# Patient Record
Sex: MALE | Race: WHITE | NOT HISPANIC OR LATINO | Employment: OTHER | URBAN - METROPOLITAN AREA
[De-identification: names, ages, dates, MRNs, and addresses within clinical notes are randomized per-mention and may not be internally consistent; named-entity substitution may affect disease eponyms.]

---

## 2023-02-17 PROBLEM — E11.49 DIABETES MELLITUS TYPE 2 WITH NEUROLOGICAL MANIFESTATIONS (MULTI): Status: ACTIVE | Noted: 2023-02-17

## 2023-02-17 PROBLEM — M51.36 DISC DEGENERATION, LUMBAR: Status: ACTIVE | Noted: 2023-02-17

## 2023-02-17 PROBLEM — M54.9 BACK PAIN: Status: ACTIVE | Noted: 2023-02-17

## 2023-02-17 PROBLEM — E78.5 HYPERLIPIDEMIA: Status: ACTIVE | Noted: 2023-02-17

## 2023-02-17 PROBLEM — N40.0 ENLARGED PROSTATE WITHOUT LOWER URINARY TRACT SYMPTOMS (LUTS): Status: ACTIVE | Noted: 2023-02-17

## 2023-02-17 PROBLEM — I10 HYPERTENSION: Status: ACTIVE | Noted: 2023-02-17

## 2023-02-17 PROBLEM — R35.1 NOCTURIA: Status: ACTIVE | Noted: 2023-02-17

## 2023-02-17 PROBLEM — K21.9 GERD (GASTROESOPHAGEAL REFLUX DISEASE): Status: ACTIVE | Noted: 2023-02-17

## 2023-02-17 PROBLEM — M19.019 DJD OF SHOULDER: Status: ACTIVE | Noted: 2023-02-17

## 2023-02-17 PROBLEM — M17.9 DEGENERATIVE JOINT DISEASE OF KNEE: Status: ACTIVE | Noted: 2023-02-17

## 2023-02-17 PROBLEM — E55.9 MILD VITAMIN D DEFICIENCY: Status: ACTIVE | Noted: 2023-02-17

## 2023-02-17 PROBLEM — E11.29: Status: ACTIVE | Noted: 2023-02-17

## 2023-02-17 PROBLEM — Z85.820 PERSONAL HISTORY OF MALIGNANT MELANOMA OF SKIN: Status: ACTIVE | Noted: 2023-02-17

## 2023-02-17 PROBLEM — M25.512 LEFT SHOULDER PAIN: Status: ACTIVE | Noted: 2023-02-17

## 2023-02-17 PROBLEM — M51.369 DISC DEGENERATION, LUMBAR: Status: ACTIVE | Noted: 2023-02-17

## 2023-02-17 PROBLEM — R31.9 HEMATURIA: Status: ACTIVE | Noted: 2022-12-31

## 2023-02-17 PROBLEM — I47.19 PAROXYSMAL ATRIAL TACHYCARDIA (CMS-HCC): Status: ACTIVE | Noted: 2023-02-17

## 2023-02-17 PROBLEM — N20.0 RENAL CALCULI: Status: ACTIVE | Noted: 2023-02-17

## 2023-02-17 RX ORDER — TAMSULOSIN HYDROCHLORIDE 0.4 MG/1
0.4 CAPSULE ORAL DAILY
COMMUNITY
Start: 2019-03-04 | End: 2023-04-07 | Stop reason: SDUPTHER

## 2023-02-17 RX ORDER — FLECAINIDE ACETATE 100 MG/1
50 TABLET ORAL 2 TIMES DAILY
COMMUNITY
Start: 2012-12-31 | End: 2023-10-05 | Stop reason: ALTCHOICE

## 2023-02-17 RX ORDER — GLYBURIDE 5 MG/1
5 TABLET ORAL 4 TIMES DAILY
COMMUNITY
Start: 2013-04-19 | End: 2023-04-07 | Stop reason: SDUPTHER

## 2023-02-17 RX ORDER — TIZANIDINE 2 MG/1
2 TABLET ORAL EVERY 6 HOURS PRN
COMMUNITY
Start: 2019-09-09 | End: 2023-09-18 | Stop reason: ALTCHOICE

## 2023-02-17 RX ORDER — METFORMIN HYDROCHLORIDE 500 MG/1
TABLET, EXTENDED RELEASE ORAL
COMMUNITY
Start: 2021-06-21 | End: 2023-04-07 | Stop reason: SDUPTHER

## 2023-02-17 RX ORDER — SIMVASTATIN 40 MG/1
40 TABLET, FILM COATED ORAL DAILY
COMMUNITY
End: 2023-04-07 | Stop reason: SDUPTHER

## 2023-02-17 RX ORDER — PIOGLITAZONEHYDROCHLORIDE 45 MG/1
45 TABLET ORAL DAILY
COMMUNITY
End: 2023-04-07 | Stop reason: SDUPTHER

## 2023-02-17 RX ORDER — LOSARTAN POTASSIUM 100 MG/1
1 TABLET ORAL DAILY
COMMUNITY
Start: 2017-09-13 | End: 2023-04-07 | Stop reason: SDUPTHER

## 2023-03-13 ENCOUNTER — TELEPHONE (OUTPATIENT)
Dept: PRIMARY CARE | Facility: CLINIC | Age: 78
End: 2023-03-13
Payer: COMMERCIAL

## 2023-03-13 DIAGNOSIS — E11.22 CONTROLLED TYPE 2 DIABETES MELLITUS WITH STAGE 3 CHRONIC KIDNEY DISEASE, WITHOUT LONG-TERM CURRENT USE OF INSULIN (MULTI): Primary | ICD-10-CM

## 2023-03-13 DIAGNOSIS — N18.30 CONTROLLED TYPE 2 DIABETES MELLITUS WITH STAGE 3 CHRONIC KIDNEY DISEASE, WITHOUT LONG-TERM CURRENT USE OF INSULIN (MULTI): Primary | ICD-10-CM

## 2023-04-04 ENCOUNTER — LAB (OUTPATIENT)
Dept: LAB | Facility: LAB | Age: 78
End: 2023-04-04
Payer: MEDICARE

## 2023-04-04 DIAGNOSIS — E11.22 CONTROLLED TYPE 2 DIABETES MELLITUS WITH STAGE 3 CHRONIC KIDNEY DISEASE, WITHOUT LONG-TERM CURRENT USE OF INSULIN (MULTI): ICD-10-CM

## 2023-04-04 DIAGNOSIS — N18.30 CONTROLLED TYPE 2 DIABETES MELLITUS WITH STAGE 3 CHRONIC KIDNEY DISEASE, WITHOUT LONG-TERM CURRENT USE OF INSULIN (MULTI): ICD-10-CM

## 2023-04-04 DIAGNOSIS — I10 HYPERTENSION, UNSPECIFIED TYPE: ICD-10-CM

## 2023-04-04 LAB
ALANINE AMINOTRANSFERASE (SGPT) (U/L) IN SER/PLAS: 8 U/L (ref 10–52)
ALBUMIN (G/DL) IN SER/PLAS: 4 G/DL (ref 3.4–5)
ALKALINE PHOSPHATASE (U/L) IN SER/PLAS: 65 U/L (ref 33–136)
ANION GAP IN SER/PLAS: 14 MMOL/L (ref 10–20)
ASPARTATE AMINOTRANSFERASE (SGOT) (U/L) IN SER/PLAS: 13 U/L (ref 9–39)
BILIRUBIN TOTAL (MG/DL) IN SER/PLAS: 0.6 MG/DL (ref 0–1.2)
CALCIUM (MG/DL) IN SER/PLAS: 9.6 MG/DL (ref 8.6–10.3)
CARBON DIOXIDE, TOTAL (MMOL/L) IN SER/PLAS: 28 MMOL/L (ref 21–32)
CHLORIDE (MMOL/L) IN SER/PLAS: 104 MMOL/L (ref 98–107)
CREATININE (MG/DL) IN SER/PLAS: 1.17 MG/DL (ref 0.5–1.3)
ESTIMATED AVERAGE GLUCOSE FOR HBA1C: 123 MG/DL
GFR MALE: 64 ML/MIN/1.73M2
GLUCOSE (MG/DL) IN SER/PLAS: 86 MG/DL (ref 74–99)
HEMOGLOBIN A1C/HEMOGLOBIN TOTAL IN BLOOD: 5.9 %
POTASSIUM (MMOL/L) IN SER/PLAS: 4.7 MMOL/L (ref 3.5–5.3)
PROTEIN TOTAL: 7.2 G/DL (ref 6.4–8.2)
SODIUM (MMOL/L) IN SER/PLAS: 141 MMOL/L (ref 136–145)
UREA NITROGEN (MG/DL) IN SER/PLAS: 36 MG/DL (ref 6–23)

## 2023-04-04 PROCEDURE — 80053 COMPREHEN METABOLIC PANEL: CPT

## 2023-04-04 PROCEDURE — 36415 COLL VENOUS BLD VENIPUNCTURE: CPT

## 2023-04-04 PROCEDURE — 83036 HEMOGLOBIN GLYCOSYLATED A1C: CPT

## 2023-04-07 ENCOUNTER — OFFICE VISIT (OUTPATIENT)
Dept: PRIMARY CARE | Facility: CLINIC | Age: 78
End: 2023-04-07
Payer: MEDICARE

## 2023-04-07 VITALS
BODY MASS INDEX: 29.12 KG/M2 | OXYGEN SATURATION: 97 % | HEIGHT: 71 IN | DIASTOLIC BLOOD PRESSURE: 67 MMHG | HEART RATE: 72 BPM | WEIGHT: 208 LBS | SYSTOLIC BLOOD PRESSURE: 129 MMHG

## 2023-04-07 DIAGNOSIS — I10 HYPERTENSION, UNSPECIFIED TYPE: ICD-10-CM

## 2023-04-07 DIAGNOSIS — N20.0 NEPHROLITHIASIS: ICD-10-CM

## 2023-04-07 DIAGNOSIS — I47.19 PAROXYSMAL ATRIAL TACHYCARDIA (CMS-HCC): ICD-10-CM

## 2023-04-07 DIAGNOSIS — N40.0 BENIGN PROSTATIC HYPERPLASIA WITHOUT LOWER URINARY TRACT SYMPTOMS: ICD-10-CM

## 2023-04-07 DIAGNOSIS — E78.5 HYPERLIPIDEMIA, UNSPECIFIED HYPERLIPIDEMIA TYPE: ICD-10-CM

## 2023-04-07 DIAGNOSIS — E11.22 CONTROLLED TYPE 2 DIABETES MELLITUS WITH STAGE 3 CHRONIC KIDNEY DISEASE, WITHOUT LONG-TERM CURRENT USE OF INSULIN (MULTI): ICD-10-CM

## 2023-04-07 DIAGNOSIS — N18.30 CONTROLLED TYPE 2 DIABETES MELLITUS WITH STAGE 3 CHRONIC KIDNEY DISEASE, WITHOUT LONG-TERM CURRENT USE OF INSULIN (MULTI): ICD-10-CM

## 2023-04-07 DIAGNOSIS — Z00.00 MEDICARE ANNUAL WELLNESS VISIT, SUBSEQUENT: Primary | ICD-10-CM

## 2023-04-07 PROCEDURE — 3074F SYST BP LT 130 MM HG: CPT | Performed by: FAMILY MEDICINE

## 2023-04-07 PROCEDURE — 3078F DIAST BP <80 MM HG: CPT | Performed by: FAMILY MEDICINE

## 2023-04-07 PROCEDURE — 1170F FXNL STATUS ASSESSED: CPT | Performed by: FAMILY MEDICINE

## 2023-04-07 PROCEDURE — G0439 PPPS, SUBSEQ VISIT: HCPCS | Performed by: FAMILY MEDICINE

## 2023-04-07 PROCEDURE — 99497 ADVNCD CARE PLAN 30 MIN: CPT | Performed by: FAMILY MEDICINE

## 2023-04-07 PROCEDURE — 99214 OFFICE O/P EST MOD 30 MIN: CPT | Performed by: FAMILY MEDICINE

## 2023-04-07 PROCEDURE — 1036F TOBACCO NON-USER: CPT | Performed by: FAMILY MEDICINE

## 2023-04-07 PROCEDURE — 1160F RVW MEDS BY RX/DR IN RCRD: CPT | Performed by: FAMILY MEDICINE

## 2023-04-07 PROCEDURE — 1157F ADVNC CARE PLAN IN RCRD: CPT | Performed by: FAMILY MEDICINE

## 2023-04-07 PROCEDURE — 1159F MED LIST DOCD IN RCRD: CPT | Performed by: FAMILY MEDICINE

## 2023-04-07 RX ORDER — GLYBURIDE 5 MG/1
10 TABLET ORAL
Qty: 360 TABLET | Refills: 2 | Status: SHIPPED | OUTPATIENT
Start: 2023-04-07 | End: 2023-09-18 | Stop reason: SDUPTHER

## 2023-04-07 RX ORDER — LOSARTAN POTASSIUM 100 MG/1
100 TABLET ORAL DAILY
Qty: 90 TABLET | Refills: 2 | Status: SHIPPED | OUTPATIENT
Start: 2023-04-07 | End: 2023-09-18 | Stop reason: SDUPTHER

## 2023-04-07 RX ORDER — TAMSULOSIN HYDROCHLORIDE 0.4 MG/1
0.4 CAPSULE ORAL DAILY
Qty: 90 CAPSULE | Refills: 2 | Status: SHIPPED | OUTPATIENT
Start: 2023-04-07 | End: 2023-09-18 | Stop reason: SDUPTHER

## 2023-04-07 RX ORDER — PIOGLITAZONEHYDROCHLORIDE 45 MG/1
45 TABLET ORAL DAILY
Qty: 90 TABLET | Refills: 2 | Status: SHIPPED | OUTPATIENT
Start: 2023-04-07 | End: 2023-09-18 | Stop reason: SDUPTHER

## 2023-04-07 RX ORDER — METFORMIN HYDROCHLORIDE 500 MG/1
1000 TABLET, EXTENDED RELEASE ORAL
Qty: 180 TABLET | Refills: 2 | Status: SHIPPED | OUTPATIENT
Start: 2023-04-07 | End: 2023-09-18 | Stop reason: SDUPTHER

## 2023-04-07 RX ORDER — SIMVASTATIN 40 MG/1
40 TABLET, FILM COATED ORAL NIGHTLY
Qty: 90 TABLET | Refills: 2 | Status: SHIPPED | OUTPATIENT
Start: 2023-04-07 | End: 2023-09-18 | Stop reason: SDUPTHER

## 2023-04-07 ASSESSMENT — PATIENT HEALTH QUESTIONNAIRE - PHQ9
2. FEELING DOWN, DEPRESSED OR HOPELESS: NOT AT ALL
SUM OF ALL RESPONSES TO PHQ9 QUESTIONS 1 & 2: 0
1. LITTLE INTEREST OR PLEASURE IN DOING THINGS: NOT AT ALL

## 2023-04-07 ASSESSMENT — ACTIVITIES OF DAILY LIVING (ADL)
TAKING_MEDICATION: INDEPENDENT
DRESSING: INDEPENDENT
DOING_HOUSEWORK: INDEPENDENT
BATHING: INDEPENDENT
GROCERY_SHOPPING: INDEPENDENT
MANAGING_FINANCES: INDEPENDENT

## 2023-04-07 NOTE — PROGRESS NOTES
General Medical Management Note    77 y.o. male presents for Medical Management.  Wife present also  HPI    ER in 1/2023 for kidney stone.  Passed spontaneously.  No residual effects.  Right knee s/p TKA 10 yrs ago.  Golfs all Summer.  Walks daily.  Doing squats now.  Compliant with current medications.  Notes no side effects.  He and his wife are given up sweets for length.  Hemoglobin A1c has significantly improved.    Chronic kidney disease stage IIIa not drinking enough water during the winter but states he drinks more during summer.    Living independently with his wife.  Driving automobile safely.  Rarely uses alcohol.  Non-smoker    Past Medical History:   Diagnosis Date    Low back pain, unspecified 09/25/2017    Acute low back pain    Personal history of other diseases of the circulatory system     Personal history of supraventricular tachycardia    Personal history of other diseases of urinary system 12/31/2022    History of hematuria    Personal history of other endocrine, nutritional and metabolic disease     History of diabetes mellitus      Past Surgical History:   Procedure Laterality Date    CATARACT EXTRACTION  04/07/2017    Cataract Surgery    HAND SURGERY  04/07/2017    Hand Surgery                                                                                                                                                          OTHER SURGICAL HISTORY  04/10/2014    Catheter Ablation Atrial Supraventricular Tachycardia    SHOULDER SURGERY  04/07/2017    Shoulder Surgery    TOTAL HIP ARTHROPLASTY  04/10/2014    Hip Replacement    TOTAL KNEE ARTHROPLASTY  04/10/2014    Knee Replacement     Family History   Problem Relation Name Age of Onset    Other (heart failure following cardiac surgery) Mother        Social History     Socioeconomic History    Marital status:      Spouse name: Not on file    Number of children: Not on file    Years of education: Not on file    Highest education level:  "Not on file   Occupational History    Not on file   Tobacco Use    Smoking status: Not on file    Smokeless tobacco: Not on file   Vaping Use    Vaping status: Not on file   Substance and Sexual Activity    Alcohol use: Not on file    Drug use: Not on file    Sexual activity: Not on file   Other Topics Concern    Not on file   Social History Narrative    Not on file     Social Determinants of Health     Financial Resource Strain: Not on file   Food Insecurity: Not on file   Transportation Needs: Not on file   Physical Activity: Not on file   Stress: Not on file   Social Connections: Not on file   Intimate Partner Violence: Not on file   Housing Stability: Not on file       Current Outpatient Medications on File Prior to Visit   Medication Sig Dispense Refill    flecainide (Tambocor) 100 mg tablet Take 0.5 tablets (50 mg) by mouth in the morning and 0.5 tablets (50 mg) in the evening.      glyBURIDE (Diabeta) 5 mg tablet Take 1 tablet (5 mg) by mouth in the morning and 1 tablet (5 mg) at noon and 1 tablet (5 mg) in the evening and 1 tablet (5 mg) before bedtime.      losartan (Cozaar) 100 mg tablet Take 1 tablet (100 mg) by mouth once daily.      metFORMIN XR (Glucophage-XR) 500 mg 24 hr tablet Take 1 tablet with evening meal for 2 weeks then increase to 2 tab with evening meal      pioglitazone (Actos) 45 mg tablet Take 1 tablet (45 mg) by mouth once daily.      simvastatin (Zocor) 40 mg tablet Take 1 tablet (40 mg) by mouth once daily.      tamsulosin (Flomax) 0.4 mg 24 hr capsule Take 1 capsule (0.4 mg) by mouth once daily.      tiZANidine (Zanaflex) 2 mg tablet Take 1 tablet (2 mg) by mouth every 6 hours if needed.       No current facility-administered medications on file prior to visit.       Allergies   Allergen Reactions    Cat Dander Unknown    Canagliflozin Rash         ROS: Denies chest pain, SOB, Headache, GI problems     Visit Vitals  /67   Pulse 72   Ht 1.803 m (5' 11\")   Wt 94.3 kg (208 lb) "   SpO2 97%   BMI 29.01 kg/m²   Smoking Status Never Assessed   BSA 2.17 m²        PHYSICAL EXAM:  Alert and oriented x3.  Eyes: EOM grossly intact  Neck supple without lymph adenopathy or carotid bruit.  No masses or thyromegaly  Heart regular rate and rhythm without murmur.  Lungs clear to auscultation.  Legs without edema.  Gait is non-antalgic  Speech clear.  Hearing adequate.          DIAGNOSIS/PLAN:  1. Medicare annual wellness visit, subsequent    2. Hypertension, unspecified type  - Comprehensive Metabolic Panel; Future  - losartan (Cozaar) 100 mg tablet; Take 1 tablet (100 mg) by mouth once daily.  Dispense: 90 tablet; Refill: 2    3. Nephrolithiasis  Resolved    4. Controlled type 2 diabetes mellitus with stage 3 chronic kidney disease, without long-term current use of insulin (CMS/[facility])  - glyBURIDE (Diabeta) 5 mg tablet; Take 2 tablets (10 mg) by mouth in the morning and 2 tablets (10 mg) in the evening. Take with meals.  Dispense: 360 tablet; Refill: 2  - metFORMIN XR (Glucophage-XR) 500 mg 24 hr tablet; Take 2 tablets (1,000 mg) by mouth once daily in the evening. Take with meals. Do not crush, chew, or split.  Dispense: 180 tablet; Refill: 2  - pioglitazone (Actos) 45 mg tablet; Take 1 tablet (45 mg) by mouth once daily.  Dispense: 90 tablet; Refill: 2    5. Benign prostatic hyperplasia without lower urinary tract symptoms  - tamsulosin (Flomax) 0.4 mg 24 hr capsule; Take 1 capsule (0.4 mg) by mouth once daily.  Dispense: 90 capsule; Refill: 2    6. Hyperlipidemia, unspecified hyperlipidemia type  - simvastatin (Zocor) 40 mg tablet; Take 1 tablet (40 mg) by mouth once daily at bedtime.  Dispense: 90 tablet; Refill: 2    7. Paroxysmal atrial tachycardia (CMS/HCC)  Follow-up with cardiology        Follow up in 6 months for medical management    I will continue to monitor, evaluate, assess and treat all problems/diagnoses as appropriate and continue to collaborate with specialists.    Contact office or  send a Follow My Health message with any questions or concerns    Encouraged to sign up with my  care  Patient will only be notified of labs that require medical intervention.    Prescriptions will not be filled unless you are compliant with your follow up appointments or have a follow up appointment scheduled as per instruction of your physician. Refills should be requested at the time of your visit.    **Charting was completed using voice recognition technology and may include unintended errors**    Kd Lange DO, DWAYNE  [address] [facility] [address]  [place] [address] [phone]          Kd Lange DO, DWAYNE

## 2023-08-23 DIAGNOSIS — R30.0 DYSURIA: ICD-10-CM

## 2023-08-23 DIAGNOSIS — R53.83 FATIGUE, UNSPECIFIED TYPE: Primary | ICD-10-CM

## 2023-08-23 DIAGNOSIS — E11.9 TYPE 2 DIABETES MELLITUS WITHOUT COMPLICATION, UNSPECIFIED WHETHER LONG TERM INSULIN USE (MULTI): ICD-10-CM

## 2023-08-23 DIAGNOSIS — Z13.220 SCREENING, LIPID: ICD-10-CM

## 2023-08-23 DIAGNOSIS — E55.9 VITAMIN D DEFICIENCY: ICD-10-CM

## 2023-08-23 DIAGNOSIS — E29.1 HYPOGONADISM MALE: ICD-10-CM

## 2023-08-23 DIAGNOSIS — R39.198 SLOWING OF URINARY STREAM: ICD-10-CM

## 2023-08-23 DIAGNOSIS — N40.0 BENIGN PROSTATIC HYPERPLASIA, UNSPECIFIED WHETHER LOWER URINARY TRACT SYMPTOMS PRESENT: ICD-10-CM

## 2023-09-14 ENCOUNTER — LAB (OUTPATIENT)
Dept: LAB | Facility: LAB | Age: 78
End: 2023-09-14
Payer: MEDICARE

## 2023-09-14 DIAGNOSIS — N40.0 BENIGN PROSTATIC HYPERPLASIA, UNSPECIFIED WHETHER LOWER URINARY TRACT SYMPTOMS PRESENT: ICD-10-CM

## 2023-09-14 DIAGNOSIS — Z13.220 SCREENING, LIPID: ICD-10-CM

## 2023-09-14 DIAGNOSIS — E55.9 VITAMIN D DEFICIENCY: ICD-10-CM

## 2023-09-14 DIAGNOSIS — E29.1 HYPOGONADISM MALE: ICD-10-CM

## 2023-09-14 DIAGNOSIS — R53.83 FATIGUE, UNSPECIFIED TYPE: ICD-10-CM

## 2023-09-14 DIAGNOSIS — R39.198 SLOWING OF URINARY STREAM: ICD-10-CM

## 2023-09-14 DIAGNOSIS — R30.0 DYSURIA: ICD-10-CM

## 2023-09-14 DIAGNOSIS — E11.9 TYPE 2 DIABETES MELLITUS WITHOUT COMPLICATION, UNSPECIFIED WHETHER LONG TERM INSULIN USE (MULTI): ICD-10-CM

## 2023-09-14 LAB
ALANINE AMINOTRANSFERASE (SGPT) (U/L) IN SER/PLAS: 8 U/L (ref 10–52)
ALBUMIN (G/DL) IN SER/PLAS: 4.1 G/DL (ref 3.4–5)
ALKALINE PHOSPHATASE (U/L) IN SER/PLAS: 76 U/L (ref 33–136)
ANION GAP IN SER/PLAS: 13 MMOL/L (ref 10–20)
APPEARANCE, URINE: CLEAR
ASPARTATE AMINOTRANSFERASE (SGOT) (U/L) IN SER/PLAS: 15 U/L (ref 9–39)
BILIRUBIN TOTAL (MG/DL) IN SER/PLAS: 0.4 MG/DL (ref 0–1.2)
BILIRUBIN, URINE: NEGATIVE
BLOOD, URINE: ABNORMAL
CALCIDIOL (25 OH VITAMIN D3) (NG/ML) IN SER/PLAS: 48 NG/ML
CALCIUM (MG/DL) IN SER/PLAS: 9.4 MG/DL (ref 8.6–10.3)
CARBON DIOXIDE, TOTAL (MMOL/L) IN SER/PLAS: 26 MMOL/L (ref 21–32)
CHLORIDE (MMOL/L) IN SER/PLAS: 107 MMOL/L (ref 98–107)
CHOLESTEROL (MG/DL) IN SER/PLAS: 113 MG/DL (ref 0–199)
CHOLESTEROL IN HDL (MG/DL) IN SER/PLAS: 45.5 MG/DL
CHOLESTEROL/HDL RATIO: 2.5
COLOR, URINE: YELLOW
CREATININE (MG/DL) IN SER/PLAS: 1.42 MG/DL (ref 0.5–1.3)
ERYTHROCYTE DISTRIBUTION WIDTH (RATIO) BY AUTOMATED COUNT: 14.5 % (ref 11.5–14.5)
ERYTHROCYTE MEAN CORPUSCULAR HEMOGLOBIN CONCENTRATION (G/DL) BY AUTOMATED: 29.5 G/DL (ref 32–36)
ERYTHROCYTE MEAN CORPUSCULAR VOLUME (FL) BY AUTOMATED COUNT: 97 FL (ref 80–100)
ERYTHROCYTES (10*6/UL) IN BLOOD BY AUTOMATED COUNT: 3.88 X10E12/L (ref 4.5–5.9)
ESTIMATED AVERAGE GLUCOSE FOR HBA1C: 128 MG/DL
GFR MALE: 50 ML/MIN/1.73M2
GLUCOSE (MG/DL) IN SER/PLAS: 62 MG/DL (ref 74–99)
GLUCOSE, URINE: NEGATIVE MG/DL
HEMATOCRIT (%) IN BLOOD BY AUTOMATED COUNT: 37.6 % (ref 41–52)
HEMOGLOBIN (G/DL) IN BLOOD: 11.1 G/DL (ref 13.5–17.5)
HEMOGLOBIN A1C/HEMOGLOBIN TOTAL IN BLOOD: 6.1 %
KETONES, URINE: NEGATIVE MG/DL
LDL: 51 MG/DL (ref 0–99)
LEUKOCYTE ESTERASE, URINE: NEGATIVE
LEUKOCYTES (10*3/UL) IN BLOOD BY AUTOMATED COUNT: 8 X10E9/L (ref 4.4–11.3)
MUCUS, URINE: NORMAL /LPF
NITRITE, URINE: NEGATIVE
PH, URINE: 5 (ref 5–8)
PLATELETS (10*3/UL) IN BLOOD AUTOMATED COUNT: 224 X10E9/L (ref 150–450)
POTASSIUM (MMOL/L) IN SER/PLAS: 4.9 MMOL/L (ref 3.5–5.3)
PROSTATE SPECIFIC AG (NG/ML) IN SER/PLAS: 1.18 NG/ML (ref 0–4)
PROTEIN TOTAL: 6.7 G/DL (ref 6.4–8.2)
PROTEIN, URINE: NEGATIVE MG/DL
RBC, URINE: 2 /HPF (ref 0–5)
SODIUM (MMOL/L) IN SER/PLAS: 141 MMOL/L (ref 136–145)
SPECIFIC GRAVITY, URINE: 1.02 (ref 1–1.03)
THYROTROPIN (MIU/L) IN SER/PLAS BY DETECTION LIMIT <= 0.05 MIU/L: 3.2 MIU/L (ref 0.44–3.98)
TRIGLYCERIDE (MG/DL) IN SER/PLAS: 83 MG/DL (ref 0–149)
UREA NITROGEN (MG/DL) IN SER/PLAS: 39 MG/DL (ref 6–23)
UROBILINOGEN, URINE: <2 MG/DL (ref 0–1.9)
VLDL: 17 MG/DL (ref 0–40)
WBC, URINE: 1 /HPF (ref 0–5)

## 2023-09-14 PROCEDURE — 81001 URINALYSIS AUTO W/SCOPE: CPT

## 2023-09-14 PROCEDURE — 83036 HEMOGLOBIN GLYCOSYLATED A1C: CPT

## 2023-09-14 PROCEDURE — 85027 COMPLETE CBC AUTOMATED: CPT

## 2023-09-14 PROCEDURE — 36415 COLL VENOUS BLD VENIPUNCTURE: CPT

## 2023-09-14 PROCEDURE — 80061 LIPID PANEL: CPT

## 2023-09-14 PROCEDURE — 80053 COMPREHEN METABOLIC PANEL: CPT

## 2023-09-14 PROCEDURE — 84443 ASSAY THYROID STIM HORMONE: CPT

## 2023-09-14 PROCEDURE — 82306 VITAMIN D 25 HYDROXY: CPT

## 2023-09-14 PROCEDURE — 84403 ASSAY OF TOTAL TESTOSTERONE: CPT

## 2023-09-14 PROCEDURE — 84153 ASSAY OF PSA TOTAL: CPT

## 2023-09-15 DIAGNOSIS — Z12.11 COLON CANCER SCREENING: Primary | ICD-10-CM

## 2023-09-15 PROBLEM — M19.91 LOCALIZED, PRIMARY OSTEOARTHRITIS: Status: RESOLVED | Noted: 2018-09-06 | Resolved: 2023-09-15

## 2023-09-15 PROBLEM — Z96.651 PRESENCE OF RIGHT ARTIFICIAL KNEE JOINT: Status: RESOLVED | Noted: 2018-01-12 | Resolved: 2023-09-15

## 2023-09-15 PROBLEM — H90.3 SENSORINEURAL HEARING LOSS, BILATERAL: Status: RESOLVED | Noted: 2018-07-23 | Resolved: 2023-09-15

## 2023-09-15 PROBLEM — M75.32 CALCIFIC TENDONITIS OF LEFT SHOULDER: Status: RESOLVED | Noted: 2018-02-08 | Resolved: 2023-09-15

## 2023-09-15 LAB — TESTOSTERONE (NG/DL) IN SER/PLAS: 553 NG/DL (ref 240–1000)

## 2023-09-18 ENCOUNTER — OFFICE VISIT (OUTPATIENT)
Dept: PRIMARY CARE | Facility: CLINIC | Age: 78
End: 2023-09-18
Payer: MEDICARE

## 2023-09-18 VITALS
BODY MASS INDEX: 29.12 KG/M2 | WEIGHT: 208 LBS | OXYGEN SATURATION: 95 % | HEART RATE: 79 BPM | SYSTOLIC BLOOD PRESSURE: 121 MMHG | HEIGHT: 71 IN | DIASTOLIC BLOOD PRESSURE: 73 MMHG

## 2023-09-18 DIAGNOSIS — E78.5 HYPERLIPIDEMIA, UNSPECIFIED HYPERLIPIDEMIA TYPE: ICD-10-CM

## 2023-09-18 DIAGNOSIS — N40.0 BENIGN PROSTATIC HYPERPLASIA WITHOUT LOWER URINARY TRACT SYMPTOMS: ICD-10-CM

## 2023-09-18 DIAGNOSIS — N18.30 CONTROLLED TYPE 2 DIABETES MELLITUS WITH STAGE 3 CHRONIC KIDNEY DISEASE, WITHOUT LONG-TERM CURRENT USE OF INSULIN (MULTI): ICD-10-CM

## 2023-09-18 DIAGNOSIS — E11.22 CONTROLLED TYPE 2 DIABETES MELLITUS WITH STAGE 3 CHRONIC KIDNEY DISEASE, WITHOUT LONG-TERM CURRENT USE OF INSULIN (MULTI): ICD-10-CM

## 2023-09-18 DIAGNOSIS — D64.9 ANEMIA, UNSPECIFIED TYPE: Primary | ICD-10-CM

## 2023-09-18 DIAGNOSIS — I10 HYPERTENSION, UNSPECIFIED TYPE: ICD-10-CM

## 2023-09-18 PROCEDURE — 3074F SYST BP LT 130 MM HG: CPT | Performed by: FAMILY MEDICINE

## 2023-09-18 PROCEDURE — 1159F MED LIST DOCD IN RCRD: CPT | Performed by: FAMILY MEDICINE

## 2023-09-18 PROCEDURE — 99214 OFFICE O/P EST MOD 30 MIN: CPT | Performed by: FAMILY MEDICINE

## 2023-09-18 PROCEDURE — 1036F TOBACCO NON-USER: CPT | Performed by: FAMILY MEDICINE

## 2023-09-18 PROCEDURE — 1160F RVW MEDS BY RX/DR IN RCRD: CPT | Performed by: FAMILY MEDICINE

## 2023-09-18 PROCEDURE — 1157F ADVNC CARE PLAN IN RCRD: CPT | Performed by: FAMILY MEDICINE

## 2023-09-18 PROCEDURE — 3078F DIAST BP <80 MM HG: CPT | Performed by: FAMILY MEDICINE

## 2023-09-18 PROCEDURE — 1126F AMNT PAIN NOTED NONE PRSNT: CPT | Performed by: FAMILY MEDICINE

## 2023-09-18 RX ORDER — GLYBURIDE 5 MG/1
10 TABLET ORAL
Qty: 360 TABLET | Refills: 2 | Status: SHIPPED | OUTPATIENT
Start: 2023-09-18 | End: 2023-09-18 | Stop reason: SDUPTHER

## 2023-09-18 RX ORDER — SIMVASTATIN 40 MG/1
40 TABLET, FILM COATED ORAL NIGHTLY
Qty: 90 TABLET | Refills: 2 | Status: SHIPPED | OUTPATIENT
Start: 2023-09-18 | End: 2024-04-10 | Stop reason: SDUPTHER

## 2023-09-18 RX ORDER — METFORMIN HYDROCHLORIDE 500 MG/1
1000 TABLET, EXTENDED RELEASE ORAL
Qty: 180 TABLET | Refills: 2 | Status: SHIPPED | OUTPATIENT
Start: 2023-09-18 | End: 2024-04-18 | Stop reason: ALTCHOICE

## 2023-09-18 RX ORDER — PIOGLITAZONEHYDROCHLORIDE 45 MG/1
45 TABLET ORAL DAILY
Qty: 90 TABLET | Refills: 2 | Status: SHIPPED | OUTPATIENT
Start: 2023-09-18 | End: 2024-04-10 | Stop reason: SDUPTHER

## 2023-09-18 RX ORDER — TAMSULOSIN HYDROCHLORIDE 0.4 MG/1
0.4 CAPSULE ORAL DAILY
Qty: 90 CAPSULE | Refills: 2 | Status: SHIPPED | OUTPATIENT
Start: 2023-09-18 | End: 2024-04-10 | Stop reason: ALTCHOICE

## 2023-09-18 RX ORDER — LOSARTAN POTASSIUM 100 MG/1
100 TABLET ORAL DAILY
Qty: 90 TABLET | Refills: 2 | Status: SHIPPED | OUTPATIENT
Start: 2023-09-18

## 2023-09-18 RX ORDER — GLYBURIDE 5 MG/1
10 TABLET ORAL
Qty: 360 TABLET | Refills: 2 | Status: SHIPPED | OUTPATIENT
Start: 2023-09-18 | End: 2024-04-10 | Stop reason: SDUPTHER

## 2023-09-18 ASSESSMENT — PATIENT HEALTH QUESTIONNAIRE - PHQ9
2. FEELING DOWN, DEPRESSED OR HOPELESS: NOT AT ALL
1. LITTLE INTEREST OR PLEASURE IN DOING THINGS: NOT AT ALL
SUM OF ALL RESPONSES TO PHQ9 QUESTIONS 1 & 2: 0

## 2023-09-18 NOTE — PATIENT INSTRUCTIONS
Take a multiple vitamin/mineral WITH IRON once daily.    Anemia - do blood test in one month;  consider colonoscopy

## 2023-09-18 NOTE — PROGRESS NOTES
General Medical Management Note    77 y.o. male presents for Medical Management.  Wife present  HPI    BPH w LUTS - Flomax 0.4 daily.  Since starting an eye atb, noticed more frequent urination.     Hematuria and slowly progressive anemia.  Had passed a stone several months ago.  WBC improved but anemia remains progressive.    8 # weight loss over summer.  BMI 29.  Patient states that since his wife became ill earlier this year, she is not cooking fresh foods.  They have been eating more processed foods.  The patient himself does not shop for food or cook food.    Patient remains active by golfing during the summer.    Recent lab testing reveals a slowly progressive anemia.  Patient is resistant to undergoing colonoscopy which has been ordered after labs were reviewed.    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    Localized, primary osteoarthritis 09/06/2018    Low back pain, unspecified 09/25/2017    Acute low back pain    Malignant melanoma of skin of trunk, except scrotum (CMS/HCC) 07/23/2009    Personal history of other diseases of the circulatory system     Personal history of supraventricular tachycardia    Personal history of other diseases of urinary system 12/31/2022    History of hematuria    Personal history of other endocrine, nutritional and metabolic disease     History of diabetes mellitus    Polyneuropathy 04/05/2010    Presence of right artificial knee joint 01/12/2018    Sensorineural hearing loss, bilateral 07/23/2018      Past Surgical History:   Procedure Laterality Date    CATARACT EXTRACTION  04/07/2017    Cataract Surgery    HAND SURGERY  04/07/2017    Hand Surgery                                                                                                                                                           OTHER SURGICAL HISTORY  04/10/2014    Catheter Ablation Atrial Supraventricular Tachycardia    SHOULDER SURGERY  04/07/2017    Shoulder Surgery    TOTAL HIP ARTHROPLASTY  04/10/2014    Hip Replacement    TOTAL KNEE ARTHROPLASTY  04/10/2014    Knee Replacement     Family History   Problem Relation Name Age of Onset    Other (heart failure following cardiac surgery) Mother        Social History     Socioeconomic History    Marital status:      Spouse name: Not on file    Number of children: Not on file    Years of education: Not on file    Highest education level: Not on file   Occupational History    Not on file   Tobacco Use    Smoking status: Never    Smokeless tobacco: Never   Substance and Sexual Activity    Alcohol use: Yes     Alcohol/week: 2.0 standard drinks of alcohol     Types: 2 Cans of beer per week    Drug use: Not on file    Sexual activity: Not on file   Other Topics Concern    Not on file   Social History Narrative    Not on file     Social Determinants of Health     Financial Resource Strain: Not on file   Food Insecurity: Not on file   Transportation Needs: Not on file   Physical Activity: Not on file   Stress: Not on file   Social Connections: Not on file   Intimate Partner Violence: Not on file   Housing Stability: Not on file       Current Outpatient Medications on File Prior to Visit   Medication Sig Dispense Refill    flecainide (Tambocor) 100 mg tablet Take 0.5 tablets (50 mg) by mouth twice a day.      glyBURIDE (Diabeta) 5 mg tablet Take 2 tablets (10 mg) by mouth in the morning and 2 tablets (10 mg) in the evening. Take with meals. 360 tablet 2    losartan (Cozaar) 100 mg tablet Take 1 tablet (100 mg) by mouth once daily. 90 tablet 2    metFORMIN XR (Glucophage-XR) 500 mg 24 hr tablet Take 2 tablets (1,000 mg) by mouth once daily in the evening. Take with meals. Do not crush, chew, or split. 180 tablet 2    pioglitazone (Actos) 45 mg tablet Take 1 tablet (45 mg) by mouth once daily.  "90 tablet 2    simvastatin (Zocor) 40 mg tablet Take 1 tablet (40 mg) by mouth once daily at bedtime. 90 tablet 2    tamsulosin (Flomax) 0.4 mg 24 hr capsule Take 1 capsule (0.4 mg) by mouth once daily. 90 capsule 2    [DISCONTINUED] tiZANidine (Zanaflex) 2 mg tablet Take 1 tablet (2 mg) by mouth every 6 hours if needed.       No current facility-administered medications on file prior to visit.       Allergies   Allergen Reactions    Cat Dander Unknown    Canagliflozin Rash         ROS: Denies chest pain, SOB, Headache, GI problems     Visit Vitals  /73   Pulse 79   Ht 1.791 m (5' 10.5\")   Wt 94.3 kg (208 lb)   SpO2 95%   BMI 29.42 kg/m²   Smoking Status Never   BSA 2.17 m²        PHYSICAL EXAM:  Alert and oriented x3.  Eyes: EOM grossly intact  Neck supple without lymph adenopathy or carotid bruit.  No masses or thyromegaly  Heart regular rate and rhythm without murmur.  Lungs clear to auscultation.  Legs without edema.  Gait is non-antalgic  Speech clear.  Hearing adequate.          DIAGNOSIS/PLAN:    1. Anemia, unspecified type  -Will repeat CBC in 1 month.  If blood count continues to decrease,  will have colonoscopy.  Will also start taking a multiple vitamin with iron daily.  Encourage patient to eat more fresh foods  - CBC; Future    2. Controlled type 2 diabetes mellitus with stage 3 chronic kidney disease, without long-term current use of insulin (CMS/[facility])  - metFORMIN  mg 24 hr tablet; Take 2 tablets (1,000 mg) by mouth once daily in the evening. Take with meals. Do not crush, chew, or split.  Dispense: 180 tablet; Refill: 2  - pioglitazone (Actos) 45 mg tablet; Take 1 tablet (45 mg) by mouth once daily.  Dispense: 90 tablet; Refill: 2  - glyBURIDE (Diabeta) 5 mg tablet; Take 2 tablets (10 mg) by mouth 2 times a day with meals.  Dispense: 360 tablet; Refill: 2    3. Hypertension, unspecified type  - losartan (Cozaar) 100 mg tablet; Take 1 tablet (100 mg) by mouth once daily.  Dispense: 90 " tablet; Refill: 2    4. Hyperlipidemia, unspecified hyperlipidemia type  - simvastatin (Zocor) 40 mg tablet; Take 1 tablet (40 mg) by mouth once daily at bedtime.  Dispense: 90 tablet; Refill: 2    5. Benign prostatic hyperplasia without lower urinary tract symptoms  -Anticipate decreased nocturia once complete antibiotic.  If symptom persists, will refer to urology as patient has history of kidney stones and BPH  - tamsulosin (Flomax) 0.4 mg 24 hr capsule; Take 1 capsule (0.4 mg) by mouth once daily.  Dispense: 90 capsule; Refill: 2        Return to office in 6 months for comprehensive medical evaluation, long-term medication use monitoring, and preventative services screening    We will continue to monitor, evaluate, assess and treat all problems/diagnoses as appropriate and continue to collaborate with specialists.    [facility]our[facility] to [facility] with [facility]    Contact office or send a  [employer] message with any questions or concerns    Patient will only be notified of labs that require medical intervention.    Prescriptions will not be filled unless you are compliant with your follow up appointments or have a follow up appointment scheduled as per instruction of your physician. Refills should be requested at the time of your visit.    **Charting was completed using voice recognition technology and may include unintended errors**    Kd Lange DO, DWAYNE  [address] [facility] [address]  [place] [address] [phone]        Kd Lange DO, DWAYNE

## 2023-10-05 ENCOUNTER — OFFICE VISIT (OUTPATIENT)
Dept: PRIMARY CARE | Facility: CLINIC | Age: 78
End: 2023-10-05
Payer: MEDICARE

## 2023-10-05 VITALS
HEIGHT: 71 IN | OXYGEN SATURATION: 96 % | HEART RATE: 74 BPM | WEIGHT: 210 LBS | SYSTOLIC BLOOD PRESSURE: 128 MMHG | DIASTOLIC BLOOD PRESSURE: 72 MMHG | BODY MASS INDEX: 29.4 KG/M2

## 2023-10-05 DIAGNOSIS — K14.9 TONGUE IRRITATION: Primary | ICD-10-CM

## 2023-10-05 PROCEDURE — 1159F MED LIST DOCD IN RCRD: CPT | Performed by: NURSE PRACTITIONER

## 2023-10-05 PROCEDURE — 1160F RVW MEDS BY RX/DR IN RCRD: CPT | Performed by: NURSE PRACTITIONER

## 2023-10-05 PROCEDURE — 1126F AMNT PAIN NOTED NONE PRSNT: CPT | Performed by: NURSE PRACTITIONER

## 2023-10-05 PROCEDURE — 3074F SYST BP LT 130 MM HG: CPT | Performed by: NURSE PRACTITIONER

## 2023-10-05 PROCEDURE — 99213 OFFICE O/P EST LOW 20 MIN: CPT | Performed by: NURSE PRACTITIONER

## 2023-10-05 PROCEDURE — 3078F DIAST BP <80 MM HG: CPT | Performed by: NURSE PRACTITIONER

## 2023-10-05 PROCEDURE — 1036F TOBACCO NON-USER: CPT | Performed by: NURSE PRACTITIONER

## 2023-10-05 RX ORDER — NEOMYCIN SULFATE, POLYMYXIN B SULFATE AND DEXAMETHASONE 3.5; 10000; 1 MG/ML; [USP'U]/ML; MG/ML
SUSPENSION/ DROPS OPHTHALMIC 2 TIMES DAILY
COMMUNITY
Start: 2023-09-21

## 2023-10-05 RX ORDER — NEOMYCIN SULFATE, POLYMYXIN B SULFATE, AND DEXAMETHASONE 3.5; 10000; 1 MG/G; [USP'U]/G; MG/G
OINTMENT OPHTHALMIC 2 TIMES DAILY
COMMUNITY
Start: 2023-09-21

## 2023-10-05 NOTE — PROGRESS NOTES
"Subjective   Patient ID: Kevin Mendes is a 77 y.o. male who presents for Throat Pain.    HPI     Pt states 3 weeks ago he was ordered very large antibiotics to tx his eye infection.   He states that 1 week ago one of the pills became stuck between the right side of his tongue and his cheek. Said it was \"way back in his mouth\"  States that the pill started to dissolve because the taste was terrible.   He denies pain.   States he just knows its there-when he swallows it feels like a speed bump.   Denies difficulty swelling or SOB.   He did complete the entire antibiotic.     Only reason he came in was because his brother had throat/tongue cancer in the past.     He is a former smoker    Review of Systems    Objective   Visit Vitals  /72   Pulse 74   Ht 1.803 m (5' 11\")   Wt 95.3 kg (210 lb)   SpO2 96%   BMI 29.29 kg/m²   Smoking Status Never   BSA 2.18 m²         Physical Exam    Alert and oriented x 3  Appears healthy  Does not appear/sound dyspneic with conversation  Speech clear.  .  Right lateral tongue closer to the tongue base noted to have a dime size mildly erythematous and mildly edematous area (area looks mildly irritated)       Assessment/Plan     1. Tongue irritation  Discussed with patient that he should continue to monitor area closely. If the area does not continue to improve, he should contact his dentist.   Try to avoid irritating foods/drinks  Apply Anbesol if needed.       Contact office with any questions or concerns.   Preferred communication is via  [employer]  Please contact [email or web address] if having issues with  [facility]    Radha GRAVES-[facility]  [address], [address]  [place] [address]  Phone: [phone]    **Charting was completed using voice recognition technology and may include unintended errors**           "

## 2023-10-11 ENCOUNTER — OFFICE VISIT (OUTPATIENT)
Dept: OTOLARYNGOLOGY | Facility: CLINIC | Age: 78
End: 2023-10-11
Payer: MEDICARE

## 2023-10-11 VITALS
DIASTOLIC BLOOD PRESSURE: 58 MMHG | TEMPERATURE: 97.8 F | BODY MASS INDEX: 30.24 KG/M2 | WEIGHT: 211.2 LBS | HEIGHT: 70 IN | SYSTOLIC BLOOD PRESSURE: 127 MMHG

## 2023-10-11 DIAGNOSIS — R22.1 MASS OF LEFT SIDE OF NECK: ICD-10-CM

## 2023-10-11 DIAGNOSIS — K14.8 TONGUE MASS: Primary | ICD-10-CM

## 2023-10-11 PROCEDURE — 99204 OFFICE O/P NEW MOD 45 MIN: CPT | Performed by: OTOLARYNGOLOGY

## 2023-10-11 PROCEDURE — 3078F DIAST BP <80 MM HG: CPT | Performed by: OTOLARYNGOLOGY

## 2023-10-11 PROCEDURE — 31575 DIAGNOSTIC LARYNGOSCOPY: CPT | Performed by: OTOLARYNGOLOGY

## 2023-10-11 PROCEDURE — 1036F TOBACCO NON-USER: CPT | Performed by: OTOLARYNGOLOGY

## 2023-10-11 PROCEDURE — 1160F RVW MEDS BY RX/DR IN RCRD: CPT | Performed by: OTOLARYNGOLOGY

## 2023-10-11 PROCEDURE — 1159F MED LIST DOCD IN RCRD: CPT | Performed by: OTOLARYNGOLOGY

## 2023-10-11 PROCEDURE — 3074F SYST BP LT 130 MM HG: CPT | Performed by: OTOLARYNGOLOGY

## 2023-10-11 PROCEDURE — 1126F AMNT PAIN NOTED NONE PRSNT: CPT | Performed by: OTOLARYNGOLOGY

## 2023-10-11 RX ORDER — ASPIRIN 81 MG/1
81 TABLET ORAL DAILY
COMMUNITY

## 2023-10-11 NOTE — PROGRESS NOTES
Impression:  1. Tongue mass  CT soft tissue neck w IV contrast      2. Mass of left side of neck  CT soft tissue neck w IV contrast         RECOMMENDATIONS/PLAN :  I explained to the patient that he has a mass along his right tongue base that is approaching the midline.  This could be coming from his base of tongue or from his right tonsillar region.  He also has a left neck mass that could either be lymphadenopathy or a lipoma.  We will set him up with a CT scan with contrast to look at both of these areas.  I would also like to refer him to one of my head and neck colleagues, Dr. Kalin Garcia in consultation for his right base of tongue mass.      **This electronic medical record note was created with the use of voice recognition software.  Despite proofreading, typographical or grammatical errors may be present that could affect meaning of content **    Subjective   Patient ID:     Kevin Mendes is a 77 y.o. male who presents to the office today stating that he recently was swallowing some medication and he felt like it got stuck along the right base of his tongue.  He has noticed a slight fullness to the area.  He denies any hemoptysis or hematemesis.  He has not had any secondary symptoms such as night sweats weight loss or fatigue.  He is still able to eat and drink without any pain or discomfort.  He denies spitting up any blood.  In the past he used to smoke cigars and drink alcohol frequently however he quit both of these approximately 25 years ago.  He also has some swelling along his left neck that has been there for several years.  He denies any fixation to the overlying skin or any pain or discomfort.    ROS:  A detailed 12 system review of systems is noted on the intake form has been reviewed with the patient with details noted in the HPI and scanned into the patient's medical record.    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    Localized, primary osteoarthritis 09/06/2018    Low back pain, unspecified 09/25/2017    Acute low back pain    Malignant melanoma of skin of trunk, except scrotum (CMS/[facility]) 07/23/2009    Personal history of other diseases of the circulatory system     Personal history of supraventricular tachycardia    Personal history of other diseases of urinary system 12/31/2022    History of hematuria    Personal history of other endocrine, nutritional and metabolic disease     History of diabetes mellitus    Polyneuropathy 04/05/2010    Presence of right artificial knee joint 01/12/2018    Sensorineural hearing loss, bilateral 07/23/2018        Past Surgical History:   Procedure Laterality Date    CATARACT EXTRACTION  04/07/2017    Cataract Surgery    HAND SURGERY  04/07/2017    Hand Surgery                                                                                                                                                          OTHER SURGICAL HISTORY  04/10/2014    Catheter Ablation Atrial Supraventricular Tachycardia    SHOULDER SURGERY  04/07/2017    Shoulder Surgery    TOTAL HIP ARTHROPLASTY  04/10/2014    Hip Replacement    TOTAL KNEE ARTHROPLASTY  04/10/2014    Knee Replacement        Allergies   Allergen Reactions    Cat Dander Unknown    Canagliflozin Rash          Current Outpatient Medications:     aspirin 81 mg EC tablet, Take 1 tablet (81 mg) by mouth once daily., Disp: , Rfl:     glyBURIDE (Diabeta) 5 mg tablet, Take 2 tablets (10 mg) by mouth 2 times a day with meals., Disp: 360 tablet, Rfl: 2    losartan (Cozaar) 100 mg tablet, Take 1 tablet (100 mg) by mouth once daily., Disp: 90 tablet, Rfl: 2    metFORMIN  mg 24 hr tablet, Take 2 tablets (1,000 mg) by mouth once daily in the evening. Take with meals. Do not crush, chew, or split., Disp: 180 tablet, Rfl: 2     "neomycin-polymyxin B-dexameth (Polydex) 3.5 mg/g-10,000 unit/g-0.1 % ointment ophthalmic ointment, apply to all 4 eyelids four times a day, Disp: , Rfl:     neomycin-polymyxin-dexAMETHasone (Maxitrol) 3.5mg/mL-10,000 unit/mL-0.1 % ophthalmic suspension, Instill 2 (TWO) drops in each eye four times a day, Disp: , Rfl:     pioglitazone (Actos) 45 mg tablet, Take 1 tablet (45 mg) by mouth once daily., Disp: 90 tablet, Rfl: 2    simvastatin (Zocor) 40 mg tablet, Take 1 tablet (40 mg) by mouth once daily at bedtime., Disp: 90 tablet, Rfl: 2    tamsulosin (Flomax) 0.4 mg 24 hr capsule, Take 1 capsule (0.4 mg) by mouth once daily., Disp: 90 capsule, Rfl: 2     Tobacco Use: Low Risk  (10/11/2023)    Patient History     Smoking Tobacco Use: Never     Smokeless Tobacco Use: Never     Passive Exposure: Not on file        Alcohol Use: Not on file        Social History     Substance and Sexual Activity   Drug Use Not on file        Physical Exam:  Visit Vitals  /58   Temp 36.6 °C (97.8 °F) (Temporal)   Ht 1.778 m (5' 10\")   Wt 95.8 kg (211 lb 3.2 oz)   BMI 30.30 kg/m²   Smoking Status Never   BSA 2.18 m²      General: Patient is alert, oriented, cooperative in no apparent distress.  Head: Normocephalic, atraumatic.  Eyes: PERRL, EOMI, Conjunctiva is clear. No nystagmus.  Ears: Right Ear-- Pinna is normal.  External auditory canal is patent, no lesions. Tympanic membrane is [intact, translucent and has good mobility with my pneumatic otoscope. No effusion].  Mastoid is nontender.  Left ear-- Pinna is normal.  External auditory canal is patent, no lesions.  Tympanic membrane is [intact, translucent and has good mobility with my pneumatic otoscope.  No effusion].  Mastoid is nontender.  Nose: Septum is relatively straight.  No septal perforation or lesions. No septal hematoma/ seroma.  No signs of bleeding.  Inferior turbinates are fairly normal.   No evidence of intranasal polyps.    Throat:  Floor of mouth is clear, no " masses.  Along his right base of tongue and right tonsillar region he does have an exophytic mass that appears like tonsil tissue.  Left side is clear.  Gums, gingiva, buccal mucosa appear pink and moist, no lesions. Teeth are in fair repair.  No obvious dental infections.  Peritonsillar regions appear symmetric without swelling.  Hard and soft palate appear normal, no obvious cleft. Uvula is midline.  Tonsils were previously removed surgically however he does have a mass along the right tongue base and right tonsillar region  Oropharynx: No lesions. Retropharyngeal wall is flat.  No active postnasal drip.  Neck: He does have a mobile mass at level 3/4 along his left neck.  This could represent lymphadenopathy versus a lipoma.  It is firm but nontender.  No fixation to the overlying skin.  Salivary Glands: Symmetric bilaterally.  No palpable masses.  No evidence of acute infection or salivary stones  Neurologic: Cranial Nerves 2-12 are grossly intact without focal deficits. Cerebellar function testing is normal.     Results:   []  Procedure:   Pre Op Diagnosis: Right base of tongue mass-rule out other upper airway lesions  Post Op Diagnosis: Same  Procedure: Flexible Nasopharyngolaryngoscopy  Surgeon: Federico Holly DO  Assist: None  Anesthesia: Topical Lidocaine 4%/ 0.05% Afrin 1:1 mixture  EBL: None  Complications: None  Disposition: The patient tolerated the procedure well. There were no complications.    Procedure:  After informed consent was obtained with the risks, benefits, complications and alternatives explained to the patient/ guardian, the patient was sat up in the ENT chair and the nose was anesthetized and decongested with topical  4% lidocaine and 0.05% Afrin. After allowing this to take effect, the flexible nasopharyngoscope was advanced thru the nostrils and down to the larynx. The following areas were visualized:  The nasal passages, septum, nasopharynx, sinus ostia, base of tongue, epiglottis, true  and false vocal folds, arytenoids, post cricoid region and subglottis were all examined and found to be normal except as follows:    Septum is relatively straight.  His ostiomeatal complexes are clear bilaterally.  No pus polyps or lesions.  Nasopharynx is clear.  Along his right tongue base and right tonsillar region he does have a mass that appears similar to tonsillar tissue.  This does extend towards the midline and it extends down towards his right vallecula.  Epiglottis otherwise appears fairly normal.  No evidence of any lingual tonsil hypertrophy on the left side.  No active bleeding.  No signs of inflammation or infection.  True and false vocal cords are approximating equally bilaterally.  No nodules polyps or lesions.  Arytenoids show mild edema consistent with laryngopharyngeal reflux.  Subglottis is clear.  Piriform sinuses are clear.      The patient tolerated the procedure well and there were no complications.      Federico Holly DO

## 2023-10-17 ENCOUNTER — LAB (OUTPATIENT)
Dept: LAB | Facility: LAB | Age: 78
End: 2023-10-17
Payer: MEDICARE

## 2023-10-17 DIAGNOSIS — D64.9 ANEMIA, UNSPECIFIED TYPE: ICD-10-CM

## 2023-10-17 LAB
ERYTHROCYTE [DISTWIDTH] IN BLOOD BY AUTOMATED COUNT: 14 % (ref 11.5–14.5)
HCT VFR BLD AUTO: 37.5 % (ref 41–52)
HGB BLD-MCNC: 11.3 G/DL (ref 13.5–17.5)
MCH RBC QN AUTO: 29.5 PG (ref 26–34)
MCHC RBC AUTO-ENTMCNC: 30.1 G/DL (ref 32–36)
MCV RBC AUTO: 98 FL (ref 80–100)
NRBC BLD-RTO: 0 /100 WBCS (ref 0–0)
PLATELET # BLD AUTO: 236 X10*3/UL (ref 150–450)
PMV BLD AUTO: 10 FL (ref 7.5–11.5)
RBC # BLD AUTO: 3.83 X10*6/UL (ref 4.5–5.9)
WBC # BLD AUTO: 8.8 X10*3/UL (ref 4.4–11.3)

## 2023-10-17 PROCEDURE — 85027 COMPLETE CBC AUTOMATED: CPT

## 2023-10-17 PROCEDURE — 36415 COLL VENOUS BLD VENIPUNCTURE: CPT

## 2023-10-18 ENCOUNTER — TELEPHONE (OUTPATIENT)
Dept: PRIMARY CARE | Facility: CLINIC | Age: 78
End: 2023-10-18
Payer: MEDICARE

## 2023-10-18 NOTE — TELEPHONE ENCOUNTER
Pt's wife is calling and is very concerned with his test results. She is asking if you can give her a call she is very concerned that he is bleeding internally and doesn't want to wait if he is. She is asking if you can give her a call at [phone]?

## 2023-10-20 ENCOUNTER — HOSPITAL ENCOUNTER (OUTPATIENT)
Dept: RADIOLOGY | Facility: HOSPITAL | Age: 78
Discharge: HOME | End: 2023-10-20
Payer: MEDICARE

## 2023-10-20 DIAGNOSIS — R22.1 MASS OF LEFT SIDE OF NECK: ICD-10-CM

## 2023-10-20 DIAGNOSIS — K14.8 TONGUE MASS: ICD-10-CM

## 2023-10-20 PROCEDURE — 70491 CT SOFT TISSUE NECK W/DYE: CPT | Performed by: RADIOLOGY

## 2023-10-20 PROCEDURE — 70491 CT SOFT TISSUE NECK W/DYE: CPT | Mod: MG

## 2023-10-20 PROCEDURE — 2550000001 HC RX 255 CONTRASTS: Performed by: OTOLARYNGOLOGY

## 2023-10-20 RX ADMIN — IOHEXOL 70 ML: 350 INJECTION, SOLUTION INTRAVENOUS at 16:20

## 2023-10-30 ENCOUNTER — OFFICE VISIT (OUTPATIENT)
Dept: OTOLARYNGOLOGY | Facility: CLINIC | Age: 78
End: 2023-10-30
Payer: MEDICARE

## 2023-10-30 VITALS
WEIGHT: 210 LBS | TEMPERATURE: 98.6 F | DIASTOLIC BLOOD PRESSURE: 70 MMHG | HEIGHT: 70 IN | BODY MASS INDEX: 30.06 KG/M2 | SYSTOLIC BLOOD PRESSURE: 115 MMHG | HEART RATE: 76 BPM

## 2023-10-30 DIAGNOSIS — C01 MALIGNANT NEOPLASM OF BASE OF TONGUE (MULTI): Primary | ICD-10-CM

## 2023-10-30 PROCEDURE — 3078F DIAST BP <80 MM HG: CPT | Performed by: OTOLARYNGOLOGY

## 2023-10-30 PROCEDURE — 1159F MED LIST DOCD IN RCRD: CPT | Performed by: OTOLARYNGOLOGY

## 2023-10-30 PROCEDURE — 1126F AMNT PAIN NOTED NONE PRSNT: CPT | Performed by: OTOLARYNGOLOGY

## 2023-10-30 PROCEDURE — 3074F SYST BP LT 130 MM HG: CPT | Performed by: OTOLARYNGOLOGY

## 2023-10-30 PROCEDURE — 31575 DIAGNOSTIC LARYNGOSCOPY: CPT | Performed by: OTOLARYNGOLOGY

## 2023-10-30 PROCEDURE — 1036F TOBACCO NON-USER: CPT | Performed by: OTOLARYNGOLOGY

## 2023-10-30 PROCEDURE — 99204 OFFICE O/P NEW MOD 45 MIN: CPT | Performed by: OTOLARYNGOLOGY

## 2023-10-30 PROCEDURE — 1160F RVW MEDS BY RX/DR IN RCRD: CPT | Performed by: OTOLARYNGOLOGY

## 2023-10-30 ASSESSMENT — PATIENT HEALTH QUESTIONNAIRE - PHQ9: 2. FEELING DOWN, DEPRESSED OR HOPELESS: NOT AT ALL

## 2023-10-30 NOTE — PROGRESS NOTES
ENT Outpatient Consultation    Chief Complaint: Base of tongue mass  History Of Present Illness  Katherine Mendes is a 78 y.o. male referred by Dr. Holly for evaluation of a base of tongue mass.  Patient states that he developed a globus type sensation a little over 1 month ago.  This prompted his evaluation with Dr. Holly who noted a oropharyngeal mass.  A CT scan was obtained showing an exophytic mass in the oropharynx that appears to be coming from the base of tongue.  There is some invasion into the deeper aspect of the base of tongue and the mass extends out laterally towards the carotid.  He has a history of smoking and a lung nodule was also noted on his CT scan.  He is still tolerating p.o. intake.     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    Surgical History  He has a past surgical history that includes Total hip arthroplasty (04/10/2014); Total knee arthroplasty (04/10/2014); Other surgical history (04/10/2014); Hand surgery (04/07/2017); Cataract extraction (04/07/2017); and Shoulder surgery (04/07/2017).     Social History  He reports that he has never smoked. He has never used smokeless tobacco. He reports current alcohol use of about 2.0 standard drinks of alcohol per week. No history on file for drug use.    Family  History  Family History   Problem Relation Name Age of Onset    Other (heart failure following cardiac surgery) Mother          Allergies  Cat dander and Canagliflozin     Physical Exam:  CONSTITUTIONAL:  No acute distress  VOICE:  No hoarseness or other abnormality  RESPIRATION:  Breathing comfortably, no stridor  CV:  No clubbing/cyanosis/edema in hands  EYES:  EOM intact, sclera normal  NEURO:  Alert and oriented times 3, Cranial nerves II-XII grossly intact and symmetric bilaterally  HEAD AND FACE:  Symmetric facial features, no masses or lesions, sinuses non-tender to palpation  SALIVARY GLANDS:  Parotid and submandibular glands normal bilaterally  EARS:  Normal external ears, external auditory canals, and TMs to otoscopy, normal hearing to whispered voice.  NOSE:  External nose midline, anterior rhinoscopy is normal with limited visualization to the anterior aspect of the interior turbinates, no bleeding or drainage, no lesions  ORAL CAVITY/OROPHARYNX/LIPS: An exophytic friable mass can be visualized in the right oropharynx.  The remainder of the oral cavity has normal mucosa  PHARYNGEAL WALLS: Right oropharyngeal mass  NECK/LYMPH: Some mildly enlarged LAD, no thyroid masses, trachea midline.  There is a lipomatous lesion in the left inferior neck near the sternocleidomastoid  SKIN:  Neck skin is without scar or injury  PSYCH:  Alert and oriented with appropriate mood and affect    Procedure Note: Flexible Nasolaryngoscopy  Verbal informed consent was obtained from the patient. 4% lidocaine mixed with phenylephrine was prepared and dripped into the nose. It was placed in the right naris. Following an appropriate amount of time to allow for adequate anesthesia, a flexible fiberoptic nasolaryngoscope was placed into the patient's right naris. The nasal cavity, nasopharynx, oropharynx, hypopharynx, and all endolaryngeal structures were visualized and were normal except as listed below. Significant findings  included:  -exophytic mass in right oropharynx, airway patent, vocal cords mobile         CT soft tissue neck w IV contrast    Result Date: 10/23/2023  Interpreted By:  Antwon Chin,  and Rossi Alcocer STUDY: CT SOFT TISSUE NECK W IV CONTRAST;  10/20/2023 4:38 pm   INDICATION: Signs/Symptoms:Right base of tongue mass and left neck mass.   COMPARISON: Cervical spine radiograph 03/18/2014   ACCESSION NUMBER(S): [id number]   ORDERING CLINICIAN: GUILHERME GIBBS   TECHNIQUE: Axial CT images of the neck were obtained.  The patient received 70 mL Omnipaque 350 intravenous contrast agent. The images were reformatted in angled axial, coronal and sagittal planes.   FINDINGS: Oral Cavity, Pharynx and Larynx:  Evaluation oral cavity is limited by streak artifact from dental hardware.  The nasopharyngeal are unremarkable. There is a 3.3 x 2.5 x 2.5 cm enhancing mass centered at the base of the right aspect of the tongue extending superiorly to the right aspect of the soft palate and inferiorly along the right lateral pharyngeal wall towards the right piriformis sinus. There is loss of the adjacent parapharyngeal fat plane.   The laryngeal structures are unremarkable.   Lymph nodes: No lymphadenopathy.   The left neck has a 1.7 x 5.1 x 4.8 cm circumscribed benign-appearing lipoma extending between the sternocleidomastoid muscle and platysma/superficial cervical fascia.   Neck vessels: Bilateral neck vessels are normal in course and caliber and appear patent. There are moderate right-greater-than-left carotid bulb calcifications.   Thyroid gland: There are multiple hypodense nodules within the thyroid gland measuring up to 1.3 cm at the inferior left thyroid lobe.   Parotid and submandibular glands: Bilateral parotid and submandibular glands are unremarkable in appearance.   Paranasal Sinuses and Mastoids: Moderately sized mucous retention cyst within the right maxillary sinus. The remainder of the visualized paranasal sinuses  and bilateral mastoids are clear.   Visualized orbital structures are unremarkable.   There is a small amount of debris within the right external auditory canal, likely representing cerumen.   There is a 9 mm spiculated nodule in the superior left lower lobe. Additional smaller tree-in-bud nodular opacities are noted in the superior left lower lobe.   Multilevel degenerative changes of the cervical spine, most prominently at C4-C5 where posterior disc osteophyte complex, uncovertebral and facet joint arthropathy results in severe right and moderate left neural foraminal narrowing and mild central canal stenosis.       1. The right tongue base has a 3.3 x 2.5 x 2.5 cm invasive/exophytic tumor extending to the right tonsil/lateral pharyngeal wall. 2. The superior segment of the left lower lobe has a 9 mm spiculated nodule with surrounding tiny nodular tree-in-bud opacities suspicious for metastatic disease. 3. Benign-appearing lipoma of left neck between sternocleidomastoid muscle and platysma/superficial cervical fascia. 4. Multiple hypodense nodules of the thyroid gland measuring up to 1.3 cm in the left thyroid lobe. Further evaluation with thyroid ultrasound is recommended. 5. Multilevel degenerative changes of the cervical spine, most prominently at C4-C5 with severe right and moderate left neural foraminal narrowing and mild central canal stenosis.   I personally reviewed the images/study and I agree with the findings as stated. This study was interpreted at [facility], [place], [place].   MACRO: None   Signed by: Antwon Chin 10/23/2023 1:06 PM Dictation workstation:   [id number]      Assessment and Plan  78 y.o. 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  Patient does have a history of smoking and there is also a suspicious appearing lung nodule on his CT scan.  Today we discussed the likely diagnosis including  different indications for surgery, radiation, chemotherapy.  Based on the appearance of his CT scan I suspect he will likely be treated with chemoradiation as the mass does not seem amenable to transoral resection.  We discussed direct laryngoscopy with biopsy and also placement of a PEG tube.  Advised him that I would still evaluate for potential surgical treatment at the time of the biopsy.  Also advised him to get evaluation by his dentist to see if he requires any dental extractions.  Consent was obtained for direct laryngoscopy, biopsy, placement of feeding tube.  We will also get a PET scan to complete staging and to evaluate his lung nodule for hypermetabolic activity    Klain Garcia MD

## 2023-10-30 NOTE — H&P (VIEW-ONLY)
ENT Outpatient Consultation    Chief Complaint: Base of tongue mass  History Of Present Illness  Katherine Mendes is a 78 y.o. male referred by Dr. Holly for evaluation of a base of tongue mass.  Patient states that he developed a globus type sensation a little over 1 month ago.  This prompted his evaluation with Dr. Holly who noted a oropharyngeal mass.  A CT scan was obtained showing an exophytic mass in the oropharynx that appears to be coming from the base of tongue.  There is some invasion into the deeper aspect of the base of tongue and the mass extends out laterally towards the carotid.  He has a history of smoking and a lung nodule was also noted on his CT scan.  He is still tolerating p.o. intake.     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    Surgical History  He has a past surgical history that includes Total hip arthroplasty (04/10/2014); Total knee arthroplasty (04/10/2014); Other surgical history (04/10/2014); Hand surgery (04/07/2017); Cataract extraction (04/07/2017); and Shoulder surgery (04/07/2017).     Social History  He reports that he has never smoked. He has never used smokeless tobacco. He reports current alcohol use of about 2.0 standard drinks of alcohol per week. No history on file for drug use.    Family  History  Family History   Problem Relation Name Age of Onset    Other (heart failure following cardiac surgery) Mother          Allergies  Cat dander and Canagliflozin     Physical Exam:  CONSTITUTIONAL:  No acute distress  VOICE:  No hoarseness or other abnormality  RESPIRATION:  Breathing comfortably, no stridor  CV:  No clubbing/cyanosis/edema in hands  EYES:  EOM intact, sclera normal  NEURO:  Alert and oriented times 3, Cranial nerves II-XII grossly intact and symmetric bilaterally  HEAD AND FACE:  Symmetric facial features, no masses or lesions, sinuses non-tender to palpation  SALIVARY GLANDS:  Parotid and submandibular glands normal bilaterally  EARS:  Normal external ears, external auditory canals, and TMs to otoscopy, normal hearing to whispered voice.  NOSE:  External nose midline, anterior rhinoscopy is normal with limited visualization to the anterior aspect of the interior turbinates, no bleeding or drainage, no lesions  ORAL CAVITY/OROPHARYNX/LIPS: An exophytic friable mass can be visualized in the right oropharynx.  The remainder of the oral cavity has normal mucosa  PHARYNGEAL WALLS: Right oropharyngeal mass  NECK/LYMPH: Some mildly enlarged LAD, no thyroid masses, trachea midline.  There is a lipomatous lesion in the left inferior neck near the sternocleidomastoid  SKIN:  Neck skin is without scar or injury  PSYCH:  Alert and oriented with appropriate mood and affect    Procedure Note: Flexible Nasolaryngoscopy  Verbal informed consent was obtained from the patient. 4% lidocaine mixed with phenylephrine was prepared and dripped into the nose. It was placed in the right naris. Following an appropriate amount of time to allow for adequate anesthesia, a flexible fiberoptic nasolaryngoscope was placed into the patient's right naris. The nasal cavity, nasopharynx, oropharynx, hypopharynx, and all endolaryngeal structures were visualized and were normal except as listed below. Significant findings  included:  -exophytic mass in right oropharynx, airway patent, vocal cords mobile         CT soft tissue neck w IV contrast    Result Date: 10/23/2023  Interpreted By:  Antwon Chin,  and Rossi Alcocer STUDY: CT SOFT TISSUE NECK W IV CONTRAST;  10/20/2023 4:38 pm   INDICATION: Signs/Symptoms:Right base of tongue mass and left neck mass.   COMPARISON: Cervical spine radiograph 03/18/2014   ACCESSION NUMBER(S): [id number]   ORDERING CLINICIAN: GUILHERME GIBBS   TECHNIQUE: Axial CT images of the neck were obtained.  The patient received 70 mL Omnipaque 350 intravenous contrast agent. The images were reformatted in angled axial, coronal and sagittal planes.   FINDINGS: Oral Cavity, Pharynx and Larynx:  Evaluation oral cavity is limited by streak artifact from dental hardware.  The nasopharyngeal are unremarkable. There is a 3.3 x 2.5 x 2.5 cm enhancing mass centered at the base of the right aspect of the tongue extending superiorly to the right aspect of the soft palate and inferiorly along the right lateral pharyngeal wall towards the right piriformis sinus. There is loss of the adjacent parapharyngeal fat plane.   The laryngeal structures are unremarkable.   Lymph nodes: No lymphadenopathy.   The left neck has a 1.7 x 5.1 x 4.8 cm circumscribed benign-appearing lipoma extending between the sternocleidomastoid muscle and platysma/superficial cervical fascia.   Neck vessels: Bilateral neck vessels are normal in course and caliber and appear patent. There are moderate right-greater-than-left carotid bulb calcifications.   Thyroid gland: There are multiple hypodense nodules within the thyroid gland measuring up to 1.3 cm at the inferior left thyroid lobe.   Parotid and submandibular glands: Bilateral parotid and submandibular glands are unremarkable in appearance.   Paranasal Sinuses and Mastoids: Moderately sized mucous retention cyst within the right maxillary sinus. The remainder of the visualized paranasal sinuses  and bilateral mastoids are clear.   Visualized orbital structures are unremarkable.   There is a small amount of debris within the right external auditory canal, likely representing cerumen.   There is a 9 mm spiculated nodule in the superior left lower lobe. Additional smaller tree-in-bud nodular opacities are noted in the superior left lower lobe.   Multilevel degenerative changes of the cervical spine, most prominently at C4-C5 where posterior disc osteophyte complex, uncovertebral and facet joint arthropathy results in severe right and moderate left neural foraminal narrowing and mild central canal stenosis.       1. The right tongue base has a 3.3 x 2.5 x 2.5 cm invasive/exophytic tumor extending to the right tonsil/lateral pharyngeal wall. 2. The superior segment of the left lower lobe has a 9 mm spiculated nodule with surrounding tiny nodular tree-in-bud opacities suspicious for metastatic disease. 3. Benign-appearing lipoma of left neck between sternocleidomastoid muscle and platysma/superficial cervical fascia. 4. Multiple hypodense nodules of the thyroid gland measuring up to 1.3 cm in the left thyroid lobe. Further evaluation with thyroid ultrasound is recommended. 5. Multilevel degenerative changes of the cervical spine, most prominently at C4-C5 with severe right and moderate left neural foraminal narrowing and mild central canal stenosis.   I personally reviewed the images/study and I agree with the findings as stated. This study was interpreted at [facility], [place], [place].   MACRO: None   Signed by: Antwon Chin 10/23/2023 1:06 PM Dictation workstation:   [id number]      Assessment and Plan  78 y.o. 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  Patient does have a history of smoking and there is also a suspicious appearing lung nodule on his CT scan.  Today we discussed the likely diagnosis including  different indications for surgery, radiation, chemotherapy.  Based on the appearance of his CT scan I suspect he will likely be treated with chemoradiation as the mass does not seem amenable to transoral resection.  We discussed direct laryngoscopy with biopsy and also placement of a PEG tube.  Advised him that I would still evaluate for potential surgical treatment at the time of the biopsy.  Also advised him to get evaluation by his dentist to see if he requires any dental extractions.  Consent was obtained for direct laryngoscopy, biopsy, placement of feeding tube.  We will also get a PET scan to complete staging and to evaluate his lung nodule for hypermetabolic activity    Kalin Garcia MD

## 2023-11-03 ENCOUNTER — ANCILLARY PROCEDURE (OUTPATIENT)
Dept: RADIOLOGY | Facility: CLINIC | Age: 78
End: 2023-11-03
Payer: MEDICARE

## 2023-11-03 DIAGNOSIS — C01 MALIGNANT NEOPLASM OF BASE OF TONGUE (MULTI): ICD-10-CM

## 2023-11-03 PROCEDURE — 3430000001 HC RX 343 DIAGNOSTIC RADIOPHARMACEUTICALS: Mod: MUE | Performed by: OTOLARYNGOLOGY

## 2023-11-03 PROCEDURE — 78815 PET IMAGE W/CT SKULL-THIGH: CPT | Mod: PET TUMOR SUBSQ TX STRATEGY | Performed by: RADIOLOGY

## 2023-11-03 PROCEDURE — A9552 F18 FDG: HCPCS | Mod: MUE | Performed by: OTOLARYNGOLOGY

## 2023-11-03 PROCEDURE — 78815 PET IMAGE W/CT SKULL-THIGH: CPT | Mod: PS

## 2023-11-03 RX ORDER — FLUDEOXYGLUCOSE F 18 200 MCI/ML
13 INJECTION, SOLUTION INTRAVENOUS
Status: COMPLETED | OUTPATIENT
Start: 2023-11-03 | End: 2023-11-03

## 2023-11-03 RX ADMIN — FLUDEOXYGLUCOSE F 18 13 MILLICURIE: 200 INJECTION, SOLUTION INTRAVENOUS at 10:42

## 2023-11-06 ENCOUNTER — APPOINTMENT (OUTPATIENT)
Dept: OTOLARYNGOLOGY | Facility: CLINIC | Age: 78
End: 2023-11-06
Payer: MEDICARE

## 2023-11-07 ENCOUNTER — PRE-ADMISSION TESTING (OUTPATIENT)
Dept: PREADMISSION TESTING | Facility: HOSPITAL | Age: 78
End: 2023-11-07
Payer: MEDICARE

## 2023-11-07 ENCOUNTER — ANESTHESIA EVENT (OUTPATIENT)
Dept: OPERATING ROOM | Facility: HOSPITAL | Age: 78
End: 2023-11-07
Payer: MEDICARE

## 2023-11-07 VITALS
BODY MASS INDEX: 30.35 KG/M2 | HEART RATE: 68 BPM | SYSTOLIC BLOOD PRESSURE: 135 MMHG | DIASTOLIC BLOOD PRESSURE: 64 MMHG | WEIGHT: 212 LBS | TEMPERATURE: 97.2 F | HEIGHT: 70 IN | OXYGEN SATURATION: 96 %

## 2023-11-07 DIAGNOSIS — C01 MALIGNANT NEOPLASM OF BASE OF TONGUE (MULTI): Primary | ICD-10-CM

## 2023-11-07 LAB
ABO GROUP (TYPE) IN BLOOD: NORMAL
ANION GAP SERPL CALC-SCNC: 15 MMOL/L (ref 10–20)
ANTIBODY SCREEN: NORMAL
APTT PPP: 32 SECONDS (ref 27–38)
BUN SERPL-MCNC: 29 MG/DL (ref 6–23)
CALCIUM SERPL-MCNC: 9.3 MG/DL (ref 8.6–10.6)
CHLORIDE SERPL-SCNC: 106 MMOL/L (ref 98–107)
CO2 SERPL-SCNC: 27 MMOL/L (ref 21–32)
CREAT SERPL-MCNC: 1.25 MG/DL (ref 0.5–1.3)
ERYTHROCYTE [DISTWIDTH] IN BLOOD BY AUTOMATED COUNT: 13.7 % (ref 11.5–14.5)
GFR SERPL CREATININE-BSD FRML MDRD: 59 ML/MIN/1.73M*2
GLUCOSE SERPL-MCNC: 142 MG/DL (ref 74–99)
HCT VFR BLD AUTO: 38.4 % (ref 41–52)
HGB BLD-MCNC: 11.7 G/DL (ref 13.5–17.5)
INR PPP: 1.1 (ref 0.9–1.1)
MCH RBC QN AUTO: 29 PG (ref 26–34)
MCHC RBC AUTO-ENTMCNC: 30.5 G/DL (ref 32–36)
MCV RBC AUTO: 95 FL (ref 80–100)
NRBC BLD-RTO: 0 /100 WBCS (ref 0–0)
PLATELET # BLD AUTO: 253 X10*3/UL (ref 150–450)
POTASSIUM SERPL-SCNC: 4.8 MMOL/L (ref 3.5–5.3)
PROTHROMBIN TIME: 12 SECONDS (ref 9.8–12.8)
RBC # BLD AUTO: 4.04 X10*6/UL (ref 4.5–5.9)
RH FACTOR (ANTIGEN D): NORMAL
SODIUM SERPL-SCNC: 143 MMOL/L (ref 136–145)
WBC # BLD AUTO: 8 X10*3/UL (ref 4.4–11.3)

## 2023-11-07 PROCEDURE — 85610 PROTHROMBIN TIME: CPT | Performed by: NURSE PRACTITIONER

## 2023-11-07 PROCEDURE — 36415 COLL VENOUS BLD VENIPUNCTURE: CPT

## 2023-11-07 PROCEDURE — 86850 RBC ANTIBODY SCREEN: CPT | Performed by: NURSE PRACTITIONER

## 2023-11-07 PROCEDURE — 99204 OFFICE O/P NEW MOD 45 MIN: CPT | Performed by: NURSE PRACTITIONER

## 2023-11-07 PROCEDURE — 80048 BASIC METABOLIC PNL TOTAL CA: CPT | Performed by: NURSE PRACTITIONER

## 2023-11-07 PROCEDURE — 87081 CULTURE SCREEN ONLY: CPT | Performed by: NURSE PRACTITIONER

## 2023-11-07 PROCEDURE — 85027 COMPLETE CBC AUTOMATED: CPT | Performed by: NURSE PRACTITIONER

## 2023-11-07 RX ORDER — CHLORHEXIDINE GLUCONATE 40 MG/ML
SOLUTION TOPICAL DAILY PRN
Qty: 473 ML | Refills: 0 | Status: SHIPPED | OUTPATIENT
Start: 2023-11-07 | End: 2023-11-12

## 2023-11-07 ASSESSMENT — ENCOUNTER SYMPTOMS
MUSCULOSKELETAL NEGATIVE: 1
NECK NEGATIVE: 1
EYES NEGATIVE: 1
GASTROINTESTINAL NEGATIVE: 1
ENDOCRINE NEGATIVE: 1
RESPIRATORY NEGATIVE: 1
NEUROLOGICAL NEGATIVE: 1
CONSTITUTIONAL NEGATIVE: 1

## 2023-11-07 ASSESSMENT — DUKE ACTIVITY SCORE INDEX (DASI)
DASI METS SCORE: 8.3
CAN YOU DO YARD WORK LIKE RAKING LEAVES, WEEDING OR PUSHING A MOWER: YES
CAN YOU WALK A BLOCK OR TWO ON LEVEL GROUND: YES
CAN YOU CLIMB A FLIGHT OF STAIRS OR WALK UP A HILL: YES
CAN YOU DO HEAVY WORK AROUND THE HOUSE LIKE SCRUBBING FLOORS OR LIFTING AND MOVING HEAVY FURNITURE: YES
CAN YOU WALK INDOORS, SUCH AS AROUND YOUR HOUSE: YES
CAN YOU PARTICIPATE IN MODERATE RECREATIONAL ACTIVITIES LIKE GOLF, BOWLING, DANCING, DOUBLES TENNIS OR THROWING A BASEBALL OR FOOTBALL: YES
CAN YOU PARTICIPATE IN STRENOUS SPORTS LIKE SWIMMING, SINGLES TENNIS, FOOTBALL, BASKETBALL, OR SKIING: NO
CAN YOU TAKE CARE OF YOURSELF (EAT, DRESS, BATHE, OR USE TOILET): YES
CAN YOU HAVE SEXUAL RELATIONS: NO
CAN YOU RUN A SHORT DISTANCE: YES
CAN YOU DO MODERATE WORK AROUND THE HOUSE LIKE VACUUMING, SWEEPING FLOORS OR CARRYING GROCERIES: YES
CAN YOU DO LIGHT WORK AROUND THE HOUSE LIKE DUSTING OR WASHING DISHES: YES
TOTAL_SCORE: 45.45

## 2023-11-07 ASSESSMENT — CHADS2 SCORE
HYPERTENSION: YES
AGE GREATER THAN OR EQUAL TO 75: YES
CHF: NO
PRIOR STROKE OR TIA OR THROMBOEMBOLISM: NO
DIABETES: YES
CHADS2 SCORE: 3

## 2023-11-07 ASSESSMENT — LIFESTYLE VARIABLES: SMOKING_STATUS: NONSMOKER

## 2023-11-07 NOTE — CPM/PAT H&P
CPM/PAT Evaluation       Name: Kevin Mendes (Katherine Mendes)  /Age: 1945/78 y.o.     Visit Type:   In-Person       Chief Complaint: Malignant neoplasm of base of tongue    HPI    Patient is a 78-year-old male with a past medical history significant for HLD, HTN, SVT status post ablation 25 years ago, hearing loss, cataract status post repair, diabetes type 2, BPH, osteoarthritis, and base of tongue cancer.  Patient is being evaluated in [facility] in anticipation of an Direct Laryngoscopy, Esophagogastroduodenoscopy with Insertion PEG on 23 with Dr. Garcia.    Past Medical History:   Diagnosis Date    Abnormal CT of the chest 2009    Arthritis     BPH (benign prostatic hyperplasia)     Calcific tendonitis of left shoulder 2018    Cardiac dysrhythmia 2009    Cataract     Diabetes mellitus (CMS/HCC)     Hyperlipidemia     Hypertension     Inflammatory and toxic neuropathy (CMS/HCC) 2010    Localized, primary osteoarthritis 2018    Low back pain, unspecified 2017    Acute low back pain    Malignant melanoma of skin of trunk, except scrotum (CMS/HCC) 2009    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)     Personal history of other diseases of the circulatory system     Personal history of supraventricular tachycardia    Personal history of other diseases of urinary system 2022    History of hematuria    Personal history of other endocrine, nutritional and metabolic disease     History of diabetes mellitus    Polyneuropathy 2010    Presence of right artificial knee joint 2018    Sensorineural hearing loss, bilateral 2018       Past Surgical History:   Procedure Laterality Date    CATARACT EXTRACTION  2017    Cataract Surgery    HAND SURGERY  2017    Hand Surgery                                                                                                                                                          OTHER SURGICAL HISTORY   04/10/2014    Catheter Ablation Atrial Supraventricular Tachycardia    SHOULDER SURGERY  04/07/2017    Shoulder Surgery    TOTAL HIP ARTHROPLASTY  04/10/2014    Hip Replacement    TOTAL KNEE ARTHROPLASTY  04/10/2014    Knee Replacement       Patient Sexual activity questions deferred to the physician.    Family History   Problem Relation Name Age of Onset    Other (heart failure following cardiac surgery) Mother         Allergies   Allergen Reactions    Cat Dander Unknown    Canagliflozin Rash       Prior to Admission medications    Medication Sig Start Date End Date Taking? Authorizing Provider   aspirin 81 mg EC tablet Take 1 tablet (81 mg) by mouth once daily.   Yes Historical Provider, MD   glyBURIDE (Diabeta) 5 mg tablet Take 2 tablets (10 mg) by mouth 2 times a day with meals. 9/18/23  Yes Kd Lange DO   losartan (Cozaar) 100 mg tablet Take 1 tablet (100 mg) by mouth once daily. 9/18/23  Yes Kd Lange DO   metFORMIN  mg 24 hr tablet Take 2 tablets (1,000 mg) by mouth once daily in the evening. Take with meals. Do not crush, chew, or split. 9/18/23  Yes Kd Lange DO   neomycin-polymyxin B-dexameth (Polydex) 3.5 mg/g-10,000 unit/g-0.1 % ointment ophthalmic ointment apply to all 4 eyelids four times a day 9/21/23  Yes Historical Provider, MD   neomycin-polymyxin-dexAMETHasone (Maxitrol) 3.5mg/mL-10,000 unit/mL-0.1 % ophthalmic suspension Instill 2 (TWO) drops in each eye four times a day 9/21/23  Yes Historical Provider, MD   pioglitazone (Actos) 45 mg tablet Take 1 tablet (45 mg) by mouth once daily. 9/18/23  Yes Kd Lange DO   simvastatin (Zocor) 40 mg tablet Take 1 tablet (40 mg) by mouth once daily at bedtime. 9/18/23  Yes Kd Lange DO   tamsulosin (Flomax) 0.4 mg 24 hr capsule Take 1 capsule (0.4 mg) by mouth once daily. 9/18/23  Yes Kd Lange DO        PAT ROS:   Constitutional:   neg    Neuro/Psych:   neg    Eyes:   neg    Ears:    hearing loss  Nose:   neg    Mouth:    Upper  Partial  Throat:   neg    Neck:   neg    Cardio:    chest pain  Respiratory:   neg    Endocrine:   neg    GI:   neg    :   neg    Musculoskeletal:   neg    Hematologic:   neg    Skin:  neg        Physical Exam  Vitals reviewed. Physical exam within normal limits.          PAT AIRWAY:   Airway:     Mallampati::  II    TM distance::  >3 FB    Neck ROM::  Full      Visit Vitals  /64   Pulse 68   Temp 36.2 °C (97.2 °F) (Tympanic)       DASI Risk Score      Flowsheet Row Most Recent Value   DASI SCORE 45.45   METS Score (Will be calculated only when all the questions are answered) 8.3          Caprini DVT Assessment      Flowsheet Row Most Recent Value   DVT Score 8   Current Status Major surgery planned, lasting 2-3 hours   History Prior major surgery   Age Over 75 years   BMI 30 or less          Modified Frailty Index      Flowsheet Row Most Recent Value   Modified Frailty Index Calculator .2727          CHADS2 Stroke Risk  Current as of 6 minutes ago        N/A 3 - 100%: High Risk   2 - 3%: Medium Risk   0 - 2%: Low Risk     Last Change: N/A          This score determines the patient's risk of having a stroke if the patient has atrial fibrillation.        This score is not applicable to this patient. Components are not calculated.          Revised Cardiac Risk Index      Flowsheet Row Most Recent Value   Revised Cardiac Risk Calculator 0          Apfel Simplified Score      Flowsheet Row Most Recent Value   Apfel Simplified Score Calculator 1          Risk Analysis Index Results This Encounter    No data found in the last 1 encounters.       Stop Bang Score      Flowsheet Row Most Recent Value   Do you snore loudly? 0   Do you often feel tired or fatigued after your sleep? 0   Has anyone ever observed you stop breathing in your sleep? 0   Do you have or are you being treated for high blood pressure? 1   Recent BMI (Calculated) 30.1   Is BMI greater than 35 kg/m2? 0=No   Age older than 50 years old? 1=Yes   Is  your neck circumference greater than 17 inches (Male) or 16 inches (Female)? 0   Gender - Male 1=Yes   STOP-BANG Total Score 3            Assessment and Plan:     Neuro:   The patient is at increased risk for perioperative stroke secondary to hypertension , increased age, hyperlipidemia, diabetes mellitus, general anesthesia, operative time >2.5 hours.    HEENT/Airway  No diagnoses, significant findings on chart review, clinical presentation, or evaluation.    Cardiovascular  The patient is scheduled for non-cardiac surgery associated with elevated risk.  The patient has no major cardiac contraindications to non- cardiac surgery.  RCRI  The patient meets 2 RCRI criteria and therefore has a 6.6% risk (elevated) of major adverse cardiac complications.  METS  The patient's functional capacity capacity is greater than 4 METS.  EKG  The patient has no EKG or echocardiographic changes concerning for myocardial ischemia.  No further cardiac evaluation is indicated  Heart Failure  The patient has no known history of heart failure.  Additionally, the patient reports no symptoms of heart failure and demonstrates no signs of heart failure.  Hypertension Evaluation  The patient has a known history of hypertension that is controlled.  Patient's hypertension is most consistent with stage 1.  Heart Rhythm Evaluation  Patient has a history of SVT 25 years ago which is resolved and was treated by Ablations x2  Heart Valve Evaluation  The patient has no known history of valvular heart disease. The patient has no symptoms or physical exam findings to suggest valvular heart disease.  CARDS EVAL  The patient is not followed by cardiology.  The patient has a 30-day risk for MACE of 2 predictors, 10.1% risk for cardiac death, nonfatal myocardial infarction, and nonfatal cardiac arrest.    Pulmonary   No significant findings on chart review or clinical presentation and evaluation.  The patient has a stop bang score of 3, which places  patient at low risk for having STEPHANIE.  ARISCAT 41, Intermediate, 13.3% risk of in-hospital postoperative pulmonary complications  PRODIGY 20, high of respiratory depression episode.    Hematology  The patient has diagnoses or significant findings on chart review or clinical presentation and evaluation significant for Malignant neoplasm of base of tongue.  Antiplatelet management   The patient is currently receiving antiplatelet therapy for primary prevention of cardiovascular disease.  Anticoagulation management  The patient is not currently receiving anticoagulation therapy.  Caprini score 8, high risk of VTE  Transfusion Evaluation  A type and screen was obtained given the likelihood for perioperative transfusion of blood or blood products.    GI  No diagnoses or significant findings on chart review or clinical presentation and evaluation.  Eat 10- 0    Genitourinary  The patient has diagnoses or significant findings on chart review or clinical presentation and evaluation significant for BPH    Renal  The patient has no known history of chronic kidney disease.    Musculoskeletal  The patient has diagnoses or significant findings on chart review or clinical presentation and evaluation significant for Osteoarthritis    Endocrine  Diabetes Evaluation  The patient has history of diabetes mellitus controlled by Medication  Thyroid Disease Evaluation  The patient has no history of thyroid disease.    ID  No diagnoses or significant findings on chart review or clinical presentation and evaluation.    -Preoperative medication instructions were provided and reviewed with the patient.  Any additional testing or evaluation was explained to the patient.  NPO Instructions were discussed, and the patient's questions were answered prior to conclusion of this encounter

## 2023-11-07 NOTE — PREPROCEDURE INSTRUCTIONS
NPO Instructions:    The Day before Surgery:  Review your medication instructions, stop indicated medications  Day of Surgery:  Review your medication instructions, take indicated medications  You may have clear liquids until TWO hours before surgery/procedure.  This includes water, black tea/coffee, (no milk or cream) apple juice and electrolyte drinks (Gatorade)  You may chew gum up to TWO hours before your surgery/procedure  Wear  comfortable loose fitting clothing  Do not use moisturizers, creams, lotions or perfume  All jewelry and valuables should be left at home    Additional Instructions:     The Day before Surgery:  Review your medication instructions, stop indicated medications  You will be contacted regarding the time of your arrival to facility and surgery time  Do not eat any food after Midnight  Day of Surgery:  Review your medication instructions, take indicated medications  You may have clear liquids until TWO hours before surgery/procedure.  This includes water, black tea/coffee, (no milk or cream) apple juice and electrolyte drinks (Gatorade)  You may chew gum up to TWO hours before your surgery/procedure  Wear  comfortable loose fitting clothing  Do not use moisturizers, creams, lotions or perfume  All jewelry and valuables should be left at home

## 2023-11-08 ENCOUNTER — ANESTHESIA (OUTPATIENT)
Dept: OPERATING ROOM | Facility: HOSPITAL | Age: 78
End: 2023-11-08
Payer: MEDICARE

## 2023-11-08 ENCOUNTER — HOSPITAL ENCOUNTER (OUTPATIENT)
Facility: HOSPITAL | Age: 78
Setting detail: OUTPATIENT SURGERY
Discharge: HOME | End: 2023-11-08
Attending: OTOLARYNGOLOGY | Admitting: OTOLARYNGOLOGY
Payer: MEDICARE

## 2023-11-08 VITALS
TEMPERATURE: 97.2 F | OXYGEN SATURATION: 97 % | WEIGHT: 203.71 LBS | SYSTOLIC BLOOD PRESSURE: 168 MMHG | HEART RATE: 78 BPM | BODY MASS INDEX: 29.16 KG/M2 | DIASTOLIC BLOOD PRESSURE: 81 MMHG | HEIGHT: 70 IN | RESPIRATION RATE: 12 BRPM

## 2023-11-08 DIAGNOSIS — G89.18 ACUTE POSTOPERATIVE PAIN: Primary | ICD-10-CM

## 2023-11-08 DIAGNOSIS — C01 MALIGNANT NEOPLASM OF BASE OF TONGUE (MULTI): ICD-10-CM

## 2023-11-08 LAB
GLUCOSE BLD MANUAL STRIP-MCNC: 112 MG/DL (ref 74–99)
GLUCOSE BLD MANUAL STRIP-MCNC: 128 MG/DL (ref 74–99)
POC FINGERSTICK BLOOD GLUCOSE: 112 MG/DL (ref 70–100)

## 2023-11-08 PROCEDURE — 2500000001 HC RX 250 WO HCPCS SELF ADMINISTERED DRUGS (ALT 637 FOR MEDICARE OP): Performed by: OTOLARYNGOLOGY

## 2023-11-08 PROCEDURE — 7100000001 HC RECOVERY ROOM TIME - INITIAL BASE CHARGE: Performed by: OTOLARYNGOLOGY

## 2023-11-08 PROCEDURE — 2500000004 HC RX 250 GENERAL PHARMACY W/ HCPCS (ALT 636 FOR OP/ED)

## 2023-11-08 PROCEDURE — 88305 TISSUE EXAM BY PATHOLOGIST: CPT | Performed by: PATHOLOGY

## 2023-11-08 PROCEDURE — 3700000002 HC GENERAL ANESTHESIA TIME - EACH INCREMENTAL 1 MINUTE: Performed by: OTOLARYNGOLOGY

## 2023-11-08 PROCEDURE — 7100000002 HC RECOVERY ROOM TIME - EACH INCREMENTAL 1 MINUTE: Performed by: OTOLARYNGOLOGY

## 2023-11-08 PROCEDURE — A4217 STERILE WATER/SALINE, 500 ML: HCPCS | Performed by: OTOLARYNGOLOGY

## 2023-11-08 PROCEDURE — 7100000010 HC PHASE TWO TIME - EACH INCREMENTAL 1 MINUTE: Performed by: OTOLARYNGOLOGY

## 2023-11-08 PROCEDURE — 3600000004 HC OR TIME - INITIAL BASE CHARGE - PROCEDURE LEVEL FOUR: Performed by: OTOLARYNGOLOGY

## 2023-11-08 PROCEDURE — A31535 PR LARYNGOSCOPY,DIRCT,OP,BIOPSY: Performed by: ANESTHESIOLOGY

## 2023-11-08 PROCEDURE — 3600000009 HC OR TIME - EACH INCREMENTAL 1 MINUTE - PROCEDURE LEVEL FOUR: Performed by: OTOLARYNGOLOGY

## 2023-11-08 PROCEDURE — 82947 ASSAY GLUCOSE BLOOD QUANT: CPT | Mod: 59

## 2023-11-08 PROCEDURE — 31535 LARYNGOSCOPY W/BIOPSY: CPT | Performed by: OTOLARYNGOLOGY

## 2023-11-08 PROCEDURE — 43246 EGD PLACE GASTROSTOMY TUBE: CPT | Performed by: OTOLARYNGOLOGY

## 2023-11-08 PROCEDURE — 88342 IMHCHEM/IMCYTCHM 1ST ANTB: CPT | Performed by: PATHOLOGY

## 2023-11-08 PROCEDURE — 2500000005 HC RX 250 GENERAL PHARMACY W/O HCPCS: Performed by: OTOLARYNGOLOGY

## 2023-11-08 PROCEDURE — 82962 GLUCOSE BLOOD TEST: CPT | Performed by: OTOLARYNGOLOGY

## 2023-11-08 PROCEDURE — 88305 TISSUE EXAM BY PATHOLOGIST: CPT | Mod: TC,SUR | Performed by: OTOLARYNGOLOGY

## 2023-11-08 PROCEDURE — 2500000004 HC RX 250 GENERAL PHARMACY W/ HCPCS (ALT 636 FOR OP/ED): Performed by: OTOLARYNGOLOGY

## 2023-11-08 PROCEDURE — A31535 PR LARYNGOSCOPY,DIRCT,OP,BIOPSY: Performed by: NURSE ANESTHETIST, CERTIFIED REGISTERED

## 2023-11-08 PROCEDURE — 88331 PATH CONSLTJ SURG 1 BLK 1SPC: CPT | Performed by: PATHOLOGY

## 2023-11-08 PROCEDURE — 3700000001 HC GENERAL ANESTHESIA TIME - INITIAL BASE CHARGE: Performed by: OTOLARYNGOLOGY

## 2023-11-08 PROCEDURE — 7100000009 HC PHASE TWO TIME - INITIAL BASE CHARGE: Performed by: OTOLARYNGOLOGY

## 2023-11-08 PROCEDURE — 2500000005 HC RX 250 GENERAL PHARMACY W/O HCPCS

## 2023-11-08 PROCEDURE — 99100 ANES PT EXTEME AGE<1 YR&>70: CPT | Performed by: ANESTHESIOLOGY

## 2023-11-08 RX ORDER — LIDOCAINE HYDROCHLORIDE 10 MG/ML
INJECTION INFILTRATION; PERINEURAL AS NEEDED
Status: DISCONTINUED | OUTPATIENT
Start: 2023-11-08 | End: 2023-11-08 | Stop reason: HOSPADM

## 2023-11-08 RX ORDER — OXYCODONE HYDROCHLORIDE 5 MG/1
5 TABLET ORAL EVERY 4 HOURS PRN
Status: DISCONTINUED | OUTPATIENT
Start: 2023-11-08 | End: 2023-11-08 | Stop reason: HOSPADM

## 2023-11-08 RX ORDER — LIDOCAINE HYDROCHLORIDE 40 MG/ML
INJECTION, SOLUTION RETROBULBAR AS NEEDED
Status: DISCONTINUED | OUTPATIENT
Start: 2023-11-08 | End: 2023-11-08

## 2023-11-08 RX ORDER — CEFAZOLIN SODIUM 2 G/100ML
INJECTION, SOLUTION INTRAVENOUS AS NEEDED
Status: DISCONTINUED | OUTPATIENT
Start: 2023-11-08 | End: 2023-11-08

## 2023-11-08 RX ORDER — ONDANSETRON HYDROCHLORIDE 2 MG/ML
INJECTION, SOLUTION INTRAVENOUS AS NEEDED
Status: DISCONTINUED | OUTPATIENT
Start: 2023-11-08 | End: 2023-11-08

## 2023-11-08 RX ORDER — ACETAMINOPHEN 325 MG/1
650 TABLET ORAL EVERY 4 HOURS PRN
Status: DISCONTINUED | OUTPATIENT
Start: 2023-11-08 | End: 2023-11-08 | Stop reason: HOSPADM

## 2023-11-08 RX ORDER — WATER 1 ML/ML
IRRIGANT IRRIGATION AS NEEDED
Status: DISCONTINUED | OUTPATIENT
Start: 2023-11-08 | End: 2023-11-08 | Stop reason: HOSPADM

## 2023-11-08 RX ORDER — ONDANSETRON HYDROCHLORIDE 2 MG/ML
4 INJECTION, SOLUTION INTRAVENOUS ONCE AS NEEDED
Status: DISCONTINUED | OUTPATIENT
Start: 2023-11-08 | End: 2023-11-08 | Stop reason: HOSPADM

## 2023-11-08 RX ORDER — ROCURONIUM BROMIDE 10 MG/ML
INJECTION, SOLUTION INTRAVENOUS AS NEEDED
Status: DISCONTINUED | OUTPATIENT
Start: 2023-11-08 | End: 2023-11-08

## 2023-11-08 RX ORDER — SODIUM CHLORIDE, SODIUM LACTATE, POTASSIUM CHLORIDE, CALCIUM CHLORIDE 600; 310; 30; 20 MG/100ML; MG/100ML; MG/100ML; MG/100ML
INJECTION, SOLUTION INTRAVENOUS CONTINUOUS PRN
Status: DISCONTINUED | OUTPATIENT
Start: 2023-11-08 | End: 2023-11-08

## 2023-11-08 RX ORDER — PHENYLEPHRINE HYDROCHLORIDE 10 MG/ML
INJECTION INTRAVENOUS AS NEEDED
Status: DISCONTINUED | OUTPATIENT
Start: 2023-11-08 | End: 2023-11-08

## 2023-11-08 RX ORDER — MIDAZOLAM HYDROCHLORIDE 1 MG/ML
INJECTION INTRAMUSCULAR; INTRAVENOUS AS NEEDED
Status: DISCONTINUED | OUTPATIENT
Start: 2023-11-08 | End: 2023-11-08

## 2023-11-08 RX ORDER — LIDOCAINE HCL/PF 100 MG/5ML
SYRINGE (ML) INTRAVENOUS AS NEEDED
Status: DISCONTINUED | OUTPATIENT
Start: 2023-11-08 | End: 2023-11-08

## 2023-11-08 RX ORDER — TRAMADOL HYDROCHLORIDE 50 MG/1
50 TABLET ORAL EVERY 8 HOURS PRN
Qty: 9 TABLET | Refills: 0 | Status: SHIPPED | OUTPATIENT
Start: 2023-11-08 | End: 2023-11-11

## 2023-11-08 RX ORDER — ESMOLOL HYDROCHLORIDE 10 MG/ML
INJECTION INTRAVENOUS AS NEEDED
Status: DISCONTINUED | OUTPATIENT
Start: 2023-11-08 | End: 2023-11-08

## 2023-11-08 RX ORDER — GLYCOPYRROLATE 0.2 MG/ML
INJECTION INTRAMUSCULAR; INTRAVENOUS AS NEEDED
Status: DISCONTINUED | OUTPATIENT
Start: 2023-11-08 | End: 2023-11-08

## 2023-11-08 RX ORDER — SODIUM CHLORIDE, SODIUM LACTATE, POTASSIUM CHLORIDE, CALCIUM CHLORIDE 600; 310; 30; 20 MG/100ML; MG/100ML; MG/100ML; MG/100ML
50 INJECTION, SOLUTION INTRAVENOUS CONTINUOUS
Status: DISCONTINUED | OUTPATIENT
Start: 2023-11-08 | End: 2023-11-08 | Stop reason: HOSPADM

## 2023-11-08 RX ORDER — PROPOFOL 10 MG/ML
INJECTION, EMULSION INTRAVENOUS AS NEEDED
Status: DISCONTINUED | OUTPATIENT
Start: 2023-11-08 | End: 2023-11-08

## 2023-11-08 RX ADMIN — CEFAZOLIN SODIUM 2 G: 2 INJECTION, SOLUTION INTRAVENOUS at 08:25

## 2023-11-08 RX ADMIN — PHENYLEPHRINE HYDROCHLORIDE 120 MCG: 10 INJECTION INTRAVENOUS at 08:58

## 2023-11-08 RX ADMIN — PROPOFOL 50 MG: 10 INJECTION, EMULSION INTRAVENOUS at 08:53

## 2023-11-08 RX ADMIN — LIDOCAINE HYDROCHLORIDE 100 MG: 20 INJECTION INTRAVENOUS at 08:16

## 2023-11-08 RX ADMIN — SUGAMMADEX 200 MG: 100 INJECTION, SOLUTION INTRAVENOUS at 09:07

## 2023-11-08 RX ADMIN — LIDOCAINE HYDROCHLORIDE 100 MG: 40 INJECTION, SOLUTION RETROBULBAR; TOPICAL at 08:18

## 2023-11-08 RX ADMIN — ROCURONIUM 50 MG: 50 INJECTION, SOLUTION INTRAVENOUS at 08:18

## 2023-11-08 RX ADMIN — GLYCOPYRROLATE 0.2 MG: 0.2 INJECTION INTRAMUSCULAR; INTRAVENOUS at 08:58

## 2023-11-08 RX ADMIN — ESMOLOL HYDROCHLORIDE 50 MG: 10 INJECTION, SOLUTION INTRAVENOUS at 08:19

## 2023-11-08 RX ADMIN — ESMOLOL HYDROCHLORIDE 50 MG: 10 INJECTION, SOLUTION INTRAVENOUS at 08:32

## 2023-11-08 RX ADMIN — ESMOLOL HYDROCHLORIDE 50 MG: 10 INJECTION, SOLUTION INTRAVENOUS at 08:29

## 2023-11-08 RX ADMIN — SODIUM CHLORIDE, POTASSIUM CHLORIDE, SODIUM LACTATE AND CALCIUM CHLORIDE: 600; 310; 30; 20 INJECTION, SOLUTION INTRAVENOUS at 08:10

## 2023-11-08 RX ADMIN — ESMOLOL HYDROCHLORIDE 50 MG: 10 INJECTION, SOLUTION INTRAVENOUS at 08:26

## 2023-11-08 RX ADMIN — MIDAZOLAM HYDROCHLORIDE 1 MG: 1 INJECTION, SOLUTION INTRAMUSCULAR; INTRAVENOUS at 08:10

## 2023-11-08 RX ADMIN — PROPOFOL 100 MG: 10 INJECTION, EMULSION INTRAVENOUS at 08:18

## 2023-11-08 RX ADMIN — ONDANSETRON 4 MG: 2 INJECTION INTRAMUSCULAR; INTRAVENOUS at 08:53

## 2023-11-08 RX ADMIN — PROPOFOL 50 MG: 10 INJECTION, EMULSION INTRAVENOUS at 08:26

## 2023-11-08 ASSESSMENT — PAIN SCALES - GENERAL
PAIN_LEVEL: 0
PAINLEVEL_OUTOF10: 0 - NO PAIN

## 2023-11-08 ASSESSMENT — PAIN - FUNCTIONAL ASSESSMENT
PAIN_FUNCTIONAL_ASSESSMENT: 0-10

## 2023-11-08 ASSESSMENT — COLUMBIA-SUICIDE SEVERITY RATING SCALE - C-SSRS
1. IN THE PAST MONTH, HAVE YOU WISHED YOU WERE DEAD OR WISHED YOU COULD GO TO SLEEP AND NOT WAKE UP?: NO
2. HAVE YOU ACTUALLY HAD ANY THOUGHTS OF KILLING YOURSELF?: NO
6. HAVE YOU EVER DONE ANYTHING, STARTED TO DO ANYTHING, OR PREPARED TO DO ANYTHING TO END YOUR LIFE?: NO

## 2023-11-08 NOTE — OP NOTE
[facility] - Operative Report  Name: Kevin Mendes   MRN: [id number]  Date of Procedure: 11/08/23  Location: [facility]    Attending Surgeon: Campbell Garcia assisting    Resident/Fellow:     Preoperative Diagnosis:  Base of tongue cancer,  Dysphagia    Postoperative Diagnosis:  Same    Operative indications:  This patient is a patient of one of my partners he is undergoing a panendoscopy right now for the evaluation of a base of tongue cancer and it is felt that he is in need of enteral nutrition.    Procedure:  The patient is already under general anesthesia.  The gastroscope was introduced all the way down to the stomach the stomach was inflated.  The abdomen was prepped and draped in usual sterile fashion the area to place the PEG was determined by manual pressure and transillumination.  A needle was inserted with negative pressure and no air was obtained until the needle was seen in the stomach.  The tract was injected with Xylocaine 1% to 100,000 epinephrine.  A small cutaneous incision was made.  The trocar and catheter were inserted.  The catheter was retrieved endoscopically with a snare.  The guidewire was then inserted and retrieved with  the snare and pulled into the oral cavity.  The PEG tube was attached to the guidewire and pulled into its proper position on the abdominal wall where it was secured with a provided bumper.  Light dressing with antibiotic ointment was applied around the PEG site.  The tube was secured to the abdomen.  The gastroscope was then utilized to examine the entirety of the stomach from the gastroesophageal junction to the pylorus and was found to be negative.  The PEG site is benign in appearance.  The entire length of the esophagus is visualized and felt to be within normal range.  The patient was returned to the care of my partner and the rest of the procedure will be dictated separately.  This is dictated on November 8, 2023.      I was  present for the entire procedure    Campbell Monroy MD

## 2023-11-08 NOTE — ANESTHESIA PROCEDURE NOTES
Peripheral IV  Date/Time: 11/8/2023 8:00 AM  Inserted by: Augustina Castillo    Placement  Needle size: 20 G  Laterality: right  Location: hand  Site prep: alcohol  Technique: anatomical landmarks  Attempts: 1

## 2023-11-08 NOTE — ANESTHESIA POSTPROCEDURE EVALUATION
Patient: Katherine Mendes    Procedure Summary       Date: 11/08/23 Room / Location: [facility] OR 04 / [facility]    Anesthesia Start: 0802 Anesthesia Stop: 0921    Procedures:       Direct Laryngoscopy      Esophagogastroduodenoscopy with Insertion PEG Diagnosis:       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)      (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) [C01])    Surgeons: Kalin Garcia MD Responsible Provider: Mile Vyas MD    Anesthesia Type: general ASA Status: 3            Anesthesia Type: general    Vitals Value Taken Time   /80 11/08/23 0916   Temp 37 11/08/23 0928   Pulse 91 11/08/23 0926   Resp 15 11/08/23 0926   SpO2 95 % 11/08/23 0926   Vitals shown include unvalidated device data.    Anesthesia Post Evaluation    Patient location during evaluation: PACU  Patient participation: complete - patient participated  Level of consciousness: awake  Pain score: 0  Pain management: adequate  Airway patency: patent  Cardiovascular status: acceptable  Respiratory status: acceptable and spontaneous ventilation  Hydration status: acceptable        No notable events documented.

## 2023-11-08 NOTE — DISCHARGE INSTRUCTIONS
Initially focus on soft foods, advance as tolerated    My office will reach out to you tomorrow with appointment for Friday    You can take tape and gauze off before that appointment if it gets dirty    Percutaneous Endoscopic Gastrostomy Discharge Instructions    About this topic  A percutaneous endoscopic gastrostomy, or PEG, is a feeding tube that goes directly into the stomach. Patients who cannot eat or drink for a long time or have trouble swallowing food may need a PEG. This may happen because you have had a stroke, brain injury, or cancer. You may need a PEG because of a health condition that causes poor nutrition or because of injury to your stomach.  The doctor will use a flexible tube with a light and camera on the end to see the stomach. This tube is an endoscope. The doctor will pass this tube through the mouth into the stomach. The camera sends pictures to a screen in the operating room. The endoscope lets the doctor see exactly where the feeding tube belongs.    What care is needed at home?  Ask your doctor what you need to do when you go home. Make sure you ask questions if you do not understand what the doctor says.  Talk to your doctor about how to care for your cut site. Ask your doctor about:  When you should change your bandages  When you may take a bath or shower  If you need to be careful with lifting things over 10 pounds (4.5 kg)  When you may go back to your normal activities like work, driving, or sex.  The staff will talk with you and your family about how to use and take care of the PEG tube.  Your doctor or dietitian will order a liquid nutrition formula to put through your feeding tube. The amount of food and water that you will need each day will be determined to make sure you are getting all of your vitamins and minerals.  Take your drugs as ordered by your doctor. Some drugs may come in a liquid form. Other drugs may be crushed, mixed with a small amount of water, and given through  the feeding tube.  Wash your hands before and after cleaning the PEG tube.  Avoid creams and powders around the PEG tube unless ordered by the doctor.  Do not push or pull the tube. Keep it in place.  Keep the skin around the PEG tube clean and dry.  Clean the outside of the PEG tube with soap and water using a cotton-tipped swab.  Flush the PEG tube with warm water after a feeding or drug to prevent clogging.  Talk to the staff about what to do if your PEG tube won't flush.  Do not put bandages between the bumper of the PEG tube and your skin.  It is important that you take care of your teeth and mouth even though you are not eating regular food. Brush your teeth regularly and visit your dentist.  What follow-up care is needed?  The staff may ask you to make visits to the office to check on your progress. Be sure to keep these visits.  You may need to talk with a dietitian to make sure you are getting all the calories and nutrition needed.  You may need a home health agency to help get your supplies and liquid nutrition.  What drugs may be needed?  The doctor may order drugs to:  Help with pain  Prevent infection  Help with digestion  Will physical activity be limited?  You may need to limit activities while the cut site heals. Talk with your doctor about the right amount of activity for you.  What problems could happen?  Leakage of stomach contents around the tube  Infection  Bleeding  Clog in the tube  Tube falls out  Injury to the stomach or bowel requiring surgery  When do I need to call the doctor?  Signs of infection. These include a fever of 100.4°F (38°C) or higher, chills.  Signs of wound infection. These include swelling, redness, warmth around the wound; too much pain when touched; yellowish, greenish, or bloody discharge; foul smell coming from the cut site; cut site opens up.  PEG tube comes out.  Cramping in your belly, gas pain, or throwing up  Loose or hard stools, belly pain, bloating, breathing  liquid into your lungs, high blood sugar  Not able to take in food from the tube  No bowel movement or not being able to pass gas  Teach Back: Helping You Understand  The Teach Back Method helps you understand the information we are giving you. After you talk with the staff, tell them in your own words what you learned. This helps to make sure the staff has described each thing clearly. It also helps to explain things that may have been confusing. Before going home, make sure you can do these:  I can tell you about my procedure.  I can tell you how to care for my PEG tube.  I can tell you what I would do if I had loose or hard stools, belly pain, or throwing up.  I can tell you what I would do if my PEG tube was leaking, clogged, or came out.  Last Reviewed Date  2020-11-02  Consumer Information Use and Disclaimer  This generalized information is a limited summary of diagnosis, treatment, and/or medication information. It is not meant to be comprehensive and should be used as a tool to help the user understand and/or assess potential diagnostic and treatment options. It does NOT include all information about conditions, treatments, medications, side effects, or risks that may apply to a specific patient. It is not intended to be medical advice or a substitute for the medical advice, diagnosis, or treatment of a health care provider based on the health care provider's examination and assessment of a patient’s specific and unique circumstances. Patients must speak with a health care provider for complete information about their health, medical questions, and treatment options, including any risks or benefits regarding use of medications. This information does not endorse any treatments or medications as safe, effective, or approved for treating a specific patient. [employer] and its affiliates disclaim any warranty or liability relating to this information or the use thereof. The use of this information is  governed by the Terms of Use, available at https://www.[employer].com/en/know/clinical-effectiveness-terms  Copyright © 2023 [employer] and its affiliates and/or licensors. All rights reserved.

## 2023-11-08 NOTE — ANESTHESIA PROCEDURE NOTES
Airway  Date/Time: 11/8/2023 8:25 AM  Urgency: elective    Difficult airway    Staffing  Performed: attending   Authorized by: Mile Vyas MD    Performed by: Augustina Castillo  Patient location during procedure: OR    Indications and Patient Condition  Indications for airway management: anesthesia  Spontaneous Ventilation: absent  Sedation level: deep  Preoxygenated: yes  Patient position: sniffing  Mask difficulty assessment: 1 - vent by mask    Final Airway Details  Final airway type: endotracheal airway      Successful airway: ETT  Cuffed: yes   Successful intubation technique: video laryngoscopy  ETT size (mm): 6.0  Placement verified by: chest auscultation and capnometry   Inital cuff pressure (cm H2O): 21  Measured from: lips  Number of attempts at approach: 2    Additional Comments  Pt with large right base of tongue lesion obscuring view of vocal cords with use of glidescope.  1st attempt by Dr. Vyas unsuccessful.  2nd attempt by surgeon with use of dido successful.

## 2023-11-08 NOTE — ANESTHESIA PREPROCEDURE EVALUATION
Patient: Katherine Mendes    Procedure Information       Date/Time: 11/08/23 0815    Procedures:       Direct Laryngoscopy      Esophagogastroduodenoscopy with Insertion PEG    Location: [facility] OR 04 / [facility] OR    Surgeons: Kalin Garcia MD            Relevant Problems   Anesthesia (within normal limits)      Cardiovascular   (+) Hyperlipidemia   (+) Hypertension      Endocrine   (+) Type 2 diabetes mellitus with renal manifestations, controlled (CMS/HCC)      GI   (+) GERD (gastroesophageal reflux disease)      /Renal   (+) Nephrolithiasis      GI/Hepatic   (+) 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)      Musculoskeletal   (+) DJD of shoulder   (+) Degenerative joint disease of knee   (+) Disc degeneration, lumbar      Eyes, Ears, Nose, and Throat  Oral cancer.       Clinical information reviewed:   Tobacco  Allergies  Meds   Med Hx  Surg Hx   Fam Hx  Soc Hx        NPO Detail:  NPO/Void Status  Carbonhydrate Drink Given Prior to Surgery? : N  Date of Last Liquid: 11/08/23  Time of Last Liquid: 0500  Date of Last Solid: 11/07/23  Time of Last Solid: 1800  Last Intake Type: Clear fluids  Time of Last Void: 0629         Physical Exam    Airway  Mallampati: III     Cardiovascular    Dental    Pulmonary    Abdominal            Anesthesia Plan    ASA 3     general     intravenous induction   Anesthetic plan and risks discussed with patient.    Plan discussed with CRNA.

## 2023-11-09 LAB — STAPHYLOCOCCUS SPEC CULT: ABNORMAL

## 2023-11-09 NOTE — OP NOTE
"Direct Laryngoscopy, Esophagogastroduodenoscopy with Insertion PEG Operative Note     Date: 2023  OR Location: [facility] OR    Name: Kevin Mendes \"Vinh", : 1945, Age: 78 y.o., MRN: [id number], Sex: male    Diagnosis  Pre-op Diagnosis     * 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) [C01] Post-op Diagnosis     * 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) [C01]     Procedures  Direct laryngoscopy with biopsy      Surgeons      * Klain Garcia - Primary    Resident/Fellow/Other Assistant:  Surgeon(s) and Role:     * Campbell Monroy MD - Assisting    Procedure Summary  Anesthesia: General  ASA: III  Anesthesia Staff: Anesthesiologist: Mile Vyas MD  CRNA: ELY Freitas-CRNA  SRNA: Augustina Castillo  Estimated Blood Loss: 5 mL  Intra-op Medications:   Medication Name Total Dose   surgical lubricant gel 1 Application   sterile water irrigation solution 1,000 mL   lidocaine (Xylocaine) 10 mg/mL (1 %) injection 2 mL              Anesthesia Record               Intraprocedure I/O Totals          Intake    Propofol Drip 0.00 mL    The total shown is the total volume documented since Anesthesia Start was filed.    lactated Ringer's 500.00 mL    Total Intake 500 mL          Specimen:   ID Type Source Tests Collected by Time   1 : right oral pharyngeal mass Tissue PHARYNX BIOPSY SURGICAL PATHOLOGY EXAM Kalin Garcia MD 2023 0831        Staff:   Circulator: Saadia Lundberg RN; Emily Soto RN  Scrub Person: Dash Arzate         Drains and/or Catheters:   Gastrostomy/Enterostomy 1 LUQ (Active)   Surrounding Skin Intact 23   Drain Status Clamped 23   Drainage Appearance None 23   Dressing Status Clean;Dry 23   Dressing Intervention New dressing 23   Dressing Type Other (Comment) 23         Findings: Exophytic friable mass coming from the right base of tongue.  No extension into the soft palate or the piriform.  It did " extend onto the lateral pharyngeal wall.  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 male who is having surgery for 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) [C01].     The patient was seen in the preoperative area. The risks, benefits, complications, treatment options, non-operative alternatives, expected recovery and outcomes were discussed with the patient. The possibilities of reaction to medication, pulmonary aspiration, injury to surrounding structures, bleeding, recurrent infection, the need for additional procedures, failure to diagnose a condition, and creating a complication requiring transfusion or operation were discussed with the patient. The patient concurred with the proposed plan, giving informed consent.  The site of surgery was properly noted/marked if necessary per policy. The patient has been actively warmed in preoperative area. Preoperative antibiotics have been ordered and given within 1 hours of incision. Venous thrombosis prophylaxis have been ordered including bilateral sequential compression devices    Procedure Details: Patient was taken back to the operating room and laid supine on the table.  Timeout was performed, general anesthesia induced, and patient was intubated.  Intubation was done with the Dedo laryngoscope due to the exophytic nature of the mass making it difficult with the glide scope.  The Dedo laryngoscope was used to examine the oral cavity, oropharynx, hypopharynx, larynx.  Findings were discussed as above.  Biopsies were obtained.  The flexible esophagoscope was then passed into the cervical esophagus down to the stomach for placement of the PEG tube.  See Dr. Monroy's note for details.  After the PEG tube was placed all equipment was removed and patient was extubated without issue.  He was taken to PACU in stable condition  Complications:  None; patient tolerated the procedure well.    Disposition: PACU -  hemodynamically stable.  Condition: stable         Kalin Garcia  Phone Number: [phone]

## 2023-11-10 ENCOUNTER — OFFICE VISIT (OUTPATIENT)
Dept: OTOLARYNGOLOGY | Facility: CLINIC | Age: 78
End: 2023-11-10
Payer: MEDICARE

## 2023-11-10 DIAGNOSIS — C01 MALIGNANT NEOPLASM OF BASE OF TONGUE (MULTI): ICD-10-CM

## 2023-11-10 DIAGNOSIS — C06.0 MALIGNANT NEOPLASM OF CHEEK MUCOSA (MULTI): ICD-10-CM

## 2023-11-10 PROCEDURE — 99213 OFFICE O/P EST LOW 20 MIN: CPT | Performed by: OTOLARYNGOLOGY

## 2023-11-10 PROCEDURE — 1159F MED LIST DOCD IN RCRD: CPT | Performed by: OTOLARYNGOLOGY

## 2023-11-10 PROCEDURE — 1126F AMNT PAIN NOTED NONE PRSNT: CPT | Performed by: OTOLARYNGOLOGY

## 2023-11-10 PROCEDURE — 3075F SYST BP GE 130 - 139MM HG: CPT | Performed by: OTOLARYNGOLOGY

## 2023-11-10 PROCEDURE — 1160F RVW MEDS BY RX/DR IN RCRD: CPT | Performed by: OTOLARYNGOLOGY

## 2023-11-10 PROCEDURE — 3078F DIAST BP <80 MM HG: CPT | Performed by: OTOLARYNGOLOGY

## 2023-11-10 PROCEDURE — 1036F TOBACCO NON-USER: CPT | Performed by: OTOLARYNGOLOGY

## 2023-11-10 NOTE — PROGRESS NOTES
ENT Outpatient Consultation    Chief Complaint: Base of tongue mass  History Of Present Illness  Katherine Mendes is a 78 y.o. male referred by Dr. Holly for evaluation of a base of tongue mass.  Patient states that he developed a globus type sensation a little over 1 month ago.  This prompted his evaluation with Dr. Holly who noted a oropharyngeal mass.  A CT scan was obtained showing an exophytic mass in the oropharynx that appears to be coming from the base of tongue.  There is some invasion into the deeper aspect of the base of tongue and the mass extends out laterally towards the carotid.  He has a history of smoking and a lung nodule was also noted on his CT scan.  He is still tolerating p.o. intake.    11/10/23: Patient returns for follow-up.  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  Final pathology is pending.  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    Surgical History  He has a past surgical history that includes Total hip arthroplasty  (04/10/2014); Total knee arthroplasty (04/10/2014); Other surgical history (04/10/2014); Hand surgery (04/07/2017); Cataract extraction (04/07/2017); and Shoulder surgery (04/07/2017).     Social History  He reports that he has never smoked. He has never used smokeless tobacco. He reports current alcohol use of about 2.0 standard drinks of alcohol per week. He reports that he does not use drugs.    Family History  Family History   Problem Relation Name Age of Onset    Other (heart failure following cardiac surgery) Mother          Allergies  Cat dander and Canagliflozin     Physical Exam:  CONSTITUTIONAL:  No acute distress  VOICE:  No hoarseness or other abnormality  RESPIRATION:  Breathing comfortably, no stridor  CV:  No clubbing/cyanosis/edema in hands  EYES:  EOM intact, sclera normal  NEURO:  Alert and oriented times 3, Cranial nerves II-XII grossly intact and symmetric bilaterally  HEAD AND FACE:  Symmetric facial features, no masses or lesions, sinuses non-tender to palpation  SALIVARY GLANDS:  Parotid and submandibular glands normal bilaterally  EARS:  Normal external ears, external auditory canals, and TMs to otoscopy, normal hearing to whispered voice.  NOSE:  External nose midline, anterior rhinoscopy is normal with limited visualization to the anterior aspect of the interior turbinates, no bleeding or drainage, no lesions  ORAL CAVITY/OROPHARYNX/LIPS: An exophytic friable mass can be visualized in the right oropharynx.  The remainder of the oral cavity has normal mucosa  PHARYNGEAL WALLS: Right oropharyngeal mass  NECK/LYMPH: Some mildly enlarged LAD, no thyroid masses, trachea midline.  There is a lipomatous lesion in the left inferior neck near the sternocleidomastoid  SKIN:  Neck skin is without scar or injury  PSYCH:  Alert and oriented with appropriate mood and affect    PEG tube site healing well with no sign of infection.  The bumper is loose and able to be spun             Assessment and  Plan  78 y.o. 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  Final pathology and p16 status is pending.  PET scan did not show any evidence of distant metastasis and no abnormal lymphadenopathy.  Based on exam and imaging he is not a surgical candidate and would recommend likely chemoradiation    -We reviewed work-up including biopsy results, imaging  -We will place referral for medical oncology, radiation oncology, nutrition  -We will order modified barium swallow    Kalin Garcia MD

## 2023-11-14 ENCOUNTER — NUTRITION (OUTPATIENT)
Dept: HEMATOLOGY/ONCOLOGY | Facility: CLINIC | Age: 78
End: 2023-11-14
Payer: MEDICARE

## 2023-11-14 NOTE — PROGRESS NOTES
"NUTRITION COMMUNICATION NOTE    Kevin Mendes \"Pat\"     REASON FOR COMMUNICATION:   Consult received from Dr. Garcia's office for recent PEG tube placement and upcoming chemo/RT treatments for new diagnosis base of tongue cancer.  Called pt today.  He stated he is still eating by mouth ok.  Coming in on Friday to [facility] radiation for an appointment.  Plan to see pt after that appointment in radiation, pt ok with that plan.             "

## 2023-11-15 NOTE — PROGRESS NOTES
"Patient ID: Kevin Mendes \"Katherine\" is a 78 y.o. male.  Diagnosis: Base of tongue cancer  Staging: T3N0M0  Date of Diagnosis: 11/8/23    Providers:  ENT Surgeon: Dr. Radha Santiago:   Dr. Burkitt ([facility]) --> Dr. Kalie Torrse: Dr. Diaz    Oncological history;  Patient was having sore throat with globus sensation for about 1 month.  CT scan (10/20/23) showed exophytic mass in the oropharynx coming from BOT.  PET CT (11/3/23) showed focal hypermetabolic activity along the right base of the tongue with extension inferiorly to the level of the epiglottis.  No distant mets.  Low dose CT chest showed nodule in LLL, but favors infectious/inflammatory.  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  PEG tube placed.      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)  09/06/2018: Localized, primary osteoarthritis  09/25/2017: Low back pain, unspecified      Comment:  Acute low back pain  07/23/2009: Malignant melanoma of skin of trunk, except scrotum (CMS/  HCC)  No date: 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)  No date: Personal history of other diseases of the circulatory system      Comment:  Personal history of supraventricular tachycardia  12/31/2022: Personal history of other diseases of urinary system      Comment:  History of hematuria  No date: Personal history of other endocrine, nutritional and   metabolic disease      Comment:  History of diabetes mellitus  04/05/2010: Polyneuropathy  01/12/2018: Presence of right artificial knee joint  07/23/2018: Sensorineural hearing loss, bilateral   Surgical History:    Past Surgical History:   Procedure Laterality " Date    CATARACT EXTRACTION  04/07/2017    Cataract Surgery    HAND SURGERY  04/07/2017    Hand Surgery                                                                                                                                                          OTHER SURGICAL HISTORY  04/10/2014    Catheter Ablation Atrial Supraventricular Tachycardia    SHOULDER SURGERY  04/07/2017    Shoulder Surgery    TOTAL HIP ARTHROPLASTY  04/10/2014    Hip Replacement    TOTAL KNEE ARTHROPLASTY  04/10/2014    Knee Replacement      Family History:    Family History   Problem Relation Name Age of Onset    Other (heart failure following cardiac surgery) Mother       Family Oncology History:    Cancer-related family history is not on file.  Social History:    Social History     Tobacco Use    Smoking status: Never    Smokeless tobacco: Never   Vaping Use    Vaping Use: Never used   Substance Use Topics    Alcohol use: Yes     Alcohol/week: 2.0 standard drinks of alcohol     Types: 2 Cans of beer per week    Drug use: Never          Subjective   Chief Complaint: Base of tongue cancer    HPI  Interval History  He has been eating soft food, he can feel something rubbing off on back of his throat when he eats, but denies odynophagia, dysphagia. He stays pretty active around house, walk daily.  He plays gold in summer.  He is overall feeling well, no recent wt loss.  Denies nausea, vomiting, fever, chills, SOB, CP, abdominla pain, diarrhea and constipation.      ROS  Review of Systems - Oncology    Allergies  Allergies   Allergen Reactions    Cat Dander Unknown    Canagliflozin Rash        Medications  Current Outpatient Medications   Medication Instructions    aspirin 81 mg, oral, Daily    glyBURIDE (DIABETA) 10 mg, oral, 2 times daily with meals    losartan (COZAAR) 100 mg, oral, Daily    metFORMIN XR (GLUCOPHAGE-XR) 1,000 mg, oral, Daily with evening meal, Do not crush, chew, or split.    neomycin-polymyxin B-dexameth (Polydex) 3.5  "mg/g-10,000 unit/g-0.1 % ointment ophthalmic ointment apply to all 4 eyelids four times a day    neomycin-polymyxin-dexAMETHasone (Maxitrol) 3.5mg/mL-10,000 unit/mL-0.1 % ophthalmic suspension Instill 2 (TWO) drops in each eye four times a day    pioglitazone (ACTOS) 45 mg, oral, Daily    simvastatin (ZOCOR) 40 mg, oral, Nightly    tamsulosin (FLOMAX) 0.4 mg, oral, Daily          Objective   VS: There were no vitals taken for this visit.  Weight: Daily Weight  11/08/23 : 92.4 kg (203 lb 11.3 oz)  11/07/23 : 96.2 kg (212 lb)  10/30/23 : 95.3 kg (210 lb)  10/11/23 : 95.8 kg (211 lb 3.2 oz)  10/05/23 : 95.3 kg (210 lb)  09/18/23 : 94.3 kg (208 lb)  04/07/23 : 94.3 kg (208 lb)      Physical Exam  Constitutional:       Appearance: Normal appearance.   HENT:      Head: Normocephalic and atraumatic.      Nose: Nose normal.      Mouth/Throat:      Mouth: Mucous membranes are moist.   Eyes:      Extraocular Movements: Extraocular movements intact.      Pupils: Pupils are equal, round, and reactive to light.   Cardiovascular:      Rate and Rhythm: Normal rate and regular rhythm.      Pulses: Normal pulses.      Heart sounds: Normal heart sounds.   Pulmonary:      Effort: Pulmonary effort is normal.      Breath sounds: Normal breath sounds.   Abdominal:      Comments: PEG in place   Musculoskeletal:         General: Normal range of motion.      Cervical back: Normal range of motion and neck supple.   Skin:     Comments: Multiple ecchymosis in bilateral arms.   Neurological:      Mental Status: He is alert.         Diagnostic Results   Labs  Below labs are reviewed today.                         Images       Pathology  No results found for: \"KBXHX\", \"PATHREP\", \"INTERP\", \"ADD1\", \"ADD2\", \"ADD3\", \"CORRECTIONHX\", \"ASTUDIES\", \"ANCILLARY1\", \"ANCILLARY2\", \"FINALINTERP\", \"COMDX\"      Assessment/Plan   Mr. Mendes is 79 y/o male with recent dx of BOT SCC (T2N0M0, p16+, Stage I) who is referred to med onc to be considered for chemotherapy " in addition to radiation.    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 11/3/23: PET/CT showed no distant mets except a pulmonary nodule in LLL with no hypermetabolic activity. Findings are favored to be infectious/inflammatory or granulomatous in nature (discussed in [facility] TB with radiologist). Close follow up with CT to assess for stability is recommended.  Since pt is not a surgical candidate, we will treat him with concurrent chemoradiation. His GFR is at 50s, prefer weekly carboplatin and paclitaxel. I discussed with patient about treatment schedule and chemotherapy related side effects in detail. Pt will see Dr. Diaz tomorrow and he will be treated at [place] by Dr. Jade.  Dr. Jade has been notified about the plan and treatment plan has been sent to her.  Tentative start date is 12/4.

## 2023-11-16 ENCOUNTER — OFFICE VISIT (OUTPATIENT)
Dept: HEMATOLOGY/ONCOLOGY | Facility: HOSPITAL | Age: 78
End: 2023-11-16
Payer: MEDICARE

## 2023-11-16 VITALS
BODY MASS INDEX: 29.23 KG/M2 | RESPIRATION RATE: 17 BRPM | SYSTOLIC BLOOD PRESSURE: 124 MMHG | DIASTOLIC BLOOD PRESSURE: 44 MMHG | TEMPERATURE: 98.2 F | WEIGHT: 204.2 LBS | OXYGEN SATURATION: 98 % | HEART RATE: 88 BPM | HEIGHT: 70 IN

## 2023-11-16 DIAGNOSIS — C01 MALIGNANT NEOPLASM OF BASE OF TONGUE (MULTI): ICD-10-CM

## 2023-11-16 DIAGNOSIS — C76.0 HEAD AND NECK CANCER (MULTI): Primary | ICD-10-CM

## 2023-11-16 PROCEDURE — 99205 OFFICE O/P NEW HI 60 MIN: CPT | Performed by: INTERNAL MEDICINE

## 2023-11-16 PROCEDURE — 3078F DIAST BP <80 MM HG: CPT | Performed by: INTERNAL MEDICINE

## 2023-11-16 PROCEDURE — 3074F SYST BP LT 130 MM HG: CPT | Performed by: INTERNAL MEDICINE

## 2023-11-16 PROCEDURE — 1159F MED LIST DOCD IN RCRD: CPT | Performed by: INTERNAL MEDICINE

## 2023-11-16 PROCEDURE — 1036F TOBACCO NON-USER: CPT | Performed by: INTERNAL MEDICINE

## 2023-11-16 PROCEDURE — 99215 OFFICE O/P EST HI 40 MIN: CPT | Performed by: INTERNAL MEDICINE

## 2023-11-16 PROCEDURE — 1126F AMNT PAIN NOTED NONE PRSNT: CPT | Performed by: INTERNAL MEDICINE

## 2023-11-16 PROCEDURE — 1160F RVW MEDS BY RX/DR IN RCRD: CPT | Performed by: INTERNAL MEDICINE

## 2023-11-16 RX ORDER — ONDANSETRON HYDROCHLORIDE 8 MG/1
8 TABLET, FILM COATED ORAL EVERY 8 HOURS PRN
Qty: 30 TABLET | Refills: 5 | Status: SHIPPED | OUTPATIENT
Start: 2023-11-16 | End: 2024-04-10 | Stop reason: ALTCHOICE

## 2023-11-16 RX ORDER — PROCHLORPERAZINE MALEATE 10 MG
10 TABLET ORAL EVERY 6 HOURS PRN
Qty: 30 TABLET | Refills: 5 | Status: SHIPPED | OUTPATIENT
Start: 2023-11-16

## 2023-11-16 ASSESSMENT — ENCOUNTER SYMPTOMS
DEPRESSION: 0
LOSS OF SENSATION IN FEET: 0
OCCASIONAL FEELINGS OF UNSTEADINESS: 0

## 2023-11-16 ASSESSMENT — PAIN SCALES - GENERAL: PAINLEVEL: 0-NO PAIN

## 2023-11-17 ENCOUNTER — TUMOR BOARD CONFERENCE (OUTPATIENT)
Dept: HEMATOLOGY/ONCOLOGY | Facility: HOSPITAL | Age: 78
End: 2023-11-17
Payer: MEDICARE

## 2023-11-17 ENCOUNTER — NUTRITION (OUTPATIENT)
Dept: HEMATOLOGY/ONCOLOGY | Facility: CLINIC | Age: 78
End: 2023-11-17

## 2023-11-17 ENCOUNTER — HOSPITAL ENCOUNTER (OUTPATIENT)
Dept: RADIATION ONCOLOGY | Facility: CLINIC | Age: 78
Setting detail: RADIATION/ONCOLOGY SERIES
Discharge: HOME | End: 2023-11-17
Payer: MEDICARE

## 2023-11-17 ENCOUNTER — SOCIAL WORK (OUTPATIENT)
Dept: CASE MANAGEMENT | Facility: HOSPITAL | Age: 78
End: 2023-11-17
Payer: MEDICARE

## 2023-11-17 VITALS
HEART RATE: 83 BPM | HEIGHT: 70 IN | DIASTOLIC BLOOD PRESSURE: 69 MMHG | TEMPERATURE: 97.7 F | OXYGEN SATURATION: 94 % | RESPIRATION RATE: 18 BRPM | SYSTOLIC BLOOD PRESSURE: 122 MMHG | WEIGHT: 204 LBS | BODY MASS INDEX: 29.2 KG/M2

## 2023-11-17 VITALS — BODY MASS INDEX: 29.2 KG/M2 | WEIGHT: 204 LBS | HEIGHT: 70 IN

## 2023-11-17 DIAGNOSIS — C01 MALIGNANT NEOPLASM OF BASE OF TONGUE (MULTI): Primary | ICD-10-CM

## 2023-11-17 LAB
LAB AP ASR DISCLAIMER: NORMAL
LABORATORY COMMENT REPORT: NORMAL
Lab: NORMAL
PATH REPORT.FINAL DX SPEC: NORMAL
PATH REPORT.GROSS SPEC: NORMAL
PATH REPORT.RELEVANT HX SPEC: NORMAL
PATH REPORT.TOTAL CANCER: NORMAL

## 2023-11-17 PROCEDURE — 31575 DIAGNOSTIC LARYNGOSCOPY: CPT | Performed by: RADIOLOGY

## 2023-11-17 PROCEDURE — 77263 THER RADIOLOGY TX PLNG CPLX: CPT | Performed by: RADIOLOGY

## 2023-11-17 PROCEDURE — 99204 OFFICE O/P NEW MOD 45 MIN: CPT | Performed by: RADIOLOGY

## 2023-11-17 PROCEDURE — 99214 OFFICE O/P EST MOD 30 MIN: CPT | Mod: 25 | Performed by: RADIOLOGY

## 2023-11-17 ASSESSMENT — ENCOUNTER SYMPTOMS
OCCASIONAL FEELINGS OF UNSTEADINESS: 0
DEPRESSION: 1
LOSS OF SENSATION IN FEET: 0

## 2023-11-17 ASSESSMENT — PAIN SCALES - GENERAL: PAINLEVEL: 0-NO PAIN

## 2023-11-17 NOTE — PROGRESS NOTES
Social Work Note  11/17/2023 SW contact Dr. Ellison and [facility] to see if either could work with patient to get him in for a tooth to be pulled prior to radiation therapy.  Both said they were fully booked until the end of December.  SINDHU left a vm for [facility] [phone].  SW will remain available to assist patient.  Fariba Regan, MSW, LSW

## 2023-11-17 NOTE — PATIENT INSTRUCTIONS
Flush PEG tube with 60 ml water 2-3 times per day, more as needed.    Provided PEG tube guide and discussed how to flush PEG tube, discussed formula feedings when needed, how to do gravity feeds.  Refer to PEG tube guide again for refresher, however will be following throughout SCC treatments.  Contact name and number provided in book if further questions arise prior to tx start.    Continue to eat by mouth as able/tolerated.  Recommend soft high calorie, high protein smaller frequent meals.  Try to eat 4-6 smaller meals throughout the day.    Add Ensure plus 1-2 times per day midday for snack/between meals.

## 2023-11-17 NOTE — PROGRESS NOTES
"NUTRITION Assessment NOTE  Reason for Visit:  Kevin Mendes \"Katherine\" is a 78 y.o. male with a diagnosis of BOT cancer.  Patient had a PEG tube placed 11/8/2023.  Patient seen today after radiation oncology MD visit.  Patient to be started chemo/RT in the near future.      Lab Results   Component Value Date/Time    GLUCOSE 142 (H) 11/07/2023 0924     11/07/2023 0924    K 4.8 11/07/2023 0924     11/07/2023 0924    CO2 27 11/07/2023 0924    ANIONGAP 15 11/07/2023 0924    BUN 29 (H) 11/07/2023 0924    CREATININE 1.25 11/07/2023 0924    EGFR 59 (L) 11/07/2023 0924    CALCIUM 9.3 11/07/2023 0924    ALBUMIN 4.1 09/14/2023 0951    ALKPHOS 76 09/14/2023 0951    PROT 6.7 09/14/2023 0951    AST 15 09/14/2023 0951    BILITOT 0.4 09/14/2023 0951    ALT 8 (L) 09/14/2023 0951     Lab Results   Component Value Date/Time    VITD25 48 09/14/2023 0951       Anthropometrics:  Anthropometrics  Height: 177.8 cm (5' 10\") (Height taken from Radiation apt today)  Weight: 92.5 kg (204 lb) (weight taken from radiation MD visit)  BMI (Calculated): 29.27  IBW/kg (Dietitian Calculated): 75.45 kg  Percent of IBW: 123 %  Weight Change  Weight History / % Weight Change: 3% (3% in the last month)  Significant Weight Loss: No        Wt Readings from Last 10 Encounters:   11/17/23 92.5 kg (204 lb)   11/17/23 92.5 kg (204 lb)   11/16/23 92.6 kg (204 lb 3.2 oz)   11/08/23 92.4 kg (203 lb 11.3 oz)   11/07/23 96.2 kg (212 lb)   10/30/23 95.3 kg (210 lb)   10/11/23 95.8 kg (211 lb 3.2 oz)   10/05/23 95.3 kg (210 lb)   09/18/23 94.3 kg (208 lb)   04/07/23 94.3 kg (208 lb)        Food And Nutrient Intake:  Food and Nutrient History  Energy Intake: Fair 50-75 %  Fluid Intake: Fair  Oral Problems: denies     Food Intake  Amount of Food: appetite has decreased  Meal 1: has bowl cereal; cheerios around 10am  Meal 2: nothing  Meal 3: 1/4-1/2 portions as used to do    Food Preparation  Cooking: Spouse/Significant Other                                   " "            Nutrition Focused Physical Exam:  Subcutaneous Fat Loss  Orbital Fat Pads: Well nourshed (slightly bulging fat pads)  Buccal Fat Pads: Well nourished (full, rounded cheeks)  Muscle Wasting  Temporalis: Well nourished (well-defined muscle)    Energy Needs  Calculated Energy Needs Using Equations  Height: 177.8 cm (5' 10\") (Height taken from Radiation apt today)  Weight Used for Equation Calculations: 92.6 kg (204 lb 2.3 oz)  [place]- St. Jeor Equation (Overweight or Obese Patients): 1647  Activity Factor: 1.5  Estimated Energy Needs  Total Energy Estimated Needs (kCal): 2300 kCal  Total Estimated Energy Need per Day (kCal/kg): 25 kCal/kg  Estimated Fluid Needs  Total Fluid Estimated Needs (mL): 2300 mL  Total Fluid Estimated Needs (mL/kg): 25 mL/kg  Estimated Protein Needs  Total Protein Estimated Needs (g): 112 g  Total Protein Estimated Needs (g/kg): 1.2 g/kg    Nutrition Diagnosis      Nutrition Diagnosis  Patient has Nutrition Diagnosis: Yes  Diagnosis Status (1): New  Nutrition Diagnosis 1: Increased nutrient needs  Related to (1): increased calorie, protein and fluid needs  As Evidenced by (1): upcoming chemo/RT  Additional Nutrition Diagnosis: Diagnosis 2  Diagnosis Status (2): New  Nutrition Diagnosis 2: Predicted inadequate nutrient intake  Related to (2): Head and neck cancer treatments known to decrease oral intakes  As Evidenced by (2): PEG tube placed    Nutrition Interventions/Recommendations   Food and Nutrition Delivery  Meals & Snacks: Energy-modified diet, Protein-modified diet, Texture-Modified Diet  Goals: Small frequent meals including high calorie, high protein softer foods increased fluids  Enteral Nutrition: Enteral nutrition site care, Feeding tube flush  Goals: Flush PEG tube with 60ml water 2-3 times per day for now.  Throughout SCC treatments as po intake declines will start PEG tube feedings with CereSoft Standard 1.5  Medical Food Supplement: Ensure Plus  Goals: Start with " 1-2 per day between meals as tolerated  Nutrition Education  Nutrition Education Content: Content related nutrition education  Goals: Understand use of PEG tube, nutrition throughout SCC treatments.  Coordination of Nutrition Care by a Nutrition Professional  Collaboration and referral of nutrition care: Referral by nutrition professional to other providers  Goals: Will obtain TF order and send to [facility] for PEG tube formula and supplies    Ensure plus 1-2 per day will provide 350-700 calories, and 16-32g protein.    Patient mentioned that he had not flushed PEG tube since placed almost 10 days ago, was not sent home with any syringes.  Merary NIELSON assessed PEG tube today and flushed with 30ml water today in clinic, flushed well, pt felt ok.        Patient Instructions   Flush PEG tube with 60 ml water 2-3 times per day, more as needed.    Provided PEG tube guide and discussed how to flush PEG tube, discussed formula feedings when needed, how to do gravity feeds.  Refer to PEG tube guide again for refresher, however will be following throughout SCC treatments.  Contact name and number provided in book if further questions arise prior to tx start.    Continue to eat by mouth as able/tolerated.  Recommend soft high calorie, high protein smaller frequent meals.  Try to eat 4-6 smaller meals throughout the day.    Add Ensure plus 1-2 times per day midday for snack/between meals.      Nutrition Monitoring and Evaluation   Food/Nutrient Related History Monitoring  Monitoring and Evaluation Plan: Energy intake, Fluid intake, Protein intake, Enteral and parenteral nutrition intake  Energy Intake: Estimated energy intake  Criteria: 6361-6207 calories per day  Fluid Intake: Estimated fluid intake  Criteria: 6162-9371 calories per day  Estimated protein intake: Estimated protein intake  Criteria: 92-112g protein per day  Enteral and Parenteral Nutrition Intake: Enteral nutrition formula/solution, Enteral nutrition  intake  Criteria: Flush PEG tube with 60ml water flushes 2-3 times per day for now.  As po intake decreases initiate PEG tube feedings with Helmed[employer] Standard 1.4 gravity feeds, start with 1-2 cartons and increase as tolerated to goal.  Biochemical Data, Medical Tests and Procedures  Monitoring and Evaluation Plan: Electrolyte/renal panel, GI profile, Glucose/endocrine profile  Criteria: Labs WNL    Enteral Nutrition Goal:  Helmedix Standard 1.4 gravity feeds, 5 cartons per day will provide 2275 calories, 100 g protein, 1155ml free water.    Can do 650 ml TID and 325ml 1 per day for total of 5 cartons.  Water flushes of 60 ml before and after each gravity feed.  Additional water flush of 240ml 3 times per day.    Time Spent  Prep time on day of patient encounter: 15 minutes  Time spent directly with patient, family or caregiver: 40 minutes  Additional Time Spent on Patient Care Activities: 15 minutes  Documentation Time: 15 minutes  Other Time Spent: 0 minutes  Total: 85 minutes      This service will continue to follow up as needed throughout SCC treatments.

## 2023-11-17 NOTE — PROGRESS NOTES
"   Radiation Oncology Outpatient Consult    Patient Name:  Kevin Mendes  MRN:  [id number]  :  1945    Referring Provider: Kalin Garcia MD  Primary Care Provider: Kd Lange DO  Care Team: Patient Care Team:  Kd Lange DO as PCP - General  Kd Lange DO as PCP - [facility] Attributed Provider  Kyunghee Burkitt, DO as Consulting Physician (Hematology and Oncology)  Tsering Jade MD as Consulting Physician (Hematology and Oncology)    Date of Service: 2023       SUMMARY: 78 y.o. 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 male who was referred by Kalin Garcia MD, for a consultation to the [facility] Department of Radiation Oncology.  He is presenting for evaluation and management of head and neck cancer    They initially presented to ENT with complaints of globus type sensation for 1 month    Fiberoptic examination revealed exophytic mass in right oropharynx, airway patent, vocal cords mobile     Biopsy  demonstrated SCC, p16 status pending    Staging CT Neck w/ demonstrated  a 3.3 x 2.5 x 2.5 cm enhancing mass centered at the base of the right aspect of the tongue extending superiorly to the right aspect of the soft palate and inferiorly along the right lateral pharyngeal wall towards the right piriformis sinus. There is loss of the adjacent parapharyngeal fat plane. No lymphadenopathy.    Staging PET/CT did not show evidence of mina or distant metastases      Dental Evaluation: Pt likely need 1 tooth extracted    SLP: pending    Today, the patient reports feeling well overall. The patient reports decreased appetite and some trouble with swallowing.  They endorse some hoarseness/voice changes. They have a PEG tube in place but haven't used it yet    Prior Radiotherapy:  No  Radiation Treatments       No radiation treatments to show. 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    Localized, primary osteoarthritis 09/06/2018    Low back pain, unspecified 09/25/2017    Acute low back pain    Malignant melanoma of skin of trunk, except scrotum (CMS/[facility]) 07/23/2009    Malignant neoplasm of base of tongue (CMS/[facility])     Personal history of other diseases of the circulatory system     Personal history of supraventricular tachycardia    Personal history of other diseases of urinary system 12/31/2022    History of hematuria    Personal history of other endocrine, nutritional and metabolic disease     History of diabetes mellitus    Polyneuropathy 04/05/2010    Presence of right artificial knee joint 01/12/2018    Sensorineural hearing loss, bilateral 07/23/2018     History of autoimmune disease: No  History of Cancer: Yes, describe: Skin cancer    Past Surgical History:    Past Surgical History:   Procedure Laterality Date    CATARACT EXTRACTION  04/07/2017    Cataract Surgery    HAND SURGERY  04/07/2017    Hand Surgery                                                                                                                                                          OTHER SURGICAL HISTORY  04/10/2014    Catheter Ablation Atrial Supraventricular Tachycardia    SHOULDER SURGERY  04/07/2017    Shoulder Surgery    TOTAL HIP ARTHROPLASTY  04/10/2014    Hip Replacement    TOTAL KNEE ARTHROPLASTY  04/10/2014    Knee Replacement        Family History:  Cancer-related family history is not on  file.    Social History:    Social History     Tobacco Use    Smoking status: Former     Types: Cigarettes     Passive exposure: Never    Smokeless tobacco: Never   Vaping Use    Vaping Use: Never used   Substance Use Topics    Alcohol use: Yes     Alcohol/week: 2.0 standard drinks of alcohol     Types: 2 Cans of beer per week    Drug use: Never     Where does the patient live: [place]   Can they come to [place] for treatment (Yes/No):  Yes  Can they go to [facility] for treatment if indicated (Yes/No): No  If no [facility], then where would want to be treated:- Prefer [place]  Smoking history (Current/Former/Never): Former  Occupation: Retired     Social work Assessment:  Does the patient require social work referral (Yes/No): No    Allergies:    Allergies   Allergen Reactions    Cat Dander Unknown    Canagliflozin Rash        Medications:    Current Outpatient Medications:     aspirin 81 mg EC tablet, Take 1 tablet (81 mg) by mouth once daily., Disp: , Rfl:     glyBURIDE (Diabeta) 5 mg tablet, Take 2 tablets (10 mg) by mouth 2 times a day with meals., Disp: 360 tablet, Rfl: 2    losartan (Cozaar) 100 mg tablet, Take 1 tablet (100 mg) by mouth once daily., Disp: 90 tablet, Rfl: 2    metFORMIN  mg 24 hr tablet, Take 2 tablets (1,000 mg) by mouth once daily in the evening. Take with meals. Do not crush, chew, or split., Disp: 180 tablet, Rfl: 2    neomycin-polymyxin B-dexameth (Polydex) 3.5 mg/g-10,000 unit/g-0.1 % ointment ophthalmic ointment, apply to all 4 eyelids four times a day, Disp: , Rfl:     neomycin-polymyxin-dexAMETHasone (Maxitrol) 3.5mg/mL-10,000 unit/mL-0.1 % ophthalmic suspension, Instill 2 (TWO) drops in each eye four times a day, Disp: , Rfl:     ondansetron (Zofran) 8 mg tablet, Take 1 tablet (8 mg) by mouth every 8 hours if needed for nausea or vomiting., Disp: 30 tablet, Rfl: 5    pioglitazone (Actos) 45 mg tablet, Take 1 tablet (45 mg) by mouth once daily., Disp: 90 tablet, Rfl: 2    prochlorperazine  "(Compazine) 10 mg tablet, Take 1 tablet (10 mg) by mouth every 6 hours if needed for nausea or vomiting., Disp: 30 tablet, Rfl: 5    simvastatin (Zocor) 40 mg tablet, Take 1 tablet (40 mg) by mouth once daily at bedtime., Disp: 90 tablet, Rfl: 2    tamsulosin (Flomax) 0.4 mg 24 hr capsule, Take 1 capsule (0.4 mg) by mouth once daily., Disp: 90 capsule, Rfl: 2      Review of Systems:  Review of Systems   Constitutional:  Positive for appetite change (decreased appetite, eating soft foods. PEG tube in place) and fatigue (increase in fatigue that is not always relieved by rest).   HENT:   Positive for hearing loss (retired ), lump/mass, trouble swallowing (needs water to help swallow more often) and voice change.    Eyes:         On medication for infection    Respiratory:  Positive for cough.    Cardiovascular: Negative.    Gastrointestinal: Negative.    Endocrine: Negative.    Genitourinary:  Positive for nocturia (2-3 times per night).    Musculoskeletal:  Positive for gait problem.   Skin:  Positive for wound (to face).   Neurological:  Positive for gait problem.   Hematological:  Bruises/bleeds easily.   Psychiatric/Behavioral:  Positive for depression and sleep disturbance. The patient is nervous/anxious.      The patient's current pain level was assessed.  They report currently having a pain of 0 out of 10.  They feel their pain is under control without the use of pain medications.      Performance Status:  The Karnofsky performance scale today is 80, Normal activity with effort; some signs or symptoms of disease (ECOG equivalent 1).        OBJECTIVE  Physical Exam:  /69 (BP Location: Right arm, Patient Position: Sitting, BP Cuff Size: Large adult)   Pulse 83   Temp 36.5 °C (97.7 °F) (Tympanic)   Resp 18   Ht 1.778 m (5' 10\")   Wt 92.5 kg (204 lb)   SpO2 94%   BMI 29.27 kg/m²    Constitutional: Awake, alert and oriented. No acute distress. Appears stated age.  Eyes: Conjunctivae are clear " without exudates or hemorrhage. Eyelids are normal in appearance without swelling or lesions.  ENMT: mucous membranes moist, no apparent injury, no lesions seen  Neck: Neck supple, no apparent injury, trachea midline  Resp/Thorax: Patent airways,  good chest expansion. Thorax symmetric  Cardiovascular: The external chest is normal without lifts, heaves, or thrills. PMI is not visible.  GI: Nondistended, soft, non-tender.  /Gyn: Deferred  MSK: No tenderness noted on palpation of the spine. No ROM limitations.  Extremities: normal extremities, no cyanosis, erythema or edema.  Neurological: alert and oriented x3. Sensory and motor function appear intact. No gait abnormality observed.  Psych: Appropriate mood and behavior.  Skin: Warm and dry, no new lesions or rashes.    Procedure:  PROCEDURE NOTE: Recommended flexible nasal endoscopy. Risks, benefits, personnel and alternatives were explained. The patient wished to proceed. S/he was re-identified.  PROCEDURE: Flexible nasal endoscopy  FINDINGS:      After application of Jason-Synephrine and lidocaine, the endoscope was advanced  into the nasal cavity.     The following specific findings should be noted:  Exophytic Right BOT tongue mass extending superiorly along the lateral pharyngeal wall and inferiorly down to the vallecula. Abutting the lingual surface of the epiglottis  Mobile true vocal cords bilaterally  No pooling secretions    The patient tolerated the procedure without difficulty      Laboratory Review:    Lab Results   Component Value Date    WBC 8.0 11/07/2023    HGB 11.7 (L) 11/07/2023    HCT 38.4 (L) 11/07/2023     11/07/2023    CHOL 113 09/14/2023    TRIG 83 09/14/2023    HDL 45.5 09/14/2023    ALT 8 (L) 09/14/2023    AST 15 09/14/2023     11/07/2023    K 4.8 11/07/2023     11/07/2023    CREATININE 1.25 11/07/2023    BUN 29 (H) 11/07/2023    CO2 27 11/07/2023    TSH 3.20 09/14/2023    PSA 1.18 09/14/2023    INR 1.1 11/07/2023    GLUF  196 (H) 10/05/2020    HGBA1C 6.1 (A) 09/14/2023     Par    The pertinent lab results were reviewed and discussed with the patient.      Pathology Review:   pending    Imaging:  The pertinent imaging results were reviewed and discussed with the patient.  See HPI        ASSESSMENT:  Kevin Mendes is a 78 y.o. 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      COUNSELING AND COORDINATION OF CARE:        The natural history of cancer of the oropharynx  was reviewed with the patient. Prognostic factors including Age, smoking, tumor location, stage and pathologic features, P16 status and weight loss were reviewed. Standard treatment modalities such as surgery, radiation +/- chemotherapy were compared and contrasted with regard to outcome and quality of life measures. The indications, benefits, side effects and acute complications of radiotherapy, which include mucositis, loss of taste, xerostomia, thrush, dermatitis, dysphagia, odynophagia; chronic complications including xerostomia, lifelong need for fluoride prophylaxis, risk for dental caries and osteoradionecrosis, dysphagia, loss of swallow function, lymphedema  and failure to control disease have all been discussed in detail with the patient. All questions were answered to the patient´s satisfaction.         PLAN:    Pt will go see dentist regarding clearance   Chemo slated for 12/4/23. Will attempt to start radiation at that time   Will treat 70 Gy in 35 fractions  Pt requires referral to home health   Pt requires suction device for oral secretions at home       NCCN Guidelines were applicable to guide this patients treatment plan.   Sukhjinder Diaz MD       Future Appointments   Date Time Provider Department [place]   11/27/2023  1:00 PM ELY Man-CNP [id number] [place]   12/4/2023 11:00 AM INF 14 GALLITO [id number] [place]   12/5/2023  9:15 AM STJ FLUORO 5 STJDR STRENETTA [place]   1/12/2024 10:15 AM Kalin MA  MD Radha [id number] [place]   4/10/2024  2:00 PM Kd Lange DO [id number] [place]   9/18/2024  2:00 PM Kd Lange DO [id number] [place]

## 2023-11-21 ENCOUNTER — SOCIAL WORK (OUTPATIENT)
Dept: CASE MANAGEMENT | Facility: HOSPITAL | Age: 78
End: 2023-11-21
Payer: MEDICARE

## 2023-11-21 NOTE — PROGRESS NOTES
Social Work Note  11/21/2023 SW received a call from patient's wife that they had found another dental office for patient's tooth to be pulled.  SW checked and she does have the dental clearance form to be completed already.  SW will remain available to assist patient.  Fariba Regan, MSW, LSW

## 2023-11-22 ENCOUNTER — HOSPITAL ENCOUNTER (OUTPATIENT)
Dept: RADIOLOGY | Facility: EXTERNAL LOCATION | Age: 78
Discharge: HOME | End: 2023-11-22

## 2023-11-22 ENCOUNTER — HOSPITAL ENCOUNTER (OUTPATIENT)
Dept: RADIATION ONCOLOGY | Facility: CLINIC | Age: 78
Setting detail: RADIATION/ONCOLOGY SERIES
End: 2023-11-22
Payer: MEDICARE

## 2023-11-22 DIAGNOSIS — C10.8 MALIGNANT NEOPLASM OF JUNCTIONAL REGION OF OROPHARYNX (MULTI): ICD-10-CM

## 2023-11-24 ENCOUNTER — SOCIAL WORK (OUTPATIENT)
Dept: CASE MANAGEMENT | Facility: HOSPITAL | Age: 78
End: 2023-11-24
Payer: MEDICARE

## 2023-11-24 NOTE — PROGRESS NOTES
Social Work Note  11/24/2023 SW returned wife's call today.  She has concerns about transportation for patient.  SW discussed senior center and having friends help different days. Wife thought insurance would pay for his transportation to treatment.  SW explained with the insurance they have this is not covered.  Patient may need to take medication to assist him with coping with the head and neck mask. She understands patient can't drive if this happens.  They meet with medical oncology for teaching on 11/27/23.  She is going to ask the nurse about patient driving to and from infusions and if someone needs to be present at each infusion.  SW will remain available to assist patient.  Fariba Regan, MSW, LSW

## 2023-11-27 ENCOUNTER — HOSPITAL ENCOUNTER (OUTPATIENT)
Dept: RADIATION ONCOLOGY | Facility: CLINIC | Age: 78
Setting detail: RADIATION/ONCOLOGY SERIES
Discharge: HOME | End: 2023-11-27
Payer: MEDICARE

## 2023-11-27 ENCOUNTER — OFFICE VISIT (OUTPATIENT)
Dept: HEMATOLOGY/ONCOLOGY | Facility: CLINIC | Age: 78
End: 2023-11-27
Payer: MEDICARE

## 2023-11-27 VITALS
RESPIRATION RATE: 18 BRPM | SYSTOLIC BLOOD PRESSURE: 119 MMHG | WEIGHT: 202.38 LBS | TEMPERATURE: 98.1 F | HEART RATE: 84 BPM | OXYGEN SATURATION: 97 % | BODY MASS INDEX: 29.04 KG/M2 | DIASTOLIC BLOOD PRESSURE: 70 MMHG

## 2023-11-27 DIAGNOSIS — L24.A9 DRAINAGE FROM WOUND: ICD-10-CM

## 2023-11-27 DIAGNOSIS — C76.0 HEAD AND NECK CANCER (MULTI): Primary | ICD-10-CM

## 2023-11-27 PROCEDURE — 77334 RADIATION TREATMENT AID(S): CPT | Performed by: RADIOLOGY

## 2023-11-27 PROCEDURE — 3074F SYST BP LT 130 MM HG: CPT | Performed by: NURSE PRACTITIONER

## 2023-11-27 PROCEDURE — 1160F RVW MEDS BY RX/DR IN RCRD: CPT | Performed by: NURSE PRACTITIONER

## 2023-11-27 PROCEDURE — 87070 CULTURE OTHR SPECIMN AEROBIC: CPT | Performed by: NURSE PRACTITIONER

## 2023-11-27 PROCEDURE — 99215 OFFICE O/P EST HI 40 MIN: CPT | Performed by: NURSE PRACTITIONER

## 2023-11-27 PROCEDURE — 1036F TOBACCO NON-USER: CPT | Performed by: NURSE PRACTITIONER

## 2023-11-27 PROCEDURE — 87075 CULTR BACTERIA EXCEPT BLOOD: CPT | Performed by: NURSE PRACTITIONER

## 2023-11-27 PROCEDURE — 3078F DIAST BP <80 MM HG: CPT | Performed by: NURSE PRACTITIONER

## 2023-11-27 PROCEDURE — 1159F MED LIST DOCD IN RCRD: CPT | Performed by: NURSE PRACTITIONER

## 2023-11-27 PROCEDURE — 1126F AMNT PAIN NOTED NONE PRSNT: CPT | Performed by: NURSE PRACTITIONER

## 2023-11-27 RX ORDER — LORAZEPAM 1 MG/1
1 TABLET ORAL DAILY
COMMUNITY
End: 2024-04-10 | Stop reason: ALTCHOICE

## 2023-11-27 ASSESSMENT — ENCOUNTER SYMPTOMS
DEPRESSION: 0
OCCASIONAL FEELINGS OF UNSTEADINESS: 0
LOSS OF SENSATION IN FEET: 0

## 2023-11-27 ASSESSMENT — COLUMBIA-SUICIDE SEVERITY RATING SCALE - C-SSRS
6. HAVE YOU EVER DONE ANYTHING, STARTED TO DO ANYTHING, OR PREPARED TO DO ANYTHING TO END YOUR LIFE?: NO
2. HAVE YOU ACTUALLY HAD ANY THOUGHTS OF KILLING YOURSELF?: NO
1. IN THE PAST MONTH, HAVE YOU WISHED YOU WERE DEAD OR WISHED YOU COULD GO TO SLEEP AND NOT WAKE UP?: NO

## 2023-11-27 ASSESSMENT — PATIENT HEALTH QUESTIONNAIRE - PHQ9
2. FEELING DOWN, DEPRESSED OR HOPELESS: NOT AT ALL
1. LITTLE INTEREST OR PLEASURE IN DOING THINGS: NOT AT ALL
SUM OF ALL RESPONSES TO PHQ9 QUESTIONS 1 AND 2: 0

## 2023-11-27 ASSESSMENT — PAIN SCALES - GENERAL: PAINLEVEL: 0-NO PAIN

## 2023-11-27 NOTE — PROGRESS NOTES
"[facility] - Medical Oncology Follow-Up Visit    Patient ID: Kevin Mendes \"Vinh" is a 78 y.o. male      Current therapy: CARBOplatin (Paraplatin) in sodium chloride 0.9% 100 mL IV, , intravenous, Once, 0 of 7 cycles        PACLitaxeL (Taxol) 96 mg in dextrose 5 % in water (D5W) 116 mL IV, 45 mg/m2, intravenous, Once, 0 of 7 cycles        palonosetron (Aloxi) injection 250 mcg, 0.25 mg, intravenous, Once, 0 of 7 cycles      Oncologic History:     Diagnosis: Base of tongue cancer  Staging: T3N0M0  Date of Diagnosis: 11/8/23     Providers:  ENT Surgeon: Dr. Radha Santiago:   Dr. Burkitt ([facility]) --> Dr. Kalie Torres: Dr. Diaz     Oncological history;  Patient was having sore throat with globus sensation for about 1 month.  CT scan (10/20/23) showed exophytic mass in the oropharynx coming from BOT.  PET CT (11/3/23) showed focal hypermetabolic activity along the right base of the tongue with extension inferiorly to the level of the epiglottis.  No distant mets.  Low dose CT chest showed nodule in LLL, but favors infectious/inflammatory.  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  PEG tube placed.        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)  09/06/2018: Localized, primary osteoarthritis  09/25/2017: Low back pain, unspecified      Comment:  Acute low back pain  07/23/2009: Malignant melanoma of skin of trunk, except scrotum (CMS/  HCC)  No date: 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)  No date: Personal history of other diseases of the circulatory system      Comment:  Personal history of " supraventricular tachycardia  12/31/2022: Personal history of other diseases of urinary system      Comment:  History of hematuria  No date: Personal history of other endocrine, nutritional and   metabolic disease      Comment:  History of diabetes mellitus  04/05/2010: Polyneuropathy  01/12/2018: Presence of right artificial knee joint  07/23/2018: Sensorineural hearing loss, bilateral      Surgical History:    Surgical History         Past Surgical History:   Procedure Laterality Date    CATARACT EXTRACTION   04/07/2017     Cataract Surgery    HAND SURGERY   04/07/2017     Hand Surgery                                                                                                                                                          OTHER SURGICAL HISTORY   04/10/2014     Catheter Ablation Atrial Supraventricular Tachycardia    SHOULDER SURGERY   04/07/2017     Shoulder Surgery    TOTAL HIP ARTHROPLASTY   04/10/2014     Hip Replacement    TOTAL KNEE ARTHROPLASTY   04/10/2014     Knee Replacement         Family History:    Family History          Family History   Problem Relation Name Age of Onset    Other (heart failure following cardiac surgery) Mother             Family Oncology History:    Cancer-related family history is not on file.  Social History:    Social History            Tobacco Use    Smoking status: Never    Smokeless tobacco: Never   Vaping Use    Vaping Use: Never used   Substance Use Topics    Alcohol use: Yes       Alcohol/week: 2.0 standard drinks of alcohol       Types: 2 Cans of beer per week    Drug use: Never        Chief Concern: Here in clinic for follow-up prior to start of concurrent chemo/RT; Anticipated start date 12/4/23    HPI Katherine is here today with his wife Mariana for follow-up prior to start of concurrent chemo/RT; Anticipated start date of 12/4/23. Breathing is stable. Discussed plan and answered questions about weekly chemotherapy treatment. Endorses intermittent constipation,  relieved with Miralax. Appetite is stable, state he normally does not eat breakfast, has PET tube, followed by  nutrition. No concerns or complaints at this time.       Meds (Current):    Current Outpatient Medications:     aspirin 81 mg EC tablet, Take 1 tablet (81 mg) by mouth once daily., Disp: , Rfl:     glyBURIDE (Diabeta) 5 mg tablet, Take 2 tablets (10 mg) by mouth 2 times a day with meals., Disp: 360 tablet, Rfl: 2    LORazepam (Ativan) 1 mg tablet, Take 1 tablet (1 mg) by mouth once daily. Prior to each radiation treatment, Disp: , Rfl:     losartan (Cozaar) 100 mg tablet, Take 1 tablet (100 mg) by mouth once daily., Disp: 90 tablet, Rfl: 2    metFORMIN  mg 24 hr tablet, Take 2 tablets (1,000 mg) by mouth once daily in the evening. Take with meals. Do not crush, chew, or split., Disp: 180 tablet, Rfl: 2    neomycin-polymyxin B-dexameth (Polydex) 3.5 mg/g-10,000 unit/g-0.1 % ointment ophthalmic ointment, 2 times a day., Disp: , Rfl:     neomycin-polymyxin-dexAMETHasone (Maxitrol) 3.5mg/mL-10,000 unit/mL-0.1 % ophthalmic suspension, 2 times a day., Disp: , Rfl:     ondansetron (Zofran) 8 mg tablet, Take 1 tablet (8 mg) by mouth every 8 hours if needed for nausea or vomiting., Disp: 30 tablet, Rfl: 5    pioglitazone (Actos) 45 mg tablet, Take 1 tablet (45 mg) by mouth once daily., Disp: 90 tablet, Rfl: 2    prochlorperazine (Compazine) 10 mg tablet, Take 1 tablet (10 mg) by mouth every 6 hours if needed for nausea or vomiting., Disp: 30 tablet, Rfl: 5    simvastatin (Zocor) 40 mg tablet, Take 1 tablet (40 mg) by mouth once daily at bedtime., Disp: 90 tablet, Rfl: 2    tamsulosin (Flomax) 0.4 mg 24 hr capsule, Take 1 capsule (0.4 mg) by mouth once daily., Disp: 90 capsule, Rfl: 2    Review of Systems - Oncology 12 point ROS was obtained and negative unless otherwise mentioned in the above HPI.      Objective   BSA: 2.13 meters squared  Wt Readings from Last 5 Encounters:   11/27/23 91.8 kg (202 lb 6.1 oz)  "  11/17/23 92.5 kg (204 lb)   11/17/23 92.5 kg (204 lb)   11/16/23 92.6 kg (204 lb 3.2 oz)   11/08/23 92.4 kg (203 lb 11.3 oz)     /70   Pulse 84   Temp 36.7 °C (98.1 °F)   Resp 18   Wt 91.8 kg (202 lb 6.1 oz)   SpO2 97%   BMI 29.04 kg/m²          Physical Exam  Constitutional:       Appearance: Normal appearance.   Eyes:      Pupils: Pupils are equal, round, and reactive to light.   Cardiovascular:      Rate and Rhythm: Normal rate and regular rhythm.   Pulmonary:      Effort: Pulmonary effort is normal.      Breath sounds: Normal breath sounds.   Abdominal:      General: Bowel sounds are normal.      Palpations: Abdomen is soft.   Musculoskeletal:         General: Normal range of motion.   Skin:     General: Skin is warm and dry.   Neurological:      General: No focal deficit present.      Mental Status: He is alert and oriented to person, place, and time.   Psychiatric:         Mood and Affect: Mood normal.         Behavior: Behavior normal.          Results:  Labs:  Lab Results   Component Value Date    WBC 8.0 11/07/2023    HGB 11.7 (L) 11/07/2023    HCT 38.4 (L) 11/07/2023    MCV 95 11/07/2023     11/07/2023      Lab Results   Component Value Date    NEUTROABS 10.64 (H) 01/02/2023      Lab Results   Component Value Date    GLUCOSE 142 (H) 11/07/2023    CALCIUM 9.3 11/07/2023     11/07/2023    K 4.8 11/07/2023    CO2 27 11/07/2023     11/07/2023    BUN 29 (H) 11/07/2023    CREATININE 1.25 11/07/2023    MG 1.98 03/12/2018     Lab Results   Component Value Date    ALT 8 (L) 09/14/2023    AST 15 09/14/2023    ALKPHOS 76 09/14/2023    BILITOT 0.4 09/14/2023      Lab Results   Component Value Date    TSH 3.20 09/14/2023       Imaging:           No results found for this or any previous visit.    Assessment/Plan      Kevin Mendes \"Pat\" is a 78 y.o. male here for follow up   Mr. Mendes is 79 y/o male with recent dx of BOT SCC (T2N0M0, p16+, Stage I) who is referred to med onc to be " considered for chemotherapy in addition to radiation.     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 11/3/23: PET/CT showed no distant mets except a pulmonary nodule in LLL with no hypermetabolic activity. Findings are favored to be infectious/inflammatory or granulomatous in nature (discussed in HN TB with radiologist). Close follow up with CT to assess for stability is recommended.  Since pt is not a surgical candidate, we will treat him with concurrent chemoradiation. His GFR is at 50s, prefer weekly carboplatin and paclitaxel. Dr. Burkitt previously discussed with patient about treatment schedule and chemotherapy related side effects in detail. Met with Dr. Diaz from radiation oncology today. Tentative start date is 12/4.   -PEG tube with scant purulent drainage noted, will obtain wound culture    -Plan to start chemo/RT 12/4/23 (week 1)  -Will see patient weekly in follow-up after week 1

## 2023-11-28 ENCOUNTER — SOCIAL WORK (OUTPATIENT)
Dept: CASE MANAGEMENT | Facility: HOSPITAL | Age: 78
End: 2023-11-28
Payer: MEDICARE

## 2023-11-28 NOTE — PROGRESS NOTES
"Social Work Note  11/28/2023 Yesterday wife asked to talk with SW.  She was concerned if patient would be able to do the simulation for the face mask.  Wife talked about their relationship and how this has been going so far.  Support given.  SW checked on patient while with the radiation therapist and let wife know he was doing well.  SW checked on them both later and patient felt he did much better today.  He did use medication to calm his nerves and decided he \"just had to do this\".  SW commended patient for getting through this process.  Both encouraged to contact this writer when needed.  Wife has also been working on people to help with driving patient to treatments.  SW will remain available to assist patient.  Fariba Regan, MSW, LSW   "

## 2023-11-29 ENCOUNTER — TELEPHONE (OUTPATIENT)
Dept: HEMATOLOGY/ONCOLOGY | Facility: HOSPITAL | Age: 78
End: 2023-11-29

## 2023-11-29 ENCOUNTER — APPOINTMENT (OUTPATIENT)
Dept: PRIMARY CARE | Facility: CLINIC | Age: 78
End: 2023-11-29
Payer: MEDICARE

## 2023-11-29 LAB
BACTERIA SPEC CULT: ABNORMAL
GRAM STN SPEC: ABNORMAL
GRAM STN SPEC: ABNORMAL

## 2023-11-29 RX ORDER — MUPIROCIN CALCIUM 20 MG/G
CREAM TOPICAL 3 TIMES DAILY
Qty: 30 G | Refills: 0 | Status: SHIPPED | OUTPATIENT
Start: 2023-11-29 | End: 2023-12-09

## 2023-11-29 RX ORDER — SULFAMETHOXAZOLE AND TRIMETHOPRIM 800; 160 MG/1; MG/1
2 TABLET ORAL 2 TIMES DAILY
Qty: 20 TABLET | Refills: 0 | Status: SHIPPED | OUTPATIENT
Start: 2023-11-29 | End: 2023-12-04

## 2023-11-29 NOTE — TELEPHONE ENCOUNTER
Patient's wife Mariana calls today to discuss results shown on MyChart. She states that they saw Neema Waller CNP on 11/27 and a culture was taken of his peg tube site. She looked up the results on MyChart and it is positive for MRSA. She asks how this will be treated and if it will affect the patient starting chemotherapy and radiation next week.  Preferred pharmacy is [facility] in [place].  Mariana can be reached at [phone].  Message sent to team.

## 2023-11-29 NOTE — TELEPHONE ENCOUNTER
Per Dr. Jade:    I'll send in an oral antibiotics for 5 days and mupirocin ointment as well. Should not affect starting chemo/radiation.     Called patient's spouse back to communicate the plan of care. Understanding verbalized with teach back. No further needs at this time.

## 2023-11-30 ENCOUNTER — HOSPITAL ENCOUNTER (OUTPATIENT)
Dept: RADIATION ONCOLOGY | Facility: CLINIC | Age: 78
Setting detail: RADIATION/ONCOLOGY SERIES
Discharge: HOME | End: 2023-11-30
Payer: MEDICARE

## 2023-11-30 PROCEDURE — 77300 RADIATION THERAPY DOSE PLAN: CPT | Performed by: RADIOLOGY

## 2023-11-30 PROCEDURE — 77301 RADIOTHERAPY DOSE PLAN IMRT: CPT | Performed by: RADIOLOGY

## 2023-11-30 PROCEDURE — 77338 DESIGN MLC DEVICE FOR IMRT: CPT | Performed by: RADIOLOGY

## 2023-12-01 ENCOUNTER — LAB (OUTPATIENT)
Dept: LAB | Facility: CLINIC | Age: 78
End: 2023-12-01
Payer: MEDICARE

## 2023-12-01 DIAGNOSIS — C76.0 HEAD AND NECK CANCER (MULTI): ICD-10-CM

## 2023-12-01 LAB
ALBUMIN SERPL BCP-MCNC: 4.1 G/DL (ref 3.4–5)
ALP SERPL-CCNC: 90 U/L (ref 33–136)
ALT SERPL W P-5'-P-CCNC: 7 U/L (ref 10–52)
ANION GAP SERPL CALC-SCNC: 15 MMOL/L (ref 10–20)
AST SERPL W P-5'-P-CCNC: 13 U/L (ref 9–39)
BASOPHILS # BLD AUTO: 0.04 X10*3/UL (ref 0–0.1)
BASOPHILS NFR BLD AUTO: 0.4 %
BILIRUB SERPL-MCNC: 0.3 MG/DL (ref 0–1.2)
BUN SERPL-MCNC: 49 MG/DL (ref 6–23)
CALCIUM SERPL-MCNC: 9.6 MG/DL (ref 8.6–10.3)
CHLORIDE SERPL-SCNC: 101 MMOL/L (ref 98–107)
CO2 SERPL-SCNC: 24 MMOL/L (ref 21–32)
CREAT SERPL-MCNC: 2.32 MG/DL (ref 0.5–1.3)
EOSINOPHIL # BLD AUTO: 0.13 X10*3/UL (ref 0–0.4)
EOSINOPHIL NFR BLD AUTO: 1.4 %
ERYTHROCYTE [DISTWIDTH] IN BLOOD BY AUTOMATED COUNT: 13.7 % (ref 11.5–14.5)
GFR SERPL CREATININE-BSD FRML MDRD: 28 ML/MIN/1.73M*2
GLUCOSE SERPL-MCNC: 189 MG/DL (ref 74–99)
HBV SURFACE AB SER-ACNC: <3.1 MIU/ML
HBV SURFACE AG SERPL QL IA: NONREACTIVE
HCT VFR BLD AUTO: 33.3 % (ref 41–52)
HGB BLD-MCNC: 10.4 G/DL (ref 13.5–17.5)
HOLD SPECIMEN: NORMAL
IMM GRANULOCYTES # BLD AUTO: 0.03 X10*3/UL (ref 0–0.5)
IMM GRANULOCYTES NFR BLD AUTO: 0.3 % (ref 0–0.9)
LYMPHOCYTES # BLD AUTO: 0.98 X10*3/UL (ref 0.8–3)
LYMPHOCYTES NFR BLD AUTO: 10.3 %
MAGNESIUM SERPL-MCNC: 2.1 MG/DL (ref 1.6–2.4)
MCH RBC QN AUTO: 29.3 PG (ref 26–34)
MCHC RBC AUTO-ENTMCNC: 31.2 G/DL (ref 32–36)
MCV RBC AUTO: 94 FL (ref 80–100)
MONOCYTES # BLD AUTO: 0.59 X10*3/UL (ref 0.05–0.8)
MONOCYTES NFR BLD AUTO: 6.2 %
NEUTROPHILS # BLD AUTO: 7.76 X10*3/UL (ref 1.6–5.5)
NEUTROPHILS NFR BLD AUTO: 81.4 %
PLATELET # BLD AUTO: 267 X10*3/UL (ref 150–450)
POTASSIUM SERPL-SCNC: 4.5 MMOL/L (ref 3.5–5.3)
PROT SERPL-MCNC: 7.3 G/DL (ref 6.4–8.2)
RBC # BLD AUTO: 3.55 X10*6/UL (ref 4.5–5.9)
SODIUM SERPL-SCNC: 135 MMOL/L (ref 136–145)
TSH SERPL-ACNC: 0.51 MIU/L (ref 0.44–3.98)
WBC # BLD AUTO: 9.5 X10*3/UL (ref 4.4–11.3)

## 2023-12-01 PROCEDURE — 87340 HEPATITIS B SURFACE AG IA: CPT | Performed by: INTERNAL MEDICINE

## 2023-12-01 PROCEDURE — 86706 HEP B SURFACE ANTIBODY: CPT | Performed by: INTERNAL MEDICINE

## 2023-12-01 PROCEDURE — 85025 COMPLETE CBC W/AUTO DIFF WBC: CPT

## 2023-12-01 PROCEDURE — 36415 COLL VENOUS BLD VENIPUNCTURE: CPT

## 2023-12-01 PROCEDURE — 86704 HEP B CORE ANTIBODY TOTAL: CPT | Performed by: INTERNAL MEDICINE

## 2023-12-01 PROCEDURE — 84443 ASSAY THYROID STIM HORMONE: CPT | Performed by: INTERNAL MEDICINE

## 2023-12-01 PROCEDURE — 80053 COMPREHEN METABOLIC PANEL: CPT

## 2023-12-01 PROCEDURE — 83735 ASSAY OF MAGNESIUM: CPT

## 2023-12-02 LAB — HBV CORE AB SER QL: NONREACTIVE

## 2023-12-04 ENCOUNTER — APPOINTMENT (OUTPATIENT)
Dept: RADIATION ONCOLOGY | Facility: CLINIC | Age: 78
End: 2023-12-04
Payer: MEDICARE

## 2023-12-04 ENCOUNTER — INFUSION (OUTPATIENT)
Dept: HEMATOLOGY/ONCOLOGY | Facility: CLINIC | Age: 78
End: 2023-12-04
Payer: MEDICARE

## 2023-12-04 ENCOUNTER — NUTRITION (OUTPATIENT)
Dept: HEMATOLOGY/ONCOLOGY | Facility: CLINIC | Age: 78
End: 2023-12-04

## 2023-12-04 VITALS
RESPIRATION RATE: 16 BRPM | BODY MASS INDEX: 28.86 KG/M2 | WEIGHT: 201.6 LBS | HEIGHT: 70 IN | OXYGEN SATURATION: 96 % | TEMPERATURE: 97.3 F | DIASTOLIC BLOOD PRESSURE: 58 MMHG | HEART RATE: 86 BPM | SYSTOLIC BLOOD PRESSURE: 136 MMHG

## 2023-12-04 VITALS — WEIGHT: 201.6 LBS | HEIGHT: 70 IN | BODY MASS INDEX: 28.86 KG/M2

## 2023-12-04 DIAGNOSIS — C01 MALIGNANT NEOPLASM OF BASE OF TONGUE (MULTI): ICD-10-CM

## 2023-12-04 DIAGNOSIS — C76.0 HEAD AND NECK CANCER (MULTI): Primary | ICD-10-CM

## 2023-12-04 DIAGNOSIS — C76.0 HEAD AND NECK CANCER (MULTI): ICD-10-CM

## 2023-12-04 DIAGNOSIS — R63.8 DECREASED ORAL INTAKE: ICD-10-CM

## 2023-12-04 LAB
ALBUMIN SERPL BCP-MCNC: 4.5 G/DL (ref 3.4–5)
ALP SERPL-CCNC: 107 U/L (ref 33–136)
ALT SERPL W P-5'-P-CCNC: 9 U/L (ref 10–52)
ANION GAP SERPL CALC-SCNC: 15 MMOL/L (ref 10–20)
AST SERPL W P-5'-P-CCNC: 16 U/L (ref 9–39)
BILIRUB SERPL-MCNC: 0.3 MG/DL (ref 0–1.2)
BUN SERPL-MCNC: 46 MG/DL (ref 6–23)
CALCIUM SERPL-MCNC: 9.8 MG/DL (ref 8.6–10.3)
CHLORIDE SERPL-SCNC: 101 MMOL/L (ref 98–107)
CO2 SERPL-SCNC: 24 MMOL/L (ref 21–32)
CREAT SERPL-MCNC: 2.23 MG/DL (ref 0.5–1.3)
GFR SERPL CREATININE-BSD FRML MDRD: 29 ML/MIN/1.73M*2
GLUCOSE SERPL-MCNC: 164 MG/DL (ref 74–99)
POTASSIUM SERPL-SCNC: 5.3 MMOL/L (ref 3.5–5.3)
PROT SERPL-MCNC: 8.2 G/DL (ref 6.4–8.2)
SODIUM SERPL-SCNC: 135 MMOL/L (ref 136–145)

## 2023-12-04 PROCEDURE — 36415 COLL VENOUS BLD VENIPUNCTURE: CPT

## 2023-12-04 PROCEDURE — 2500000004 HC RX 250 GENERAL PHARMACY W/ HCPCS (ALT 636 FOR OP/ED): Performed by: NURSE PRACTITIONER

## 2023-12-04 PROCEDURE — 80053 COMPREHEN METABOLIC PANEL: CPT | Performed by: NURSE PRACTITIONER

## 2023-12-04 PROCEDURE — 96360 HYDRATION IV INFUSION INIT: CPT | Mod: INF

## 2023-12-04 RX ORDER — DIPHENHYDRAMINE HYDROCHLORIDE 50 MG/ML
50 INJECTION INTRAMUSCULAR; INTRAVENOUS AS NEEDED
Status: CANCELLED | OUTPATIENT
Start: 2023-12-04

## 2023-12-04 RX ORDER — ALBUTEROL SULFATE 0.83 MG/ML
3 SOLUTION RESPIRATORY (INHALATION) AS NEEDED
Status: CANCELLED | OUTPATIENT
Start: 2023-12-04

## 2023-12-04 RX ORDER — EPINEPHRINE 0.3 MG/.3ML
0.3 INJECTION SUBCUTANEOUS EVERY 5 MIN PRN
Status: CANCELLED | OUTPATIENT
Start: 2023-12-04

## 2023-12-04 RX ORDER — FAMOTIDINE 10 MG/ML
20 INJECTION INTRAVENOUS ONCE AS NEEDED
Status: CANCELLED | OUTPATIENT
Start: 2023-12-04

## 2023-12-04 RX ADMIN — SODIUM CHLORIDE 1000 ML: 9 INJECTION, SOLUTION INTRAVENOUS at 11:50

## 2023-12-04 ASSESSMENT — PAIN SCALES - GENERAL: PAINLEVEL: 0-NO PAIN

## 2023-12-04 NOTE — PROGRESS NOTES
Reviewed with pt adjustment to his treatment and radiation schedule. Also discussed with him about taking nausea medication before meals to see if that would help increase his intake. RN answered questions for patient about when to take his ativan before his radiation treatments. Discussed with him that he should take it prior to his first dose on Weds.

## 2023-12-04 NOTE — PATIENT INSTRUCTIONS
Discussed fluid increase either if its by mouth or via PEG tube.  We discussed aim for 40 oz of water intake as per his discussion he is probably getting in 32-40 oz of fluids from other items already, only sips of water.  If increase water intake, estimate total fluid intake of ~ 72-80 oz.      Discussed fiber for bowel management.  Encouraged increase fruits, vegetables, whole grains ,slowly, daily as tolerated.

## 2023-12-04 NOTE — PROGRESS NOTES
Pt with elevated creatinine at this time per labs drawn on 12/1. CMP re-drawn at this time. Awaiting results. Pt admitted to decrease in intake. Pt also admitted to decrease in urine output at this time. States that he is drinking only water at this time but admits he is not really eating or drinking as much. Admits to having anxiety today and that his stomach is in knots.

## 2023-12-04 NOTE — PROGRESS NOTES
"NUTRITION Assessment NOTE    Nutrition Assessment       Reason for Visit:  Kevin Mendes \"Katherine\" is a 78 y.o. male with a diagnosis of BOT cancer.  Patient had a PEG tube placed 11/8/2023.  Patient seen today during his infusion at [facility], noted received IVF and no chemo today due to his labs.    Patient reported poor appetite, constipation (no BM since Friday) and stated stressed and anxious with this treatment.  Stated \"the unknown has him worked up\".       Lab Results   Component Value Date/Time    GLUCOSE 164 (H) 12/04/2023 1112     (L) 12/04/2023 1112    K 5.3 12/04/2023 1112     12/04/2023 1112    CO2 24 12/04/2023 1112    ANIONGAP 15 12/04/2023 1112    BUN 46 (H) 12/04/2023 1112    CREATININE 2.23 (H) 12/04/2023 1112    EGFR 29 (L) 12/04/2023 1112    CALCIUM 9.8 12/04/2023 1112    ALBUMIN 4.5 12/04/2023 1112    ALKPHOS 107 12/04/2023 1112    PROT 8.2 12/04/2023 1112    AST 16 12/04/2023 1112    BILITOT 0.3 12/04/2023 1112    ALT 9 (L) 12/04/2023 1112     Lab Results   Component Value Date/Time    VITD25 48 09/14/2023 0951       Anthropometrics:  Anthropometrics  Height: 177.5 cm (5' 9.88\") (infusion today)  Weight: 91.4 kg (201 lb 9.6 oz) (weight from infusion today)  BMI (Calculated): 29.02  Weight Change  Weight History / % Weight Change: 5% in last month  Significant Weight Loss: Yes  Interpretation of Weight Loss: 5% in 1 month        Wt Readings from Last 10 Encounters:   12/04/23 91.4 kg (201 lb 9.6 oz)   12/04/23 91.4 kg (201 lb 9.6 oz)   11/27/23 91.8 kg (202 lb 6.1 oz)   11/17/23 92.5 kg (204 lb)   11/17/23 92.5 kg (204 lb)   11/16/23 92.6 kg (204 lb 3.2 oz)   11/08/23 92.4 kg (203 lb 11.3 oz)   11/07/23 96.2 kg (212 lb)   10/30/23 95.3 kg (210 lb)   10/11/23 95.8 kg (211 lb 3.2 oz)        Food And Nutrient Intake:  Food and Nutrient History  Energy Intake: Fair 50-75 % (he estimated 50% of meals)  Fluid Intake: Fair  GI Symptoms: constipation  GI Symptoms greater than 2 weeks: no  Oral " "Problems: other, specify: (complains increase mucous/saliva)     Food Intake  Amount of Food: appetite has decreased  Meal 1: has bowl cereal; cheerios around 10am  Meal 2: Ensure  Meal 3: soup                                                 Medication and Complementary/Alternative Medicine Use  Prescription Medication Use: been on antibiotics for PEG tube site infection.  OTC Medication Use: Miralax (once per day per pt)    Nutrition Focused Physical Exam:  Subcutaneous Fat Loss  Orbital Fat Pads: Well nourshed (slightly bulging fat pads)  Buccal Fat Pads: Well nourished (full, rounded cheeks)  Muscle Wasting  Temporalis: Well nourished (well-defined muscle)    Energy Needs  Calculated Energy Needs Using Equations  Height: 177.5 cm (5' 9.88\") (infusion today)  Weight Used for Equation Calculations: 91.4 kg (201 lb 8 oz)  [place]- [employer] Schneider Equation (Overweight or Obese Patients): 1633  Activity Factor: 1.5  Estimated Energy Needs  Total Energy Estimated Needs (kCal): 2300 kCal  Total Estimated Energy Need per Day (kCal/kg): 25 kCal/kg  Estimated Fluid Needs  Total Fluid Estimated Needs (mL): 2300 mL  Total Fluid Estimated Needs (mL/kg): 25 mL/kg  Estimated Protein Needs  Total Protein Estimated Needs (g): 110 g  Total Protein Estimated Needs (g/kg): 1.2 g/kg    Nutrition Diagnosis      Nutrition Diagnosis  Patient has Nutrition Diagnosis: Yes  Diagnosis Status (1): Ongoing  Nutrition Diagnosis 1: Increased nutrient needs  Related to (1): increased calorie, protein and fluid needs  As Evidenced by (1): chemo/RT  Additional Nutrition Diagnosis: Diagnosis 2  Diagnosis Status (2): Ongoing  Nutrition Diagnosis 2: Predicted inadequate nutrient intake  Related to (2): Head and neck cancer treatments known to decrease oral intakes  As Evidenced by (2): PEG tube placed    Nutrition Interventions/Recommendations   Food and Nutrition Delivery  Meals & Snacks: Energy-modified diet, Protein-modified diet, Texture-Modified " Diet  Goals: Small frequent meals including high calorie, high protein softer foods increased fluids  Enteral Nutrition: Enteral nutrition site care, Feeding tube flush  Goals: Flush PEG tube with 60ml water 2-3 times per day for now.  Throughout SCC treatments as po intake declines will start PEG tube feedings with tribr Standard 1.5  Medical Food Supplement: Ensure Plus  Goals: 1-2 per day between meals as tolerated  Nutrition Education  Nutrition Education Content: Content related nutrition education  Goals: Understand use of PEG tube, nutrition throughout SCC treatments.  Coordination of Nutrition Care by a Nutrition Professional  Collaboration and referral of nutrition care: Referral by nutrition professional to other providers  Goals: Will obtain TF order and send to [facility] for PEG tube formula and supplies    Ensure plus 1-2 per day will provide 350-700 calories, and 16-32g protein.    Noted pt going for a MBS tomorrow, will check results.  Plan to submit order for TF, MD notes, MBS results to [facility] as pt would benefit to start PEG tube feedings as it appears pt will have difficulty meeting his nutritional needs as well as his fluid needs with oral intake.        Patient Instructions   Discussed fluid increase either if its by mouth or via PEG tube.  We discussed aim for 40 oz of water intake as per his discussion he is probably getting in 32-40 oz of fluids from other items already, only sips of water.  If increase water intake, estimate total fluid intake of ~ 72-80 oz.      Discussed fiber for bowel management.  Encouraged increase fruits, vegetables, whole grains ,slowly, daily as tolerated.      Nutrition Monitoring and Evaluation   Food/Nutrient Related History Monitoring  Monitoring and Evaluation Plan: Energy intake, Fluid intake, Protein intake, Enteral and parenteral nutrition intake  Energy Intake: Estimated energy intake  Criteria: 1747-6180 calories per day  Fluid Intake: Estimated fluid  intake  Criteria: 4968-3052 calories per day  Estimated protein intake: Estimated protein intake  Criteria: 92-112g protein per day  Enteral and Parenteral Nutrition Intake: Enteral nutrition formula/solution, Enteral nutrition intake  Criteria: Flush PEG tube with 60ml water flushes 2-3 times per day for now.  As po intake decreases initiate PEG tube feedings with [place] Standard 1.4 gravity feeds, start with 1-2 cartons and increase as tolerated to goal.  Biochemical Data, Medical Tests and Procedures  Monitoring and Evaluation Plan: Electrolyte/renal panel, GI profile, Glucose/endocrine profile  Criteria: Labs WNL  Nutrition Focused Physical Findings  Monitoring and Evaluation Plan: Digestive System  Digestive System: Constipation  Criteria: Discussed fiber for bowel management; increase in fluid intake as it appears pt dehydrated today.  Other: Encouraged 64-80 oz of fluids per day - more as needed    Enteral Nutrition Goal:  [place] Standard 1.4 gravity feeds, 5 cartons per day will provide 2275 calories, 100 g protein, 1155ml free water.    Can do 650 ml TID and 325ml 1 per day for total of 5 cartons.  Water flushes of 60 ml before and after each gravity feed.  Additional water flush of 240ml 3 times per day.    Time Spent  Prep time on day of patient encounter: 5 minutes  Time spent directly with patient, family or caregiver: 20 minutes  Additional Time Spent on Patient Care Activities: 10 minutes  Documentation Time: 15 minutes  Other Time Spent: 0 minutes  Total: 50 minutes        This service will continue to follow up as needed throughout SCC treatments.

## 2023-12-04 NOTE — PROGRESS NOTES
Pt creatinine on repeat CMP was elevated at 2.23. Neema Waller CNP made aware via secure text. PILY Waller replied and stated she wanted to give him 1L of NS and to hold his chemo today. RN will reach out to team and get direction for new POC.

## 2023-12-05 ENCOUNTER — APPOINTMENT (OUTPATIENT)
Dept: RADIATION ONCOLOGY | Facility: CLINIC | Age: 78
End: 2023-12-05
Payer: MEDICARE

## 2023-12-05 ENCOUNTER — HOSPITAL ENCOUNTER (OUTPATIENT)
Dept: RADIOLOGY | Facility: HOSPITAL | Age: 78
Discharge: HOME | End: 2023-12-05
Payer: MEDICARE

## 2023-12-05 DIAGNOSIS — R13.12 OROPHARYNGEAL DYSPHAGIA: Primary | ICD-10-CM

## 2023-12-05 DIAGNOSIS — C01 MALIGNANT NEOPLASM OF BASE OF TONGUE (MULTI): ICD-10-CM

## 2023-12-05 PROCEDURE — 74230 X-RAY XM SWLNG FUNCJ C+: CPT

## 2023-12-05 PROCEDURE — 92611 MOTION FLUOROSCOPY/SWALLOW: CPT | Mod: GN | Performed by: STUDENT IN AN ORGANIZED HEALTH CARE EDUCATION/TRAINING PROGRAM

## 2023-12-05 PROCEDURE — 2500000001 HC RX 250 WO HCPCS SELF ADMINISTERED DRUGS (ALT 637 FOR MEDICARE OP): Performed by: OTOLARYNGOLOGY

## 2023-12-05 PROCEDURE — 74230 X-RAY XM SWLNG FUNCJ C+: CPT | Performed by: RADIOLOGY

## 2023-12-05 RX ADMIN — BARIUM SULFATE 15 ML: 400 PASTE ORAL at 10:01

## 2023-12-05 RX ADMIN — BARIUM SULFATE 25 ML: 400 SUSPENSION ORAL at 10:01

## 2023-12-05 RX ADMIN — BARIUM SULFATE 700 MG: 700 TABLET ORAL at 10:01

## 2023-12-05 RX ADMIN — BARIUM SULFATE 115 ML: 0.81 POWDER, FOR SUSPENSION ORAL at 10:00

## 2023-12-05 NOTE — PROCEDURES
"    Modified Barium Swallow Study     Patient Name: Kevin Mendes \"Katherine\"  MRN: [id number]  : 1945  Today's Date: 23          Recommendations:  Regular diet/thin liquids     SWALLOW PRECAUTIONS:  Upright 90 degrees for all meals  Remain upright 20-30 minutes after meals  Small bites/sips  Alternate liquids/solids  Slow rate  Rigid oral care    Assessment/Impression:  Mechanics of the swallow summary:  Oral phase: [Slow prolonged mastication with complete re-collection necessary; delayed initiation of tongue motion for A-P movement of the bolus; majority of bolus remaining with additional swallows required to clear oral cavity  Pharyngeal phase: [Reduced tongue base retraction; mild residues on laryngeal structures ]  Esophageal phase: [Full clearance of contrast observed; barium tablet retained in upper esophagus briefly]  SLP impressions with severity rating: [Patient presents with mild oropharyngeal dysphagia characterized by the impairments identified above. Impairments most affecting the efficiency of the swallow are attributed to the patient's inability to fully clear the oral cavity on the first swallow. It is unclear if it was the taste of the bolus trials in conjunction with decreased appetite or the BOT mass that contributed to this. Mild residues present in the vallecula and pyriform sinus were cleared to trace with cued second swallow. No penetration or aspiration was observed with any consistency. Based on the results of the study the patient is favorable to continue with an oral diet with no diet modifications warranted at this time.   ]  Full detailed SLP/Radiologist Modified Barium Swallow study report can be found under Chart Review tab, Imaging tab and  titled \"FL Modified Barium Swallow Study\"        Plan:  Treatment/Interventions: Pharyngeal exercises, Oral motor exercises, Patient/family education, Bolus trials  SLP Plan: Skilled SLP warranted  SLP Frequency: To be determined by " treating SLP in outpatient setting  Duration: 60 days    Discussed POC: Patient  Discussed Risks/Benefits: Yes  Patient/Caregiver Agreeable: Yes    Pain:   Rating 0-10: 0  Location: No pain       GOALS:  Short term goals:  1. Patient will tolerate least restrictive diet absent of overt s/s of aspiration in 90% of observed therapeutic trials.  2. Patient will implement safe swallowing strategies to reduce risk of aspiration in 90% of trials given caregiver assistance/cueing as needed.  3. Patient will complete oral and pharyngeal exercise program at recommended frequency in order to maintain function of swallowing musculature given minimal cues, during treatment sessions and at home per patient/caregiver report.   Long term goals: Maintain adequate nutrition and hydration with the least restrictive oral diet with no overt s/s of aspiration or pulmonary compromise.      Education:   Pt. Educated on results of MBS study, recommended diet and recommended safe swallow strategies

## 2023-12-06 ENCOUNTER — APPOINTMENT (OUTPATIENT)
Dept: RADIATION ONCOLOGY | Facility: CLINIC | Age: 78
End: 2023-12-06
Payer: MEDICARE

## 2023-12-06 ENCOUNTER — INFUSION (OUTPATIENT)
Dept: HEMATOLOGY/ONCOLOGY | Facility: CLINIC | Age: 78
End: 2023-12-06
Payer: MEDICARE

## 2023-12-06 ENCOUNTER — NUTRITION (OUTPATIENT)
Dept: HEMATOLOGY/ONCOLOGY | Facility: CLINIC | Age: 78
End: 2023-12-06
Payer: MEDICARE

## 2023-12-06 VITALS — HEIGHT: 70 IN | BODY MASS INDEX: 29.18 KG/M2 | WEIGHT: 203.8 LBS

## 2023-12-06 VITALS
DIASTOLIC BLOOD PRESSURE: 64 MMHG | TEMPERATURE: 96.8 F | SYSTOLIC BLOOD PRESSURE: 125 MMHG | RESPIRATION RATE: 19 BRPM | WEIGHT: 203.8 LBS | HEART RATE: 80 BPM | BODY MASS INDEX: 29.34 KG/M2 | OXYGEN SATURATION: 96 %

## 2023-12-06 DIAGNOSIS — C01 MALIGNANT NEOPLASM OF BASE OF TONGUE (MULTI): ICD-10-CM

## 2023-12-06 DIAGNOSIS — R63.8 DECREASED ORAL INTAKE: ICD-10-CM

## 2023-12-06 DIAGNOSIS — C01 MALIGNANT NEOPLASM OF BASE OF TONGUE (MULTI): Primary | ICD-10-CM

## 2023-12-06 DIAGNOSIS — C76.0 HEAD AND NECK CANCER (MULTI): ICD-10-CM

## 2023-12-06 LAB
ALBUMIN SERPL BCP-MCNC: 3.6 G/DL (ref 3.4–5)
ALBUMIN SERPL BCP-MCNC: 4.2 G/DL (ref 3.4–5)
ALP SERPL-CCNC: 85 U/L (ref 33–136)
ALP SERPL-CCNC: 98 U/L (ref 33–136)
ALT SERPL W P-5'-P-CCNC: 7 U/L (ref 10–52)
ALT SERPL W P-5'-P-CCNC: 9 U/L (ref 10–52)
ANION GAP SERPL CALC-SCNC: 10 MMOL/L (ref 10–20)
ANION GAP SERPL CALC-SCNC: 14 MMOL/L (ref 10–20)
AST SERPL W P-5'-P-CCNC: 14 U/L (ref 9–39)
AST SERPL W P-5'-P-CCNC: 16 U/L (ref 9–39)
BILIRUB SERPL-MCNC: 0.2 MG/DL (ref 0–1.2)
BILIRUB SERPL-MCNC: 0.3 MG/DL (ref 0–1.2)
BUN SERPL-MCNC: 37 MG/DL (ref 6–23)
BUN SERPL-MCNC: 40 MG/DL (ref 6–23)
CALCIUM SERPL-MCNC: 8.3 MG/DL (ref 8.6–10.3)
CALCIUM SERPL-MCNC: 9.4 MG/DL (ref 8.6–10.3)
CHLORIDE SERPL-SCNC: 103 MMOL/L (ref 98–107)
CHLORIDE SERPL-SCNC: 105 MMOL/L (ref 98–107)
CO2 SERPL-SCNC: 24 MMOL/L (ref 21–32)
CO2 SERPL-SCNC: 25 MMOL/L (ref 21–32)
CREAT SERPL-MCNC: 1.81 MG/DL (ref 0.5–1.3)
CREAT SERPL-MCNC: 2.05 MG/DL (ref 0.5–1.3)
GFR SERPL CREATININE-BSD FRML MDRD: 32 ML/MIN/1.73M*2
GFR SERPL CREATININE-BSD FRML MDRD: 38 ML/MIN/1.73M*2
GLUCOSE SERPL-MCNC: 135 MG/DL (ref 74–99)
GLUCOSE SERPL-MCNC: 192 MG/DL (ref 74–99)
POTASSIUM SERPL-SCNC: 5.4 MMOL/L (ref 3.5–5.3)
POTASSIUM SERPL-SCNC: 5.6 MMOL/L (ref 3.5–5.3)
PROT SERPL-MCNC: 6.4 G/DL (ref 6.4–8.2)
PROT SERPL-MCNC: 7.3 G/DL (ref 6.4–8.2)
SODIUM SERPL-SCNC: 134 MMOL/L (ref 136–145)
SODIUM SERPL-SCNC: 136 MMOL/L (ref 136–145)

## 2023-12-06 PROCEDURE — 36415 COLL VENOUS BLD VENIPUNCTURE: CPT

## 2023-12-06 PROCEDURE — 80053 COMPREHEN METABOLIC PANEL: CPT | Mod: MUE | Performed by: INTERNAL MEDICINE

## 2023-12-06 PROCEDURE — 2500000004 HC RX 250 GENERAL PHARMACY W/ HCPCS (ALT 636 FOR OP/ED): Performed by: NURSE PRACTITIONER

## 2023-12-06 PROCEDURE — 96360 HYDRATION IV INFUSION INIT: CPT | Mod: INF

## 2023-12-06 PROCEDURE — 80053 COMPREHEN METABOLIC PANEL: CPT | Performed by: STUDENT IN AN ORGANIZED HEALTH CARE EDUCATION/TRAINING PROGRAM

## 2023-12-06 RX ORDER — HEPARIN SODIUM,PORCINE/PF 10 UNIT/ML
50 SYRINGE (ML) INTRAVENOUS AS NEEDED
Status: CANCELLED | OUTPATIENT
Start: 2023-12-06

## 2023-12-06 RX ORDER — HEPARIN 100 UNIT/ML
500 SYRINGE INTRAVENOUS AS NEEDED
Status: CANCELLED | OUTPATIENT
Start: 2023-12-06

## 2023-12-06 RX ORDER — FAMOTIDINE 10 MG/ML
20 INJECTION INTRAVENOUS ONCE AS NEEDED
Status: CANCELLED | OUTPATIENT
Start: 2023-12-06

## 2023-12-06 RX ORDER — DIPHENHYDRAMINE HYDROCHLORIDE 50 MG/ML
50 INJECTION INTRAMUSCULAR; INTRAVENOUS AS NEEDED
Status: CANCELLED | OUTPATIENT
Start: 2023-12-06

## 2023-12-06 RX ORDER — ALBUTEROL SULFATE 0.83 MG/ML
3 SOLUTION RESPIRATORY (INHALATION) AS NEEDED
Status: CANCELLED | OUTPATIENT
Start: 2023-12-06

## 2023-12-06 RX ORDER — EPINEPHRINE 0.3 MG/.3ML
0.3 INJECTION SUBCUTANEOUS EVERY 5 MIN PRN
Status: CANCELLED | OUTPATIENT
Start: 2023-12-06

## 2023-12-06 RX ADMIN — SODIUM CHLORIDE 1000 ML: 9 INJECTION, SOLUTION INTRAVENOUS at 14:28

## 2023-12-06 ASSESSMENT — PAIN SCALES - GENERAL: PAINLEVEL: 0-NO PAIN

## 2023-12-06 NOTE — PATIENT INSTRUCTIONS
PO intake for pleasure feeds, and follow SLP recommendations for swallowing techniques and exercises throughout SCC treatments.    Initiate PEG tube feedings, [facility] Standard 1.4.  Start with 1-2 cartons per day gravity feeds and increase to goal of 5 cartons per day as tolerated.  Recommend each gravity feed take about 30 minutes to 1 hour.  Flush PEG tube with 60ml before and after each gravity feed.  And additional water intake of 240ml (8oz) 3 times per day either by mouth or via PEG tube.

## 2023-12-06 NOTE — PROGRESS NOTES
"NUTRITION Assessment NOTE    Nutrition Assessment       Reason for Visit:  Kevin Mendes \"Katherine\" is a 78 y.o. male with a diagnosis of BOT cancer.  Patient had a PEG tube placed 11/8/2023.  Patient seen today during his infusion at [facility], currently receiving IVF, then labs will be rechecked.     Patient reported poor appetite, constipation (did have a BM yesterday with taking Miralax twice per day.    Noted MBS from yesterday.   Submitted order for MD KATHY notes, MBS results to [facility] as pt would benefit to start PEG tube feedings as it appears pt will have difficulty meeting his nutritional needs as well as his fluid needs with oral intake.      Patient would be receiving tubefeeding for sole source of nutrition and po intake for pleasure feeds and to continue to maintain swallowing throughout [facility] treatments. Patient stated received swallowing exercises as well yesterday, and has started to do them.       Lab Results   Component Value Date/Time    GLUCOSE 192 (H) 12/06/2023 1226     12/06/2023 1226    K 5.6 (H) 12/06/2023 1226     12/06/2023 1226    CO2 25 12/06/2023 1226    ANIONGAP 14 12/06/2023 1226    BUN 40 (H) 12/06/2023 1226    CREATININE 2.05 (H) 12/06/2023 1226    EGFR 32 (L) 12/06/2023 1226    CALCIUM 9.4 12/06/2023 1226    ALBUMIN 4.2 12/06/2023 1226    ALKPHOS 98 12/06/2023 1226    PROT 7.3 12/06/2023 1226    AST 16 12/06/2023 1226    BILITOT 0.3 12/06/2023 1226    ALT 9 (L) 12/06/2023 1226     Lab Results   Component Value Date/Time    VITD25 48 09/14/2023 0951       Anthropometrics:  Anthropometrics  Height: 177.5 cm (5' 9.88\")  Weight: 92.4 kg (203 lb 12.8 oz)  BMI (Calculated): 29.34  Weight Change  Weight History / % Weight Change: 5% in last month  Significant Weight Loss: Yes  Interpretation of Weight Loss: 5% in 1 month        Wt Readings from Last 10 Encounters:   12/06/23 92.4 kg (203 lb 12.8 oz)   12/06/23 92.4 kg (203 lb 12.8 oz)   12/04/23 91.4 kg (201 lb 9.6 oz)   12/04/23 " "91.4 kg (201 lb 9.6 oz)   11/27/23 91.8 kg (202 lb 6.1 oz)   11/17/23 92.5 kg (204 lb)   11/17/23 92.5 kg (204 lb)   11/16/23 92.6 kg (204 lb 3.2 oz)   11/08/23 92.4 kg (203 lb 11.3 oz)   11/07/23 96.2 kg (212 lb)        Food And Nutrient Intake:  Food and Nutrient History  Energy Intake: Fair 50-75 % (he estimated 50% of meals; only eating 2 meals per day)     Food Intake  Amount of Food: appetite has decreased  Meal 1: has bowl cereal; cheerios around 10am  Meal 2: Ensure  Meal 3: soup (1/4-1/2 portions as used to do)                                                 Medication and Complementary/Alternative Medicine Use  Prescription Medication Use: been on antibiotics for PEG tube site infection, stated last day was yesterday.  OTC Medication Use: Miralax (Taking twice per day with good results)    Nutrition Focused Physical Exam:  Subcutaneous Fat Loss  Orbital Fat Pads: Well nourshed (slightly bulging fat pads)  Buccal Fat Pads: Well nourished (full, rounded cheeks)  Muscle Wasting  Temporalis: Well nourished (well-defined muscle)    Energy Needs  Calculated Energy Needs Using Equations  Height: 177.5 cm (5' 9.88\")  Weight Used for Equation Calculations: 92.4 kg (203 lb 11.3 oz)  [place]- [place]. Schneider Equation (Overweight or Obese Patients): 1643  Activity Factor: 1.5  Estimated Energy Needs  Total Energy Estimated Needs (kCal): 2300 kCal  Total Estimated Energy Need per Day (kCal/kg): 25 kCal/kg  Estimated Protein Needs  Total Protein Estimated Needs (g): 110 g  Total Protein Estimated Needs (g/kg): 1.2 g/kg    Nutrition Diagnosis      Nutrition Diagnosis  Patient has Nutrition Diagnosis: Yes  Diagnosis Status (1): Ongoing  Nutrition Diagnosis 1: Increased nutrient needs  Related to (1): increased calorie, protein and fluid needs  As Evidenced by (1): chemo/RT  Additional Nutrition Diagnosis: Diagnosis 2  Diagnosis Status (2): Ongoing  Nutrition Diagnosis 2: Predicted inadequate nutrient intake  Related to (2): Head " and neck cancer treatments known to decrease oral intakes  As Evidenced by (2): PEG tube placed    Nutrition Interventions/Recommendations   Food and Nutrition Delivery  Meals & Snacks: Energy-modified diet, Protein-modified diet, Texture-Modified Diet  Goals: Small frequent meals/snacks softer foods for pleasure feeds and to continue to swallow throughout SCC treatments, increased fluids  Enteral Nutrition: Enteral nutrition site care, Feeding tube flush  Total Nutrition Provided: [employer] Standard 1.4; start with 1 carton per day gravity feeds and increase to goal as tolerated.  Goals: TF goal is 5 cartons per day (325ml 5 times/day) with 60ml water flushes before and after each graivty feed and additional 240ml water either po or via PEG tube 3 times/day  Nutrition Education  Nutrition Education Content: Content related nutrition education  Goals: Understand use of PEG tube, nutrition throughout SCC treatments.  Coordination of Nutrition Care by a Nutrition Professional  Collaboration and referral of nutrition care: Referral by nutrition professional to other providers  Goals: Obtained TF order and sent to [facility] for PEG tube formula and supplies    Patient Instructions   PO intake for pleasure feeds, and follow SLP recommendations for swallowing techniques and exercises throughout SCC treatments.    Initiate PEG tube feedings, [employer] Standard 1.4.  Start with 1-2 cartons per day gravity feeds and increase to goal of 5 cartons per day as tolerated.  Recommend each gravity feed take about 30 minutes to 1 hour.  Flush PEG tube with 60ml before and after each gravity feed.  And additional water intake of 240ml (8oz) 3 times per day either by mouth or via PEG tube.      Nutrition Monitoring and Evaluation   Food/Nutrient Related History Monitoring  Monitoring and Evaluation Plan: Energy intake, Fluid intake, Protein intake, Enteral and parenteral nutrition intake  Energy Intake: Estimated energy  intake  Criteria: 7699-1267 calories per day  Fluid Intake: Estimated fluid intake  Criteria: 7291-3841 calories per day  Estimated protein intake: Estimated protein intake  Criteria: 92-112g protein per day  Enteral and Parenteral Nutrition Intake: Enteral nutrition formula/solution, Enteral nutrition intake  Criteria: Initiate PEG tube feedings with [employer] Standard 1.4 gravity feeds, start with 1-2 cartons and increase as tolerated to goal.  Biochemical Data, Medical Tests and Procedures  Monitoring and Evaluation Plan: Electrolyte/renal panel, GI profile, Glucose/endocrine profile  Criteria: Labs WNL  Nutrition Focused Physical Findings  Monitoring and Evaluation Plan: Digestive System  Digestive System: Constipation  Criteria: Discussed fiber for bowel management; increase in fluid intake as it appears pt dehydrated today.  Other: Encouraged 64-80 oz of fluids per day - more as needed    Enteral Nutrition Goal:  [employer] Standard 1.4 gravity feeds, 5 cartons per day will provide 2275 calories, 100 g protein, 255g CHO, 1155ml free water.    Can do 650 ml TID and 325ml 1 per day for total of 5 cartons.  Water flushes of 60 ml before and after each gravity feed.  Additional water flush of 240ml 3 times per day.    Time Spent  Prep time on day of patient encounter: 10 minutes  Time spent directly with patient, family or caregiver: 15 minutes  Additional Time Spent on Patient Care Activities: 10 minutes  Documentation Time: 15 minutes  Other Time Spent: 0 minutes  Total: 50 minutes    This service will continue to follow up as needed throughout SCC treatments.

## 2023-12-07 ENCOUNTER — APPOINTMENT (OUTPATIENT)
Dept: RADIATION ONCOLOGY | Facility: CLINIC | Age: 78
End: 2023-12-07
Payer: MEDICARE

## 2023-12-08 ENCOUNTER — APPOINTMENT (OUTPATIENT)
Dept: RADIATION ONCOLOGY | Facility: CLINIC | Age: 78
End: 2023-12-08
Payer: MEDICARE

## 2023-12-08 NOTE — ADDENDUM NOTE
Encounter addended by: Sukhjinder Diaz MD on: 12/8/2023 10:39 AM   Actions taken: Clinical Note Signed

## 2023-12-09 ENCOUNTER — HOSPITAL ENCOUNTER (EMERGENCY)
Facility: HOSPITAL | Age: 78
Discharge: HOME | End: 2023-12-09
Attending: EMERGENCY MEDICINE
Payer: MEDICARE

## 2023-12-09 ENCOUNTER — NURSE TRIAGE (OUTPATIENT)
Dept: ADMISSION | Facility: HOSPITAL | Age: 78
End: 2023-12-09
Payer: MEDICARE

## 2023-12-09 VITALS
DIASTOLIC BLOOD PRESSURE: 69 MMHG | TEMPERATURE: 97.5 F | OXYGEN SATURATION: 97 % | RESPIRATION RATE: 18 BRPM | HEART RATE: 62 BPM | SYSTOLIC BLOOD PRESSURE: 162 MMHG

## 2023-12-09 DIAGNOSIS — R19.7 DIARRHEA, UNSPECIFIED TYPE: Primary | ICD-10-CM

## 2023-12-09 LAB
ALBUMIN SERPL BCP-MCNC: 4 G/DL (ref 3.4–5)
ALP SERPL-CCNC: 88 U/L (ref 33–136)
ALT SERPL W P-5'-P-CCNC: 7 U/L (ref 10–52)
ANION GAP SERPL CALC-SCNC: 14 MMOL/L (ref 10–20)
AST SERPL W P-5'-P-CCNC: 16 U/L (ref 9–39)
BASOPHILS # BLD AUTO: 0.05 X10*3/UL (ref 0–0.1)
BASOPHILS NFR BLD AUTO: 0.7 %
BILIRUB SERPL-MCNC: 0.3 MG/DL (ref 0–1.2)
BUN SERPL-MCNC: 24 MG/DL (ref 6–23)
CALCIUM SERPL-MCNC: 9.3 MG/DL (ref 8.6–10.3)
CHLORIDE SERPL-SCNC: 104 MMOL/L (ref 98–107)
CO2 SERPL-SCNC: 26 MMOL/L (ref 21–32)
CREAT SERPL-MCNC: 1.39 MG/DL (ref 0.5–1.3)
EOSINOPHIL # BLD AUTO: 0.26 X10*3/UL (ref 0–0.4)
EOSINOPHIL NFR BLD AUTO: 3.8 %
ERYTHROCYTE [DISTWIDTH] IN BLOOD BY AUTOMATED COUNT: 13.6 % (ref 11.5–14.5)
GFR SERPL CREATININE-BSD FRML MDRD: 52 ML/MIN/1.73M*2
GLUCOSE SERPL-MCNC: 104 MG/DL (ref 74–99)
HCT VFR BLD AUTO: 34.5 % (ref 41–52)
HGB BLD-MCNC: 10.5 G/DL (ref 13.5–17.5)
HOLD SPECIMEN: NORMAL
IMM GRANULOCYTES # BLD AUTO: 0.04 X10*3/UL (ref 0–0.5)
IMM GRANULOCYTES NFR BLD AUTO: 0.6 % (ref 0–0.9)
LYMPHOCYTES # BLD AUTO: 1 X10*3/UL (ref 0.8–3)
LYMPHOCYTES NFR BLD AUTO: 14.6 %
MCH RBC QN AUTO: 28.5 PG (ref 26–34)
MCHC RBC AUTO-ENTMCNC: 30.4 G/DL (ref 32–36)
MCV RBC AUTO: 94 FL (ref 80–100)
MONOCYTES # BLD AUTO: 0.43 X10*3/UL (ref 0.05–0.8)
MONOCYTES NFR BLD AUTO: 6.3 %
NEUTROPHILS # BLD AUTO: 5.09 X10*3/UL (ref 1.6–5.5)
NEUTROPHILS NFR BLD AUTO: 74 %
NRBC BLD-RTO: 0 /100 WBCS (ref 0–0)
PLATELET # BLD AUTO: 275 X10*3/UL (ref 150–450)
POTASSIUM SERPL-SCNC: 4.8 MMOL/L (ref 3.5–5.3)
PROT SERPL-MCNC: 7.1 G/DL (ref 6.4–8.2)
RBC # BLD AUTO: 3.68 X10*6/UL (ref 4.5–5.9)
SODIUM SERPL-SCNC: 139 MMOL/L (ref 136–145)
WBC # BLD AUTO: 6.9 X10*3/UL (ref 4.4–11.3)

## 2023-12-09 PROCEDURE — 93010 ELECTROCARDIOGRAM REPORT: CPT | Performed by: EMERGENCY MEDICINE

## 2023-12-09 PROCEDURE — 85025 COMPLETE CBC W/AUTO DIFF WBC: CPT

## 2023-12-09 PROCEDURE — 96360 HYDRATION IV INFUSION INIT: CPT

## 2023-12-09 PROCEDURE — 36415 COLL VENOUS BLD VENIPUNCTURE: CPT

## 2023-12-09 PROCEDURE — 2500000004 HC RX 250 GENERAL PHARMACY W/ HCPCS (ALT 636 FOR OP/ED)

## 2023-12-09 PROCEDURE — 99284 EMERGENCY DEPT VISIT MOD MDM: CPT | Performed by: EMERGENCY MEDICINE

## 2023-12-09 PROCEDURE — 99283 EMERGENCY DEPT VISIT LOW MDM: CPT | Mod: 25

## 2023-12-09 PROCEDURE — 80053 COMPREHEN METABOLIC PANEL: CPT

## 2023-12-09 RX ADMIN — SODIUM CHLORIDE 1000 ML: 9 INJECTION, SOLUTION INTRAVENOUS at 12:40

## 2023-12-09 ASSESSMENT — PAIN SCALES - GENERAL
PAINLEVEL_OUTOF10: 0 - NO PAIN

## 2023-12-09 ASSESSMENT — LIFESTYLE VARIABLES
EVER HAD A DRINK FIRST THING IN THE MORNING TO STEADY YOUR NERVES TO GET RID OF A HANGOVER: NO
HAVE PEOPLE ANNOYED YOU BY CRITICIZING YOUR DRINKING: NO
REASON UNABLE TO ASSESS: NO
HAVE YOU EVER FELT YOU SHOULD CUT DOWN ON YOUR DRINKING: NO
EVER FELT BAD OR GUILTY ABOUT YOUR DRINKING: NO

## 2023-12-09 ASSESSMENT — PAIN - FUNCTIONAL ASSESSMENT: PAIN_FUNCTIONAL_ASSESSMENT: 0-10

## 2023-12-09 NOTE — TELEPHONE ENCOUNTER
10:45 A.M I called Virginia back and told her Kevin should go to the ER to get evaluated per fellow. She said she will take him to [facility].  Pt agreed to plan and was taking him now.

## 2023-12-09 NOTE — TELEPHONE ENCOUNTER
Additional Information  • What have you eaten in the last 2 days?     Pt has feeding tube. Doesn't eat much. He ate better yesterday.  • Commented on: Are you having any of these problems?     Denies all these symptoms. More fatigued.  • Commented on: When did these problems start?     Dehydration twice last week per wife.    Protocols used: Diarrhea

## 2023-12-09 NOTE — TELEPHONE ENCOUNTER
Received after hours page from Apoorva Brown's wife regarding possible dehydration and diarrhea. The diarrhea was almost constant for a few hours per his wife.  Reported no blood in diarrhea. He was eating better but still  not drinking well. She said he has needed fluids twice this past week. She said they weren't able to do TX due to dehydration. She is concerned he needs fluids again. He is urinating. She said she feels it is clear but not completely sure. Messaged fellow and sent message to team to notify about after hours call.   Additional Information   How often are you passing urine and what color is it?     Clear. Wife concerned he is dehydrated since not drinking much and had to get fluids 2x this past week.   Commented on: Are your bowel movements liquid or formed?     Four hours straight of diarrhea from last night to this morning.    Protocols used: Diarrhea

## 2023-12-09 NOTE — DISCHARGE INSTRUCTIONS
Seek immediate medical attention if you develop:  abdominal pain,  nausea, vomiting, worsening diarrhea, chest pain, shortness of breath, pain with urination, problems urinating, fever, chills, weakness, or any new or worsening symptoms.    Make sure that you are staying orally hydrated.

## 2023-12-09 NOTE — ED PROVIDER NOTES
HPI   No chief complaint on file.      Patient is a 78-year-old male with history of head and neck cancer, yet to start chemo presenting to [facility] ED for possible dehydration because of diarrhea symptoms started last night.  Patient past medical history also notable for hypertension, hyperlipidemia, type 2 diabetes.  Patient reports that he has had to postpone starting chemotherapy infusion/radiation treatment twice this past week due to need for IV fluid resuscitation for abnormal fluid status/electrolytes.  Patient had significant 10+ episodes of diarrhea since last night.  Patient is worried that he is low on fluids and electrolytes due to diarrhea.  He is worried this may postpone his scheduled cancer treatment on Monday.  Patient denies any associated chest pain, shortness of breath, nausea, vomiting, abdominal pain, fevers, chills, dizziness, lightheadedness, change in vision, or weakness.                          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    Localized, primary osteoarthritis 09/06/2018    Low back pain, unspecified 09/25/2017    Acute low back pain    Malignant melanoma of skin of trunk, except scrotum (CMS/HCC) 07/23/2009    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)     Personal history of other diseases of the circulatory system     Personal history of supraventricular tachycardia    Personal history of other diseases of urinary system 12/31/2022    History of hematuria    Personal history of other endocrine, nutritional and metabolic disease     History of diabetes mellitus    Polyneuropathy 04/05/2010     Presence of right artificial knee joint 01/12/2018    Sensorineural hearing loss, bilateral 07/23/2018     Past Surgical History:   Procedure Laterality Date    CATARACT EXTRACTION  04/07/2017    Cataract Surgery    HAND SURGERY  04/07/2017    Hand Surgery                                                                                                                                                          OTHER SURGICAL HISTORY  04/10/2014    Catheter Ablation Atrial Supraventricular Tachycardia    SHOULDER SURGERY  04/07/2017    Shoulder Surgery    TOTAL HIP ARTHROPLASTY  04/10/2014    Hip Replacement    TOTAL KNEE ARTHROPLASTY  04/10/2014    Knee Replacement     Family History   Problem Relation Name Age of Onset    Other (heart failure following cardiac surgery) Mother       Social History     Tobacco Use    Smoking status: Former     Types: Cigarettes     Passive exposure: Never    Smokeless tobacco: Never   Vaping Use    Vaping Use: Never used   Substance Use Topics    Alcohol use: Yes     Alcohol/week: 2.0 standard drinks of alcohol     Types: 2 Cans of beer per week    Drug use: Never       Physical Exam   ED Triage Vitals   Temp Pulse Resp BP   -- -- -- --      SpO2 Temp src Heart Rate Source Patient Position   -- -- -- --      BP Location FiO2 (%)     -- --       Physical Exam  Constitutional:       Appearance: Normal appearance. He is normal weight.   HENT:      Head: Normocephalic and atraumatic.      Nose: Nose normal.      Mouth/Throat:      Mouth: Mucous membranes are moist.      Pharynx: Oropharynx is clear.   Eyes:      Extraocular Movements: Extraocular movements intact.      Conjunctiva/sclera: Conjunctivae normal.      Pupils: Pupils are equal, round, and reactive to light.   Cardiovascular:      Rate and Rhythm: Normal rate and regular rhythm.      Pulses: Normal pulses.      Heart sounds: Normal heart sounds.   Pulmonary:      Effort: Pulmonary effort is normal.      Breath sounds: Normal  breath sounds.   Abdominal:      General: Abdomen is flat. Bowel sounds are normal.      Palpations: Abdomen is soft.      Comments: PEG tube in place.   Musculoskeletal:         General: Normal range of motion.      Cervical back: Normal range of motion and neck supple.   Skin:     General: Skin is warm and dry.      Capillary Refill: Capillary refill takes less than 2 seconds.   Neurological:      General: No focal deficit present.      Mental Status: He is alert and oriented to person, place, and time. Mental status is at baseline.   Psychiatric:         Mood and Affect: Mood normal.         Behavior: Behavior normal.         ED Course & MDM   Diagnoses as of 12/09/23 1409   Diarrhea, unspecified type       Medical Decision Making  Patient is a 78 y.o. male who presents to [facility] ED for No chief complaint on file.. On initial ED evaluation, patient found to be in no acute distress. Per HPI, concern to evaluate and treat for possible dehydration/electrolyte abnormality.  Obtaining CBC, CMP, EKG.  Administering 1 L IV normal saline fluids.  CBC and CMP labs show improvement from previous labs taken 3 days ago when patient got IV fluids.  Reassured patient.  Recommended over-the-counter antidiarrheal medications for any remaining diarrhea symptoms.    Patient to follow up with PCP. Anticipatory guidance and return precautions provided.  Patient otherwise stable for discharge.          Procedure  Procedures     Hollis Gloria MD  Resident  12/09/23 1416

## 2023-12-11 ENCOUNTER — INFUSION (OUTPATIENT)
Dept: HEMATOLOGY/ONCOLOGY | Facility: CLINIC | Age: 78
End: 2023-12-11
Payer: MEDICARE

## 2023-12-11 ENCOUNTER — HOSPITAL ENCOUNTER (OUTPATIENT)
Dept: CARDIOLOGY | Facility: HOSPITAL | Age: 78
Discharge: HOME | End: 2023-12-11
Payer: MEDICARE

## 2023-12-11 ENCOUNTER — HOSPITAL ENCOUNTER (OUTPATIENT)
Dept: RADIATION ONCOLOGY | Facility: CLINIC | Age: 78
Setting detail: RADIATION/ONCOLOGY SERIES
Discharge: HOME | End: 2023-12-11
Payer: MEDICARE

## 2023-12-11 ENCOUNTER — NUTRITION (OUTPATIENT)
Dept: HEMATOLOGY/ONCOLOGY | Facility: CLINIC | Age: 78
End: 2023-12-11
Payer: MEDICARE

## 2023-12-11 ENCOUNTER — OFFICE VISIT (OUTPATIENT)
Dept: HEMATOLOGY/ONCOLOGY | Facility: CLINIC | Age: 78
End: 2023-12-11
Payer: MEDICARE

## 2023-12-11 VITALS
RESPIRATION RATE: 14 BRPM | TEMPERATURE: 97.5 F | HEART RATE: 74 BPM | OXYGEN SATURATION: 95 % | WEIGHT: 201.06 LBS | DIASTOLIC BLOOD PRESSURE: 68 MMHG | BODY MASS INDEX: 28.95 KG/M2 | SYSTOLIC BLOOD PRESSURE: 139 MMHG

## 2023-12-11 VITALS — WEIGHT: 201.06 LBS | BODY MASS INDEX: 28.78 KG/M2 | HEIGHT: 70 IN

## 2023-12-11 DIAGNOSIS — C10.8 MALIGNANT NEOPLASM OF OVERLAPPING SITES OF OROPHARYNX (MULTI): ICD-10-CM

## 2023-12-11 DIAGNOSIS — C76.0 HEAD AND NECK CANCER (MULTI): Primary | ICD-10-CM

## 2023-12-11 DIAGNOSIS — R63.8 DECREASED ORAL INTAKE: ICD-10-CM

## 2023-12-11 DIAGNOSIS — C76.0 HEAD AND NECK CANCER (MULTI): ICD-10-CM

## 2023-12-11 LAB
RAD ONC MSQ ACTUAL FRACTIONS DELIVERED: 1
RAD ONC MSQ ACTUAL SESSION DELIVERED DOSE: 200 CGRAY
RAD ONC MSQ ACTUAL TOTAL DOSE: 200 CGRAY
RAD ONC MSQ ELAPSED DAYS: 0
RAD ONC MSQ LAST DATE: NORMAL
RAD ONC MSQ PRESCRIBED FRACTIONAL DOSE: 200 CGRAY
RAD ONC MSQ PRESCRIBED NUMBER OF FRACTIONS: 30
RAD ONC MSQ PRESCRIBED TECHNIQUE: NORMAL
RAD ONC MSQ PRESCRIBED TOTAL DOSE: 6000 CGRAY
RAD ONC MSQ PRESCRIPTION PATTERN COMMENT: NORMAL
RAD ONC MSQ START DATE: NORMAL
RAD ONC MSQ TREATMENT COURSE NUMBER: 1
RAD ONC MSQ TREATMENT SITE: NORMAL

## 2023-12-11 PROCEDURE — 2500000004 HC RX 250 GENERAL PHARMACY W/ HCPCS (ALT 636 FOR OP/ED): Performed by: STUDENT IN AN ORGANIZED HEALTH CARE EDUCATION/TRAINING PROGRAM

## 2023-12-11 PROCEDURE — 2500000001 HC RX 250 WO HCPCS SELF ADMINISTERED DRUGS (ALT 637 FOR MEDICARE OP): Performed by: STUDENT IN AN ORGANIZED HEALTH CARE EDUCATION/TRAINING PROGRAM

## 2023-12-11 PROCEDURE — 3078F DIAST BP <80 MM HG: CPT | Performed by: STUDENT IN AN ORGANIZED HEALTH CARE EDUCATION/TRAINING PROGRAM

## 2023-12-11 PROCEDURE — 2500000004 HC RX 250 GENERAL PHARMACY W/ HCPCS (ALT 636 FOR OP/ED): Performed by: NURSE PRACTITIONER

## 2023-12-11 PROCEDURE — 1159F MED LIST DOCD IN RCRD: CPT | Performed by: STUDENT IN AN ORGANIZED HEALTH CARE EDUCATION/TRAINING PROGRAM

## 2023-12-11 PROCEDURE — 1126F AMNT PAIN NOTED NONE PRSNT: CPT | Performed by: STUDENT IN AN ORGANIZED HEALTH CARE EDUCATION/TRAINING PROGRAM

## 2023-12-11 PROCEDURE — 96417 CHEMO IV INFUS EACH ADDL SEQ: CPT

## 2023-12-11 PROCEDURE — 77386 HC INTENSITY-MODULATED RADIATION THERAPY (IMRT), COMPLEX: CPT | Performed by: RADIOLOGY

## 2023-12-11 PROCEDURE — 1036F TOBACCO NON-USER: CPT | Performed by: STUDENT IN AN ORGANIZED HEALTH CARE EDUCATION/TRAINING PROGRAM

## 2023-12-11 PROCEDURE — 93005 ELECTROCARDIOGRAM TRACING: CPT

## 2023-12-11 PROCEDURE — 96413 CHEMO IV INFUSION 1 HR: CPT

## 2023-12-11 PROCEDURE — 3075F SYST BP GE 130 - 139MM HG: CPT | Performed by: STUDENT IN AN ORGANIZED HEALTH CARE EDUCATION/TRAINING PROGRAM

## 2023-12-11 PROCEDURE — 96375 TX/PRO/DX INJ NEW DRUG ADDON: CPT | Mod: INF

## 2023-12-11 PROCEDURE — 99214 OFFICE O/P EST MOD 30 MIN: CPT | Mod: 25 | Performed by: STUDENT IN AN ORGANIZED HEALTH CARE EDUCATION/TRAINING PROGRAM

## 2023-12-11 PROCEDURE — 99214 OFFICE O/P EST MOD 30 MIN: CPT | Performed by: STUDENT IN AN ORGANIZED HEALTH CARE EDUCATION/TRAINING PROGRAM

## 2023-12-11 PROCEDURE — 96361 HYDRATE IV INFUSION ADD-ON: CPT | Mod: INF

## 2023-12-11 PROCEDURE — 1160F RVW MEDS BY RX/DR IN RCRD: CPT | Performed by: STUDENT IN AN ORGANIZED HEALTH CARE EDUCATION/TRAINING PROGRAM

## 2023-12-11 PROCEDURE — 77014 CHG CT GUIDANCE RADIATION THERAPY FLDS PLACEMENT: CPT | Performed by: RADIOLOGY

## 2023-12-11 RX ORDER — ALBUTEROL SULFATE 0.83 MG/ML
3 SOLUTION RESPIRATORY (INHALATION) AS NEEDED
Status: DISCONTINUED | OUTPATIENT
Start: 2023-12-11 | End: 2023-12-11 | Stop reason: HOSPADM

## 2023-12-11 RX ORDER — FAMOTIDINE 10 MG/ML
20 INJECTION INTRAVENOUS ONCE
Status: COMPLETED | OUTPATIENT
Start: 2023-12-11 | End: 2023-12-11

## 2023-12-11 RX ORDER — ALBUTEROL SULFATE 0.83 MG/ML
3 SOLUTION RESPIRATORY (INHALATION) AS NEEDED
Status: CANCELLED | OUTPATIENT
Start: 2023-12-11

## 2023-12-11 RX ORDER — PROCHLORPERAZINE MALEATE 5 MG
10 TABLET ORAL EVERY 6 HOURS PRN
Status: CANCELLED | OUTPATIENT
Start: 2023-12-11

## 2023-12-11 RX ORDER — EPINEPHRINE 0.3 MG/.3ML
0.3 INJECTION SUBCUTANEOUS EVERY 5 MIN PRN
Status: CANCELLED | OUTPATIENT
Start: 2023-12-11

## 2023-12-11 RX ORDER — EPINEPHRINE 0.3 MG/.3ML
0.3 INJECTION SUBCUTANEOUS EVERY 5 MIN PRN
Status: DISCONTINUED | OUTPATIENT
Start: 2023-12-11 | End: 2023-12-11 | Stop reason: HOSPADM

## 2023-12-11 RX ORDER — FAMOTIDINE 10 MG/ML
20 INJECTION INTRAVENOUS ONCE AS NEEDED
Status: DISCONTINUED | OUTPATIENT
Start: 2023-12-11 | End: 2023-12-11 | Stop reason: HOSPADM

## 2023-12-11 RX ORDER — DIPHENHYDRAMINE HYDROCHLORIDE 50 MG/ML
50 INJECTION INTRAMUSCULAR; INTRAVENOUS AS NEEDED
Status: CANCELLED | OUTPATIENT
Start: 2023-12-11

## 2023-12-11 RX ORDER — DIPHENHYDRAMINE HCL 25 MG
50 CAPSULE ORAL ONCE
Status: COMPLETED | OUTPATIENT
Start: 2023-12-11 | End: 2023-12-11

## 2023-12-11 RX ORDER — PALONOSETRON 0.05 MG/ML
0.25 INJECTION, SOLUTION INTRAVENOUS ONCE
Status: COMPLETED | OUTPATIENT
Start: 2023-12-11 | End: 2023-12-11

## 2023-12-11 RX ORDER — FAMOTIDINE 10 MG/ML
20 INJECTION INTRAVENOUS ONCE AS NEEDED
Status: CANCELLED | OUTPATIENT
Start: 2023-12-11

## 2023-12-11 RX ORDER — PROCHLORPERAZINE MALEATE 10 MG
10 TABLET ORAL EVERY 6 HOURS PRN
Status: DISCONTINUED | OUTPATIENT
Start: 2023-12-11 | End: 2023-12-11 | Stop reason: HOSPADM

## 2023-12-11 RX ORDER — DIPHENHYDRAMINE HCL 50 MG
50 CAPSULE ORAL ONCE
Status: CANCELLED | OUTPATIENT
Start: 2023-12-11

## 2023-12-11 RX ORDER — PALONOSETRON 0.05 MG/ML
0.25 INJECTION, SOLUTION INTRAVENOUS ONCE
Status: CANCELLED | OUTPATIENT
Start: 2023-12-11

## 2023-12-11 RX ORDER — DIPHENHYDRAMINE HYDROCHLORIDE 50 MG/ML
50 INJECTION INTRAMUSCULAR; INTRAVENOUS AS NEEDED
Status: DISCONTINUED | OUTPATIENT
Start: 2023-12-11 | End: 2023-12-11 | Stop reason: HOSPADM

## 2023-12-11 RX ORDER — PROCHLORPERAZINE EDISYLATE 5 MG/ML
10 INJECTION INTRAMUSCULAR; INTRAVENOUS EVERY 6 HOURS PRN
Status: CANCELLED | OUTPATIENT
Start: 2023-12-11

## 2023-12-11 RX ORDER — DEXAMETHASONE SODIUM PHOSPHATE 100 MG/10ML
10 INJECTION INTRAMUSCULAR; INTRAVENOUS ONCE
Status: COMPLETED | OUTPATIENT
Start: 2023-12-11 | End: 2023-12-11

## 2023-12-11 RX ORDER — HEPARIN 100 UNIT/ML
500 SYRINGE INTRAVENOUS AS NEEDED
Status: CANCELLED | OUTPATIENT
Start: 2023-12-11

## 2023-12-11 RX ORDER — FAMOTIDINE 10 MG/ML
20 INJECTION INTRAVENOUS ONCE
Status: CANCELLED | OUTPATIENT
Start: 2023-12-11

## 2023-12-11 RX ORDER — DEXAMETHASONE SODIUM PHOSPHATE 100 MG/10ML
10 INJECTION INTRAMUSCULAR; INTRAVENOUS ONCE
Status: CANCELLED | OUTPATIENT
Start: 2023-12-11

## 2023-12-11 RX ORDER — HEPARIN SODIUM,PORCINE/PF 10 UNIT/ML
50 SYRINGE (ML) INTRAVENOUS AS NEEDED
Status: CANCELLED | OUTPATIENT
Start: 2023-12-11

## 2023-12-11 RX ORDER — PROCHLORPERAZINE EDISYLATE 5 MG/ML
10 INJECTION INTRAMUSCULAR; INTRAVENOUS EVERY 6 HOURS PRN
Status: DISCONTINUED | OUTPATIENT
Start: 2023-12-11 | End: 2023-12-11 | Stop reason: HOSPADM

## 2023-12-11 RX ADMIN — DIPHENHYDRAMINE HYDROCHLORIDE 50 MG: 25 CAPSULE ORAL at 12:38

## 2023-12-11 RX ADMIN — PACLITAXEL 96 MG: 6 INJECTION, SOLUTION, CONCENTRATE INTRAVENOUS at 13:38

## 2023-12-11 RX ADMIN — DEXAMETHASONE SODIUM PHOSPHATE 10 MG: 10 INJECTION INTRAMUSCULAR; INTRAVENOUS at 12:38

## 2023-12-11 RX ADMIN — FAMOTIDINE 20 MG: 10 INJECTION INTRAVENOUS at 12:39

## 2023-12-11 RX ADMIN — PALONOSETRON 250 MCG: 0.05 INJECTION, SOLUTION INTRAVENOUS at 12:39

## 2023-12-11 RX ADMIN — SODIUM CHLORIDE 1000 ML: 9 INJECTION, SOLUTION INTRAVENOUS at 12:30

## 2023-12-11 RX ADMIN — CARBOPLATIN 148.8 MG: 600 INJECTION, SOLUTION INTRAVENOUS at 14:48

## 2023-12-11 ASSESSMENT — ENCOUNTER SYMPTOMS
DEPRESSION: 0
LOSS OF SENSATION IN FEET: 0
OCCASIONAL FEELINGS OF UNSTEADINESS: 0

## 2023-12-11 ASSESSMENT — PAIN SCALES - GENERAL: PAINLEVEL: 0-NO PAIN

## 2023-12-11 NOTE — PROGRESS NOTES
"NUTRITION Assessment NOTE    Nutrition Assessment       Reason for Visit:  Kevin Mendes \"Katherine\" is a 78 y.o. male with a diagnosis of BOT cancer.  Patient had a PEG tube placed 11/8/2023.  Patient seen today during his infusion at [facility], patient started his chemo/RT treatments today.    Patient stated [employer] out to the house Thursday, provided teaching and supplies.  He reported Friday evening he had diarrhea, pretty significant, and went to the ER Saturday as he was fearful of dehydration.        Lab Results   Component Value Date/Time    GLUCOSE 104 (H) 12/09/2023 1110     12/09/2023 1110    K 4.8 12/09/2023 1110     12/09/2023 1110    CO2 26 12/09/2023 1110    ANIONGAP 14 12/09/2023 1110    BUN 24 (H) 12/09/2023 1110    CREATININE 1.39 (H) 12/09/2023 1110    EGFR 52 (L) 12/09/2023 1110    CALCIUM 9.3 12/09/2023 1110    ALBUMIN 4.0 12/09/2023 1110    ALKPHOS 88 12/09/2023 1110    PROT 7.1 12/09/2023 1110    AST 16 12/09/2023 1110    BILITOT 0.3 12/09/2023 1110    ALT 7 (L) 12/09/2023 1110     Lab Results   Component Value Date/Time    VITD25 48 09/14/2023 0951       Anthropometrics:  Anthropometrics  Height: 177.5 cm (5' 9.88\")  Weight: 91.2 kg (201 lb 1 oz)  BMI (Calculated): 28.95  Weight Change  Weight History / % Weight Change: 5% in last month (has been stable last week or so.)  Significant Weight Loss: Yes  Interpretation of Weight Loss: 5% in 1 month        Wt Readings from Last 10 Encounters:   12/11/23 91.2 kg (201 lb 1 oz)   12/11/23 91.2 kg (201 lb 1 oz)   12/06/23 92.4 kg (203 lb 12.8 oz)   12/06/23 92.4 kg (203 lb 12.8 oz)   12/04/23 91.4 kg (201 lb 9.6 oz)   12/04/23 91.4 kg (201 lb 9.6 oz)   11/27/23 91.8 kg (202 lb 6.1 oz)   11/17/23 92.5 kg (204 lb)   11/17/23 92.5 kg (204 lb)   11/16/23 92.6 kg (204 lb 3.2 oz)        Food And Nutrient Intake:  Food and Nutrient History  Energy Intake: Poor < 50 %  Fluid Intake: Good this past weekend; reported 80 oz Friday and had ~ 90 oz " "Sunday  GI Symptoms: diarrhea  GI Symptoms greater than 2 weeks: no  Oral Problems: other, specify: (complains increase mucous/saliva)     Food Intake  Amount of Food: appetite has decreased  Meal 1: has bowl cereal; cheerios around 10am  Meal 2: TF  Meal 3: TF                                                      Nutrition Focused Physical Exam:  Subcutaneous Fat Loss  Orbital Fat Pads: Well nourshed (slightly bulging fat pads)  Buccal Fat Pads: Well nourished (full, rounded cheeks)  Muscle Wasting  Temporalis: Well nourished (well-defined muscle)    Energy Needs  Calculated Energy Needs Using Equations  Height: 177.5 cm (5' 9.88\")  Weight Used for Equation Calculations: 91.2 kg (201 lb 1 oz)  [place]- St. Jeor Equation (Overweight or Obese Patients): 1631  Activity Factor: 1.5  Total Energy Needs: 2500  Estimated Energy Needs  Total Energy Estimated Needs (kCal): 2300 kCal  Total Estimated Energy Need per Day (kCal/kg): 25 kCal/kg  Estimated Fluid Needs  Total Fluid Estimated Needs (mL): 2300 mL  Total Fluid Estimated Needs (mL/kg): 25 mL/kg  Estimated Protein Needs  Total Protein Estimated Needs (g): 110 g  Total Protein Estimated Needs (g/kg): 1.2 g/kg    Nutrition Diagnosis      Nutrition Diagnosis  Patient has Nutrition Diagnosis: Yes  Diagnosis Status (1): Ongoing  Nutrition Diagnosis 1: Increased nutrient needs  Related to (1): increased calorie, protein and fluid needs  As Evidenced by (1): chemo/RT  Additional Nutrition Diagnosis: Diagnosis 2  Diagnosis Status (2): Ongoing  Nutrition Diagnosis 2: Predicted inadequate nutrient intake  Related to (2): Head and neck cancer treatments known to decrease oral intakes  As Evidenced by (2): PEG tube placed    Nutrition Interventions/Recommendations   Food and Nutrition Delivery  Meals & Snacks: Energy-modified diet, Protein-modified diet, Texture-Modified Diet  Goals: Small frequent meals/snacks softer foods for pleasure feeds and to continue to swallow throughout " SCC treatments, increased fluids  Enteral Nutrition: Enteral nutrition site care, Feeding tube flush  Total Nutrition Provided: Ida Coderwall Standard 1.4; 2-3 cartons per day gravity feeds and increase to goal as tolerated.  Goals: TF goal is 5 cartons per day (325ml 5 times/day) with 60ml water flushes before and after each graivty feed and additional 240ml water either po or via PEG tube 3 times/day  Nutrition Education  Nutrition Education Content: Content related nutrition education  Goals: Understand nutrition through PEG tube, and nutrition throughout SCC treatments.  Coordination of Nutrition Care by a Nutrition Professional  Collaboration and referral of nutrition care: Referral by nutrition professional to other providers  Goals: Carlos is DME for PEG tube formula and supplies    Patient Instructions   Discussed fluid intake of ~64-80 oz per day total, combine between TF and fluid intake.      Discussed 2-3 cartons of Ida Coderwall Standard 1.4 through PEG tube as tolerated daily.  We discussed see how he feels after this first treatment and we can increase PEG tube feedings to goal as tolerated.  For now, continue with 2-3 cartons and po intake for pleasure feeds.         Nutrition Monitoring and Evaluation   Food/Nutrient Related History Monitoring  Monitoring and Evaluation Plan: Energy intake, Fluid intake, Protein intake, Enteral and parenteral nutrition intake  Energy Intake: Estimated energy intake  Criteria: 0131-3525 calories per day  Fluid Intake: Estimated fluid intake  Criteria: 1458-0633 calories per day  Estimated protein intake: Estimated protein intake  Criteria: 92-112g protein per day  Enteral and Parenteral Nutrition Intake: Enteral nutrition formula/solution, Enteral nutrition intake  Criteria: Initiate PEG tube feedings with MyoKardia Standard 1.4 gravity feeds, continue with 2 cartons and increase to 3 cartons this week and then as tolerated to goal.  Biochemical Data, Medical Tests and  Procedures  Monitoring and Evaluation Plan: Electrolyte/renal panel, GI profile, Glucose/endocrine profile  Criteria: Labs WNL  Nutrition Focused Physical Findings  Monitoring and Evaluation Plan: Digestive System  Digestive System: Constipation, Diarrhea  Criteria: Discussed fiber for bowel management; continue to meet fluid intake  Other: Encouraged 64-80 oz of fluids per day - more as needed    Enteral Nutrition Goal:  Multistat Standard 1.4 gravity feeds, 5 cartons per day will provide 2275 calories, 100 g protein, 255g CHO, 1155ml free water.    Can do 542ml TID for total of 5 cartons.  Water flushes of 60 ml before and after each gravity feed.  Additional water flush of 240ml 3 times per day.    Time Spent  Prep time on day of patient encounter: 10 minutes  Time spent directly with patient, family or caregiver: 20 minutes  Additional Time Spent on Patient Care Activities: 5 minutes  Documentation Time: 15 minutes  Other Time Spent: 0 minutes  Total: 50 minutes      This service will continue to follow up as needed throughout SCC treatments.

## 2023-12-11 NOTE — PROGRESS NOTES
"[facility] - Medical Oncology Follow-Up Visit    Patient ID: Kevin Mendes \"Vinh" is a 78 y.o. male      Current therapy: CARBOplatin (Paraplatin) in sodium chloride 0.9% 100 mL IV, , intravenous, Once, 0 of 7 cycles        PACLitaxeL (Taxol) 96 mg in dextrose 5 % in water (D5W) 116 mL IV, 45 mg/m2, intravenous, Once, 0 of 7 cycles        palonosetron (Aloxi) injection 250 mcg, 0.25 mg, intravenous, Once, 0 of 7 cycles      Oncologic History:     Diagnosis: Base of tongue cancer  Staging: T3N0M0  Date of Diagnosis: 11/8/23     Providers:  ENT Surgeon: Dr. Radha Santiago:   Dr. Burkitt ([facility]) --> Dr. Kalie Torres: Dr. Diaz     Oncological history;  Patient was having sore throat with globus sensation for about 1 month.  CT scan (10/20/23) showed exophytic mass in the oropharynx coming from BOT.  PET CT (11/3/23) showed focal hypermetabolic activity along the right base of the tongue with extension inferiorly to the level of the epiglottis.  No distant mets.  Low dose CT chest showed nodule in LLL, but favors infectious/inflammatory.  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  PEG tube placed. He started concurrent chemoradiation with weekly carbo/taxol on 12/11/23.         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)  09/06/2018: Localized, primary osteoarthritis  09/25/2017: Low back pain, unspecified      Comment:  Acute low back pain  07/23/2009: Malignant melanoma of skin of trunk, except scrotum (CMS/  HCC)  No date: 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)  No date: Personal history of " other diseases of the circulatory system      Comment:  Personal history of supraventricular tachycardia  12/31/2022: Personal history of other diseases of urinary system      Comment:  History of hematuria  No date: Personal history of other endocrine, nutritional and   metabolic disease      Comment:  History of diabetes mellitus  04/05/2010: Polyneuropathy  01/12/2018: Presence of right artificial knee joint  07/23/2018: Sensorineural hearing loss, bilateral      Surgical History:    Surgical History         Past Surgical History:   Procedure Laterality Date    CATARACT EXTRACTION   04/07/2017     Cataract Surgery    HAND SURGERY   04/07/2017     Hand Surgery                                                                                                                                                          OTHER SURGICAL HISTORY   04/10/2014     Catheter Ablation Atrial Supraventricular Tachycardia    SHOULDER SURGERY   04/07/2017     Shoulder Surgery    TOTAL HIP ARTHROPLASTY   04/10/2014     Hip Replacement    TOTAL KNEE ARTHROPLASTY   04/10/2014     Knee Replacement         Family History:    Family History          Family History   Problem Relation Name Age of Onset    Other (heart failure following cardiac surgery) Mother             Family Oncology History:    Cancer-related family history is not on file.  Social History:    Social History            Tobacco Use    Smoking status: Never    Smokeless tobacco: Never   Vaping Use    Vaping Use: Never used   Substance Use Topics    Alcohol use: Yes       Alcohol/week: 2.0 standard drinks of alcohol       Types: 2 Cans of beer per week    Drug use: Never        Chief Concern: Here in clinic for follow-up prior to start of concurrent chemo/RT;     HPI Katherine is here today with his wife Mariana for follow-up. He was in the ER again, just getting over diarrhea.  This has resolved. He is feeling better overall. His PEG Tube site is improved as well. HIs wife wants to  meet with the dietician.       Meds (Current):    Current Outpatient Medications:     aspirin 81 mg EC tablet, Take 1 tablet (81 mg) by mouth once daily., Disp: , Rfl:     glyBURIDE (Diabeta) 5 mg tablet, Take 2 tablets (10 mg) by mouth 2 times a day with meals., Disp: 360 tablet, Rfl: 2    LORazepam (Ativan) 1 mg tablet, Take 1 tablet (1 mg) by mouth once daily. Prior to each radiation treatment, Disp: , Rfl:     losartan (Cozaar) 100 mg tablet, Take 1 tablet (100 mg) by mouth once daily., Disp: 90 tablet, Rfl: 2    metFORMIN  mg 24 hr tablet, Take 2 tablets (1,000 mg) by mouth once daily in the evening. Take with meals. Do not crush, chew, or split., Disp: 180 tablet, Rfl: 2    neomycin-polymyxin B-dexameth (Polydex) 3.5 mg/g-10,000 unit/g-0.1 % ointment ophthalmic ointment, 2 times a day., Disp: , Rfl:     neomycin-polymyxin-dexAMETHasone (Maxitrol) 3.5mg/mL-10,000 unit/mL-0.1 % ophthalmic suspension, 2 times a day., Disp: , Rfl:     ondansetron (Zofran) 8 mg tablet, Take 1 tablet (8 mg) by mouth every 8 hours if needed for nausea or vomiting., Disp: 30 tablet, Rfl: 5    pioglitazone (Actos) 45 mg tablet, Take 1 tablet (45 mg) by mouth once daily., Disp: 90 tablet, Rfl: 2    prochlorperazine (Compazine) 10 mg tablet, Take 1 tablet (10 mg) by mouth every 6 hours if needed for nausea or vomiting., Disp: 30 tablet, Rfl: 5    simvastatin (Zocor) 40 mg tablet, Take 1 tablet (40 mg) by mouth once daily at bedtime., Disp: 90 tablet, Rfl: 2    tamsulosin (Flomax) 0.4 mg 24 hr capsule, Take 1 capsule (0.4 mg) by mouth once daily. (Patient not taking: Reported on 12/11/2023), Disp: 90 capsule, Rfl: 2    Review of Systems - Oncology 12 point ROS was obtained and negative unless otherwise mentioned in the above HPI.      Objective   BSA: 2.12 meters squared  Wt Readings from Last 5 Encounters:   12/11/23 91.2 kg (201 lb 1 oz)   12/06/23 92.4 kg (203 lb 12.8 oz)   12/06/23 92.4 kg (203 lb 12.8 oz)   12/04/23 91.4 kg  "(201 lb 9.6 oz)   12/04/23 91.4 kg (201 lb 9.6 oz)     /68 (BP Location: Left arm, Patient Position: Sitting)   Pulse 74   Temp 36.4 °C (97.5 °F) (Temporal)   Resp 14   Wt 91.2 kg (201 lb 1 oz)   SpO2 95%   BMI 28.95 kg/m²          Physical Exam  Constitutional:       Appearance: Normal appearance.   Eyes:      Pupils: Pupils are equal, round, and reactive to light.   Cardiovascular:      Rate and Rhythm: Normal rate and regular rhythm.   Pulmonary:      Effort: Pulmonary effort is normal.      Breath sounds: Normal breath sounds.   Abdominal:      General: Bowel sounds are normal.      Palpations: Abdomen is soft.   Musculoskeletal:         General: Normal range of motion.   Skin:     General: Skin is warm and dry.   Neurological:      General: No focal deficit present.      Mental Status: He is alert and oriented to person, place, and time.   Psychiatric:         Mood and Affect: Mood normal.         Behavior: Behavior normal.          Results:  Labs:  Lab Results   Component Value Date    WBC 6.9 12/09/2023    HGB 10.5 (L) 12/09/2023    HCT 34.5 (L) 12/09/2023    MCV 94 12/09/2023     12/09/2023      Lab Results   Component Value Date    NEUTROABS 5.09 12/09/2023      Lab Results   Component Value Date    GLUCOSE 104 (H) 12/09/2023    CALCIUM 9.3 12/09/2023     12/09/2023    K 4.8 12/09/2023    CO2 26 12/09/2023     12/09/2023    BUN 24 (H) 12/09/2023    CREATININE 1.39 (H) 12/09/2023    MG 2.10 12/01/2023     Lab Results   Component Value Date    ALT 7 (L) 12/09/2023    AST 16 12/09/2023    ALKPHOS 88 12/09/2023    BILITOT 0.3 12/09/2023      Lab Results   Component Value Date    TSH 0.51 12/01/2023       Imaging:           No results found for this or any previous visit.    Assessment/Plan      Kevin Mendes \"Pat\" is a 78 y.o. male here for follow up   Mr. Mendes is 79 y/o male with recent dx of BOT SCC (T2N0M0, p16+, Stage I) who is referred to med onc to be considered for " chemotherapy in addition to radiation.     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 11/3/23: PET/CT showed no distant mets except a pulmonary nodule in LLL with no hypermetabolic activity. Findings are favored to be infectious/inflammatory or granulomatous in nature (discussed in HN TB with radiologist). Close follow up with CT to assess for stability is recommended.  Since pt is not a surgical candidate, we will treat him with concurrent chemoradiation. His GFR is at 50s, prefer weekly carboplatin and paclitaxel. Dr. Burkitt previously discussed with patient about treatment schedule and chemotherapy related side effects in detail. Met with Dr. Diaz from radiation oncology.      - now s/p infection of PEG site which has resolved  - starting concurrent chemoRT with weekly carbo/taxol today 12/11/23  - Will see patient weekly in follow-up

## 2023-12-11 NOTE — PATIENT INSTRUCTIONS
Discussed fluid intake of ~64-80 oz per day total, combine between TF and fluid intake.      Discussed 2-3 cartons of [employer] for [employer] Standard 1.4 through PEG tube as tolerated daily.  We discussed see how he feels after this first treatment and we can increase PEG tube feedings to goal as tolerated.  For now, continue with 2-3 cartons and po intake for pleasure feeds.

## 2023-12-12 ENCOUNTER — HOSPITAL ENCOUNTER (OUTPATIENT)
Dept: RADIATION ONCOLOGY | Facility: CLINIC | Age: 78
Setting detail: RADIATION/ONCOLOGY SERIES
Discharge: HOME | End: 2023-12-12
Payer: MEDICARE

## 2023-12-12 ENCOUNTER — APPOINTMENT (OUTPATIENT)
Dept: HEMATOLOGY/ONCOLOGY | Facility: CLINIC | Age: 78
End: 2023-12-12
Payer: MEDICARE

## 2023-12-12 DIAGNOSIS — Z51.0 ENCOUNTER FOR ANTINEOPLASTIC RADIATION THERAPY: ICD-10-CM

## 2023-12-12 DIAGNOSIS — C10.8 MALIGNANT NEOPLASM OF OVERLAPPING SITES OF OROPHARYNX (MULTI): ICD-10-CM

## 2023-12-12 LAB
RAD ONC MSQ ACTUAL FRACTIONS DELIVERED: 2
RAD ONC MSQ ACTUAL SESSION DELIVERED DOSE: 200 CGRAY
RAD ONC MSQ ACTUAL TOTAL DOSE: 400 CGRAY
RAD ONC MSQ ELAPSED DAYS: 1
RAD ONC MSQ LAST DATE: NORMAL
RAD ONC MSQ PRESCRIBED FRACTIONAL DOSE: 200 CGRAY
RAD ONC MSQ PRESCRIBED NUMBER OF FRACTIONS: 30
RAD ONC MSQ PRESCRIBED TECHNIQUE: NORMAL
RAD ONC MSQ PRESCRIBED TOTAL DOSE: 6000 CGRAY
RAD ONC MSQ PRESCRIPTION PATTERN COMMENT: NORMAL
RAD ONC MSQ START DATE: NORMAL
RAD ONC MSQ TREATMENT COURSE NUMBER: 1
RAD ONC MSQ TREATMENT SITE: NORMAL

## 2023-12-12 PROCEDURE — 77014 CHG CT GUIDANCE RADIATION THERAPY FLDS PLACEMENT: CPT | Performed by: RADIOLOGY

## 2023-12-12 PROCEDURE — 77386 HC INTENSITY-MODULATED RADIATION THERAPY (IMRT), COMPLEX: CPT | Performed by: RADIOLOGY

## 2023-12-13 ENCOUNTER — RADIATION ONCOLOGY OTV (OUTPATIENT)
Dept: RADIATION ONCOLOGY | Facility: CLINIC | Age: 78
End: 2023-12-13
Payer: MEDICARE

## 2023-12-13 ENCOUNTER — HOSPITAL ENCOUNTER (OUTPATIENT)
Dept: RADIATION ONCOLOGY | Facility: CLINIC | Age: 78
Setting detail: RADIATION/ONCOLOGY SERIES
Discharge: HOME | End: 2023-12-13
Payer: MEDICARE

## 2023-12-13 VITALS
BODY MASS INDEX: 29.69 KG/M2 | DIASTOLIC BLOOD PRESSURE: 69 MMHG | WEIGHT: 206.2 LBS | TEMPERATURE: 96.8 F | HEART RATE: 75 BPM | OXYGEN SATURATION: 97 % | SYSTOLIC BLOOD PRESSURE: 119 MMHG | RESPIRATION RATE: 18 BRPM

## 2023-12-13 DIAGNOSIS — C10.8 MALIGNANT NEOPLASM OF OVERLAPPING SITES OF OROPHARYNX (MULTI): ICD-10-CM

## 2023-12-13 DIAGNOSIS — Z51.0 ENCOUNTER FOR ANTINEOPLASTIC RADIATION THERAPY: ICD-10-CM

## 2023-12-13 LAB
RAD ONC MSQ ACTUAL FRACTIONS DELIVERED: 3
RAD ONC MSQ ACTUAL SESSION DELIVERED DOSE: 200 CGRAY
RAD ONC MSQ ACTUAL TOTAL DOSE: 600 CGRAY
RAD ONC MSQ ELAPSED DAYS: 2
RAD ONC MSQ LAST DATE: NORMAL
RAD ONC MSQ PRESCRIBED FRACTIONAL DOSE: 200 CGRAY
RAD ONC MSQ PRESCRIBED NUMBER OF FRACTIONS: 30
RAD ONC MSQ PRESCRIBED TECHNIQUE: NORMAL
RAD ONC MSQ PRESCRIBED TOTAL DOSE: 6000 CGRAY
RAD ONC MSQ PRESCRIPTION PATTERN COMMENT: NORMAL
RAD ONC MSQ START DATE: NORMAL
RAD ONC MSQ TREATMENT COURSE NUMBER: 1
RAD ONC MSQ TREATMENT SITE: NORMAL

## 2023-12-13 PROCEDURE — 77427 RADIATION TX MANAGEMENT X5: CPT | Performed by: RADIOLOGY

## 2023-12-13 PROCEDURE — 77014 CHG CT GUIDANCE RADIATION THERAPY FLDS PLACEMENT: CPT | Performed by: RADIOLOGY

## 2023-12-13 PROCEDURE — 77336 RADIATION PHYSICS CONSULT: CPT | Performed by: RADIOLOGY

## 2023-12-13 PROCEDURE — 77386 HC INTENSITY-MODULATED RADIATION THERAPY (IMRT), COMPLEX: CPT | Performed by: RADIOLOGY

## 2023-12-13 ASSESSMENT — PAIN SCALES - GENERAL: PAINLEVEL: 0-NO PAIN

## 2023-12-13 NOTE — PROGRESS NOTES
Radiation Oncology On Treatment Visit    Patient Name:  Kevin Mendes  MRN:  [id number]  :  1945    Referring Provider: No ref. 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             SUBJECTIVE: Pt tolerating radiation well so far. No side effects. Having issues with secretions      OBJECTIVE:   Vital Signs:  /69 (BP Location: Left arm, Patient Position: Sitting, BP Cuff Size: Large adult)   Pulse 75   Temp 36 °C (96.8 °F) (Temporal)   Resp 18   Wt 93.5 kg (206 lb 3.2 oz)   SpO2 97%   BMI 29.69 kg/m²    Pain Scale: The patient's current pain level was assessed.  They report currently having a pain of 0 out of 10.    Other Pertinent Findings:   Patient states he has increased thick secretions.   Toxicity Assessment          2023    13:00   Toxicity Assessment   Adverse Events Reviewed (WDL) Yes (Within Defined Limits)   Treatment  and neck   Anorexia Grade 1       appetite comes and goes, using PEG tube feedings 3 per day with water flush. Encouraged small protein rich snacks, nutrition following.   Anxiety Grade 1       nervous about possible side effects   Dehydration Grade  "0   Dermatitis Radiation Grade 0       reviewed skin care recommendations with patient   Diarrhea Grade 1       had an episode last friday with several watery BM's, went to the ER on saturday after feeling better. Labs were normal per patient, received IVF and was discharged. Resolving now   Fatigue Grade 0   Nausea Grade 0       anti-emetics prn   Pain Grade 0   Vomiting Grade 0   Dysphagia Grade 0       softer foods   Esophagitis Grade 0   Mucositis Oral Grade 0   Hearing Impaired Grade 0       basline, not worsening   Dry Eye --        blocked ducts per patient, eye Dr. following   Dry Mouth Grade 1       biotene and xylimelts   Ear Pain Grade 0       can feel \"something\" on the right ear. Doesn't hurt   Tinnitus Grade 0   Oral Pain Grade 0   Hoarseness Grade 1   Voice Alteration Grade 1        Assessment / Plan:  The patient is tolerating radiation therapy as anticipated.  Continue per current treatment plan.   Pt requires suction machine for secretions.     "

## 2023-12-14 ENCOUNTER — SOCIAL WORK (OUTPATIENT)
Dept: CASE MANAGEMENT | Facility: HOSPITAL | Age: 78
End: 2023-12-14
Payer: MEDICARE

## 2023-12-14 ENCOUNTER — HOSPITAL ENCOUNTER (OUTPATIENT)
Dept: RADIATION ONCOLOGY | Facility: CLINIC | Age: 78
Setting detail: RADIATION/ONCOLOGY SERIES
Discharge: HOME | End: 2023-12-14
Payer: MEDICARE

## 2023-12-14 DIAGNOSIS — Z51.0 ENCOUNTER FOR ANTINEOPLASTIC RADIATION THERAPY: ICD-10-CM

## 2023-12-14 DIAGNOSIS — C10.8 MALIGNANT NEOPLASM OF OVERLAPPING SITES OF OROPHARYNX (MULTI): ICD-10-CM

## 2023-12-14 LAB
ATRIAL RATE: 70 BPM
P AXIS: 71 DEGREES
P OFFSET: 204 MS
P ONSET: 142 MS
PR INTERVAL: 166 MS
Q ONSET: 225 MS
QRS COUNT: 11 BEATS
QRS DURATION: 130 MS
QT INTERVAL: 418 MS
QTC CALCULATION(BAZETT): 451 MS
QTC FREDERICIA: 440 MS
R AXIS: 88 DEGREES
RAD ONC MSQ ACTUAL FRACTIONS DELIVERED: 4
RAD ONC MSQ ACTUAL SESSION DELIVERED DOSE: 200 CGRAY
RAD ONC MSQ ACTUAL TOTAL DOSE: 800 CGRAY
RAD ONC MSQ ELAPSED DAYS: 3
RAD ONC MSQ LAST DATE: NORMAL
RAD ONC MSQ PRESCRIBED FRACTIONAL DOSE: 200 CGRAY
RAD ONC MSQ PRESCRIBED NUMBER OF FRACTIONS: 30
RAD ONC MSQ PRESCRIBED TECHNIQUE: NORMAL
RAD ONC MSQ PRESCRIBED TOTAL DOSE: 6000 CGRAY
RAD ONC MSQ PRESCRIPTION PATTERN COMMENT: NORMAL
RAD ONC MSQ START DATE: NORMAL
RAD ONC MSQ TREATMENT COURSE NUMBER: 1
RAD ONC MSQ TREATMENT SITE: NORMAL
T AXIS: 63 DEGREES
T OFFSET: 434 MS
VENTRICULAR RATE: 70 BPM

## 2023-12-14 PROCEDURE — 77386 HC INTENSITY-MODULATED RADIATION THERAPY (IMRT), COMPLEX: CPT | Performed by: RADIOLOGY

## 2023-12-14 PROCEDURE — 77014 CHG CT GUIDANCE RADIATION THERAPY FLDS PLACEMENT: CPT | Performed by: STUDENT IN AN ORGANIZED HEALTH CARE EDUCATION/TRAINING PROGRAM

## 2023-12-14 NOTE — PROGRESS NOTES
Social Work Note  12/14/2023 [facility] faxed over a referral to [facility] for a nurse (peg tube teaching and suction machine teaching) and to [employer] (suction machine) yesterday.  [facility] called both companies today and they received the referrals.  SW will remain available to assist patient.  Fariba Regan, MSW, LSW

## 2023-12-15 ENCOUNTER — LAB (OUTPATIENT)
Dept: LAB | Facility: CLINIC | Age: 78
End: 2023-12-15
Payer: MEDICARE

## 2023-12-15 ENCOUNTER — HOSPITAL ENCOUNTER (OUTPATIENT)
Dept: RADIATION ONCOLOGY | Facility: CLINIC | Age: 78
Setting detail: RADIATION/ONCOLOGY SERIES
Discharge: HOME | End: 2023-12-15
Payer: MEDICARE

## 2023-12-15 ENCOUNTER — HOME HEALTH ADMISSION (OUTPATIENT)
Dept: HOME HEALTH SERVICES | Facility: HOME HEALTH | Age: 78
End: 2023-12-15
Payer: MEDICARE

## 2023-12-15 DIAGNOSIS — Z51.0 ENCOUNTER FOR ANTINEOPLASTIC RADIATION THERAPY: ICD-10-CM

## 2023-12-15 DIAGNOSIS — C76.0 HEAD AND NECK CANCER (MULTI): ICD-10-CM

## 2023-12-15 DIAGNOSIS — C10.8 MALIGNANT NEOPLASM OF OVERLAPPING SITES OF OROPHARYNX (MULTI): ICD-10-CM

## 2023-12-15 LAB
ALBUMIN SERPL BCP-MCNC: 4.2 G/DL (ref 3.4–5)
ALP SERPL-CCNC: 77 U/L (ref 33–136)
ALT SERPL W P-5'-P-CCNC: 8 U/L (ref 10–52)
ANION GAP SERPL CALC-SCNC: 12 MMOL/L (ref 10–20)
AST SERPL W P-5'-P-CCNC: 11 U/L (ref 9–39)
BASOPHILS # BLD AUTO: 0.03 X10*3/UL (ref 0–0.1)
BASOPHILS NFR BLD AUTO: 0.4 %
BILIRUB SERPL-MCNC: 0.4 MG/DL (ref 0–1.2)
BUN SERPL-MCNC: 29 MG/DL (ref 6–23)
CALCIUM SERPL-MCNC: 9.5 MG/DL (ref 8.6–10.3)
CHLORIDE SERPL-SCNC: 102 MMOL/L (ref 98–107)
CO2 SERPL-SCNC: 30 MMOL/L (ref 21–32)
CREAT SERPL-MCNC: 1.26 MG/DL (ref 0.5–1.3)
EOSINOPHIL # BLD AUTO: 0.13 X10*3/UL (ref 0–0.4)
EOSINOPHIL NFR BLD AUTO: 1.8 %
ERYTHROCYTE [DISTWIDTH] IN BLOOD BY AUTOMATED COUNT: 13.8 % (ref 11.5–14.5)
GFR SERPL CREATININE-BSD FRML MDRD: 58 ML/MIN/1.73M*2
GLUCOSE SERPL-MCNC: 131 MG/DL (ref 74–99)
HCT VFR BLD AUTO: 33.6 % (ref 41–52)
HGB BLD-MCNC: 10.3 G/DL (ref 13.5–17.5)
IMM GRANULOCYTES # BLD AUTO: 0.02 X10*3/UL (ref 0–0.5)
IMM GRANULOCYTES NFR BLD AUTO: 0.3 % (ref 0–0.9)
LYMPHOCYTES # BLD AUTO: 0.97 X10*3/UL (ref 0.8–3)
LYMPHOCYTES NFR BLD AUTO: 13.1 %
MAGNESIUM SERPL-MCNC: 1.9 MG/DL (ref 1.6–2.4)
MCH RBC QN AUTO: 28.9 PG (ref 26–34)
MCHC RBC AUTO-ENTMCNC: 30.7 G/DL (ref 32–36)
MCV RBC AUTO: 94 FL (ref 80–100)
MONOCYTES # BLD AUTO: 0.45 X10*3/UL (ref 0.05–0.8)
MONOCYTES NFR BLD AUTO: 6.1 %
NEUTROPHILS # BLD AUTO: 5.78 X10*3/UL (ref 1.6–5.5)
NEUTROPHILS NFR BLD AUTO: 78.3 %
PLATELET # BLD AUTO: 226 X10*3/UL (ref 150–450)
POTASSIUM SERPL-SCNC: 4.9 MMOL/L (ref 3.5–5.3)
PROT SERPL-MCNC: 7.2 G/DL (ref 6.4–8.2)
RAD ONC MSQ ACTUAL FRACTIONS DELIVERED: 5
RAD ONC MSQ ACTUAL SESSION DELIVERED DOSE: 200 CGRAY
RAD ONC MSQ ACTUAL TOTAL DOSE: 1000 CGRAY
RAD ONC MSQ ELAPSED DAYS: 4
RAD ONC MSQ LAST DATE: NORMAL
RAD ONC MSQ PRESCRIBED FRACTIONAL DOSE: 200 CGRAY
RAD ONC MSQ PRESCRIBED NUMBER OF FRACTIONS: 30
RAD ONC MSQ PRESCRIBED TECHNIQUE: NORMAL
RAD ONC MSQ PRESCRIBED TOTAL DOSE: 6000 CGRAY
RAD ONC MSQ PRESCRIPTION PATTERN COMMENT: NORMAL
RAD ONC MSQ START DATE: NORMAL
RAD ONC MSQ TREATMENT COURSE NUMBER: 1
RAD ONC MSQ TREATMENT SITE: NORMAL
RBC # BLD AUTO: 3.57 X10*6/UL (ref 4.5–5.9)
SODIUM SERPL-SCNC: 139 MMOL/L (ref 136–145)
WBC # BLD AUTO: 7.4 X10*3/UL (ref 4.4–11.3)

## 2023-12-15 PROCEDURE — 85025 COMPLETE CBC W/AUTO DIFF WBC: CPT

## 2023-12-15 PROCEDURE — 36415 COLL VENOUS BLD VENIPUNCTURE: CPT

## 2023-12-15 PROCEDURE — 80053 COMPREHEN METABOLIC PANEL: CPT

## 2023-12-15 PROCEDURE — 77386 HC INTENSITY-MODULATED RADIATION THERAPY (IMRT), COMPLEX: CPT | Performed by: RADIOLOGY

## 2023-12-15 PROCEDURE — 83735 ASSAY OF MAGNESIUM: CPT

## 2023-12-15 PROCEDURE — 77014 CHG CT GUIDANCE RADIATION THERAPY FLDS PLACEMENT: CPT | Performed by: RADIOLOGY

## 2023-12-17 ENCOUNTER — NURSE TRIAGE (OUTPATIENT)
Dept: ADMISSION | Facility: HOSPITAL | Age: 78
End: 2023-12-17
Payer: MEDICARE

## 2023-12-17 NOTE — TELEPHONE ENCOUNTER
Spouse states when she went to flush and start tube feed there was red in the tube and she was initially alarmed thinking it was blood but then realized patient had red sports drink and red cough medicine and thinks that is what it was instead. Will avoid red dyes and monitor for any other signs of bleeding and return call if develops. Message sent to fellow on call for any further recommendations.     Additional Information   Are medicines or other measures helping the cough?     Took coricidin hbp nighttime last night   Commented on: If yes: what color is it? How much is there?     white    Protocols used: Cough

## 2023-12-18 ENCOUNTER — HOSPITAL ENCOUNTER (OUTPATIENT)
Dept: RADIATION ONCOLOGY | Facility: CLINIC | Age: 78
Setting detail: RADIATION/ONCOLOGY SERIES
Discharge: HOME | End: 2023-12-18
Payer: MEDICARE

## 2023-12-18 ENCOUNTER — NUTRITION (OUTPATIENT)
Dept: HEMATOLOGY/ONCOLOGY | Facility: CLINIC | Age: 78
End: 2023-12-18

## 2023-12-18 ENCOUNTER — INFUSION (OUTPATIENT)
Dept: HEMATOLOGY/ONCOLOGY | Facility: CLINIC | Age: 78
End: 2023-12-18
Payer: MEDICARE

## 2023-12-18 ENCOUNTER — OFFICE VISIT (OUTPATIENT)
Dept: HEMATOLOGY/ONCOLOGY | Facility: CLINIC | Age: 78
End: 2023-12-18
Payer: MEDICARE

## 2023-12-18 VITALS
HEART RATE: 70 BPM | RESPIRATION RATE: 16 BRPM | BODY MASS INDEX: 29.01 KG/M2 | DIASTOLIC BLOOD PRESSURE: 68 MMHG | WEIGHT: 201.5 LBS | TEMPERATURE: 98.1 F | SYSTOLIC BLOOD PRESSURE: 131 MMHG | OXYGEN SATURATION: 95 %

## 2023-12-18 VITALS — WEIGHT: 201.5 LBS | BODY MASS INDEX: 28.85 KG/M2 | HEIGHT: 70 IN

## 2023-12-18 DIAGNOSIS — C76.0 HEAD AND NECK CANCER (MULTI): ICD-10-CM

## 2023-12-18 DIAGNOSIS — Z51.0 ENCOUNTER FOR ANTINEOPLASTIC RADIATION THERAPY: ICD-10-CM

## 2023-12-18 DIAGNOSIS — C10.8 MALIGNANT NEOPLASM OF OVERLAPPING SITES OF OROPHARYNX (MULTI): ICD-10-CM

## 2023-12-18 LAB
BASOPHILS # BLD AUTO: 0.04 X10*3/UL (ref 0–0.1)
BASOPHILS NFR BLD AUTO: 0.6 %
EOSINOPHIL # BLD AUTO: 0.23 X10*3/UL (ref 0–0.4)
EOSINOPHIL NFR BLD AUTO: 3.6 %
ERYTHROCYTE [DISTWIDTH] IN BLOOD BY AUTOMATED COUNT: 14 % (ref 11.5–14.5)
HCT VFR BLD AUTO: 27.7 % (ref 41–52)
HGB BLD-MCNC: 8.5 G/DL (ref 13.5–17.5)
IMM GRANULOCYTES # BLD AUTO: 0.03 X10*3/UL (ref 0–0.5)
IMM GRANULOCYTES NFR BLD AUTO: 0.5 % (ref 0–0.9)
LYMPHOCYTES # BLD AUTO: 0.88 X10*3/UL (ref 0.8–3)
LYMPHOCYTES NFR BLD AUTO: 13.6 %
MCH RBC QN AUTO: 29.2 PG (ref 26–34)
MCHC RBC AUTO-ENTMCNC: 30.7 G/DL (ref 32–36)
MCV RBC AUTO: 95 FL (ref 80–100)
MONOCYTES # BLD AUTO: 0.44 X10*3/UL (ref 0.05–0.8)
MONOCYTES NFR BLD AUTO: 6.8 %
NEUTROPHILS # BLD AUTO: 4.84 X10*3/UL (ref 1.6–5.5)
NEUTROPHILS NFR BLD AUTO: 74.9 %
NRBC BLD-RTO: ABNORMAL /100{WBCS}
PLATELET # BLD AUTO: 204 X10*3/UL (ref 150–450)
RAD ONC MSQ ACTUAL FRACTIONS DELIVERED: 6
RAD ONC MSQ ACTUAL SESSION DELIVERED DOSE: 200 CGRAY
RAD ONC MSQ ACTUAL TOTAL DOSE: 1200 CGRAY
RAD ONC MSQ ELAPSED DAYS: 7
RAD ONC MSQ LAST DATE: NORMAL
RAD ONC MSQ PRESCRIBED FRACTIONAL DOSE: 200 CGRAY
RAD ONC MSQ PRESCRIBED NUMBER OF FRACTIONS: 30
RAD ONC MSQ PRESCRIBED TECHNIQUE: NORMAL
RAD ONC MSQ PRESCRIBED TOTAL DOSE: 6000 CGRAY
RAD ONC MSQ PRESCRIPTION PATTERN COMMENT: NORMAL
RAD ONC MSQ START DATE: NORMAL
RAD ONC MSQ TREATMENT COURSE NUMBER: 1
RAD ONC MSQ TREATMENT SITE: NORMAL
RBC # BLD AUTO: 2.91 X10*6/UL (ref 4.5–5.9)
WBC # BLD AUTO: 6.5 X10*3/UL (ref 4.4–11.3)

## 2023-12-18 PROCEDURE — 96417 CHEMO IV INFUS EACH ADDL SEQ: CPT

## 2023-12-18 PROCEDURE — 1036F TOBACCO NON-USER: CPT | Performed by: NURSE PRACTITIONER

## 2023-12-18 PROCEDURE — 3078F DIAST BP <80 MM HG: CPT | Performed by: NURSE PRACTITIONER

## 2023-12-18 PROCEDURE — 77386 HC INTENSITY-MODULATED RADIATION THERAPY (IMRT), COMPLEX: CPT | Performed by: RADIOLOGY

## 2023-12-18 PROCEDURE — 99214 OFFICE O/P EST MOD 30 MIN: CPT | Mod: 25 | Performed by: NURSE PRACTITIONER

## 2023-12-18 PROCEDURE — 96413 CHEMO IV INFUSION 1 HR: CPT

## 2023-12-18 PROCEDURE — 99214 OFFICE O/P EST MOD 30 MIN: CPT | Performed by: NURSE PRACTITIONER

## 2023-12-18 PROCEDURE — 85025 COMPLETE CBC W/AUTO DIFF WBC: CPT | Performed by: NURSE PRACTITIONER

## 2023-12-18 PROCEDURE — 2500000004 HC RX 250 GENERAL PHARMACY W/ HCPCS (ALT 636 FOR OP/ED): Performed by: NURSE PRACTITIONER

## 2023-12-18 PROCEDURE — 1160F RVW MEDS BY RX/DR IN RCRD: CPT | Performed by: NURSE PRACTITIONER

## 2023-12-18 PROCEDURE — 1159F MED LIST DOCD IN RCRD: CPT | Performed by: NURSE PRACTITIONER

## 2023-12-18 PROCEDURE — 1125F AMNT PAIN NOTED PAIN PRSNT: CPT | Performed by: NURSE PRACTITIONER

## 2023-12-18 PROCEDURE — 77014 CHG CT GUIDANCE RADIATION THERAPY FLDS PLACEMENT: CPT | Performed by: RADIOLOGY

## 2023-12-18 PROCEDURE — 96375 TX/PRO/DX INJ NEW DRUG ADDON: CPT | Mod: INF

## 2023-12-18 PROCEDURE — 3075F SYST BP GE 130 - 139MM HG: CPT | Performed by: NURSE PRACTITIONER

## 2023-12-18 PROCEDURE — 36415 COLL VENOUS BLD VENIPUNCTURE: CPT

## 2023-12-18 PROCEDURE — 2500000001 HC RX 250 WO HCPCS SELF ADMINISTERED DRUGS (ALT 637 FOR MEDICARE OP): Performed by: NURSE PRACTITIONER

## 2023-12-18 RX ORDER — PALONOSETRON 0.05 MG/ML
0.25 INJECTION, SOLUTION INTRAVENOUS ONCE
Status: CANCELLED | OUTPATIENT
Start: 2023-12-18

## 2023-12-18 RX ORDER — PROCHLORPERAZINE EDISYLATE 5 MG/ML
10 INJECTION INTRAMUSCULAR; INTRAVENOUS EVERY 6 HOURS PRN
Status: DISCONTINUED | OUTPATIENT
Start: 2023-12-18 | End: 2023-12-18 | Stop reason: HOSPADM

## 2023-12-18 RX ORDER — PROCHLORPERAZINE MALEATE 5 MG
10 TABLET ORAL EVERY 6 HOURS PRN
Status: CANCELLED | OUTPATIENT
Start: 2023-12-18

## 2023-12-18 RX ORDER — FAMOTIDINE 10 MG/ML
20 INJECTION INTRAVENOUS ONCE
Status: CANCELLED | OUTPATIENT
Start: 2023-12-18

## 2023-12-18 RX ORDER — DIPHENHYDRAMINE HYDROCHLORIDE 50 MG/ML
50 INJECTION INTRAMUSCULAR; INTRAVENOUS AS NEEDED
Status: DISCONTINUED | OUTPATIENT
Start: 2023-12-18 | End: 2023-12-18 | Stop reason: HOSPADM

## 2023-12-18 RX ORDER — DIPHENHYDRAMINE HCL 25 MG
50 CAPSULE ORAL ONCE
Status: COMPLETED | OUTPATIENT
Start: 2023-12-18 | End: 2023-12-18

## 2023-12-18 RX ORDER — FAMOTIDINE 10 MG/ML
20 INJECTION INTRAVENOUS ONCE
Status: COMPLETED | OUTPATIENT
Start: 2023-12-18 | End: 2023-12-18

## 2023-12-18 RX ORDER — PROCHLORPERAZINE EDISYLATE 5 MG/ML
10 INJECTION INTRAMUSCULAR; INTRAVENOUS EVERY 6 HOURS PRN
Status: CANCELLED | OUTPATIENT
Start: 2023-12-18

## 2023-12-18 RX ORDER — FAMOTIDINE 10 MG/ML
20 INJECTION INTRAVENOUS ONCE AS NEEDED
Status: DISCONTINUED | OUTPATIENT
Start: 2023-12-18 | End: 2023-12-18 | Stop reason: HOSPADM

## 2023-12-18 RX ORDER — DIPHENHYDRAMINE HCL 50 MG
50 CAPSULE ORAL ONCE
Status: CANCELLED | OUTPATIENT
Start: 2023-12-18

## 2023-12-18 RX ORDER — EPINEPHRINE 0.3 MG/.3ML
0.3 INJECTION SUBCUTANEOUS EVERY 5 MIN PRN
Status: DISCONTINUED | OUTPATIENT
Start: 2023-12-18 | End: 2023-12-18 | Stop reason: HOSPADM

## 2023-12-18 RX ORDER — EPINEPHRINE 0.3 MG/.3ML
0.3 INJECTION SUBCUTANEOUS EVERY 5 MIN PRN
Status: CANCELLED | OUTPATIENT
Start: 2023-12-18

## 2023-12-18 RX ORDER — ALBUTEROL SULFATE 0.83 MG/ML
3 SOLUTION RESPIRATORY (INHALATION) AS NEEDED
Status: CANCELLED | OUTPATIENT
Start: 2023-12-18

## 2023-12-18 RX ORDER — PROCHLORPERAZINE MALEATE 10 MG
10 TABLET ORAL EVERY 6 HOURS PRN
Status: DISCONTINUED | OUTPATIENT
Start: 2023-12-18 | End: 2023-12-18 | Stop reason: HOSPADM

## 2023-12-18 RX ORDER — FAMOTIDINE 10 MG/ML
20 INJECTION INTRAVENOUS ONCE AS NEEDED
Status: CANCELLED | OUTPATIENT
Start: 2023-12-18

## 2023-12-18 RX ORDER — PALONOSETRON 0.05 MG/ML
0.25 INJECTION, SOLUTION INTRAVENOUS ONCE
Status: COMPLETED | OUTPATIENT
Start: 2023-12-18 | End: 2023-12-18

## 2023-12-18 RX ORDER — ALBUTEROL SULFATE 0.83 MG/ML
3 SOLUTION RESPIRATORY (INHALATION) AS NEEDED
Status: DISCONTINUED | OUTPATIENT
Start: 2023-12-18 | End: 2023-12-18 | Stop reason: HOSPADM

## 2023-12-18 RX ORDER — DEXAMETHASONE SODIUM PHOSPHATE 100 MG/10ML
10 INJECTION INTRAMUSCULAR; INTRAVENOUS ONCE
Status: CANCELLED | OUTPATIENT
Start: 2023-12-18

## 2023-12-18 RX ORDER — DIPHENHYDRAMINE HYDROCHLORIDE 50 MG/ML
50 INJECTION INTRAMUSCULAR; INTRAVENOUS AS NEEDED
Status: CANCELLED | OUTPATIENT
Start: 2023-12-18

## 2023-12-18 RX ORDER — DEXAMETHASONE SODIUM PHOSPHATE 100 MG/10ML
10 INJECTION INTRAMUSCULAR; INTRAVENOUS ONCE
Status: COMPLETED | OUTPATIENT
Start: 2023-12-18 | End: 2023-12-18

## 2023-12-18 RX ADMIN — DIPHENHYDRAMINE HYDROCHLORIDE 50 MG: 25 CAPSULE ORAL at 13:08

## 2023-12-18 RX ADMIN — FAMOTIDINE 20 MG: 10 INJECTION INTRAVENOUS at 13:08

## 2023-12-18 RX ADMIN — DEXAMETHASONE SODIUM PHOSPHATE 10 MG: 10 INJECTION INTRAMUSCULAR; INTRAVENOUS at 13:09

## 2023-12-18 RX ADMIN — PALONOSETRON 250 MCG: 0.05 INJECTION, SOLUTION INTRAVENOUS at 13:08

## 2023-12-18 RX ADMIN — PACLITAXEL 96 MG: 6 INJECTION, SOLUTION, CONCENTRATE INTRAVENOUS at 13:40

## 2023-12-18 RX ADMIN — CARBOPLATIN 159 MG: 10 INJECTION, SOLUTION INTRAVENOUS at 14:59

## 2023-12-18 ASSESSMENT — ENCOUNTER SYMPTOMS
OCCASIONAL FEELINGS OF UNSTEADINESS: 0
LOSS OF SENSATION IN FEET: 0
DEPRESSION: 0

## 2023-12-18 ASSESSMENT — PAIN SCALES - GENERAL: PAINLEVEL: 5

## 2023-12-18 NOTE — PATIENT INSTRUCTIONS
Discussed today increase TF to 4 cartons per day.  Can split that 4th carton up between the 3 feeds as he was not sure if he could do a 4th feed.    Recommend : 434ml Boomrat Standard 1.4 3 times per day; this would be 4 cartons per day. 4 cartons per day would provide 1820 calories, 80g protein per day, 924ml free water per day.     Continue to do water flushes of 60ml before and after each gravity feed and additional fluid intake as needed to meet estimated fluid needs.    Roughly 2-3 more 8 oz per day of fluid.

## 2023-12-18 NOTE — PROGRESS NOTES
"[facility] - Medical Oncology Follow-Up Visit    Patient ID: Kevin Mendes \"Vinh" is a 78 y.o. male      Current therapy: CARBOplatin (Paraplatin) 148.8 mg in sodium chloride 0.9% 114.88 mL IV, 148.8 mg, intravenous, Once, 1 of 7 cycles    Administration: 148.8 mg (12/11/2023)        PACLitaxeL (Taxol) 96 mg in dextrose 5 % in water (D5W) 116 mL IV, 45 mg/m2 = 96 mg, intravenous, Once, 1 of 7 cycles    Administration: 96 mg (12/11/2023)        palonosetron (Aloxi) injection 250 mcg, 250 mcg, intravenous, Once, 1 of 7 cycles    Administration: 250 mcg (12/11/2023)      Oncologic History:     Diagnosis: Base of tongue cancer  Staging: T3N0M0  Date of Diagnosis: 11/8/23     Providers:  ENT Surgeon: Dr. Radha Gomezc:   Dr. Burkitt ([facility]) --> Dr. Kalie Torres: Dr. Diaz     Oncological history;  Patient was having sore throat with globus sensation for about 1 month.  CT scan (10/20/23) showed exophytic mass in the oropharynx coming from BOT.  PET CT (11/3/23) showed focal hypermetabolic activity along the right base of the tongue with extension inferiorly to the level of the epiglottis.  No distant mets.  Low dose CT chest showed nodule in LLL, but favors infectious/inflammatory.  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  PEG tube placed. He started concurrent chemoradiation with weekly carbo/taxol on 12/11/23.         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)  09/06/2018: Localized, primary osteoarthritis  09/25/2017: Low back pain, unspecified      Comment:  Acute low back pain  07/23/2009: " Malignant melanoma of skin of trunk, except scrotum (CMS/  HCC)  No date: 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)  No date: Personal history of other diseases of the circulatory system      Comment:  Personal history of supraventricular tachycardia  12/31/2022: Personal history of other diseases of urinary system      Comment:  History of hematuria  No date: Personal history of other endocrine, nutritional and   metabolic disease      Comment:  History of diabetes mellitus  04/05/2010: Polyneuropathy  01/12/2018: Presence of right artificial knee joint  07/23/2018: Sensorineural hearing loss, bilateral      Surgical History:    Surgical History         Past Surgical History:   Procedure Laterality Date    CATARACT EXTRACTION   04/07/2017     Cataract Surgery    HAND SURGERY   04/07/2017     Hand Surgery                                                                                                                                                          OTHER SURGICAL HISTORY   04/10/2014     Catheter Ablation Atrial Supraventricular Tachycardia    SHOULDER SURGERY   04/07/2017     Shoulder Surgery    TOTAL HIP ARTHROPLASTY   04/10/2014     Hip Replacement    TOTAL KNEE ARTHROPLASTY   04/10/2014     Knee Replacement         Family History:    Family History          Family History   Problem Relation Name Age of Onset    Other (heart failure following cardiac surgery) Mother             Family Oncology History:    Cancer-related family history is not on file.  Social History:    Social History            Tobacco Use    Smoking status: Never    Smokeless tobacco: Never   Vaping Use    Vaping Use: Never used   Substance Use Topics    Alcohol use: Yes       Alcohol/week: 2.0 standard drinks of alcohol       Types: 2 Cans of beer per week    Drug use: Never        Chief Concern: Here in clinic for follow-up prior to start of concurrent chemo/RT;     HPI Pat is here today for follow-up and treatment on concurrent  chemo/RT (week 2). Feeling well for treatment. Breathing stable. Endorses right sided neck pain, taking Tylenol 650mg intermittently with good relief, he will notify the team if pain worsens. He met with the dietician and using his PEG tube for nutrition/hydration, 3 feedings per day. Denies any GI symptoms. No fevers, chills, or cold symptoms. No other concerns or complaints.      Meds (Current):    Current Outpatient Medications:     aspirin 81 mg EC tablet, Take 1 tablet (81 mg) by mouth once daily., Disp: , Rfl:     glyBURIDE (Diabeta) 5 mg tablet, Take 2 tablets (10 mg) by mouth 2 times a day with meals., Disp: 360 tablet, Rfl: 2    LORazepam (Ativan) 1 mg tablet, Take 1 tablet (1 mg) by mouth once daily. Prior to each radiation treatment, Disp: , Rfl:     losartan (Cozaar) 100 mg tablet, Take 1 tablet (100 mg) by mouth once daily., Disp: 90 tablet, Rfl: 2    metFORMIN  mg 24 hr tablet, Take 2 tablets (1,000 mg) by mouth once daily in the evening. Take with meals. Do not crush, chew, or split., Disp: 180 tablet, Rfl: 2    neomycin-polymyxin B-dexameth (Polydex) 3.5 mg/g-10,000 unit/g-0.1 % ointment ophthalmic ointment, 2 times a day., Disp: , Rfl:     neomycin-polymyxin-dexAMETHasone (Maxitrol) 3.5mg/mL-10,000 unit/mL-0.1 % ophthalmic suspension, 2 times a day., Disp: , Rfl:     ondansetron (Zofran) 8 mg tablet, Take 1 tablet (8 mg) by mouth every 8 hours if needed for nausea or vomiting., Disp: 30 tablet, Rfl: 5    pioglitazone (Actos) 45 mg tablet, Take 1 tablet (45 mg) by mouth once daily., Disp: 90 tablet, Rfl: 2    prochlorperazine (Compazine) 10 mg tablet, Take 1 tablet (10 mg) by mouth every 6 hours if needed for nausea or vomiting., Disp: 30 tablet, Rfl: 5    simvastatin (Zocor) 40 mg tablet, Take 1 tablet (40 mg) by mouth once daily at bedtime., Disp: 90 tablet, Rfl: 2    tamsulosin (Flomax) 0.4 mg 24 hr capsule, Take 1 capsule (0.4 mg) by mouth once daily. (Patient not taking: Reported on  12/11/2023), Disp: 90 capsule, Rfl: 2    Review of Systems - Oncology 12 point ROS was obtained and negative unless otherwise mentioned in the above HPI.      Objective   BSA: 2.12 meters squared  Wt Readings from Last 5 Encounters:   12/18/23 91.4 kg (201 lb 8 oz)   12/13/23 93.5 kg (206 lb 3.2 oz)   12/11/23 91.2 kg (201 lb 1 oz)   12/11/23 91.2 kg (201 lb 1 oz)   12/06/23 92.4 kg (203 lb 12.8 oz)     /68 (BP Location: Right arm, Patient Position: Sitting)   Pulse 70   Temp 36.7 °C (98.1 °F) (Temporal)   Resp 16   Wt 91.4 kg (201 lb 8 oz)   SpO2 95%   BMI 29.01 kg/m²          Physical Exam  Constitutional:       Appearance: Normal appearance.   Eyes:      Pupils: Pupils are equal, round, and reactive to light.   Cardiovascular:      Rate and Rhythm: Normal rate and regular rhythm.   Pulmonary:      Effort: Pulmonary effort is normal.      Breath sounds: Normal breath sounds.   Abdominal:      General: Bowel sounds are normal.      Palpations: Abdomen is soft.   Musculoskeletal:         General: Normal range of motion.   Skin:     General: Skin is warm and dry.   Neurological:      General: No focal deficit present.      Mental Status: He is alert and oriented to person, place, and time.   Psychiatric:         Mood and Affect: Mood normal.         Behavior: Behavior normal.        Results:  Labs:  Lab Results   Component Value Date    WBC 7.4 12/15/2023    HGB 10.3 (L) 12/15/2023    HCT 33.6 (L) 12/15/2023    MCV 94 12/15/2023     12/15/2023      Lab Results   Component Value Date    NEUTROABS 5.78 (H) 12/15/2023      Lab Results   Component Value Date    GLUCOSE 131 (H) 12/15/2023    CALCIUM 9.5 12/15/2023     12/15/2023    K 4.9 12/15/2023    CO2 30 12/15/2023     12/15/2023    BUN 29 (H) 12/15/2023    CREATININE 1.26 12/15/2023    MG 1.90 12/15/2023     Lab Results   Component Value Date    ALT 8 (L) 12/15/2023    AST 11 12/15/2023    ALKPHOS 77 12/15/2023    BILITOT 0.4 12/15/2023  "     Lab Results   Component Value Date    TSH 0.51 12/01/2023       Imaging:           No results found for this or any previous visit.    Assessment/Plan      Kevin Mendes \"Katherine\" is a 78 y.o. male here for follow up   Mr. Mendes is 77 y/o male with recent dx of BOT SCC (T2N0M0, p16+, Stage I) who is referred to med onc to be considered for chemotherapy in addition to radiation.     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 11/3/23: PET/CT showed no distant mets except a pulmonary nodule in LLL with no hypermetabolic activity. Findings are favored to be infectious/inflammatory or granulomatous in nature (discussed in HN TB with radiologist). Close follow up with CT to assess for stability is recommended.  Since pt is not a surgical candidate, we will treat him with concurrent chemoradiation. His GFR is at 50s, prefer weekly carboplatin and paclitaxel. Dr. Burkitt previously discussed with patient about treatment schedule and chemotherapy related side effects in detail. Met with Dr. Diaz from radiation oncology.      - now s/p infection of PEG site which has resolved  - started concurrent chemoRT with weekly carbo/taxol today 12/11/23  - Will see patient weekly in follow-up, next week (week 3) virtual appointment due to the holiday/scheduling                 "

## 2023-12-18 NOTE — PROGRESS NOTES
"NUTRITION Follow-Up NOTE    Nutrition Assessment       Reason for Visit:  Kevin Mendes \"Katherine\" is a 78 y.o. male with a diagnosis of BOT cancer.  Patient had a PEG tube placed 11/8/2023.  Patient seen today during his infusion at [facility], patient started his chemo/RT treatments today.    Patient stated this weekend was a little rough.  He reported not feeling well, throat was sore and was not able to sleep much.  He stated he was not taking any po this weekend besides water.  Was up to 3 cartons of Orchestrate Standard 1.4 via PEG tube. He stated he has been able to do almost 80 oz of fluids total either via PEG tube or orally daily.       Lab Results   Component Value Date/Time    GLUCOSE 131 (H) 12/15/2023 1200     12/15/2023 1200    K 4.9 12/15/2023 1200     12/15/2023 1200    CO2 30 12/15/2023 1200    ANIONGAP 12 12/15/2023 1200    BUN 29 (H) 12/15/2023 1200    CREATININE 1.26 12/15/2023 1200    EGFR 58 (L) 12/15/2023 1200    CALCIUM 9.5 12/15/2023 1200    ALBUMIN 4.2 12/15/2023 1200    ALKPHOS 77 12/15/2023 1200    PROT 7.2 12/15/2023 1200    AST 11 12/15/2023 1200    BILITOT 0.4 12/15/2023 1200    ALT 8 (L) 12/15/2023 1200     Lab Results   Component Value Date/Time    VITD25 48 09/14/2023 0951       Anthropometrics:  Anthropometrics  Height: 177.5 cm (5' 9.88\")  Weight: 91.4 kg (201 lb 8 oz)  BMI (Calculated): 29.01  IBW/kg (Dietitian Calculated): 75.45 kg  Percent of IBW: 121 %  Weight Change  Weight History / % Weight Change: 5% in last month  Significant Weight Loss: Yes  Interpretation of Weight Loss: 5% in 1 month        Wt Readings from Last 10 Encounters:   12/18/23 91.4 kg (201 lb 8 oz)   12/18/23 91.4 kg (201 lb 8 oz)   12/13/23 93.5 kg (206 lb 3.2 oz)   12/11/23 91.2 kg (201 lb 1 oz)   12/11/23 91.2 kg (201 lb 1 oz)   12/06/23 92.4 kg (203 lb 12.8 oz)   12/06/23 92.4 kg (203 lb 12.8 oz)   12/04/23 91.4 kg (201 lb 9.6 oz)   12/04/23 91.4 kg (201 lb 9.6 oz)   11/27/23 91.8 kg (202 lb 6.1 " "oz)        Food And Nutrient Intake:  Food and Nutrient History  Energy Intake: Poor < 50 %  Fluid Intake: Good estimated ~ 80 oz daily                         Enteral Nutrition Intake  Enteral Nutrition Formula/Solution: Ida Neuralieve Standard 1.4 - currently doing 3 cartons per day Gravity feeds  Feeding Tube Flush: 60ml before and after each feed ( 3 times per day)                                  Nutrition Focused Physical Exam:  Subcutaneous Fat Loss  Orbital Fat Pads: Well nourshed (slightly bulging fat pads)  Buccal Fat Pads: Well nourished (full, rounded cheeks)  Muscle Wasting  Temporalis: Well nourished (well-defined muscle)    Energy Needs  Calculated Energy Needs Using Equations  Height: 177.5 cm (5' 9.88\")  Weight Used for Equation Calculations: 91.4 kg (201 lb 8 oz)  Fisk- [place]. Schneider Equation (Overweight or Obese Patients): 1633  Activity Factor: 1.5  Total Energy Needs: 2500  Estimated Energy Needs  Total Energy Estimated Needs (kCal): 2300 kCal  Total Estimated Energy Need per Day (kCal/kg): 25 kCal/kg  Estimated Fluid Needs  Total Fluid Estimated Needs (mL): 2300 mL  Total Fluid Estimated Needs (mL/kg): 25 mL/kg  Estimated Protein Needs  Total Protein Estimated Needs (g): 110 g  Total Protein Estimated Needs (g/kg): 1.2 g/kg    Nutrition Diagnosis      Nutrition Diagnosis  Patient has Nutrition Diagnosis: Yes  Diagnosis Status (1): Ongoing  Nutrition Diagnosis 1: Increased nutrient needs  Related to (1): increased calorie, protein and fluid needs  As Evidenced by (1): chemo/RT  Additional Nutrition Diagnosis: Diagnosis 2  Diagnosis Status (2): Ongoing  Nutrition Diagnosis 2: Predicted inadequate nutrient intake  Related to (2): Head and neck cancer treatments known to decrease oral intakes  As Evidenced by (2): PEG tube placed    Nutrition Interventions/Recommendations   Food and Nutrition Delivery  Meals & Snacks: Energy-modified diet, Protein-modified diet, Texture-Modified Diet  Goals: Small " frequent meals/snacks softer foods for pleasure feeds and to continue to swallow throughout SCC treatments, increased fluids  Enteral Nutrition: Enteral nutrition site care, Feeding tube flush, Modify composition of enteral nutrition  Total Nutrition Provided: 3 cartons per day provides 1365 calories, 60g protein, 693ml free water per day  Goals: 5 cartons ZYOMYX Standard 1.4; 2275 calories, 100g protein, 1155ml free water per day  Nutrition Education  Nutrition Education Content: Content related nutrition education  Goals: Understand nutrition through PEG tube, and nutrition throughout SCC treatments.  Coordination of Nutrition Care by a Nutrition Professional  Collaboration and referral of nutrition care: Referral by nutrition professional to other providers  Goals: Carlos is DME for PEG tube formula and supplies    Patient Instructions   Discussed today increase TF to 4 cartons per day.  Can split that 4th carton up between the 3 feeds as he was not sure if he could do a 4th feed.    Recommend : 434ml ZYOMYX Standard 1.4 3 times per day; this would be 4 cartons per day. 4 cartons per day would provide 1820 calories, 80g protein per day, 924ml free water per day.     Continue to do water flushes of 60ml before and after each gravity feed and additional fluid intake as needed to meet estimated fluid needs.    Roughly 2-3 more 8 oz per day of fluid.     Nutrition Monitoring and Evaluation   Food/Nutrient Related History Monitoring  Monitoring and Evaluation Plan: Energy intake, Fluid intake, Protein intake, Enteral and parenteral nutrition intake  Energy Intake: Estimated energy intake  Criteria: 2406-0595 calories per day  Fluid Intake: Estimated fluid intake  Criteria: 9262-9798 calories per day  Estimated protein intake: Estimated protein intake  Criteria: 92-112g protein per day  Enteral and Parenteral Nutrition Intake: Enteral nutrition formula/solution, Enteral nutrition intake  Criteria: increase TF  to 4 cartons per day; recommend splitting it up between the 3 feeds.  434ml 3 times per day gravity feeds.  Water flushes 60ml before and after and additional water flushes to meet estimated fluid needs.  Biochemical Data, Medical Tests and Procedures  Monitoring and Evaluation Plan: Electrolyte/renal panel, GI profile, Glucose/endocrine profile  Criteria: Labs WNL    Enteral Nutrition Goal:  [email or web address] Standard 1.4 gravity feeds, 5 cartons per day will provide 2275 calories, 100 g protein, 255g CHO, 1155ml free water.    Can do 542ml TID for total of 5 cartons.  Water flushes of 60 ml before and after each gravity feed.  Additional water flush of 240ml 3 times per day.  Time Spent  Prep time on day of patient encounter: 10 minutes  Time spent directly with patient, family or caregiver: 25 minutes  Additional Time Spent on Patient Care Activities: 15 minutes  Documentation Time: 15 minutes  Other Time Spent: 0 minutes  Total: 65 minutes        This service will continue to follow up as needed throughout SCC treatments.

## 2023-12-19 ENCOUNTER — HOSPITAL ENCOUNTER (OUTPATIENT)
Dept: RADIATION ONCOLOGY | Facility: CLINIC | Age: 78
Setting detail: RADIATION/ONCOLOGY SERIES
Discharge: HOME | End: 2023-12-19
Payer: MEDICARE

## 2023-12-19 ENCOUNTER — HOME CARE VISIT (OUTPATIENT)
Dept: HOME HEALTH SERVICES | Facility: HOME HEALTH | Age: 78
End: 2023-12-19
Payer: MEDICARE

## 2023-12-19 VITALS
RESPIRATION RATE: 20 BRPM | OXYGEN SATURATION: 98 % | HEART RATE: 72 BPM | DIASTOLIC BLOOD PRESSURE: 70 MMHG | SYSTOLIC BLOOD PRESSURE: 124 MMHG | TEMPERATURE: 97.5 F

## 2023-12-19 DIAGNOSIS — Z51.0 ENCOUNTER FOR ANTINEOPLASTIC RADIATION THERAPY: ICD-10-CM

## 2023-12-19 DIAGNOSIS — C10.8 MALIGNANT NEOPLASM OF OVERLAPPING SITES OF OROPHARYNX (MULTI): ICD-10-CM

## 2023-12-19 LAB
RAD ONC MSQ ACTUAL FRACTIONS DELIVERED: 7
RAD ONC MSQ ACTUAL SESSION DELIVERED DOSE: 200 CGRAY
RAD ONC MSQ ACTUAL TOTAL DOSE: 1400 CGRAY
RAD ONC MSQ ELAPSED DAYS: 8
RAD ONC MSQ LAST DATE: NORMAL
RAD ONC MSQ PRESCRIBED FRACTIONAL DOSE: 200 CGRAY
RAD ONC MSQ PRESCRIBED NUMBER OF FRACTIONS: 30
RAD ONC MSQ PRESCRIBED TECHNIQUE: NORMAL
RAD ONC MSQ PRESCRIBED TOTAL DOSE: 6000 CGRAY
RAD ONC MSQ PRESCRIPTION PATTERN COMMENT: NORMAL
RAD ONC MSQ START DATE: NORMAL
RAD ONC MSQ TREATMENT COURSE NUMBER: 1
RAD ONC MSQ TREATMENT SITE: NORMAL

## 2023-12-19 PROCEDURE — 0023 HH SOC

## 2023-12-19 PROCEDURE — 1090000001 HH PPS REVENUE CREDIT

## 2023-12-19 PROCEDURE — G0299 HHS/HOSPICE OF RN EA 15 MIN: HCPCS | Mod: HHH

## 2023-12-19 PROCEDURE — 77386 HC INTENSITY-MODULATED RADIATION THERAPY (IMRT), COMPLEX: CPT | Performed by: RADIOLOGY

## 2023-12-19 PROCEDURE — 1090000002 HH PPS REVENUE DEBIT

## 2023-12-19 PROCEDURE — 169592 NO-PAY CLAIM PROCEDURE

## 2023-12-19 PROCEDURE — 77014 CHG CT GUIDANCE RADIATION THERAPY FLDS PLACEMENT: CPT | Performed by: RADIOLOGY

## 2023-12-19 ASSESSMENT — PAIN SCALES - PAIN ASSESSMENT IN ADVANCED DEMENTIA (PAINAD)
CONSOLABILITY: 0
NEGVOCALIZATION: 0
FACIALEXPRESSION: 0 - SMILING OR INEXPRESSIVE.
BREATHING: 0
TOTALSCORE: 0
BODYLANGUAGE: 0 - RELAXED.
NEGVOCALIZATION: 0 - NONE.
FACIALEXPRESSION: 0
CONSOLABILITY: 0 - NO NEED TO CONSOLE.
BODYLANGUAGE: 0

## 2023-12-19 ASSESSMENT — ENCOUNTER SYMPTOMS
CHANGE IN APPETITE: UNCHANGED
PAIN LOCATION - PAIN SEVERITY: 2/10
PAIN SEVERITY GOAL: 0/10
OCCASIONAL FEELINGS OF UNSTEADINESS: 0
PAIN: 1
LOWEST PAIN SEVERITY IN PAST 24 HOURS: 0/10
DEPRESSION: 0
LOSS OF SENSATION IN FEET: 0
SUBJECTIVE PAIN PROGRESSION: UNCHANGED
APPETITE LEVEL: POOR
PAIN LOCATION - PAIN FREQUENCY: INTERMITTENT
PAIN LOCATION: JAW
PAIN LOCATION - PAIN QUALITY: SORE
HIGHEST PAIN SEVERITY IN PAST 24 HOURS: 2/10
PERSON REPORTING PAIN: PATIENT

## 2023-12-19 ASSESSMENT — ACTIVITIES OF DAILY LIVING (ADL): ENTERING_EXITING_HOME: MODERATE ASSIST

## 2023-12-20 ENCOUNTER — HOSPITAL ENCOUNTER (OUTPATIENT)
Dept: RADIATION ONCOLOGY | Facility: CLINIC | Age: 78
Setting detail: RADIATION/ONCOLOGY SERIES
Discharge: HOME | End: 2023-12-20
Payer: MEDICARE

## 2023-12-20 ENCOUNTER — RADIATION ONCOLOGY OTV (OUTPATIENT)
Dept: RADIATION ONCOLOGY | Facility: CLINIC | Age: 78
End: 2023-12-20
Payer: MEDICARE

## 2023-12-20 VITALS
HEART RATE: 70 BPM | RESPIRATION RATE: 18 BRPM | BODY MASS INDEX: 29.28 KG/M2 | SYSTOLIC BLOOD PRESSURE: 118 MMHG | WEIGHT: 203.4 LBS | OXYGEN SATURATION: 99 % | TEMPERATURE: 96.8 F | DIASTOLIC BLOOD PRESSURE: 63 MMHG

## 2023-12-20 DIAGNOSIS — C10.8 MALIGNANT NEOPLASM OF OVERLAPPING SITES OF OROPHARYNX (MULTI): ICD-10-CM

## 2023-12-20 DIAGNOSIS — Z51.0 ENCOUNTER FOR ANTINEOPLASTIC RADIATION THERAPY: ICD-10-CM

## 2023-12-20 LAB
RAD ONC MSQ ACTUAL FRACTIONS DELIVERED: 8
RAD ONC MSQ ACTUAL SESSION DELIVERED DOSE: 200 CGRAY
RAD ONC MSQ ACTUAL TOTAL DOSE: 1600 CGRAY
RAD ONC MSQ ELAPSED DAYS: 9
RAD ONC MSQ LAST DATE: NORMAL
RAD ONC MSQ PRESCRIBED FRACTIONAL DOSE: 200 CGRAY
RAD ONC MSQ PRESCRIBED NUMBER OF FRACTIONS: 30
RAD ONC MSQ PRESCRIBED TECHNIQUE: NORMAL
RAD ONC MSQ PRESCRIBED TOTAL DOSE: 6000 CGRAY
RAD ONC MSQ PRESCRIPTION PATTERN COMMENT: NORMAL
RAD ONC MSQ START DATE: NORMAL
RAD ONC MSQ TREATMENT COURSE NUMBER: 1
RAD ONC MSQ TREATMENT SITE: NORMAL

## 2023-12-20 PROCEDURE — 1090000001 HH PPS REVENUE CREDIT

## 2023-12-20 PROCEDURE — 1090000002 HH PPS REVENUE DEBIT

## 2023-12-20 PROCEDURE — 77386 HC INTENSITY-MODULATED RADIATION THERAPY (IMRT), COMPLEX: CPT | Performed by: RADIOLOGY

## 2023-12-20 PROCEDURE — 77014 CHG CT GUIDANCE RADIATION THERAPY FLDS PLACEMENT: CPT | Performed by: RADIOLOGY

## 2023-12-20 PROCEDURE — 77336 RADIATION PHYSICS CONSULT: CPT | Performed by: RADIOLOGY

## 2023-12-20 PROCEDURE — 77427 RADIATION TX MANAGEMENT X5: CPT | Performed by: RADIOLOGY

## 2023-12-20 RX ORDER — FLUORIDE (SODIUM) 1.1 %
PASTE (ML) DENTAL
COMMUNITY
Start: 2023-10-31

## 2023-12-20 ASSESSMENT — PAIN SCALES - GENERAL: PAINLEVEL: 0-NO PAIN

## 2023-12-20 NOTE — HOME HEALTH
SKILLED NURSING VISIT FOR ADMISSION TO [facility] ASSESSMENT TEACHING OF MEDICATIONS DISEASE PROCESS AND ADMINISTRATION OF ENTERAL FEEDINGS. PT RECIEVING CHEMOTHERAPY RAETMENTS WEEKLY AND DAILY RADIATION TREATMENTS. SPOUSE INDEPENDENT WITH ENTERAL FEEDINGS INCLUDING WATER FLUSH AND PEG TUBE SITE CARE. PT STILL ABLE TO TAKE SOME FLUIDS AND MEDICATIONS ORALLY. PT AGREEABLE TO SNV 1 X WEEK AND AS NEEDED.

## 2023-12-20 NOTE — PROGRESS NOTES
Radiation Oncology On Treatment Visit    Patient Name:  Kevin Mendes  MRN:  [id number]  :  1945    Referring Provider: No ref. 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             SUBJECTIVE: Tolerating radiation. No side effects. Appetite is suppressed.       OBJECTIVE:   Vital Signs:  /63 (BP Location: Left arm, Patient Position: Sitting, BP Cuff Size: Adult)   Pulse 70   Temp 36 °C (96.8 °F) (Temporal)   Resp 18   Wt 92.3 kg (203 lb 6.4 oz)   SpO2 99%   BMI 29.28 kg/m²    Pain Scale: The patient's current pain level was assessed.  They report currently having a pain of 1 out of 10.    Other Pertinent Findings: Tolerating 4 cans via peg, gained 2#, mepilex applied to peg site. Tylenol prn for throat pain effective.     Toxicity Assessment          2023    13:00 2023    09:00   Toxicity Assessment   Adverse Events Reviewed (WDL) Yes (Within Defined Limits) Yes (Within Defined Limits)   Treatment  and neck Head and neck   Anorexia Grade 1       appetite comes and goes, using PEG tube feedings 3 per day  "with water flush. Encouraged small protein rich snacks, nutrition following. Grade 1   Anxiety Grade 1       nervous about possible side effects Grade 1   Dehydration Grade 0 Grade 0   Dermatitis Radiation Grade 0       reviewed skin care recommendations with patient Grade 0       using aquaphor   Diarrhea Grade 1       had an episode last friday with several watery BM's, went to the ER on saturday after feeling better. Labs were normal per patient, received IVF and was discharged. Resolving now Grade 0   Fatigue Grade 0 Grade 1   Nausea Grade 0       anti-emetics prn Grade 0   Pain Grade 0 Grade 1       mild throat pain relieved by tylenol   Vomiting Grade 0 Grade 0   Dysphagia Grade 0       softer foods Grade 1       softer foods /meds   Esophagitis Grade 0    Mucositis Oral Grade 0 Grade 1       dry mouth   Hearing Impaired Grade 0       basline, not worsening    Dry Eye --        blocked ducts per patient, eye Dr. following    Dry Mouth Grade 1       biotene and xylimelts Grade 1       mild increase in phlegm/usung suction machine   Ear Pain Grade 0       can feel \"something\" on the right ear. Doesn't hurt Grade 0   Tinnitus Grade 0 Grade 0   Oral Pain Grade 0 Grade 0   Hoarseness Grade 1 Grade 1   Voice Alteration Grade 1 Grade 1        Assessment / Plan:  The patient is tolerating radiation therapy as anticipated.  Continue per current treatment plan.        "

## 2023-12-21 ENCOUNTER — HOSPITAL ENCOUNTER (OUTPATIENT)
Dept: RADIATION ONCOLOGY | Facility: CLINIC | Age: 78
Setting detail: RADIATION/ONCOLOGY SERIES
Discharge: HOME | End: 2023-12-21
Payer: MEDICARE

## 2023-12-21 DIAGNOSIS — Z51.0 ENCOUNTER FOR ANTINEOPLASTIC RADIATION THERAPY: ICD-10-CM

## 2023-12-21 DIAGNOSIS — C10.8 MALIGNANT NEOPLASM OF OVERLAPPING SITES OF OROPHARYNX (MULTI): ICD-10-CM

## 2023-12-21 LAB
RAD ONC MSQ ACTUAL FRACTIONS DELIVERED: 9
RAD ONC MSQ ACTUAL SESSION DELIVERED DOSE: 200 CGRAY
RAD ONC MSQ ACTUAL TOTAL DOSE: 1800 CGRAY
RAD ONC MSQ ELAPSED DAYS: 10
RAD ONC MSQ LAST DATE: NORMAL
RAD ONC MSQ PRESCRIBED FRACTIONAL DOSE: 200 CGRAY
RAD ONC MSQ PRESCRIBED NUMBER OF FRACTIONS: 30
RAD ONC MSQ PRESCRIBED TECHNIQUE: NORMAL
RAD ONC MSQ PRESCRIBED TOTAL DOSE: 6000 CGRAY
RAD ONC MSQ PRESCRIPTION PATTERN COMMENT: NORMAL
RAD ONC MSQ START DATE: NORMAL
RAD ONC MSQ TREATMENT COURSE NUMBER: 1
RAD ONC MSQ TREATMENT SITE: NORMAL

## 2023-12-21 PROCEDURE — 77386 HC INTENSITY-MODULATED RADIATION THERAPY (IMRT), COMPLEX: CPT | Performed by: RADIOLOGY

## 2023-12-21 PROCEDURE — 1090000002 HH PPS REVENUE DEBIT

## 2023-12-21 PROCEDURE — 77014 CHG CT GUIDANCE RADIATION THERAPY FLDS PLACEMENT: CPT | Performed by: RADIOLOGY

## 2023-12-21 PROCEDURE — 1090000001 HH PPS REVENUE CREDIT

## 2023-12-21 RX ORDER — DIPHENHYDRAMINE HCL 50 MG
50 CAPSULE ORAL ONCE
Status: CANCELLED | OUTPATIENT
Start: 2023-12-26

## 2023-12-21 RX ORDER — FAMOTIDINE 10 MG/ML
20 INJECTION INTRAVENOUS ONCE AS NEEDED
Status: CANCELLED | OUTPATIENT
Start: 2023-12-26

## 2023-12-21 RX ORDER — EPINEPHRINE 0.3 MG/.3ML
0.3 INJECTION SUBCUTANEOUS EVERY 5 MIN PRN
Status: CANCELLED | OUTPATIENT
Start: 2023-12-26

## 2023-12-21 RX ORDER — PROCHLORPERAZINE EDISYLATE 5 MG/ML
10 INJECTION INTRAMUSCULAR; INTRAVENOUS EVERY 6 HOURS PRN
Status: CANCELLED | OUTPATIENT
Start: 2023-12-26

## 2023-12-21 RX ORDER — FAMOTIDINE 10 MG/ML
20 INJECTION INTRAVENOUS ONCE
Status: CANCELLED | OUTPATIENT
Start: 2023-12-26

## 2023-12-21 RX ORDER — ALBUTEROL SULFATE 0.83 MG/ML
3 SOLUTION RESPIRATORY (INHALATION) AS NEEDED
Status: CANCELLED | OUTPATIENT
Start: 2023-12-26

## 2023-12-21 RX ORDER — PROCHLORPERAZINE MALEATE 10 MG
10 TABLET ORAL EVERY 6 HOURS PRN
Status: CANCELLED | OUTPATIENT
Start: 2023-12-26

## 2023-12-21 RX ORDER — DIPHENHYDRAMINE HYDROCHLORIDE 50 MG/ML
50 INJECTION INTRAMUSCULAR; INTRAVENOUS AS NEEDED
Status: CANCELLED | OUTPATIENT
Start: 2023-12-26

## 2023-12-21 RX ORDER — PALONOSETRON 0.05 MG/ML
0.25 INJECTION, SOLUTION INTRAVENOUS ONCE
Status: CANCELLED | OUTPATIENT
Start: 2023-12-26

## 2023-12-21 RX ORDER — DEXAMETHASONE SODIUM PHOSPHATE 4 MG/ML
10 INJECTION, SOLUTION INTRA-ARTICULAR; INTRALESIONAL; INTRAMUSCULAR; INTRAVENOUS; SOFT TISSUE ONCE
Status: CANCELLED | OUTPATIENT
Start: 2023-12-26

## 2023-12-22 ENCOUNTER — HOSPITAL ENCOUNTER (OUTPATIENT)
Dept: RADIATION ONCOLOGY | Facility: CLINIC | Age: 78
Setting detail: RADIATION/ONCOLOGY SERIES
Discharge: HOME | End: 2023-12-22
Payer: MEDICARE

## 2023-12-22 DIAGNOSIS — Z51.0 ENCOUNTER FOR ANTINEOPLASTIC RADIATION THERAPY: ICD-10-CM

## 2023-12-22 DIAGNOSIS — C10.8 MALIGNANT NEOPLASM OF OVERLAPPING SITES OF OROPHARYNX (MULTI): ICD-10-CM

## 2023-12-22 LAB
RAD ONC MSQ ACTUAL FRACTIONS DELIVERED: 10
RAD ONC MSQ ACTUAL SESSION DELIVERED DOSE: 200 CGRAY
RAD ONC MSQ ACTUAL TOTAL DOSE: 2000 CGRAY
RAD ONC MSQ ELAPSED DAYS: 11
RAD ONC MSQ LAST DATE: NORMAL
RAD ONC MSQ PRESCRIBED FRACTIONAL DOSE: 200 CGRAY
RAD ONC MSQ PRESCRIBED NUMBER OF FRACTIONS: 30
RAD ONC MSQ PRESCRIBED TECHNIQUE: NORMAL
RAD ONC MSQ PRESCRIBED TOTAL DOSE: 6000 CGRAY
RAD ONC MSQ PRESCRIPTION PATTERN COMMENT: NORMAL
RAD ONC MSQ START DATE: NORMAL
RAD ONC MSQ TREATMENT COURSE NUMBER: 1
RAD ONC MSQ TREATMENT SITE: NORMAL

## 2023-12-22 PROCEDURE — 77386 HC INTENSITY-MODULATED RADIATION THERAPY (IMRT), COMPLEX: CPT | Performed by: RADIOLOGY

## 2023-12-22 PROCEDURE — 1090000002 HH PPS REVENUE DEBIT

## 2023-12-22 PROCEDURE — 1090000001 HH PPS REVENUE CREDIT

## 2023-12-22 PROCEDURE — 77014 CHG CT GUIDANCE RADIATION THERAPY FLDS PLACEMENT: CPT | Performed by: RADIOLOGY

## 2023-12-23 PROCEDURE — 1090000002 HH PPS REVENUE DEBIT

## 2023-12-23 PROCEDURE — 1090000001 HH PPS REVENUE CREDIT

## 2023-12-24 PROCEDURE — 1090000002 HH PPS REVENUE DEBIT

## 2023-12-24 PROCEDURE — 1090000001 HH PPS REVENUE CREDIT

## 2023-12-25 PROCEDURE — 1090000001 HH PPS REVENUE CREDIT

## 2023-12-25 PROCEDURE — 1090000002 HH PPS REVENUE DEBIT

## 2023-12-26 ENCOUNTER — HOSPITAL ENCOUNTER (OUTPATIENT)
Dept: RADIATION ONCOLOGY | Facility: CLINIC | Age: 78
Setting detail: RADIATION/ONCOLOGY SERIES
Discharge: HOME | End: 2023-12-26
Payer: MEDICARE

## 2023-12-26 ENCOUNTER — TELEMEDICINE (OUTPATIENT)
Dept: HEMATOLOGY/ONCOLOGY | Facility: HOSPITAL | Age: 78
End: 2023-12-26
Payer: MEDICARE

## 2023-12-26 ENCOUNTER — INFUSION (OUTPATIENT)
Dept: HEMATOLOGY/ONCOLOGY | Facility: CLINIC | Age: 78
End: 2023-12-26
Payer: MEDICARE

## 2023-12-26 VITALS
SYSTOLIC BLOOD PRESSURE: 122 MMHG | HEART RATE: 76 BPM | WEIGHT: 200 LBS | DIASTOLIC BLOOD PRESSURE: 64 MMHG | TEMPERATURE: 96.8 F | BODY MASS INDEX: 28.79 KG/M2 | RESPIRATION RATE: 18 BRPM | OXYGEN SATURATION: 98 %

## 2023-12-26 DIAGNOSIS — C76.0 HEAD AND NECK CANCER (MULTI): ICD-10-CM

## 2023-12-26 DIAGNOSIS — C10.8 MALIGNANT NEOPLASM OF OVERLAPPING SITES OF OROPHARYNX (MULTI): ICD-10-CM

## 2023-12-26 DIAGNOSIS — Z51.0 ENCOUNTER FOR ANTINEOPLASTIC RADIATION THERAPY: ICD-10-CM

## 2023-12-26 LAB
ALBUMIN SERPL BCP-MCNC: 4 G/DL (ref 3.4–5)
ALP SERPL-CCNC: 84 U/L (ref 33–136)
ALT SERPL W P-5'-P-CCNC: 8 U/L (ref 10–52)
ANION GAP SERPL CALC-SCNC: 14 MMOL/L (ref 10–20)
AST SERPL W P-5'-P-CCNC: 12 U/L (ref 9–39)
BASOPHILS # BLD AUTO: 0.05 X10*3/UL (ref 0–0.1)
BASOPHILS NFR BLD AUTO: 1.1 %
BILIRUB SERPL-MCNC: 0.4 MG/DL (ref 0–1.2)
BUN SERPL-MCNC: 33 MG/DL (ref 6–23)
CALCIUM SERPL-MCNC: 9.2 MG/DL (ref 8.6–10.3)
CHLORIDE SERPL-SCNC: 99 MMOL/L (ref 98–107)
CO2 SERPL-SCNC: 29 MMOL/L (ref 21–32)
CREAT SERPL-MCNC: 1.27 MG/DL (ref 0.5–1.3)
EOSINOPHIL # BLD AUTO: 0.11 X10*3/UL (ref 0–0.4)
EOSINOPHIL NFR BLD AUTO: 2.3 %
ERYTHROCYTE [DISTWIDTH] IN BLOOD BY AUTOMATED COUNT: 14.4 % (ref 11.5–14.5)
GFR SERPL CREATININE-BSD FRML MDRD: 57 ML/MIN/1.73M*2
GLUCOSE SERPL-MCNC: 221 MG/DL (ref 74–99)
HCT VFR BLD AUTO: 31.3 % (ref 41–52)
HGB BLD-MCNC: 9.7 G/DL (ref 13.5–17.5)
IMM GRANULOCYTES # BLD AUTO: 0.03 X10*3/UL (ref 0–0.5)
IMM GRANULOCYTES NFR BLD AUTO: 0.6 % (ref 0–0.9)
LYMPHOCYTES # BLD AUTO: 0.63 X10*3/UL (ref 0.8–3)
LYMPHOCYTES NFR BLD AUTO: 13.3 %
MAGNESIUM SERPL-MCNC: 1.82 MG/DL (ref 1.6–2.4)
MCH RBC QN AUTO: 28.8 PG (ref 26–34)
MCHC RBC AUTO-ENTMCNC: 31 G/DL (ref 32–36)
MCV RBC AUTO: 93 FL (ref 80–100)
MONOCYTES # BLD AUTO: 0.41 X10*3/UL (ref 0.05–0.8)
MONOCYTES NFR BLD AUTO: 8.7 %
NEUTROPHILS # BLD AUTO: 3.49 X10*3/UL (ref 1.6–5.5)
NEUTROPHILS NFR BLD AUTO: 74 %
NRBC BLD-RTO: ABNORMAL /100{WBCS}
PLATELET # BLD AUTO: 230 X10*3/UL (ref 150–450)
POTASSIUM SERPL-SCNC: 4.9 MMOL/L (ref 3.5–5.3)
PROT SERPL-MCNC: 7.2 G/DL (ref 6.4–8.2)
RAD ONC MSQ ACTUAL FRACTIONS DELIVERED: 11
RAD ONC MSQ ACTUAL SESSION DELIVERED DOSE: 200 CGRAY
RAD ONC MSQ ACTUAL TOTAL DOSE: 2200 CGRAY
RAD ONC MSQ ELAPSED DAYS: 15
RAD ONC MSQ LAST DATE: NORMAL
RAD ONC MSQ PRESCRIBED FRACTIONAL DOSE: 200 CGRAY
RAD ONC MSQ PRESCRIBED NUMBER OF FRACTIONS: 30
RAD ONC MSQ PRESCRIBED TECHNIQUE: NORMAL
RAD ONC MSQ PRESCRIBED TOTAL DOSE: 6000 CGRAY
RAD ONC MSQ PRESCRIPTION PATTERN COMMENT: NORMAL
RAD ONC MSQ START DATE: NORMAL
RAD ONC MSQ TREATMENT COURSE NUMBER: 1
RAD ONC MSQ TREATMENT SITE: NORMAL
RBC # BLD AUTO: 3.37 X10*6/UL (ref 4.5–5.9)
SODIUM SERPL-SCNC: 137 MMOL/L (ref 136–145)
WBC # BLD AUTO: 4.7 X10*3/UL (ref 4.4–11.3)

## 2023-12-26 PROCEDURE — 83735 ASSAY OF MAGNESIUM: CPT | Performed by: INTERNAL MEDICINE

## 2023-12-26 PROCEDURE — 96417 CHEMO IV INFUS EACH ADDL SEQ: CPT

## 2023-12-26 PROCEDURE — 2500000004 HC RX 250 GENERAL PHARMACY W/ HCPCS (ALT 636 FOR OP/ED): Performed by: STUDENT IN AN ORGANIZED HEALTH CARE EDUCATION/TRAINING PROGRAM

## 2023-12-26 PROCEDURE — 36415 COLL VENOUS BLD VENIPUNCTURE: CPT

## 2023-12-26 PROCEDURE — 77014 CHG CT GUIDANCE RADIATION THERAPY FLDS PLACEMENT: CPT | Performed by: RADIOLOGY

## 2023-12-26 PROCEDURE — 99212 OFFICE O/P EST SF 10 MIN: CPT | Performed by: NURSE PRACTITIONER

## 2023-12-26 PROCEDURE — 96375 TX/PRO/DX INJ NEW DRUG ADDON: CPT | Mod: INF

## 2023-12-26 PROCEDURE — 77386 HC INTENSITY-MODULATED RADIATION THERAPY (IMRT), COMPLEX: CPT | Performed by: RADIOLOGY

## 2023-12-26 PROCEDURE — 96413 CHEMO IV INFUSION 1 HR: CPT

## 2023-12-26 PROCEDURE — 85025 COMPLETE CBC W/AUTO DIFF WBC: CPT | Performed by: INTERNAL MEDICINE

## 2023-12-26 PROCEDURE — 1090000002 HH PPS REVENUE DEBIT

## 2023-12-26 PROCEDURE — 2500000001 HC RX 250 WO HCPCS SELF ADMINISTERED DRUGS (ALT 637 FOR MEDICARE OP): Performed by: STUDENT IN AN ORGANIZED HEALTH CARE EDUCATION/TRAINING PROGRAM

## 2023-12-26 PROCEDURE — 82435 ASSAY OF BLOOD CHLORIDE: CPT | Performed by: INTERNAL MEDICINE

## 2023-12-26 PROCEDURE — 1090000001 HH PPS REVENUE CREDIT

## 2023-12-26 RX ORDER — HEPARIN SODIUM,PORCINE/PF 10 UNIT/ML
50 SYRINGE (ML) INTRAVENOUS AS NEEDED
Status: CANCELLED | OUTPATIENT
Start: 2023-12-26

## 2023-12-26 RX ORDER — DIPHENHYDRAMINE HYDROCHLORIDE 50 MG/ML
50 INJECTION INTRAMUSCULAR; INTRAVENOUS AS NEEDED
Status: DISCONTINUED | OUTPATIENT
Start: 2023-12-26 | End: 2023-12-26 | Stop reason: HOSPADM

## 2023-12-26 RX ORDER — PROCHLORPERAZINE MALEATE 10 MG
10 TABLET ORAL EVERY 6 HOURS PRN
Status: DISCONTINUED | OUTPATIENT
Start: 2023-12-26 | End: 2023-12-26 | Stop reason: HOSPADM

## 2023-12-26 RX ORDER — FAMOTIDINE 10 MG/ML
20 INJECTION INTRAVENOUS ONCE AS NEEDED
Status: DISCONTINUED | OUTPATIENT
Start: 2023-12-26 | End: 2023-12-26 | Stop reason: HOSPADM

## 2023-12-26 RX ORDER — HEPARIN 100 UNIT/ML
500 SYRINGE INTRAVENOUS AS NEEDED
Status: CANCELLED | OUTPATIENT
Start: 2023-12-26

## 2023-12-26 RX ORDER — DIPHENHYDRAMINE HCL 25 MG
50 CAPSULE ORAL ONCE
Status: COMPLETED | OUTPATIENT
Start: 2023-12-26 | End: 2023-12-26

## 2023-12-26 RX ORDER — PROCHLORPERAZINE EDISYLATE 5 MG/ML
10 INJECTION INTRAMUSCULAR; INTRAVENOUS EVERY 6 HOURS PRN
Status: DISCONTINUED | OUTPATIENT
Start: 2023-12-26 | End: 2023-12-26 | Stop reason: HOSPADM

## 2023-12-26 RX ORDER — ALBUTEROL SULFATE 0.83 MG/ML
3 SOLUTION RESPIRATORY (INHALATION) AS NEEDED
Status: DISCONTINUED | OUTPATIENT
Start: 2023-12-26 | End: 2023-12-26 | Stop reason: HOSPADM

## 2023-12-26 RX ORDER — DEXAMETHASONE SODIUM PHOSPHATE 100 MG/10ML
10 INJECTION INTRAMUSCULAR; INTRAVENOUS ONCE
Status: COMPLETED | OUTPATIENT
Start: 2023-12-26 | End: 2023-12-26

## 2023-12-26 RX ORDER — FAMOTIDINE 10 MG/ML
20 INJECTION INTRAVENOUS ONCE
Status: COMPLETED | OUTPATIENT
Start: 2023-12-26 | End: 2023-12-26

## 2023-12-26 RX ORDER — DIPHENHYDRAMINE HYDROCHLORIDE 50 MG/ML
50 INJECTION INTRAMUSCULAR; INTRAVENOUS AS NEEDED
OUTPATIENT
Start: 2023-12-26

## 2023-12-26 RX ORDER — FAMOTIDINE 10 MG/ML
20 INJECTION INTRAVENOUS ONCE AS NEEDED
OUTPATIENT
Start: 2023-12-26

## 2023-12-26 RX ORDER — EPINEPHRINE 0.3 MG/.3ML
0.3 INJECTION SUBCUTANEOUS EVERY 5 MIN PRN
Status: DISCONTINUED | OUTPATIENT
Start: 2023-12-26 | End: 2023-12-26 | Stop reason: HOSPADM

## 2023-12-26 RX ORDER — EPINEPHRINE 0.3 MG/.3ML
0.3 INJECTION SUBCUTANEOUS EVERY 5 MIN PRN
OUTPATIENT
Start: 2023-12-26

## 2023-12-26 RX ORDER — ALBUTEROL SULFATE 0.83 MG/ML
3 SOLUTION RESPIRATORY (INHALATION) AS NEEDED
OUTPATIENT
Start: 2023-12-26

## 2023-12-26 RX ORDER — PALONOSETRON 0.05 MG/ML
0.25 INJECTION, SOLUTION INTRAVENOUS ONCE
Status: COMPLETED | OUTPATIENT
Start: 2023-12-26 | End: 2023-12-26

## 2023-12-26 RX ADMIN — PALONOSETRON 250 MCG: 0.05 INJECTION, SOLUTION INTRAVENOUS at 13:00

## 2023-12-26 RX ADMIN — PACLITAXEL 96 MG: 6 INJECTION, SOLUTION, CONCENTRATE INTRAVENOUS at 13:33

## 2023-12-26 RX ADMIN — DEXAMETHASONE SODIUM PHOSPHATE 10 MG: 10 INJECTION INTRAMUSCULAR; INTRAVENOUS at 12:59

## 2023-12-26 RX ADMIN — CARBOPLATIN 160 MG: 10 INJECTION, SOLUTION INTRAVENOUS at 14:40

## 2023-12-26 RX ADMIN — FAMOTIDINE 20 MG: 10 INJECTION INTRAVENOUS at 12:59

## 2023-12-26 RX ADMIN — DIPHENHYDRAMINE HYDROCHLORIDE 50 MG: 25 CAPSULE ORAL at 12:59

## 2023-12-26 ASSESSMENT — PAIN SCALES - GENERAL: PAINLEVEL: 0-NO PAIN

## 2023-12-26 NOTE — PROGRESS NOTES
"[facility] - Medical Oncology Follow-Up Visit    Patient ID: Kevin Mendes \"Vinh" is a 78 y.o. male      Current therapy: CARBOplatin (Paraplatin) 148.8 mg in sodium chloride 0.9% 114.88 mL IV, 148.8 mg, intravenous, Once, 2 of 7 cycles    Administration: 148.8 mg (12/11/2023), 159 mg (12/18/2023)        PACLitaxeL (Taxol) 96 mg in dextrose 5 % in water (D5W) 116 mL IV, 45 mg/m2 = 96 mg, intravenous, Once, 2 of 7 cycles    Administration: 96 mg (12/11/2023), 96 mg (12/18/2023)        palonosetron (Aloxi) injection 250 mcg, 250 mcg, intravenous, Once, 2 of 7 cycles    Administration: 250 mcg (12/11/2023), 250 mcg (12/18/2023)      Oncologic History:     Diagnosis: Base of tongue cancer  Staging: T3N0M0  Date of Diagnosis: 11/8/23     Providers:  ENT Surgeon: Dr. Radha Santiago:   Dr. Burkitt ([facility]) --> Dr. Kalie Torres: Dr. Diaz     Oncological history;  Patient was having sore throat with globus sensation for about 1 month.  CT scan (10/20/23) showed exophytic mass in the oropharynx coming from BOT.  PET CT (11/3/23) showed focal hypermetabolic activity along the right base of the tongue with extension inferiorly to the level of the epiglottis.  No distant mets.  Low dose CT chest showed nodule in LLL, but favors infectious/inflammatory.  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  PEG tube placed. He started concurrent chemoradiation with weekly carbo/taxol on 12/11/23.         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)  09/06/2018: Localized, primary osteoarthritis  09/25/2017: Low back pain, " unspecified      Comment:  Acute low back pain  07/23/2009: Malignant melanoma of skin of trunk, except scrotum (CMS/  HCC)  No date: 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)  No date: Personal history of other diseases of the circulatory system      Comment:  Personal history of supraventricular tachycardia  12/31/2022: Personal history of other diseases of urinary system      Comment:  History of hematuria  No date: Personal history of other endocrine, nutritional and   metabolic disease      Comment:  History of diabetes mellitus  04/05/2010: Polyneuropathy  01/12/2018: Presence of right artificial knee joint  07/23/2018: Sensorineural hearing loss, bilateral      Surgical History:    Surgical History         Past Surgical History:   Procedure Laterality Date    CATARACT EXTRACTION   04/07/2017     Cataract Surgery    HAND SURGERY   04/07/2017     Hand Surgery                                                                                                                                                          OTHER SURGICAL HISTORY   04/10/2014     Catheter Ablation Atrial Supraventricular Tachycardia    SHOULDER SURGERY   04/07/2017     Shoulder Surgery    TOTAL HIP ARTHROPLASTY   04/10/2014     Hip Replacement    TOTAL KNEE ARTHROPLASTY   04/10/2014     Knee Replacement         Family History:    Family History          Family History   Problem Relation Name Age of Onset    Other (heart failure following cardiac surgery) Mother             Family Oncology History:    Cancer-related family history is not on file.  Social History:    Social History            Tobacco Use    Smoking status: Never    Smokeless tobacco: Never   Vaping Use    Vaping Use: Never used   Substance Use Topics    Alcohol use: Yes       Alcohol/week: 2.0 standard drinks of alcohol       Types: 2 Cans of beer per week    Drug use: Never        Chief Concern: Virtual visit for follow-up on concurrent chemo/RT;     HPI Pat is seen  virtually today prior to treatment. He is feeling well for treatment. Breathing stable. Using PEG tube, tolerating 4 bottles of TF. No GI symptoms currently. No fevers, chills, or cold symptoms. No other concerns or complaints.        Meds (Current):    Current Outpatient Medications:     aspirin 81 mg EC tablet, Take 1 tablet (81 mg) by mouth once daily., Disp: , Rfl:     glyBURIDE (Diabeta) 5 mg tablet, Take 2 tablets (10 mg) by mouth 2 times a day with meals., Disp: 360 tablet, Rfl: 2    LORazepam (Ativan) 1 mg tablet, Take 1 tablet (1 mg) by mouth once daily. Prior to each radiation treatment, Disp: , Rfl:     losartan (Cozaar) 100 mg tablet, Take 1 tablet (100 mg) by mouth once daily., Disp: 90 tablet, Rfl: 2    metFORMIN  mg 24 hr tablet, Take 2 tablets (1,000 mg) by mouth once daily in the evening. Take with meals. Do not crush, chew, or split., Disp: 180 tablet, Rfl: 2    neomycin-polymyxin B-dexameth (Polydex) 3.5 mg/g-10,000 unit/g-0.1 % ointment ophthalmic ointment, 2 times a day., Disp: , Rfl:     neomycin-polymyxin-dexAMETHasone (Maxitrol) 3.5mg/mL-10,000 unit/mL-0.1 % ophthalmic suspension, 2 times a day., Disp: , Rfl:     ondansetron (Zofran) 8 mg tablet, Take 1 tablet (8 mg) by mouth every 8 hours if needed for nausea or vomiting., Disp: 30 tablet, Rfl: 5    pioglitazone (Actos) 45 mg tablet, Take 1 tablet (45 mg) by mouth once daily., Disp: 90 tablet, Rfl: 2    PreviDent 5000 Booster Plus 1.1 % dental paste, Brush at bedtime and expectorate do not rinse, Disp: , Rfl:     prochlorperazine (Compazine) 10 mg tablet, Take 1 tablet (10 mg) by mouth every 6 hours if needed for nausea or vomiting., Disp: 30 tablet, Rfl: 5    simvastatin (Zocor) 40 mg tablet, Take 1 tablet (40 mg) by mouth once daily at bedtime., Disp: 90 tablet, Rfl: 2    tamsulosin (Flomax) 0.4 mg 24 hr capsule, Take 1 capsule (0.4 mg) by mouth once daily. (Patient not taking: Reported on 12/11/2023), Disp: 90 capsule, Rfl:  "2    Review of Systems - Oncology 12 point ROS was obtained and negative unless otherwise mentioned in the above HPI.      Objective   BSA: There is no height or weight on file to calculate BSA.  Wt Readings from Last 5 Encounters:   12/20/23 92.3 kg (203 lb 6.4 oz)   12/18/23 91.4 kg (201 lb 8 oz)   12/18/23 91.4 kg (201 lb 8 oz)   12/13/23 93.5 kg (206 lb 3.2 oz)   12/11/23 91.2 kg (201 lb 1 oz)     There were no vitals taken for this visit.    Physical Exam Not performed due to visit type.            Results:  Labs:  Lab Results   Component Value Date    WBC 6.5 12/18/2023    HGB 8.5 (L) 12/18/2023    HCT 27.7 (L) 12/18/2023    MCV 95 12/18/2023     12/18/2023      Lab Results   Component Value Date    NEUTROABS 4.84 12/18/2023      Lab Results   Component Value Date    GLUCOSE 131 (H) 12/15/2023    CALCIUM 9.5 12/15/2023     12/15/2023    K 4.9 12/15/2023    CO2 30 12/15/2023     12/15/2023    BUN 29 (H) 12/15/2023    CREATININE 1.26 12/15/2023    MG 1.90 12/15/2023     Lab Results   Component Value Date    ALT 8 (L) 12/15/2023    AST 11 12/15/2023    ALKPHOS 77 12/15/2023    BILITOT 0.4 12/15/2023      Lab Results   Component Value Date    TSH 0.51 12/01/2023       Imaging:           No results found for this or any previous visit.    Assessment/Plan      Kevin Mendes \"Katherine\" is a 78 y.o. male here for follow up   Mr. Mendes is 77 y/o male with recent dx of BOT SCC (T2N0M0, p16+, Stage I) who is referred to med onc to be considered for chemotherapy in addition to radiation.     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 11/3/23: PET/CT showed no distant mets except a pulmonary nodule in LLL with no hypermetabolic activity. Findings are favored to be infectious/inflammatory or granulomatous in nature (discussed in HN TB with radiologist). Close follow up with CT to assess for stability is recommended.  Since pt is not a surgical candidate, we will treat him with concurrent " chemoradiation. His GFR is at 50s, prefer weekly carboplatin and paclitaxel. Dr. Burkitt previously discussed with patient about treatment schedule and chemotherapy related side effects in detail. Met with Dr. Diaz from radiation oncology.      - now s/p infection of PEG site which has resolved  - started concurrent chemoRT with weekly carbo/taxol 12/11/23  - Will see patient weekly in follow-up

## 2023-12-26 NOTE — SIGNIFICANT EVENT
12/26/23 1231   Prechemo Checklist   Has the patient been in the hospital, ED, or urgent care since last date of service No   Chemo/Immuno Consent Signed Yes   Protocol/Indications Verified Yes   Confirmed to previous date/time of medication Yes   All medications are dated accurately Yes   Pregnancy Test Negative Not applicable   Parameters Met Yes   BSA/Weight-Height Verified Yes   Dose Calculations Verified Yes

## 2023-12-27 ENCOUNTER — RADIATION ONCOLOGY OTV (OUTPATIENT)
Dept: RADIATION ONCOLOGY | Facility: CLINIC | Age: 78
End: 2023-12-27
Payer: MEDICARE

## 2023-12-27 ENCOUNTER — HOME CARE VISIT (OUTPATIENT)
Dept: HOME HEALTH SERVICES | Facility: HOME HEALTH | Age: 78
End: 2023-12-27
Payer: MEDICARE

## 2023-12-27 ENCOUNTER — HOSPITAL ENCOUNTER (OUTPATIENT)
Dept: RADIATION ONCOLOGY | Facility: CLINIC | Age: 78
Setting detail: RADIATION/ONCOLOGY SERIES
Discharge: HOME | End: 2023-12-27
Payer: MEDICARE

## 2023-12-27 VITALS
SYSTOLIC BLOOD PRESSURE: 110 MMHG | OXYGEN SATURATION: 98 % | DIASTOLIC BLOOD PRESSURE: 62 MMHG | BODY MASS INDEX: 29.14 KG/M2 | TEMPERATURE: 97.7 F | WEIGHT: 202.4 LBS | HEART RATE: 60 BPM | RESPIRATION RATE: 18 BRPM

## 2023-12-27 DIAGNOSIS — Z51.0 ENCOUNTER FOR ANTINEOPLASTIC RADIATION THERAPY: ICD-10-CM

## 2023-12-27 DIAGNOSIS — C10.8 MALIGNANT NEOPLASM OF OVERLAPPING SITES OF OROPHARYNX (MULTI): ICD-10-CM

## 2023-12-27 LAB
RAD ONC MSQ ACTUAL FRACTIONS DELIVERED: 12
RAD ONC MSQ ACTUAL SESSION DELIVERED DOSE: 200 CGRAY
RAD ONC MSQ ACTUAL TOTAL DOSE: 2400 CGRAY
RAD ONC MSQ ELAPSED DAYS: 16
RAD ONC MSQ LAST DATE: NORMAL
RAD ONC MSQ PRESCRIBED FRACTIONAL DOSE: 200 CGRAY
RAD ONC MSQ PRESCRIBED NUMBER OF FRACTIONS: 30
RAD ONC MSQ PRESCRIBED TECHNIQUE: NORMAL
RAD ONC MSQ PRESCRIBED TOTAL DOSE: 6000 CGRAY
RAD ONC MSQ PRESCRIPTION PATTERN COMMENT: NORMAL
RAD ONC MSQ START DATE: NORMAL
RAD ONC MSQ TREATMENT COURSE NUMBER: 1
RAD ONC MSQ TREATMENT SITE: NORMAL

## 2023-12-27 PROCEDURE — 77014 CHG CT GUIDANCE RADIATION THERAPY FLDS PLACEMENT: CPT | Performed by: OTOLARYNGOLOGY

## 2023-12-27 PROCEDURE — 77427 RADIATION TX MANAGEMENT X5: CPT | Performed by: RADIOLOGY

## 2023-12-27 PROCEDURE — G0299 HHS/HOSPICE OF RN EA 15 MIN: HCPCS | Mod: HHH

## 2023-12-27 PROCEDURE — 77336 RADIATION PHYSICS CONSULT: CPT | Performed by: RADIOLOGY

## 2023-12-27 PROCEDURE — 1090000001 HH PPS REVENUE CREDIT

## 2023-12-27 PROCEDURE — 77386 HC INTENSITY-MODULATED RADIATION THERAPY (IMRT), COMPLEX: CPT | Performed by: RADIOLOGY

## 2023-12-27 PROCEDURE — 1090000002 HH PPS REVENUE DEBIT

## 2023-12-27 ASSESSMENT — PAIN SCALES - GENERAL: PAINLEVEL: 0-NO PAIN

## 2023-12-27 NOTE — PROGRESS NOTES
"Radiation Oncology On Treatment Visit    Patient Name:  Kevin Mendes  MRN:  [id number]  :  1945    Referring Provider: No ref. 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             SUBJECTIVE: Peg tube feedings 4 per day with water flushes. Decreased appetite. Taking pills, yogurt, bananas, and cereal. Tried \"gummies\" without increase in appetite.  Down 1 lb this week.  Reports intermittent ear pain relieved with tylenol.    OBJECTIVE:   Vital Signs:  /62 (BP Location: Right arm, Patient Position: Sitting, BP Cuff Size: Large adult)   Pulse 60   Temp 36.5 °C (97.7 °F) (Tympanic)   Resp 18   Wt 91.8 kg (202 lb 6.4 oz)   SpO2 98%   BMI 29.14 kg/m²    Pain Scale: The patient's current pain level was assessed.  They report currently having a pain of 0 out of 10.    Other Pertinent Findings:     Toxicity Assessment          2023    13:00 2023    09:00 2023    09:08 2023    09:13   Toxicity Assessment   Adverse Events Reviewed (WDL) Yes (Within Defined Limits) Yes (Within Defined Limits) Yes (Within Defined " "Limits) Yes (Within Defined Limits)   Treatment  and neck Head and neck Head and neck Head and neck   Anorexia Grade 1       appetite comes and goes, using PEG tube feedings 3 per day with water flush. Encouraged small protein rich snacks, nutrition following. Grade 1 Grade 1       PEG tube feedings 4 per day with water flushes. About to swallow pills, yogurt, bananas, cereal. Decreased appetite. Tryed \"gummies\" with no increase in appetite. Grade 1       Peg tube feedings 4 per day with water flushes. Decreased appetite. Taking pills, yogurt, bananas, and cereal. Tried \"gummies\" without increase in appetite.   Anxiety Grade 1       nervous about possible side effects Grade 1     Dehydration Grade 0 Grade 0  Grade 0   Dermatitis Radiation Grade 0       reviewed skin care recommendations with patient Grade 0       using aquaphor  Grade 0       aquaphor   Diarrhea Grade 1       had an episode last friday with several watery BM's, went to the ER on saturday after feeling better. Labs were normal per patient, received IVF and was discharged. Resolving now Grade 0  Grade 1       loose stool x1 episode/day   Fatigue Grade 0 Grade 1  Grade 1       urinary frequency with nocturia. Not sleeping well at night. Taking naps   Nausea Grade 0       anti-emetics prn Grade 0  Grade 0   Pain Grade 0 Grade 1       mild throat pain relieved by tylenol  Grade 0   Vomiting Grade 0 Grade 0  Grade 0   Dysphagia Grade 0       softer foods Grade 1       softer foods /meds     Esophagitis Grade 0      Mucositis Oral Grade 0 Grade 1       dry mouth     Hearing Impaired Grade 0       basline, not worsening      Dry Eye --        blocked ducts per patient, eye Dr. following      Dry Mouth Grade 1       biotene and xylimelts Grade 1       mild increase in phlegm/usung suction machine  Grade 1       mild dry mouth.  has biotene but not using. Has suction machine, only used a few times.   Ear Pain Grade 0       can feel \"something\" on the " right ear. Doesn't hurt Grade 0  Grade 1       intermittent ear pain relieved with tylenol   Tinnitus Grade 0 Grade 0  Grade 0   Oral Pain Grade 0 Grade 0  Grade 0   Hoarseness Grade 1 Grade 1     Voice Alteration Grade 1 Grade 1  Grade 1       reports deeper and raspy        Assessment / Plan:  The patient is tolerating radiation therapy as anticipated.  Continue per current treatment plan.

## 2023-12-28 ENCOUNTER — NUTRITION (OUTPATIENT)
Dept: HEMATOLOGY/ONCOLOGY | Facility: CLINIC | Age: 78
End: 2023-12-28
Payer: MEDICARE

## 2023-12-28 ENCOUNTER — HOSPITAL ENCOUNTER (OUTPATIENT)
Dept: RADIATION ONCOLOGY | Facility: CLINIC | Age: 78
Setting detail: RADIATION/ONCOLOGY SERIES
Discharge: HOME | End: 2023-12-28
Payer: MEDICARE

## 2023-12-28 VITALS — HEIGHT: 70 IN | BODY MASS INDEX: 28.63 KG/M2 | WEIGHT: 200 LBS

## 2023-12-28 VITALS
HEART RATE: 78 BPM | SYSTOLIC BLOOD PRESSURE: 122 MMHG | DIASTOLIC BLOOD PRESSURE: 70 MMHG | OXYGEN SATURATION: 97 % | RESPIRATION RATE: 20 BRPM | TEMPERATURE: 97.3 F

## 2023-12-28 DIAGNOSIS — Z51.0 ENCOUNTER FOR ANTINEOPLASTIC RADIATION THERAPY: ICD-10-CM

## 2023-12-28 DIAGNOSIS — C10.8 MALIGNANT NEOPLASM OF OVERLAPPING SITES OF OROPHARYNX (MULTI): ICD-10-CM

## 2023-12-28 LAB
RAD ONC MSQ ACTUAL FRACTIONS DELIVERED: 13
RAD ONC MSQ ACTUAL SESSION DELIVERED DOSE: 200 CGRAY
RAD ONC MSQ ACTUAL TOTAL DOSE: 2600 CGRAY
RAD ONC MSQ ELAPSED DAYS: 17
RAD ONC MSQ LAST DATE: NORMAL
RAD ONC MSQ PRESCRIBED FRACTIONAL DOSE: 200 CGRAY
RAD ONC MSQ PRESCRIBED NUMBER OF FRACTIONS: 30
RAD ONC MSQ PRESCRIBED TECHNIQUE: NORMAL
RAD ONC MSQ PRESCRIBED TOTAL DOSE: 6000 CGRAY
RAD ONC MSQ PRESCRIPTION PATTERN COMMENT: NORMAL
RAD ONC MSQ START DATE: NORMAL
RAD ONC MSQ TREATMENT COURSE NUMBER: 1
RAD ONC MSQ TREATMENT SITE: NORMAL

## 2023-12-28 PROCEDURE — 1090000002 HH PPS REVENUE DEBIT

## 2023-12-28 PROCEDURE — 1090000001 HH PPS REVENUE CREDIT

## 2023-12-28 PROCEDURE — 77386 HC INTENSITY-MODULATED RADIATION THERAPY (IMRT), COMPLEX: CPT | Performed by: RADIOLOGY

## 2023-12-28 PROCEDURE — 77014 CHG CT GUIDANCE RADIATION THERAPY FLDS PLACEMENT: CPT | Performed by: RADIOLOGY

## 2023-12-28 ASSESSMENT — ENCOUNTER SYMPTOMS
PAIN SEVERITY GOAL: 0/10
OCCASIONAL FEELINGS OF UNSTEADINESS: 0
DEPRESSION: 0
DENIES PAIN: 1
APPETITE LEVEL: POOR
PERSON REPORTING PAIN: PATIENT
LAST BOWEL MOVEMENT: 66835
LOWEST PAIN SEVERITY IN PAST 24 HOURS: 0/10
CHANGE IN APPETITE: UNCHANGED
HIGHEST PAIN SEVERITY IN PAST 24 HOURS: 0/10
LOSS OF SENSATION IN FEET: 0

## 2023-12-28 ASSESSMENT — PAIN SCALES - PAIN ASSESSMENT IN ADVANCED DEMENTIA (PAINAD)
CONSOLABILITY: 0 - NO NEED TO CONSOLE.
CONSOLABILITY: 0
BODYLANGUAGE: 0
FACIALEXPRESSION: 0
BREATHING: 0
NEGVOCALIZATION: 0
FACIALEXPRESSION: 0 - SMILING OR INEXPRESSIVE.
BODYLANGUAGE: 0 - RELAXED.
TOTALSCORE: 0
NEGVOCALIZATION: 0 - NONE.

## 2023-12-28 ASSESSMENT — ACTIVITIES OF DAILY LIVING (ADL): OASIS_M1830: 05

## 2023-12-28 NOTE — PROGRESS NOTES
"NUTRITION Follow-Up NOTE    Nutrition Assessment       Reason for Visit:  Kevin Mendes \"Katherine\" is a 78 y.o. male with a diagnosis of BOT cancer.  Patient had a PEG tube placed 11/8/2023.  Follow up visit today done over phone call with patient wife, and patient.     Patient had chemo on Tuesday this week due to the Holiday.    He is up now to 4 cartons of [employer] Standard 1.4 via PEG tube. He is doing 80 oz of fluids total either via PEG tube or orally daily.  Not eating orally at this time.       Lab Results   Component Value Date/Time    GLUCOSE 221 (H) 12/26/2023 1208     12/26/2023 1208    K 4.9 12/26/2023 1208    CL 99 12/26/2023 1208    CO2 29 12/26/2023 1208    ANIONGAP 14 12/26/2023 1208    BUN 33 (H) 12/26/2023 1208    CREATININE 1.27 12/26/2023 1208    EGFR 57 (L) 12/26/2023 1208    CALCIUM 9.2 12/26/2023 1208    ALBUMIN 4.0 12/26/2023 1208    ALKPHOS 84 12/26/2023 1208    PROT 7.2 12/26/2023 1208    AST 12 12/26/2023 1208    BILITOT 0.4 12/26/2023 1208    ALT 8 (L) 12/26/2023 1208     Lab Results   Component Value Date/Time    VITD25 48 09/14/2023 0951       Anthropometrics:  Anthropometrics  Height: 177.5 cm (5' 9.88\")  Weight: 90.7 kg (200 lb) (from infusion on 12/26)  BMI (Calculated): 28.79        Wt Readings from Last 10 Encounters:   12/28/23 90.7 kg (200 lb)   12/27/23 91.8 kg (202 lb 6.4 oz)   12/26/23 90.7 kg (200 lb)   12/20/23 92.3 kg (203 lb 6.4 oz)   12/18/23 91.4 kg (201 lb 8 oz)   12/18/23 91.4 kg (201 lb 8 oz)   12/13/23 93.5 kg (206 lb 3.2 oz)   12/11/23 91.2 kg (201 lb 1 oz)   12/11/23 91.2 kg (201 lb 1 oz)   12/06/23 92.4 kg (203 lb 12.8 oz)     Weight stable this past week.     Food And Nutrient Intake:  Food and Nutrient History  Energy Intake: Poor < 50 % (poor oral intake; TF intake good)  Fluid Intake: Good estimated ~ 80 oz daily  GI Symptoms: diarrhea  GI Symptoms greater than 2 weeks: intermittent     Food Intake  Amount of Food: Reported no oral intake of food          " "         Enteral Nutrition Intake  Enteral Nutrition Formula/Solution: My R[employer] Units Standard 1.4 - currently doing 4 cartons per day between 3 feeds, Gravity feeds  Feeding Tube Flush: 60ml before and 120ml after each feed ( 3 times per day)                                  Nutrition Focused Physical Exam:       Energy Needs  Calculated Energy Needs Using Equations  Height: 177.5 cm (5' 9.88\")  Weight Used for Equation Calculations: 91.8 kg (202 lb 6.1 oz)  [place]- St. Jeor Equation (Overweight or Obese Patients): 1637  Activity Factor: 1.5  Total Energy Needs: 2500  Estimated Energy Needs  Total Energy Estimated Needs (kCal): 2300 kCal  Total Estimated Energy Need per Day (kCal/kg): 25 kCal/kg  Estimated Fluid Needs  Total Fluid Estimated Needs (mL): 2300 mL  Total Fluid Estimated Needs (mL/kg): 25 mL/kg  Estimated Protein Needs  Total Protein Estimated Needs (g): 110 g  Total Protein Estimated Needs (g/kg): 1.2 g/kg    Nutrition Diagnosis      Nutrition Diagnosis  Patient has Nutrition Diagnosis: Yes  Diagnosis Status (1): Ongoing  Nutrition Diagnosis 1: Increased nutrient needs  Additional Assessment Information (1): no changes  Additional Nutrition Diagnosis: Diagnosis 2  Diagnosis Status (2): Ongoing  Nutrition Diagnosis 2: Predicted inadequate nutrient intake  Additional Assessment Information (2): no changes    Nutrition Interventions/Recommendations   Food and Nutrition Delivery  Meals & Snacks: Energy-modified diet, Protein-modified diet, Texture-Modified Diet  Goals: Small frequent meals/snacks softer foods for pleasure feeds and to continue to swallow throughout SCC treatments, increased fluids  Enteral Nutrition: Enteral nutrition site care, Feeding tube flush, Modify composition of enteral nutrition  Total Nutrition Provided: Continue with 4 cartons per day provides 1820 calories, 80g protein, 924ml free water per day  Goals: 5 cartons My Rental Units Standard 1.4; 2275 calories, 100g protein, 1155ml free " water per day  Nutrition Education  Nutrition Education Content: Content related nutrition education  Goals: Understand nutrition through PEG tube, and nutrition throughout SCC treatments.  Coordination of Nutrition Care by a Nutrition Professional  Collaboration and referral of nutrition care: Collaboration by nutrition professional with other providers  Goals: Carlos is DME for PEG tube formula and supplies    Patient Instructions   Recommend continue with current TF regimen of 4 cartons per day.  Continue to do water flushes as discussed and sip on water/fluids throughout the day as tolerated.    Nutrition Monitoring and Evaluation   Food/Nutrient Related History Monitoring  Monitoring and Evaluation Plan: Energy intake, Fluid intake, Protein intake, Enteral and parenteral nutrition intake  Energy Intake: Estimated energy intake  Criteria: 9128-2906 calories per day  Fluid Intake: Estimated fluid intake  Criteria: 2175-5189 calories per day  Estimated protein intake: Estimated protein intake  Criteria: 92-112g protein per day  Enteral and Parenteral Nutrition Intake: Enteral nutrition formula/solution, Enteral nutrition intake  Criteria: Continue with current TF of 4 cartons per day; 434ml 3 times per day gravity feeds.  Water flushes 60ml before and after and additional water flushes to meet estimated fluid needs.  Biochemical Data, Medical Tests and Procedures  Monitoring and Evaluation Plan: Electrolyte/renal panel, GI profile, Glucose/endocrine profile  Criteria: Labs WNL  Nutrition Focused Physical Findings  Monitoring and Evaluation Plan: Digestive System  Digestive System: Constipation, Diarrhea  Other: Encouraged 64-80 oz of fluids per day - more as needed    Enteral Nutrition Goal:  [employer] Standard 1.4 gravity feeds, 5 cartons per day will provide 2275 calories, 100 g protein, 255g CHO, 1155ml free water.    Can do 542ml TID for total of 5 cartons.  Water flushes of 60 ml before and after each  gravity feed.  Additional water flush of 240ml 3 times per day.    Time Spent  Prep time on day of patient encounter: 0 minutes  Time spent directly with patient, family or caregiver: 15 minutes  Additional Time Spent on Patient Care Activities: 0 minutes  Documentation Time: 15 minutes  Other Time Spent: 0 minutes  Total: 30 minutes      This service will continue to follow up as needed throughout SCC treatments.

## 2023-12-28 NOTE — PATIENT INSTRUCTIONS
Recommend continue with current TF regimen of 4 cartons per day.  Continue to do water flushes as discussed and sip on water/fluids throughout the day as tolerated.

## 2023-12-29 ENCOUNTER — HOSPITAL ENCOUNTER (OUTPATIENT)
Dept: RADIATION ONCOLOGY | Facility: CLINIC | Age: 78
Setting detail: RADIATION/ONCOLOGY SERIES
Discharge: HOME | End: 2023-12-29
Payer: MEDICARE

## 2023-12-29 DIAGNOSIS — C10.8 MALIGNANT NEOPLASM OF OVERLAPPING SITES OF OROPHARYNX (MULTI): ICD-10-CM

## 2023-12-29 DIAGNOSIS — Z51.0 ENCOUNTER FOR ANTINEOPLASTIC RADIATION THERAPY: ICD-10-CM

## 2023-12-29 LAB
RAD ONC MSQ ACTUAL FRACTIONS DELIVERED: 14
RAD ONC MSQ ACTUAL SESSION DELIVERED DOSE: 200 CGRAY
RAD ONC MSQ ACTUAL TOTAL DOSE: 2800 CGRAY
RAD ONC MSQ ELAPSED DAYS: 18
RAD ONC MSQ LAST DATE: NORMAL
RAD ONC MSQ PRESCRIBED FRACTIONAL DOSE: 200 CGRAY
RAD ONC MSQ PRESCRIBED NUMBER OF FRACTIONS: 30
RAD ONC MSQ PRESCRIBED TECHNIQUE: NORMAL
RAD ONC MSQ PRESCRIBED TOTAL DOSE: 6000 CGRAY
RAD ONC MSQ PRESCRIPTION PATTERN COMMENT: NORMAL
RAD ONC MSQ START DATE: NORMAL
RAD ONC MSQ TREATMENT COURSE NUMBER: 1
RAD ONC MSQ TREATMENT SITE: NORMAL

## 2023-12-29 PROCEDURE — 1090000001 HH PPS REVENUE CREDIT

## 2023-12-29 PROCEDURE — 77014 CHG CT GUIDANCE RADIATION THERAPY FLDS PLACEMENT: CPT | Performed by: RADIOLOGY

## 2023-12-29 PROCEDURE — 1090000002 HH PPS REVENUE DEBIT

## 2023-12-29 PROCEDURE — 77386 HC INTENSITY-MODULATED RADIATION THERAPY (IMRT), COMPLEX: CPT | Performed by: RADIOLOGY

## 2023-12-30 PROCEDURE — 1090000002 HH PPS REVENUE DEBIT

## 2023-12-30 PROCEDURE — 1090000001 HH PPS REVENUE CREDIT

## 2023-12-31 PROCEDURE — 1090000002 HH PPS REVENUE DEBIT

## 2023-12-31 PROCEDURE — 1090000001 HH PPS REVENUE CREDIT

## 2024-01-01 PROCEDURE — 1090000002 HH PPS REVENUE DEBIT

## 2024-01-01 PROCEDURE — 1090000001 HH PPS REVENUE CREDIT

## 2024-01-02 ENCOUNTER — APPOINTMENT (OUTPATIENT)
Dept: HEMATOLOGY/ONCOLOGY | Facility: HOSPITAL | Age: 79
End: 2024-01-02
Payer: MEDICARE

## 2024-01-02 ENCOUNTER — INFUSION (OUTPATIENT)
Dept: HEMATOLOGY/ONCOLOGY | Facility: CLINIC | Age: 79
End: 2024-01-02
Payer: MEDICARE

## 2024-01-02 ENCOUNTER — NUTRITION (OUTPATIENT)
Dept: HEMATOLOGY/ONCOLOGY | Facility: CLINIC | Age: 79
End: 2024-01-02

## 2024-01-02 ENCOUNTER — TELEMEDICINE (OUTPATIENT)
Dept: HEMATOLOGY/ONCOLOGY | Facility: HOSPITAL | Age: 79
End: 2024-01-02
Payer: MEDICARE

## 2024-01-02 ENCOUNTER — HOSPITAL ENCOUNTER (OUTPATIENT)
Dept: RADIATION ONCOLOGY | Facility: CLINIC | Age: 79
Setting detail: RADIATION/ONCOLOGY SERIES
Discharge: HOME | End: 2024-01-02
Payer: MEDICARE

## 2024-01-02 ENCOUNTER — APPOINTMENT (OUTPATIENT)
Dept: HEMATOLOGY/ONCOLOGY | Facility: CLINIC | Age: 79
End: 2024-01-02
Payer: MEDICARE

## 2024-01-02 VITALS — HEIGHT: 70 IN | WEIGHT: 197.8 LBS | BODY MASS INDEX: 28.32 KG/M2

## 2024-01-02 VITALS
DIASTOLIC BLOOD PRESSURE: 66 MMHG | SYSTOLIC BLOOD PRESSURE: 123 MMHG | RESPIRATION RATE: 18 BRPM | BODY MASS INDEX: 28.48 KG/M2 | HEART RATE: 77 BPM | TEMPERATURE: 97.7 F | WEIGHT: 197.8 LBS | OXYGEN SATURATION: 95 %

## 2024-01-02 DIAGNOSIS — C01 MALIGNANT NEOPLASM OF BASE OF TONGUE (MULTI): Primary | ICD-10-CM

## 2024-01-02 DIAGNOSIS — Z51.0 ENCOUNTER FOR ANTINEOPLASTIC RADIATION THERAPY: ICD-10-CM

## 2024-01-02 DIAGNOSIS — C76.0 HEAD AND NECK CANCER (MULTI): ICD-10-CM

## 2024-01-02 DIAGNOSIS — C10.8 MALIGNANT NEOPLASM OF OVERLAPPING SITES OF OROPHARYNX (MULTI): ICD-10-CM

## 2024-01-02 LAB
ALBUMIN SERPL BCP-MCNC: 3.7 G/DL (ref 3.4–5)
ALP SERPL-CCNC: 70 U/L (ref 33–136)
ALT SERPL W P-5'-P-CCNC: 8 U/L (ref 10–52)
ANION GAP SERPL CALC-SCNC: 11 MMOL/L (ref 10–20)
AST SERPL W P-5'-P-CCNC: 11 U/L (ref 9–39)
BASOPHILS # BLD AUTO: 0.06 X10*3/UL (ref 0–0.1)
BASOPHILS NFR BLD AUTO: 1.7 %
BILIRUB SERPL-MCNC: 0.6 MG/DL (ref 0–1.2)
BUN SERPL-MCNC: 27 MG/DL (ref 6–23)
CALCIUM SERPL-MCNC: 7.9 MG/DL (ref 8.6–10.3)
CHLORIDE SERPL-SCNC: 105 MMOL/L (ref 98–107)
CO2 SERPL-SCNC: 26 MMOL/L (ref 21–32)
CREAT SERPL-MCNC: 0.88 MG/DL (ref 0.5–1.3)
EOSINOPHIL # BLD AUTO: 0.1 X10*3/UL (ref 0–0.4)
EOSINOPHIL NFR BLD AUTO: 2.8 %
ERYTHROCYTE [DISTWIDTH] IN BLOOD BY AUTOMATED COUNT: 15.8 % (ref 11.5–14.5)
GFR SERPL CREATININE-BSD FRML MDRD: 87 ML/MIN/1.73M*2
GLUCOSE SERPL-MCNC: 213 MG/DL (ref 74–99)
HCT VFR BLD AUTO: 29.5 % (ref 41–52)
HGB BLD-MCNC: 9.3 G/DL (ref 13.5–17.5)
IMM GRANULOCYTES # BLD AUTO: 0.03 X10*3/UL (ref 0–0.5)
IMM GRANULOCYTES NFR BLD AUTO: 0.8 % (ref 0–0.9)
LYMPHOCYTES # BLD AUTO: 0.57 X10*3/UL (ref 0.8–3)
LYMPHOCYTES NFR BLD AUTO: 16.1 %
MAGNESIUM SERPL-MCNC: 1.73 MG/DL (ref 1.6–2.4)
MCH RBC QN AUTO: 29.2 PG (ref 26–34)
MCHC RBC AUTO-ENTMCNC: 31.5 G/DL (ref 32–36)
MCV RBC AUTO: 93 FL (ref 80–100)
MONOCYTES # BLD AUTO: 0.37 X10*3/UL (ref 0.05–0.8)
MONOCYTES NFR BLD AUTO: 10.4 %
NEUTROPHILS # BLD AUTO: 2.42 X10*3/UL (ref 1.6–5.5)
NEUTROPHILS NFR BLD AUTO: 68.2 %
NRBC BLD-RTO: ABNORMAL /100{WBCS}
PLATELET # BLD AUTO: 175 X10*3/UL (ref 150–450)
POTASSIUM SERPL-SCNC: 4.1 MMOL/L (ref 3.5–5.3)
PROT SERPL-MCNC: 6.4 G/DL (ref 6.4–8.2)
RAD ONC MSQ ACTUAL FRACTIONS DELIVERED: 15
RAD ONC MSQ ACTUAL SESSION DELIVERED DOSE: 200 CGRAY
RAD ONC MSQ ACTUAL TOTAL DOSE: 3000 CGRAY
RAD ONC MSQ ELAPSED DAYS: 22
RAD ONC MSQ LAST DATE: NORMAL
RAD ONC MSQ PRESCRIBED FRACTIONAL DOSE: 200 CGRAY
RAD ONC MSQ PRESCRIBED NUMBER OF FRACTIONS: 30
RAD ONC MSQ PRESCRIBED TECHNIQUE: NORMAL
RAD ONC MSQ PRESCRIBED TOTAL DOSE: 6000 CGRAY
RAD ONC MSQ PRESCRIPTION PATTERN COMMENT: NORMAL
RAD ONC MSQ START DATE: NORMAL
RAD ONC MSQ TREATMENT COURSE NUMBER: 1
RAD ONC MSQ TREATMENT SITE: NORMAL
RBC # BLD AUTO: 3.18 X10*6/UL (ref 4.5–5.9)
SODIUM SERPL-SCNC: 138 MMOL/L (ref 136–145)
WBC # BLD AUTO: 3.6 X10*3/UL (ref 4.4–11.3)

## 2024-01-02 PROCEDURE — 36415 COLL VENOUS BLD VENIPUNCTURE: CPT

## 2024-01-02 PROCEDURE — 77386 HC INTENSITY-MODULATED RADIATION THERAPY (IMRT), COMPLEX: CPT | Performed by: RADIOLOGY

## 2024-01-02 PROCEDURE — 84075 ASSAY ALKALINE PHOSPHATASE: CPT | Performed by: INTERNAL MEDICINE

## 2024-01-02 PROCEDURE — 96417 CHEMO IV INFUS EACH ADDL SEQ: CPT

## 2024-01-02 PROCEDURE — 1090000001 HH PPS REVENUE CREDIT

## 2024-01-02 PROCEDURE — 96413 CHEMO IV INFUSION 1 HR: CPT

## 2024-01-02 PROCEDURE — 85025 COMPLETE CBC W/AUTO DIFF WBC: CPT | Performed by: INTERNAL MEDICINE

## 2024-01-02 PROCEDURE — 2500000004 HC RX 250 GENERAL PHARMACY W/ HCPCS (ALT 636 FOR OP/ED): Performed by: STUDENT IN AN ORGANIZED HEALTH CARE EDUCATION/TRAINING PROGRAM

## 2024-01-02 PROCEDURE — 83735 ASSAY OF MAGNESIUM: CPT | Performed by: INTERNAL MEDICINE

## 2024-01-02 PROCEDURE — 99213 OFFICE O/P EST LOW 20 MIN: CPT | Performed by: NURSE PRACTITIONER

## 2024-01-02 PROCEDURE — 2500000001 HC RX 250 WO HCPCS SELF ADMINISTERED DRUGS (ALT 637 FOR MEDICARE OP): Performed by: STUDENT IN AN ORGANIZED HEALTH CARE EDUCATION/TRAINING PROGRAM

## 2024-01-02 PROCEDURE — 77014 CHG CT GUIDANCE RADIATION THERAPY FLDS PLACEMENT: CPT | Performed by: RADIOLOGY

## 2024-01-02 PROCEDURE — 96375 TX/PRO/DX INJ NEW DRUG ADDON: CPT | Mod: INF

## 2024-01-02 PROCEDURE — 1090000002 HH PPS REVENUE DEBIT

## 2024-01-02 RX ORDER — FAMOTIDINE 10 MG/ML
20 INJECTION INTRAVENOUS ONCE AS NEEDED
Status: DISCONTINUED | OUTPATIENT
Start: 2024-01-02 | End: 2024-01-02 | Stop reason: HOSPADM

## 2024-01-02 RX ORDER — PROCHLORPERAZINE MALEATE 10 MG
10 TABLET ORAL EVERY 6 HOURS PRN
Status: DISCONTINUED | OUTPATIENT
Start: 2024-01-02 | End: 2024-01-02 | Stop reason: HOSPADM

## 2024-01-02 RX ORDER — DIPHENHYDRAMINE HCL 50 MG
50 CAPSULE ORAL ONCE
Status: CANCELLED | OUTPATIENT
Start: 2024-01-02

## 2024-01-02 RX ORDER — ALBUTEROL SULFATE 0.83 MG/ML
3 SOLUTION RESPIRATORY (INHALATION) AS NEEDED
Status: CANCELLED | OUTPATIENT
Start: 2024-01-02

## 2024-01-02 RX ORDER — FAMOTIDINE 10 MG/ML
20 INJECTION INTRAVENOUS ONCE
Status: COMPLETED | OUTPATIENT
Start: 2024-01-02 | End: 2024-01-02

## 2024-01-02 RX ORDER — DIPHENHYDRAMINE HYDROCHLORIDE 50 MG/ML
50 INJECTION INTRAMUSCULAR; INTRAVENOUS AS NEEDED
Status: DISCONTINUED | OUTPATIENT
Start: 2024-01-02 | End: 2024-01-02 | Stop reason: HOSPADM

## 2024-01-02 RX ORDER — PROCHLORPERAZINE EDISYLATE 5 MG/ML
10 INJECTION INTRAMUSCULAR; INTRAVENOUS EVERY 6 HOURS PRN
Status: CANCELLED | OUTPATIENT
Start: 2024-01-02

## 2024-01-02 RX ORDER — DIPHENHYDRAMINE HCL 25 MG
50 CAPSULE ORAL ONCE
Status: COMPLETED | OUTPATIENT
Start: 2024-01-02 | End: 2024-01-02

## 2024-01-02 RX ORDER — PALONOSETRON 0.05 MG/ML
0.25 INJECTION, SOLUTION INTRAVENOUS ONCE
Status: COMPLETED | OUTPATIENT
Start: 2024-01-02 | End: 2024-01-02

## 2024-01-02 RX ORDER — PROCHLORPERAZINE EDISYLATE 5 MG/ML
10 INJECTION INTRAMUSCULAR; INTRAVENOUS EVERY 6 HOURS PRN
Status: DISCONTINUED | OUTPATIENT
Start: 2024-01-02 | End: 2024-01-02 | Stop reason: HOSPADM

## 2024-01-02 RX ORDER — EPINEPHRINE 0.3 MG/.3ML
0.3 INJECTION SUBCUTANEOUS EVERY 5 MIN PRN
Status: CANCELLED | OUTPATIENT
Start: 2024-01-02

## 2024-01-02 RX ORDER — ALBUTEROL SULFATE 0.83 MG/ML
3 SOLUTION RESPIRATORY (INHALATION) AS NEEDED
Status: DISCONTINUED | OUTPATIENT
Start: 2024-01-02 | End: 2024-01-02 | Stop reason: HOSPADM

## 2024-01-02 RX ORDER — PALONOSETRON 0.05 MG/ML
0.25 INJECTION, SOLUTION INTRAVENOUS ONCE
Status: CANCELLED | OUTPATIENT
Start: 2024-01-02

## 2024-01-02 RX ORDER — FAMOTIDINE 10 MG/ML
20 INJECTION INTRAVENOUS ONCE AS NEEDED
Status: CANCELLED | OUTPATIENT
Start: 2024-01-02

## 2024-01-02 RX ORDER — DIPHENHYDRAMINE HYDROCHLORIDE 50 MG/ML
50 INJECTION INTRAMUSCULAR; INTRAVENOUS AS NEEDED
Status: CANCELLED | OUTPATIENT
Start: 2024-01-02

## 2024-01-02 RX ORDER — HEPARIN 100 UNIT/ML
500 SYRINGE INTRAVENOUS AS NEEDED
Status: CANCELLED | OUTPATIENT
Start: 2024-01-02

## 2024-01-02 RX ORDER — EPINEPHRINE 0.3 MG/.3ML
0.3 INJECTION SUBCUTANEOUS EVERY 5 MIN PRN
Status: DISCONTINUED | OUTPATIENT
Start: 2024-01-02 | End: 2024-01-02 | Stop reason: HOSPADM

## 2024-01-02 RX ORDER — PROCHLORPERAZINE MALEATE 10 MG
10 TABLET ORAL EVERY 6 HOURS PRN
Status: CANCELLED | OUTPATIENT
Start: 2024-01-02

## 2024-01-02 RX ORDER — HEPARIN SODIUM,PORCINE/PF 10 UNIT/ML
50 SYRINGE (ML) INTRAVENOUS AS NEEDED
Status: CANCELLED | OUTPATIENT
Start: 2024-01-02

## 2024-01-02 RX ORDER — DEXAMETHASONE SODIUM PHOSPHATE 4 MG/ML
10 INJECTION, SOLUTION INTRA-ARTICULAR; INTRALESIONAL; INTRAMUSCULAR; INTRAVENOUS; SOFT TISSUE ONCE
Status: CANCELLED | OUTPATIENT
Start: 2024-01-02

## 2024-01-02 RX ORDER — FAMOTIDINE 10 MG/ML
20 INJECTION INTRAVENOUS ONCE
Status: CANCELLED | OUTPATIENT
Start: 2024-01-02

## 2024-01-02 RX ORDER — DEXAMETHASONE SODIUM PHOSPHATE 100 MG/10ML
10 INJECTION INTRAMUSCULAR; INTRAVENOUS ONCE
Status: COMPLETED | OUTPATIENT
Start: 2024-01-02 | End: 2024-01-02

## 2024-01-02 RX ADMIN — DEXAMETHASONE SODIUM PHOSPHATE 10 MG: 10 INJECTION INTRAMUSCULAR; INTRAVENOUS at 12:22

## 2024-01-02 RX ADMIN — PALONOSETRON 250 MCG: 0.05 INJECTION, SOLUTION INTRAVENOUS at 12:21

## 2024-01-02 RX ADMIN — DIPHENHYDRAMINE HYDROCHLORIDE 50 MG: 25 CAPSULE ORAL at 12:22

## 2024-01-02 RX ADMIN — FAMOTIDINE 20 MG: 10 INJECTION INTRAVENOUS at 12:22

## 2024-01-02 RX ADMIN — CARBOPLATIN 210 MG: 10 INJECTION, SOLUTION INTRAVENOUS at 14:03

## 2024-01-02 RX ADMIN — PACLITAXEL 96 MG: 6 INJECTION, SOLUTION, CONCENTRATE INTRAVENOUS at 12:59

## 2024-01-02 ASSESSMENT — PAIN SCALES - GENERAL: PAINLEVEL: 0-NO PAIN

## 2024-01-02 NOTE — PROGRESS NOTES
"[facility] - Medical Oncology Follow-Up Visit    Patient ID: Kevin Mendes \"Vinh" is a 78 y.o. male      Current therapy: CARBOplatin (Paraplatin) 148.8 mg in sodium chloride 0.9% 114.88 mL IV, 148.8 mg, intravenous, Once, 4 of 7 cycles    Administration: 148.8 mg (12/11/2023), 160 mg (12/26/2023), 159 mg (12/18/2023), 210 mg (1/2/2024)        PACLitaxeL (Taxol) 96 mg in dextrose 5 % in water (D5W) 116 mL IV, 45 mg/m2 = 96 mg, intravenous, Once, 4 of 7 cycles    Administration: 96 mg (12/11/2023), 96 mg (12/26/2023), 96 mg (12/18/2023), 96 mg (1/2/2024)        palonosetron (Aloxi) injection 250 mcg, 250 mcg, intravenous, Once, 4 of 7 cycles    Administration: 250 mcg (12/11/2023), 250 mcg (12/26/2023), 250 mcg (12/18/2023), 250 mcg (1/2/2024)      Oncologic History:     Diagnosis: Base of tongue cancer  Staging: T3N0M0  Date of Diagnosis: 11/8/23     Providers:  ENT Surgeon: Dr. Radha Gomez:   Dr. Burkitt ([facility]) --> Dr. Kalie Torres: Dr. Diaz     Oncological history;  Patient was having sore throat with globus sensation for about 1 month.  CT scan (10/20/23) showed exophytic mass in the oropharynx coming from BOT.  PET CT (11/3/23) showed focal hypermetabolic activity along the right base of the tongue with extension inferiorly to the level of the epiglottis.  No distant mets.  Low dose CT chest showed nodule in LLL, but favors infectious/inflammatory.  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  PEG tube placed. He started concurrent chemoradiation with weekly carbo/taxol on 12/11/23.         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)  09/06/2018: Localized, primary osteoarthritis  09/25/2017: Low back pain, unspecified      Comment:  Acute low back pain  07/23/2009: Malignant melanoma of skin of trunk, except scrotum (CMS/  HCC)  No date: 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)  No date: Personal history of other diseases of the circulatory system      Comment:  Personal history of supraventricular tachycardia  12/31/2022: Personal history of other diseases of urinary system      Comment:  History of hematuria  No date: Personal history of other endocrine, nutritional and   metabolic disease      Comment:  History of diabetes mellitus  04/05/2010: Polyneuropathy  01/12/2018: Presence of right artificial knee joint  07/23/2018: Sensorineural hearing loss, bilateral      Surgical History:    Surgical History         Past Surgical History:   Procedure Laterality Date    CATARACT EXTRACTION   04/07/2017     Cataract Surgery    HAND SURGERY   04/07/2017     Hand Surgery                                                                                                                                                          OTHER SURGICAL HISTORY   04/10/2014     Catheter Ablation Atrial Supraventricular Tachycardia    SHOULDER SURGERY   04/07/2017     Shoulder Surgery    TOTAL HIP ARTHROPLASTY   04/10/2014     Hip Replacement    TOTAL KNEE ARTHROPLASTY   04/10/2014     Knee Replacement         Family History:    Family History          Family History   Problem Relation Name Age of Onset    Other (heart failure following cardiac surgery) Mother             Family Oncology History:    Cancer-related family history is not on file.  Social History:    Social History            Tobacco Use    Smoking status: Never    Smokeless tobacco: Never   Vaping Use    Vaping Use: Never used   Substance Use Topics    Alcohol use: Yes       Alcohol/week: 2.0 standard drinks of alcohol       Types: 2 Cans of beer per week     Drug use: Never        Chief Concern: Here in clinic for follow-up on concurrent chemo/RT;     HPI Virtual visit for follow-up prior to treatment on concurrent chemo/RT (week 4). He is feeling well for treatment. Breathing stable, no new respiratory symptoms. No GI symptoms. He is staying well hydrated, continues using PEG for tube feedings. No rash or skin issues. No fevers, chills, or cold symptoms. No other concerns or complaints.      Meds (Current):    Current Outpatient Medications:     aspirin 81 mg EC tablet, Take 1 tablet (81 mg) by mouth once daily., Disp: , Rfl:     glyBURIDE (Diabeta) 5 mg tablet, Take 2 tablets (10 mg) by mouth 2 times a day with meals., Disp: 360 tablet, Rfl: 2    LORazepam (Ativan) 1 mg tablet, Take 1 tablet (1 mg) by mouth once daily. Prior to each radiation treatment, Disp: , Rfl:     losartan (Cozaar) 100 mg tablet, Take 1 tablet (100 mg) by mouth once daily., Disp: 90 tablet, Rfl: 2    metFORMIN  mg 24 hr tablet, Take 2 tablets (1,000 mg) by mouth once daily in the evening. Take with meals. Do not crush, chew, or split., Disp: 180 tablet, Rfl: 2    neomycin-polymyxin B-dexameth (Polydex) 3.5 mg/g-10,000 unit/g-0.1 % ointment ophthalmic ointment, 2 times a day., Disp: , Rfl:     neomycin-polymyxin-dexAMETHasone (Maxitrol) 3.5mg/mL-10,000 unit/mL-0.1 % ophthalmic suspension, 2 times a day., Disp: , Rfl:     ondansetron (Zofran) 8 mg tablet, Take 1 tablet (8 mg) by mouth every 8 hours if needed for nausea or vomiting., Disp: 30 tablet, Rfl: 5    pioglitazone (Actos) 45 mg tablet, Take 1 tablet (45 mg) by mouth once daily., Disp: 90 tablet, Rfl: 2    PreviDent 5000 Booster Plus 1.1 % dental paste, Brush at bedtime and expectorate do not rinse, Disp: , Rfl:     prochlorperazine (Compazine) 10 mg tablet, Take 1 tablet (10 mg) by mouth every 6 hours if needed for nausea or vomiting., Disp: 30 tablet, Rfl: 5    simvastatin (Zocor) 40 mg tablet, Take 1 tablet (40 mg) by mouth once  "daily at bedtime., Disp: 90 tablet, Rfl: 2    tamsulosin (Flomax) 0.4 mg 24 hr capsule, Take 1 capsule (0.4 mg) by mouth once daily. (Patient not taking: Reported on 12/11/2023), Disp: 90 capsule, Rfl: 2  No current facility-administered medications for this visit.    Review of Systems - Oncology 12 point ROS was obtained and negative unless otherwise mentioned in the above HPI.      Objective   BSA: There is no height or weight on file to calculate BSA.  Wt Readings from Last 5 Encounters:   01/02/24 89.7 kg (197 lb 12.8 oz)   01/02/24 89.7 kg (197 lb 12.8 oz)   12/28/23 90.7 kg (200 lb)   12/27/23 91.8 kg (202 lb 6.4 oz)   12/26/23 90.7 kg (200 lb)     There were no vitals taken for this visit.    Physical Exam Not performed due to visit type.         Results:  Labs:  Lab Results   Component Value Date    WBC 3.6 (L) 01/02/2024    HGB 9.3 (L) 01/02/2024    HCT 29.5 (L) 01/02/2024    MCV 93 01/02/2024     01/02/2024      Lab Results   Component Value Date    NEUTROABS 2.42 01/02/2024      Lab Results   Component Value Date    GLUCOSE 213 (H) 01/02/2024    CALCIUM 7.9 (L) 01/02/2024     01/02/2024    K 4.1 01/02/2024    CO2 26 01/02/2024     01/02/2024    BUN 27 (H) 01/02/2024    CREATININE 0.88 01/02/2024    MG 1.73 01/02/2024     Lab Results   Component Value Date    ALT 8 (L) 01/02/2024    AST 11 01/02/2024    ALKPHOS 70 01/02/2024    BILITOT 0.6 01/02/2024      Lab Results   Component Value Date    TSH 0.51 12/01/2023       Imaging:           No results found for this or any previous visit.    Assessment/Plan      Kevin Mendes \"Pat\" is a 78 y.o. male here for follow up   Mr. Mendes is 77 y/o male with recent dx of BOT SCC (T2N0M0, p16+, Stage I) who is referred to med onc to be considered for chemotherapy in addition to radiation.     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 11/3/23: PET/CT showed no distant mets except a pulmonary nodule in LLL with no hypermetabolic " activity. Findings are favored to be infectious/inflammatory or granulomatous in nature (discussed in HN TB with radiologist). Close follow up with CT to assess for stability is recommended.  Since pt is not a surgical candidate, we will treat him with concurrent chemoradiation. His GFR is at 50s, prefer weekly carboplatin and paclitaxel. Dr. Burkitt previously discussed with patient about treatment schedule and chemotherapy related side effects in detail. Met with Dr. Diaz from radiation oncology.      - now s/p infection of PEG site which has resolved  - started concurrent chemoRT with weekly carbo/taxol today 12/11/23  - Will see patient weekly in follow-up, next week (week 4) for in-person visit prior to treatment

## 2024-01-02 NOTE — PROGRESS NOTES
"NUTRITION Follow-Up NOTE    Nutrition Assessment       Reason for Visit:  Keivn Mendes \"Katherine\" is a 78 y.o. male with a diagnosis of BOT cancer.  Patient had a PEG tube placed 11/8/2023.  Follow up visit today done at [facility] during pt infusion.    Remains to do 4 cartons of [email or web address] Standard 1.4 via PEG tube. He is doing 80 oz of fluids total either via PEG tube or orally daily.  Eats small bites of foods orally occasionally.       Lab Results   Component Value Date/Time    GLUCOSE 213 (H) 01/02/2024 1114     01/02/2024 1114    K 4.1 01/02/2024 1114     01/02/2024 1114    CO2 26 01/02/2024 1114    ANIONGAP 11 01/02/2024 1114    BUN 27 (H) 01/02/2024 1114    CREATININE 0.88 01/02/2024 1114    EGFR 87 01/02/2024 1114    CALCIUM 7.9 (L) 01/02/2024 1114    ALBUMIN 3.7 01/02/2024 1114    ALKPHOS 70 01/02/2024 1114    PROT 6.4 01/02/2024 1114    AST 11 01/02/2024 1114    BILITOT 0.6 01/02/2024 1114    ALT 8 (L) 01/02/2024 1114     Anthropometrics:  Anthropometrics  Height: 177.5 cm (5' 9.88\")  Weight: 89.7 kg (197 lb 12.8 oz) (from infusion today)  BMI (Calculated): 28.48  IBW/kg (Dietitian Calculated): 75.45 kg  Percent of IBW: 119 %  Weight Change  Weight History / % Weight Change: 1% weight loss in the last week ( 3 lbs)  Significant Weight Loss: Yes  Interpretation of Weight Loss: 1-2% in 1 week        Wt Readings from Last 10 Encounters:   01/02/24 89.7 kg (197 lb 12.8 oz)   01/02/24 89.7 kg (197 lb 12.8 oz)   12/28/23 90.7 kg (200 lb)   12/27/23 91.8 kg (202 lb 6.4 oz)   12/26/23 90.7 kg (200 lb)   12/20/23 92.3 kg (203 lb 6.4 oz)   12/18/23 91.4 kg (201 lb 8 oz)   12/18/23 91.4 kg (201 lb 8 oz)   12/13/23 93.5 kg (206 lb 3.2 oz)   12/11/23 91.2 kg (201 lb 1 oz)        Food And Nutrient Intake:  Food and Nutrient History  Energy Intake: Poor < 50 % (poor oral intake; TF intake good)  Fluid Intake: Good estimated ~ 80 oz daily  GI Symptoms: diarrhea  GI Symptoms greater than 2 weeks: intermittent " "    Food Intake  Amount of Food: Eats bits of foods at times; banana, soup.                   Enteral Nutrition Intake  Enteral Nutrition Formula/Solution: [employer] Standard 1.4 - currently doing 4 cartons per day between 3 feeds, Gravity feeds  Feeding Tube Flush: 60ml before and 120ml after each feed ( 3 times per day)                                  Nutrition Focused Physical Exam:  Subcutaneous Fat Loss  Orbital Fat Pads: Well nourshed (slightly bulging fat pads)  Buccal Fat Pads: Well nourished (full, rounded cheeks)  Muscle Wasting  Temporalis: Well nourished (well-defined muscle)    Energy Needs  Calculated Energy Needs Using Equations  Height: 177.5 cm (5' 9.88\")  Weight Used for Equation Calculations: 89.7 kg (197 lb 12 oz)  Coffee- . Jennie Equation (Overweight or Obese Patients): 1616  Activity Factor: 1.3  Total Energy Needs: 2100  Estimated Energy Needs  Total Energy Estimated Needs (kCal): 2300 kCal  Total Estimated Energy Need per Day (kCal/kg): 25 kCal/kg  Estimated Fluid Needs  Total Fluid Estimated Needs (mL): 2300 mL  Total Fluid Estimated Needs (mL/kg): 25 mL/kg  Estimated Protein Needs  Total Protein Estimated Needs (g): 110 g  Total Protein Estimated Needs (g/kg): 1.2 g/kg    Nutrition Diagnosis      Nutrition Diagnosis  Patient has Nutrition Diagnosis: Yes  Diagnosis Status (1): Ongoing  Nutrition Diagnosis 1: Increased nutrient needs  Additional Assessment Information (1): no changes  Additional Nutrition Diagnosis: Diagnosis 2  Diagnosis Status (2): Ongoing  Nutrition Diagnosis 2: Predicted inadequate nutrient intake  Additional Assessment Information (2): no changes    Nutrition Interventions/Recommendations   Food and Nutrition Delivery  Meals & Snacks: Energy-modified diet, Protein-modified diet, Texture-Modified Diet  Goals: Small frequent meals/snacks softer foods for pleasure feeds and to continue to swallow throughout SCC treatments, increased fluids  Enteral Nutrition: Enteral " nutrition site care, Feeding tube flush, Modify composition of enteral nutrition  Total Nutrition Provided: Continue with 4 cartons per day provides 1820 calories, 80g protein, 924ml free water per day  Goals: 5 cartons [employer] Standard 1.4; 2275 calories, 100g protein, 1155ml free water per day (5 cartons per day if significant weight loss noted week to week during chemo/RT tx.)  Nutrition Education  Nutrition Education Content: Content related nutrition education  Goals: Understand nutrition through PEG tube, and nutrition throughout SCC treatments.  Coordination of Nutrition Care by a Nutrition Professional  Collaboration and referral of nutrition care: Collaboration by nutrition professional with other providers  Goals: Carlos is DME for PEG tube formula and supplies    There are no Patient Instructions on file for this visit.    Nutrition Monitoring and Evaluation   Food/Nutrient Related History Monitoring  Monitoring and Evaluation Plan: Energy intake, Fluid intake, Protein intake, Enteral and parenteral nutrition intake  Energy Intake: Estimated energy intake  Criteria: 1500-6745 calories per day  Fluid Intake: Estimated fluid intake  Criteria: 0959-0301 calories per day  Estimated protein intake: Estimated protein intake  Criteria: 92-112g protein per day  Enteral and Parenteral Nutrition Intake: Enteral nutrition formula/solution, Enteral nutrition intake  Criteria: Continue with current TF of 4 cartons per day; 434ml 3 times per day gravity feeds.  Water flushes 60ml before and after and additional water flushes to meet estimated fluid needs.  Biochemical Data, Medical Tests and Procedures  Monitoring and Evaluation Plan: Electrolyte/renal panel, GI profile, Glucose/endocrine profile  Criteria: Labs WNL  Nutrition Focused Physical Findings  Monitoring and Evaluation Plan: Digestive System  Digestive System: Constipation, Diarrhea  Other: Encouraged 64-80 oz of fluids per day - more as needed    Enteral  Nutrition Goal:  [employer] Standard 1.4 gravity feeds, 5 cartons per day will provide 2275 calories, 100 g protein, 255g CHO, 1155ml free water.    Can do 542ml TID for total of 5 cartons.  Water flushes of 60 ml before and after each gravity feed.  Additional water flush of 240ml 3 times per day.    Time Spent  Prep time on day of patient encounter: 5 minutes  Time spent directly with patient, family or caregiver: 25 minutes  Additional Time Spent on Patient Care Activities: 10 minutes  Documentation Time: 15 minutes  Other Time Spent: 0 minutes  Total: 55 minutes        This service will continue to follow up as needed throughout SCC treatments.

## 2024-01-03 ENCOUNTER — NURSE TRIAGE (OUTPATIENT)
Dept: ADMISSION | Facility: HOSPITAL | Age: 79
End: 2024-01-03

## 2024-01-03 ENCOUNTER — RADIATION ONCOLOGY OTV (OUTPATIENT)
Dept: RADIATION ONCOLOGY | Facility: CLINIC | Age: 79
End: 2024-01-03
Payer: MEDICARE

## 2024-01-03 ENCOUNTER — HOME CARE VISIT (OUTPATIENT)
Dept: HOME HEALTH SERVICES | Facility: HOME HEALTH | Age: 79
End: 2024-01-03
Payer: MEDICARE

## 2024-01-03 ENCOUNTER — HOSPITAL ENCOUNTER (OUTPATIENT)
Dept: RADIATION ONCOLOGY | Facility: CLINIC | Age: 79
Setting detail: RADIATION/ONCOLOGY SERIES
Discharge: HOME | End: 2024-01-03
Payer: MEDICARE

## 2024-01-03 VITALS
TEMPERATURE: 97.5 F | RESPIRATION RATE: 18 BRPM | SYSTOLIC BLOOD PRESSURE: 118 MMHG | HEART RATE: 80 BPM | DIASTOLIC BLOOD PRESSURE: 50 MMHG | OXYGEN SATURATION: 97 %

## 2024-01-03 VITALS
HEART RATE: 67 BPM | OXYGEN SATURATION: 97 % | WEIGHT: 197.2 LBS | DIASTOLIC BLOOD PRESSURE: 63 MMHG | TEMPERATURE: 97.5 F | BODY MASS INDEX: 28.39 KG/M2 | SYSTOLIC BLOOD PRESSURE: 111 MMHG

## 2024-01-03 DIAGNOSIS — Z51.0 ENCOUNTER FOR ANTINEOPLASTIC RADIATION THERAPY: ICD-10-CM

## 2024-01-03 DIAGNOSIS — C10.8 MALIGNANT NEOPLASM OF OVERLAPPING SITES OF OROPHARYNX (MULTI): ICD-10-CM

## 2024-01-03 LAB
RAD ONC MSQ ACTUAL FRACTIONS DELIVERED: 16
RAD ONC MSQ ACTUAL SESSION DELIVERED DOSE: 200 CGRAY
RAD ONC MSQ ACTUAL TOTAL DOSE: 3200 CGRAY
RAD ONC MSQ ELAPSED DAYS: 23
RAD ONC MSQ LAST DATE: NORMAL
RAD ONC MSQ PRESCRIBED FRACTIONAL DOSE: 200 CGRAY
RAD ONC MSQ PRESCRIBED NUMBER OF FRACTIONS: 30
RAD ONC MSQ PRESCRIBED TECHNIQUE: NORMAL
RAD ONC MSQ PRESCRIBED TOTAL DOSE: 6000 CGRAY
RAD ONC MSQ PRESCRIPTION PATTERN COMMENT: NORMAL
RAD ONC MSQ START DATE: NORMAL
RAD ONC MSQ TREATMENT COURSE NUMBER: 1
RAD ONC MSQ TREATMENT SITE: NORMAL

## 2024-01-03 PROCEDURE — 77014 CHG CT GUIDANCE RADIATION THERAPY FLDS PLACEMENT: CPT | Performed by: RADIOLOGY

## 2024-01-03 PROCEDURE — 77336 RADIATION PHYSICS CONSULT: CPT | Performed by: RADIOLOGY

## 2024-01-03 PROCEDURE — 77386 HC INTENSITY-MODULATED RADIATION THERAPY (IMRT), COMPLEX: CPT | Performed by: RADIOLOGY

## 2024-01-03 PROCEDURE — 77427 RADIATION TX MANAGEMENT X5: CPT | Performed by: RADIOLOGY

## 2024-01-03 PROCEDURE — G0300 HHS/HOSPICE OF LPN EA 15 MIN: HCPCS | Mod: HHH

## 2024-01-03 PROCEDURE — 1090000002 HH PPS REVENUE DEBIT

## 2024-01-03 PROCEDURE — G0180 MD CERTIFICATION HHA PATIENT: HCPCS | Performed by: RADIOLOGY

## 2024-01-03 PROCEDURE — 1090000001 HH PPS REVENUE CREDIT

## 2024-01-03 ASSESSMENT — ENCOUNTER SYMPTOMS
APPETITE LEVEL: FAIR
DENIES PAIN: 1
LAST BOWEL MOVEMENT: 66842
DIARRHEA: 1
STOOL FREQUENCY: MORE THAN TWICE DAILY

## 2024-01-03 ASSESSMENT — PAIN SCALES - GENERAL: PAINLEVEL: 0-NO PAIN

## 2024-01-03 NOTE — TELEPHONE ENCOUNTER
Per Dr. Jade, he should start with the Imodium. However, if he gets dizzy/light headed or is tachycardic or hypotensive then he should go to the ER. I called the wife back and also spoke to Jessica, the home care RN. She was agreeable and said she would let us know if his vitals were off and required ED. If not, he will start with Imodium.

## 2024-01-03 NOTE — TELEPHONE ENCOUNTER
[facility] RN called reporting that she is with Kevin now and in the past 20 minutes he has had 3 really bad episodes of diarrhea. No nausea/vomiting or abdominal pain. Per the wife, this happened the day after his last chemo too but this time seems worse. She said his appetite is poor, but he is eating/drinking. Denies all other symptoms. The home care RN said he has been to the ER multiple times recently. They do have Imodium at home but haven't taken it yet. Message sent to the team.

## 2024-01-03 NOTE — PROGRESS NOTES
Radiation Oncology On Treatment Visit    Patient Name:  Kevin Mendes  MRN:  [id number]  :  1945    Referring Provider: No ref. provider found  Primary Care Provider: Kd Lange DO  Care Team: Patient Care Team:  Kd Lange DO as PCP - General  Kd Lange DO as PCP - [facility] ACO Attributed Provider  Kyunghee Burkitt, DO as Consulting Physician (Hematology and Oncology)  Tsering Jade MD as Consulting Physician (Hematology and Oncology)  Sonya Pike RDN, LD as Dietitian (Nutrition)  KERRY Mendoza as  ()    Date of Service: 1/3/2024     Diagnosis:   Specialty Problems          Radiation Oncology Problems    Head and neck cancer (CMS/HCC)        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)         Treatment Summary:  Radiation Treatments       Active   Oropharynx_R (Started on 2023)   Most recent fraction: 200 cGy given on 1/3/2024   Total given: 3,200 cGy / 6,000 cGy  (16 of 30 fractions)   Elapsed Days: 23   Technique: VMAT           Completed   No historical radiation treatments to show.             SUBJECTIVE: Patient reports increase in diarrhea this past Monday after having to take miralax due to no BM for 3 days. Now resolving. Reports nose bleeds last week, purchased new humidifier and improving. Appetite improving, but continued problems swallowing.       OBJECTIVE:   Vital Signs:  /63   Pulse 67   Temp 36.4 °C (97.5 °F)   Wt 89.4 kg (197 lb 3.2 oz)   SpO2 97%   BMI 28.39 kg/m²    Pain Scale: The patient's current pain level was assessed.  They report currently having a pain of 0 out of 10.    Other Pertinent Findings:     Toxicity Assessment          2023    13:00 2023    09:00 2023    09:08 2023    09:13   Toxicity Assessment   Adverse Events Reviewed (WDL) Yes (Within Defined Limits) Yes (Within Defined Limits) Yes (Within Defined Limits) Yes (Within Defined Limits)   Treatment  and neck Head and neck Head  "and neck Head and neck   Anorexia Grade 1       appetite comes and goes, using PEG tube feedings 3 per day with water flush. Encouraged small protein rich snacks, nutrition following. Grade 1 Grade 1       PEG tube feedings 4 per day with water flushes. About to swallow pills, yogurt, bananas, cereal. Decreased appetite. Tryed \"gummies\" with no increase in appetite. Grade 1       Peg tube feedings 4 per day with water flushes. Decreased appetite. Taking pills, yogurt, bananas, and cereal. Tried \"gummies\" without increase in appetite.   Anxiety Grade 1       nervous about possible side effects Grade 1     Dehydration Grade 0 Grade 0  Grade 0   Dermatitis Radiation Grade 0       reviewed skin care recommendations with patient Grade 0       using aquaphor  Grade 0       aquaphor   Diarrhea Grade 1       had an episode last friday with several watery BM's, went to the ER on saturday after feeling better. Labs were normal per patient, received IVF and was discharged. Resolving now Grade 0  Grade 1       loose stool x1 episode/day   Fatigue Grade 0 Grade 1  Grade 1       urinary frequency with nocturia. Not sleeping well at night. Taking naps   Nausea Grade 0       anti-emetics prn Grade 0  Grade 0   Pain Grade 0 Grade 1       mild throat pain relieved by tylenol  Grade 0   Vomiting Grade 0 Grade 0  Grade 0   Dysphagia Grade 0       softer foods Grade 1       softer foods /meds     Esophagitis Grade 0      Mucositis Oral Grade 0 Grade 1       dry mouth     Hearing Impaired Grade 0       basline, not worsening      Dry Eye --        blocked ducts per patient, eye Dr. following      Dry Mouth Grade 1       biotene and xylimelts Grade 1       mild increase in phlegm/usung suction machine  Grade 1       mild dry mouth.  has biotene but not using. Has suction machine, only used a few times.   Ear Pain Grade 0       can feel \"something\" on the right ear. Doesn't hurt Grade 0  Grade 1       intermittent ear pain relieved with " tylenol   Tinnitus Grade 0 Grade 0  Grade 0   Oral Pain Grade 0 Grade 0  Grade 0   Hoarseness Grade 1 Grade 1     Voice Alteration Grade 1 Grade 1  Grade 1       reports deeper and raspy        Assessment / Plan:  The patient is tolerating radiation therapy as anticipated.  Continue per current treatment plan.

## 2024-01-04 ENCOUNTER — HOSPITAL ENCOUNTER (OUTPATIENT)
Dept: RADIATION ONCOLOGY | Facility: CLINIC | Age: 79
Setting detail: RADIATION/ONCOLOGY SERIES
Discharge: HOME | End: 2024-01-04
Payer: MEDICARE

## 2024-01-04 DIAGNOSIS — C10.8 MALIGNANT NEOPLASM OF OVERLAPPING SITES OF OROPHARYNX (MULTI): ICD-10-CM

## 2024-01-04 DIAGNOSIS — Z51.0 ENCOUNTER FOR ANTINEOPLASTIC RADIATION THERAPY: ICD-10-CM

## 2024-01-04 LAB
RAD ONC MSQ ACTUAL FRACTIONS DELIVERED: 17
RAD ONC MSQ ACTUAL SESSION DELIVERED DOSE: 200 CGRAY
RAD ONC MSQ ACTUAL TOTAL DOSE: 3400 CGRAY
RAD ONC MSQ ELAPSED DAYS: 24
RAD ONC MSQ LAST DATE: NORMAL
RAD ONC MSQ PRESCRIBED FRACTIONAL DOSE: 200 CGRAY
RAD ONC MSQ PRESCRIBED NUMBER OF FRACTIONS: 30
RAD ONC MSQ PRESCRIBED TECHNIQUE: NORMAL
RAD ONC MSQ PRESCRIBED TOTAL DOSE: 6000 CGRAY
RAD ONC MSQ PRESCRIPTION PATTERN COMMENT: NORMAL
RAD ONC MSQ START DATE: NORMAL
RAD ONC MSQ TREATMENT COURSE NUMBER: 1
RAD ONC MSQ TREATMENT SITE: NORMAL

## 2024-01-04 PROCEDURE — 77014 CHG CT GUIDANCE RADIATION THERAPY FLDS PLACEMENT: CPT | Performed by: RADIOLOGY

## 2024-01-04 PROCEDURE — 1090000002 HH PPS REVENUE DEBIT

## 2024-01-04 PROCEDURE — 1090000001 HH PPS REVENUE CREDIT

## 2024-01-04 PROCEDURE — 77386 HC INTENSITY-MODULATED RADIATION THERAPY (IMRT), COMPLEX: CPT | Performed by: RADIOLOGY

## 2024-01-05 ENCOUNTER — HOSPITAL ENCOUNTER (OUTPATIENT)
Dept: RADIATION ONCOLOGY | Facility: CLINIC | Age: 79
Setting detail: RADIATION/ONCOLOGY SERIES
Discharge: HOME | End: 2024-01-05
Payer: MEDICARE

## 2024-01-05 DIAGNOSIS — C10.8 MALIGNANT NEOPLASM OF OVERLAPPING SITES OF OROPHARYNX (MULTI): ICD-10-CM

## 2024-01-05 DIAGNOSIS — Z51.0 ENCOUNTER FOR ANTINEOPLASTIC RADIATION THERAPY: ICD-10-CM

## 2024-01-05 LAB
RAD ONC MSQ ACTUAL FRACTIONS DELIVERED: 18
RAD ONC MSQ ACTUAL SESSION DELIVERED DOSE: 200 CGRAY
RAD ONC MSQ ACTUAL TOTAL DOSE: 3600 CGRAY
RAD ONC MSQ ELAPSED DAYS: 25
RAD ONC MSQ LAST DATE: NORMAL
RAD ONC MSQ PRESCRIBED FRACTIONAL DOSE: 200 CGRAY
RAD ONC MSQ PRESCRIBED NUMBER OF FRACTIONS: 30
RAD ONC MSQ PRESCRIBED TECHNIQUE: NORMAL
RAD ONC MSQ PRESCRIBED TOTAL DOSE: 6000 CGRAY
RAD ONC MSQ PRESCRIPTION PATTERN COMMENT: NORMAL
RAD ONC MSQ START DATE: NORMAL
RAD ONC MSQ TREATMENT COURSE NUMBER: 1
RAD ONC MSQ TREATMENT SITE: NORMAL

## 2024-01-05 PROCEDURE — 77014 CHG CT GUIDANCE RADIATION THERAPY FLDS PLACEMENT: CPT | Performed by: RADIOLOGY

## 2024-01-05 PROCEDURE — 1090000002 HH PPS REVENUE DEBIT

## 2024-01-05 PROCEDURE — 77386 HC INTENSITY-MODULATED RADIATION THERAPY (IMRT), COMPLEX: CPT | Performed by: RADIOLOGY

## 2024-01-05 PROCEDURE — 1090000001 HH PPS REVENUE CREDIT

## 2024-01-05 RX ORDER — EPINEPHRINE 0.3 MG/.3ML
0.3 INJECTION SUBCUTANEOUS EVERY 5 MIN PRN
Status: CANCELLED | OUTPATIENT
Start: 2024-01-09

## 2024-01-05 RX ORDER — ALBUTEROL SULFATE 0.83 MG/ML
3 SOLUTION RESPIRATORY (INHALATION) AS NEEDED
Status: CANCELLED | OUTPATIENT
Start: 2024-01-09

## 2024-01-05 RX ORDER — FAMOTIDINE 10 MG/ML
20 INJECTION INTRAVENOUS ONCE
Status: CANCELLED | OUTPATIENT
Start: 2024-01-09

## 2024-01-05 RX ORDER — PALONOSETRON 0.05 MG/ML
0.25 INJECTION, SOLUTION INTRAVENOUS ONCE
Status: CANCELLED | OUTPATIENT
Start: 2024-01-09

## 2024-01-05 RX ORDER — DEXAMETHASONE SODIUM PHOSPHATE 100 MG/10ML
10 INJECTION INTRAMUSCULAR; INTRAVENOUS ONCE
Status: CANCELLED | OUTPATIENT
Start: 2024-01-09

## 2024-01-05 RX ORDER — PROCHLORPERAZINE EDISYLATE 5 MG/ML
10 INJECTION INTRAMUSCULAR; INTRAVENOUS EVERY 6 HOURS PRN
Status: CANCELLED | OUTPATIENT
Start: 2024-01-09

## 2024-01-05 RX ORDER — FAMOTIDINE 10 MG/ML
20 INJECTION INTRAVENOUS ONCE AS NEEDED
Status: CANCELLED | OUTPATIENT
Start: 2024-01-09

## 2024-01-05 RX ORDER — DIPHENHYDRAMINE HCL 50 MG
50 CAPSULE ORAL ONCE
Status: CANCELLED | OUTPATIENT
Start: 2024-01-09

## 2024-01-05 RX ORDER — PROCHLORPERAZINE MALEATE 5 MG
10 TABLET ORAL EVERY 6 HOURS PRN
Status: CANCELLED | OUTPATIENT
Start: 2024-01-09

## 2024-01-05 RX ORDER — DIPHENHYDRAMINE HYDROCHLORIDE 50 MG/ML
50 INJECTION INTRAMUSCULAR; INTRAVENOUS AS NEEDED
Status: CANCELLED | OUTPATIENT
Start: 2024-01-09

## 2024-01-06 PROCEDURE — 1090000001 HH PPS REVENUE CREDIT

## 2024-01-06 PROCEDURE — 1090000002 HH PPS REVENUE DEBIT

## 2024-01-07 PROCEDURE — 1090000001 HH PPS REVENUE CREDIT

## 2024-01-07 PROCEDURE — 1090000002 HH PPS REVENUE DEBIT

## 2024-01-08 ENCOUNTER — HOSPITAL ENCOUNTER (OUTPATIENT)
Dept: RADIATION ONCOLOGY | Facility: CLINIC | Age: 79
Setting detail: RADIATION/ONCOLOGY SERIES
Discharge: HOME | End: 2024-01-08
Payer: MEDICARE

## 2024-01-08 ENCOUNTER — OFFICE VISIT (OUTPATIENT)
Dept: HEMATOLOGY/ONCOLOGY | Facility: CLINIC | Age: 79
End: 2024-01-08
Payer: MEDICARE

## 2024-01-08 ENCOUNTER — INFUSION (OUTPATIENT)
Dept: HEMATOLOGY/ONCOLOGY | Facility: CLINIC | Age: 79
End: 2024-01-08
Payer: MEDICARE

## 2024-01-08 ENCOUNTER — NUTRITION (OUTPATIENT)
Dept: HEMATOLOGY/ONCOLOGY | Facility: CLINIC | Age: 79
End: 2024-01-08

## 2024-01-08 VITALS
TEMPERATURE: 97.5 F | HEART RATE: 76 BPM | BODY MASS INDEX: 28.63 KG/M2 | OXYGEN SATURATION: 95 % | RESPIRATION RATE: 18 BRPM | SYSTOLIC BLOOD PRESSURE: 132 MMHG | DIASTOLIC BLOOD PRESSURE: 63 MMHG | WEIGHT: 198.85 LBS

## 2024-01-08 VITALS — WEIGHT: 198.85 LBS | BODY MASS INDEX: 28.47 KG/M2 | HEIGHT: 70 IN

## 2024-01-08 VITALS — HEIGHT: 70 IN | BODY MASS INDEX: 28.47 KG/M2

## 2024-01-08 DIAGNOSIS — C76.0 HEAD AND NECK CANCER (MULTI): ICD-10-CM

## 2024-01-08 DIAGNOSIS — C10.8 MALIGNANT NEOPLASM OF OVERLAPPING SITES OF OROPHARYNX (MULTI): ICD-10-CM

## 2024-01-08 DIAGNOSIS — Z51.0 ENCOUNTER FOR ANTINEOPLASTIC RADIATION THERAPY: ICD-10-CM

## 2024-01-08 LAB
ALBUMIN SERPL BCP-MCNC: 4.2 G/DL (ref 3.4–5)
ALP SERPL-CCNC: 84 U/L (ref 33–136)
ALT SERPL W P-5'-P-CCNC: 8 U/L (ref 10–52)
ANION GAP SERPL CALC-SCNC: 14 MMOL/L (ref 10–20)
AST SERPL W P-5'-P-CCNC: 12 U/L (ref 9–39)
BASOPHILS # BLD AUTO: 0.05 X10*3/UL (ref 0–0.1)
BASOPHILS NFR BLD AUTO: 1.2 %
BILIRUB SERPL-MCNC: 0.7 MG/DL (ref 0–1.2)
BUN SERPL-MCNC: 34 MG/DL (ref 6–23)
CALCIUM SERPL-MCNC: 9.2 MG/DL (ref 8.6–10.3)
CHLORIDE SERPL-SCNC: 99 MMOL/L (ref 98–107)
CO2 SERPL-SCNC: 28 MMOL/L (ref 21–32)
CREAT SERPL-MCNC: 1.02 MG/DL (ref 0.5–1.3)
EGFRCR SERPLBLD CKD-EPI 2021: 75 ML/MIN/1.73M*2
EOSINOPHIL # BLD AUTO: 0.06 X10*3/UL (ref 0–0.4)
EOSINOPHIL NFR BLD AUTO: 1.5 %
ERYTHROCYTE [DISTWIDTH] IN BLOOD BY AUTOMATED COUNT: 16.4 % (ref 11.5–14.5)
GLUCOSE SERPL-MCNC: 276 MG/DL (ref 74–99)
HCT VFR BLD AUTO: 29.5 % (ref 41–52)
HGB BLD-MCNC: 9.5 G/DL (ref 13.5–17.5)
IMM GRANULOCYTES # BLD AUTO: 0.01 X10*3/UL (ref 0–0.5)
IMM GRANULOCYTES NFR BLD AUTO: 0.2 % (ref 0–0.9)
LYMPHOCYTES # BLD AUTO: 0.49 X10*3/UL (ref 0.8–3)
LYMPHOCYTES NFR BLD AUTO: 11.9 %
MAGNESIUM SERPL-MCNC: 1.82 MG/DL (ref 1.6–2.4)
MCH RBC QN AUTO: 29.7 PG (ref 26–34)
MCHC RBC AUTO-ENTMCNC: 32.2 G/DL (ref 32–36)
MCV RBC AUTO: 92 FL (ref 80–100)
MONOCYTES # BLD AUTO: 0.23 X10*3/UL (ref 0.05–0.8)
MONOCYTES NFR BLD AUTO: 5.6 %
NEUTROPHILS # BLD AUTO: 3.28 X10*3/UL (ref 1.6–5.5)
NEUTROPHILS NFR BLD AUTO: 79.6 %
NRBC BLD-RTO: ABNORMAL /100{WBCS}
PLATELET # BLD AUTO: 104 X10*3/UL (ref 150–450)
POTASSIUM SERPL-SCNC: 5.1 MMOL/L (ref 3.5–5.3)
PROT SERPL-MCNC: 7.3 G/DL (ref 6.4–8.2)
RAD ONC MSQ ACTUAL FRACTIONS DELIVERED: 19
RAD ONC MSQ ACTUAL SESSION DELIVERED DOSE: 200 CGRAY
RAD ONC MSQ ACTUAL TOTAL DOSE: 3800 CGRAY
RAD ONC MSQ ELAPSED DAYS: 28
RAD ONC MSQ LAST DATE: NORMAL
RAD ONC MSQ PRESCRIBED FRACTIONAL DOSE: 200 CGRAY
RAD ONC MSQ PRESCRIBED NUMBER OF FRACTIONS: 30
RAD ONC MSQ PRESCRIBED TECHNIQUE: NORMAL
RAD ONC MSQ PRESCRIBED TOTAL DOSE: 6000 CGRAY
RAD ONC MSQ PRESCRIPTION PATTERN COMMENT: NORMAL
RAD ONC MSQ START DATE: NORMAL
RAD ONC MSQ TREATMENT COURSE NUMBER: 1
RAD ONC MSQ TREATMENT SITE: NORMAL
RBC # BLD AUTO: 3.2 X10*6/UL (ref 4.5–5.9)
SODIUM SERPL-SCNC: 136 MMOL/L (ref 136–145)
WBC # BLD AUTO: 4.1 X10*3/UL (ref 4.4–11.3)

## 2024-01-08 PROCEDURE — 99214 OFFICE O/P EST MOD 30 MIN: CPT | Performed by: NURSE PRACTITIONER

## 2024-01-08 PROCEDURE — 77386 HC INTENSITY-MODULATED RADIATION THERAPY (IMRT), COMPLEX: CPT | Performed by: RADIOLOGY

## 2024-01-08 PROCEDURE — 1036F TOBACCO NON-USER: CPT | Performed by: NURSE PRACTITIONER

## 2024-01-08 PROCEDURE — 2500000004 HC RX 250 GENERAL PHARMACY W/ HCPCS (ALT 636 FOR OP/ED): Performed by: STUDENT IN AN ORGANIZED HEALTH CARE EDUCATION/TRAINING PROGRAM

## 2024-01-08 PROCEDURE — 1090000002 HH PPS REVENUE DEBIT

## 2024-01-08 PROCEDURE — 83735 ASSAY OF MAGNESIUM: CPT | Performed by: INTERNAL MEDICINE

## 2024-01-08 PROCEDURE — 96413 CHEMO IV INFUSION 1 HR: CPT

## 2024-01-08 PROCEDURE — 80053 COMPREHEN METABOLIC PANEL: CPT | Performed by: INTERNAL MEDICINE

## 2024-01-08 PROCEDURE — 77014 CHG CT GUIDANCE RADIATION THERAPY FLDS PLACEMENT: CPT | Performed by: RADIOLOGY

## 2024-01-08 PROCEDURE — 3075F SYST BP GE 130 - 139MM HG: CPT | Performed by: NURSE PRACTITIONER

## 2024-01-08 PROCEDURE — 36415 COLL VENOUS BLD VENIPUNCTURE: CPT

## 2024-01-08 PROCEDURE — 3078F DIAST BP <80 MM HG: CPT | Performed by: NURSE PRACTITIONER

## 2024-01-08 PROCEDURE — 2500000004 HC RX 250 GENERAL PHARMACY W/ HCPCS (ALT 636 FOR OP/ED): Mod: JZ | Performed by: STUDENT IN AN ORGANIZED HEALTH CARE EDUCATION/TRAINING PROGRAM

## 2024-01-08 PROCEDURE — 1159F MED LIST DOCD IN RCRD: CPT | Performed by: NURSE PRACTITIONER

## 2024-01-08 PROCEDURE — 96375 TX/PRO/DX INJ NEW DRUG ADDON: CPT | Mod: INF

## 2024-01-08 PROCEDURE — 2500000001 HC RX 250 WO HCPCS SELF ADMINISTERED DRUGS (ALT 637 FOR MEDICARE OP): Performed by: STUDENT IN AN ORGANIZED HEALTH CARE EDUCATION/TRAINING PROGRAM

## 2024-01-08 PROCEDURE — 1126F AMNT PAIN NOTED NONE PRSNT: CPT | Performed by: NURSE PRACTITIONER

## 2024-01-08 PROCEDURE — 1157F ADVNC CARE PLAN IN RCRD: CPT | Performed by: NURSE PRACTITIONER

## 2024-01-08 PROCEDURE — 1090000001 HH PPS REVENUE CREDIT

## 2024-01-08 PROCEDURE — 96417 CHEMO IV INFUS EACH ADDL SEQ: CPT

## 2024-01-08 PROCEDURE — 85025 COMPLETE CBC W/AUTO DIFF WBC: CPT | Performed by: INTERNAL MEDICINE

## 2024-01-08 RX ORDER — PROCHLORPERAZINE EDISYLATE 5 MG/ML
10 INJECTION INTRAMUSCULAR; INTRAVENOUS EVERY 6 HOURS PRN
Status: DISCONTINUED | OUTPATIENT
Start: 2024-01-08 | End: 2024-01-08 | Stop reason: HOSPADM

## 2024-01-08 RX ORDER — PALONOSETRON 0.05 MG/ML
0.25 INJECTION, SOLUTION INTRAVENOUS ONCE
Status: COMPLETED | OUTPATIENT
Start: 2024-01-08 | End: 2024-01-08

## 2024-01-08 RX ORDER — PROCHLORPERAZINE MALEATE 10 MG
10 TABLET ORAL EVERY 6 HOURS PRN
Status: DISCONTINUED | OUTPATIENT
Start: 2024-01-08 | End: 2024-01-08 | Stop reason: HOSPADM

## 2024-01-08 RX ORDER — ALBUTEROL SULFATE 0.83 MG/ML
3 SOLUTION RESPIRATORY (INHALATION) AS NEEDED
Status: DISCONTINUED | OUTPATIENT
Start: 2024-01-08 | End: 2024-01-08 | Stop reason: HOSPADM

## 2024-01-08 RX ORDER — EPINEPHRINE 0.3 MG/.3ML
0.3 INJECTION SUBCUTANEOUS EVERY 5 MIN PRN
Status: DISCONTINUED | OUTPATIENT
Start: 2024-01-08 | End: 2024-01-08 | Stop reason: HOSPADM

## 2024-01-08 RX ORDER — FAMOTIDINE 10 MG/ML
20 INJECTION INTRAVENOUS ONCE
Status: COMPLETED | OUTPATIENT
Start: 2024-01-08 | End: 2024-01-08

## 2024-01-08 RX ORDER — DEXAMETHASONE SODIUM PHOSPHATE 100 MG/10ML
10 INJECTION INTRAMUSCULAR; INTRAVENOUS ONCE
Status: COMPLETED | OUTPATIENT
Start: 2024-01-08 | End: 2024-01-08

## 2024-01-08 RX ORDER — DIPHENHYDRAMINE HYDROCHLORIDE 50 MG/ML
50 INJECTION INTRAMUSCULAR; INTRAVENOUS AS NEEDED
Status: DISCONTINUED | OUTPATIENT
Start: 2024-01-08 | End: 2024-01-08 | Stop reason: HOSPADM

## 2024-01-08 RX ORDER — DIPHENHYDRAMINE HCL 25 MG
50 CAPSULE ORAL ONCE
Status: COMPLETED | OUTPATIENT
Start: 2024-01-08 | End: 2024-01-08

## 2024-01-08 RX ORDER — FAMOTIDINE 10 MG/ML
20 INJECTION INTRAVENOUS ONCE AS NEEDED
Status: DISCONTINUED | OUTPATIENT
Start: 2024-01-08 | End: 2024-01-08 | Stop reason: HOSPADM

## 2024-01-08 RX ADMIN — FAMOTIDINE 20 MG: 10 INJECTION INTRAVENOUS at 12:10

## 2024-01-08 RX ADMIN — CARBOPLATIN 185 MG: 10 INJECTION, SOLUTION INTRAVENOUS at 13:47

## 2024-01-08 RX ADMIN — PALONOSETRON 250 MCG: 0.05 INJECTION, SOLUTION INTRAVENOUS at 12:09

## 2024-01-08 RX ADMIN — PACLITAXEL 96 MG: 6 INJECTION, SOLUTION, CONCENTRATE INTRAVENOUS at 12:36

## 2024-01-08 RX ADMIN — DIPHENHYDRAMINE HYDROCHLORIDE 50 MG: 25 CAPSULE ORAL at 12:10

## 2024-01-08 RX ADMIN — DEXAMETHASONE SODIUM PHOSPHATE 10 MG: 10 INJECTION INTRAMUSCULAR; INTRAVENOUS at 12:10

## 2024-01-08 ASSESSMENT — PAIN SCALES - GENERAL: PAINLEVEL: 0-NO PAIN

## 2024-01-08 NOTE — PROGRESS NOTES
"[facility] - Medical Oncology Follow-Up Visit    Patient ID: Kevin Mendes \"Vinh" is a 78 y.o. male      Current therapy: CARBOplatin (Paraplatin) 148.8 mg in sodium chloride 0.9% 114.88 mL IV, 148.8 mg, intravenous, Once, 4 of 7 cycles    Administration: 148.8 mg (12/11/2023), 160 mg (12/26/2023), 159 mg (12/18/2023), 210 mg (1/2/2024)        PACLitaxeL (Taxol) 96 mg in dextrose 5 % in water (D5W) 116 mL IV, 45 mg/m2 = 96 mg, intravenous, Once, 4 of 7 cycles    Administration: 96 mg (12/11/2023), 96 mg (12/26/2023), 96 mg (12/18/2023), 96 mg (1/2/2024)        palonosetron (Aloxi) injection 250 mcg, 250 mcg, intravenous, Once, 4 of 7 cycles    Administration: 250 mcg (12/11/2023), 250 mcg (12/26/2023), 250 mcg (12/18/2023), 250 mcg (1/2/2024)      Oncologic History:     Diagnosis: Base of tongue cancer  Staging: T3N0M0  Date of Diagnosis: 11/8/23     Providers:  ENT Surgeon: Dr. Radha Gomez:   Dr. Burkitt ([facility]) --> Dr. Kalie Torres: Dr. Diaz     Oncological history;  Patient was having sore throat with globus sensation for about 1 month.  CT scan (10/20/23) showed exophytic mass in the oropharynx coming from BOT.  PET CT (11/3/23) showed focal hypermetabolic activity along the right base of the tongue with extension inferiorly to the level of the epiglottis.  No distant mets.  Low dose CT chest showed nodule in LLL, but favors infectious/inflammatory.  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  PEG tube placed. He started concurrent chemoradiation with weekly carbo/taxol on 12/11/23.         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)  09/06/2018: Localized, primary osteoarthritis  09/25/2017: Low back pain, unspecified      Comment:  Acute low back pain  07/23/2009: Malignant melanoma of skin of trunk, except scrotum (CMS/  HCC)  No date: 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)  No date: Personal history of other diseases of the circulatory system      Comment:  Personal history of supraventricular tachycardia  12/31/2022: Personal history of other diseases of urinary system      Comment:  History of hematuria  No date: Personal history of other endocrine, nutritional and   metabolic disease      Comment:  History of diabetes mellitus  04/05/2010: Polyneuropathy  01/12/2018: Presence of right artificial knee joint  07/23/2018: Sensorineural hearing loss, bilateral      Surgical History:    Surgical History         Past Surgical History:   Procedure Laterality Date    CATARACT EXTRACTION   04/07/2017     Cataract Surgery    HAND SURGERY   04/07/2017     Hand Surgery                                                                                                                                                          OTHER SURGICAL HISTORY   04/10/2014     Catheter Ablation Atrial Supraventricular Tachycardia    SHOULDER SURGERY   04/07/2017     Shoulder Surgery    TOTAL HIP ARTHROPLASTY   04/10/2014     Hip Replacement    TOTAL KNEE ARTHROPLASTY   04/10/2014     Knee Replacement         Family History:    Family History          Family History   Problem Relation Name Age of Onset    Other (heart failure following cardiac surgery) Mother             Family Oncology History:    Cancer-related family history is not on file.  Social History:    Social History            Tobacco Use    Smoking status: Never    Smokeless tobacco: Never   Vaping Use    Vaping Use: Never used   Substance Use Topics    Alcohol use: Yes       Alcohol/week: 2.0 standard drinks of alcohol       Types: 2 Cans of beer per week     Drug use: Never        Chief Concern: Here in clinic for follow-up on concurrent chemo/RT;     HPI Follow-up prior to treatment on concurrent chemo/RT (week 5). He is feeling well for treatment. Breathing stable, no new respiratory symptoms. Reports diarrhea after chemotherapy, relieved with imodium, states he then became constipated and needed laxative. Discussed taking imodium in lower dose to avoid constipation, he will try this. He is staying well hydrated, continues using PEG for tube feedings. No rash or skin issues. No fevers, chills, or cold symptoms. No other concerns or complaints.      Meds (Current):    Current Outpatient Medications:     aspirin 81 mg EC tablet, Take 1 tablet (81 mg) by mouth once daily., Disp: , Rfl:     glyBURIDE (Diabeta) 5 mg tablet, Take 2 tablets (10 mg) by mouth 2 times a day with meals., Disp: 360 tablet, Rfl: 2    LORazepam (Ativan) 1 mg tablet, Take 1 tablet (1 mg) by mouth once daily. Prior to each radiation treatment, Disp: , Rfl:     losartan (Cozaar) 100 mg tablet, Take 1 tablet (100 mg) by mouth once daily., Disp: 90 tablet, Rfl: 2    metFORMIN  mg 24 hr tablet, Take 2 tablets (1,000 mg) by mouth once daily in the evening. Take with meals. Do not crush, chew, or split., Disp: 180 tablet, Rfl: 2    neomycin-polymyxin B-dexameth (Polydex) 3.5 mg/g-10,000 unit/g-0.1 % ointment ophthalmic ointment, 2 times a day., Disp: , Rfl:     neomycin-polymyxin-dexAMETHasone (Maxitrol) 3.5mg/mL-10,000 unit/mL-0.1 % ophthalmic suspension, 2 times a day., Disp: , Rfl:     ondansetron (Zofran) 8 mg tablet, Take 1 tablet (8 mg) by mouth every 8 hours if needed for nausea or vomiting., Disp: 30 tablet, Rfl: 5    pioglitazone (Actos) 45 mg tablet, Take 1 tablet (45 mg) by mouth once daily., Disp: 90 tablet, Rfl: 2    PreviDent 5000 Booster Plus 1.1 % dental paste, Brush at bedtime and expectorate do not rinse, Disp: , Rfl:     prochlorperazine (Compazine) 10 mg tablet, Take 1 tablet  (10 mg) by mouth every 6 hours if needed for nausea or vomiting., Disp: 30 tablet, Rfl: 5    simvastatin (Zocor) 40 mg tablet, Take 1 tablet (40 mg) by mouth once daily at bedtime., Disp: 90 tablet, Rfl: 2    tamsulosin (Flomax) 0.4 mg 24 hr capsule, Take 1 capsule (0.4 mg) by mouth once daily. (Patient not taking: Reported on 12/11/2023), Disp: 90 capsule, Rfl: 2    Review of Systems - Oncology 12 point ROS was obtained and negative unless otherwise mentioned in the above HPI.      Objective   BSA: 2.11 meters squared  Wt Readings from Last 5 Encounters:   01/08/24 90.2 kg (198 lb 13.7 oz)   01/03/24 89.4 kg (197 lb 3.2 oz)   01/02/24 89.7 kg (197 lb 12.8 oz)   01/02/24 89.7 kg (197 lb 12.8 oz)   12/28/23 90.7 kg (200 lb)     /63 (BP Location: Right arm, Patient Position: Sitting, BP Cuff Size: Adult)   Pulse 76   Temp 36.4 °C (97.5 °F) (Temporal)   Resp 18   Wt 90.2 kg (198 lb 13.7 oz)   SpO2 95%   BMI 28.63 kg/m²     Physical Exam  Constitutional:       Appearance: Normal appearance.   Eyes:      Extraocular Movements: Extraocular movements intact.   Cardiovascular:      Rate and Rhythm: Normal rate and regular rhythm.      Heart sounds: Normal heart sounds.   Pulmonary:      Effort: Pulmonary effort is normal.      Breath sounds: Normal breath sounds.   Abdominal:      General: Bowel sounds are normal.      Palpations: Abdomen is soft.   Musculoskeletal:         General: Normal range of motion.   Skin:     General: Skin is warm and dry.   Neurological:      General: No focal deficit present.      Mental Status: He is alert and oriented to person, place, and time.   Psychiatric:         Mood and Affect: Mood normal.         Behavior: Behavior normal.    Not performed due to visit type.         Results:  Labs:  Lab Results   Component Value Date    WBC 3.6 (L) 01/02/2024    HGB 9.3 (L) 01/02/2024    HCT 29.5 (L) 01/02/2024    MCV 93 01/02/2024     01/02/2024      Lab Results   Component Value  "Date    NEUTROABS 2.42 01/02/2024      Lab Results   Component Value Date    GLUCOSE 213 (H) 01/02/2024    CALCIUM 7.9 (L) 01/02/2024     01/02/2024    K 4.1 01/02/2024    CO2 26 01/02/2024     01/02/2024    BUN 27 (H) 01/02/2024    CREATININE 0.88 01/02/2024    MG 1.73 01/02/2024     Lab Results   Component Value Date    ALT 8 (L) 01/02/2024    AST 11 01/02/2024    ALKPHOS 70 01/02/2024    BILITOT 0.6 01/02/2024      Lab Results   Component Value Date    TSH 0.51 12/01/2023       Imaging:           No results found for this or any previous visit.    Assessment/Plan      Kevin Mendes \"Katherine\" is a 78 y.o. male here for follow up   Mr. Mendes is 77 y/o male with recent dx of BOT SCC (T2N0M0, p16+, Stage I) who is referred to med onc to be considered for chemotherapy in addition to radiation.     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 11/3/23: PET/CT showed no distant mets except a pulmonary nodule in LLL with no hypermetabolic activity. Findings are favored to be infectious/inflammatory or granulomatous in nature (discussed in HN TB with radiologist). Close follow up with CT to assess for stability is recommended.  Since pt is not a surgical candidate, we will treat him with concurrent chemoradiation. His GFR is at 50s, prefer weekly carboplatin and paclitaxel. Dr. Burkitt previously discussed with patient about treatment schedule and chemotherapy related side effects in detail. Met with Dr. Diaz from radiation oncology.      - now s/p infection of PEG site which has resolved  - started concurrent chemoRT with weekly carbo/taxol today 12/11/23  - Will see patient weekly in follow-up, next week (week 6) for in-person visit prior to treatment                 "

## 2024-01-08 NOTE — PROGRESS NOTES
"NUTRITION Follow-Up NOTE    Nutrition Assessment       Reason for Visit:  Kevin Mendes \"Katherine\" is a 78 y.o. male with a diagnosis of BOT cancer.  Patient had a PEG tube placed 11/8/2023.  Follow up visit today done at [facility] during pt infusion.    Remains to do 4 cartons of [employer] Standard 1.4 via PEG tube. He is doing 80 oz of fluids total either via PEG tube or orally daily.  Eats small bites of foods orally occasionally.       Lab Results   Component Value Date/Time    GLUCOSE 276 (H) 01/08/2024 1045     01/08/2024 1045    K 5.1 01/08/2024 1045    CL 99 01/08/2024 1045    CO2 28 01/08/2024 1045    ANIONGAP 14 01/08/2024 1045    BUN 34 (H) 01/08/2024 1045    CREATININE 1.02 01/08/2024 1045    EGFR 75 01/08/2024 1045    CALCIUM 9.2 01/08/2024 1045    ALBUMIN 4.2 01/08/2024 1045    ALKPHOS 84 01/08/2024 1045    PROT 7.3 01/08/2024 1045    AST 12 01/08/2024 1045    BILITOT 0.7 01/08/2024 1045    ALT 8 (L) 01/08/2024 1045     Anthropometrics:  Anthropometrics  Height: 178 cm (5' 10.08\")  Weight: 90.2 kg (198 lb 13.7 oz)  BMI (Calculated): 28.47  IBW/kg (Dietitian Calculated): 75.45 kg  Percent of IBW: 119 %        Wt Readings from Last 10 Encounters:   01/08/24 90.2 kg (198 lb 13.7 oz)   01/08/24 90.2 kg (198 lb 13.7 oz)   01/03/24 89.4 kg (197 lb 3.2 oz)   01/02/24 89.7 kg (197 lb 12.8 oz)   01/02/24 89.7 kg (197 lb 12.8 oz)   12/28/23 90.7 kg (200 lb)   12/27/23 91.8 kg (202 lb 6.4 oz)   12/26/23 90.7 kg (200 lb)   12/20/23 92.3 kg (203 lb 6.4 oz)   12/18/23 91.4 kg (201 lb 8 oz)        Food And Nutrient Intake:  Food and Nutrient History  Energy Intake: Poor < 50 %  Fluid Intake: Good estimated ~ 80 oz daily  GI Symptoms: diarrhea (reports diarrhea after chemo)  GI Symptoms greater than 2 weeks: intermittent     Food Intake  Amount of Food: Eats bits of foods at times; banana, soup.                   Enteral Nutrition Intake  Enteral Nutrition Formula/Solution: RayV Standard 1.4 - currently doing " "4 cartons per day between 3 feeds, Gravity feeds  Feeding Tube Flush: 60ml before and 120ml after each feed ( 3 times per day)                             Medication and Complementary/Alternative Medicine Use  OTC Medication Use: takes imodium when diarrhea present.    Nutrition Focused Physical Exam:  Subcutaneous Fat Loss  Orbital Fat Pads: Well nourshed (slightly bulging fat pads)  Buccal Fat Pads: Well nourished (full, rounded cheeks)  Muscle Wasting  Temporalis: Well nourished (well-defined muscle)    Energy Needs  Calculated Energy Needs Using Equations  Height: 178 cm (5' 10.08\")  Weight Used for Equation Calculations: 90.2 kg (198 lb 13.7 oz)  Burson- St. Jeor Equation (Overweight or Obese Patients): 1624  Activity Factor: 1.3  Total Energy Needs: 2100  Estimated Energy Needs  Total Energy Estimated Needs (kCal): 2300 kCal  Total Estimated Energy Need per Day (kCal/kg): 25 kCal/kg  Estimated Fluid Needs  Total Fluid Estimated Needs (mL): 2300 mL  Total Fluid Estimated Needs (mL/kg): 25 mL/kg  Estimated Protein Needs  Total Protein Estimated Needs (g): 110 g  Total Protein Estimated Needs (g/kg): 1.2 g/kg    Nutrition Diagnosis      Nutrition Diagnosis  Patient has Nutrition Diagnosis: Yes  Diagnosis Status (1): Ongoing  Nutrition Diagnosis 1: Increased nutrient needs  Additional Assessment Information (1): no changes  Additional Nutrition Diagnosis: Diagnosis 2  Diagnosis Status (2): Ongoing  Nutrition Diagnosis 2: Predicted inadequate nutrient intake  Additional Assessment Information (2): no changes    Nutrition Interventions/Recommendations   Food and Nutrition Delivery  Meals & Snacks: Energy-modified diet, Protein-modified diet, Texture-Modified Diet  Goals: Small frequent meals/snacks softer foods for pleasure feeds and to continue to swallow throughout SCC treatments, increased fluids  Enteral Nutrition: Enteral nutrition site care, Feeding tube flush, Modify composition of enteral nutrition  Total " Nutrition Provided: Continue with 4 cartons per day provides 1820 calories, 80g protein, 924ml free water per day  Goals: 5 cartons [employer] Standard 1.4; 2275 calories, 100g protein, 1155ml free water per day  Nutrition Education  Nutrition Education Content: Content related nutrition education  Goals: Understand nutrition through PEG tube, and nutrition throughout SCC treatments.  Coordination of Nutrition Care by a Nutrition Professional  Collaboration and referral of nutrition care: Collaboration by nutrition professional with other providers  Goals: [employer] is DME for PEG tube formula and supplies    There are no Patient Instructions on file for this visit.    Nutrition Monitoring and Evaluation   Food/Nutrient Related History Monitoring  Monitoring and Evaluation Plan: Energy intake, Fluid intake, Protein intake, Enteral and parenteral nutrition intake  Energy Intake: Estimated energy intake  Criteria: 4509-9189 calories per day  Fluid Intake: Estimated fluid intake  Criteria: 1485-9584 calories per day  Estimated protein intake: Estimated protein intake  Criteria: 92-112g protein per day  Enteral and Parenteral Nutrition Intake: Enteral nutrition formula/solution, Enteral nutrition intake  Criteria: Continue with current TF of 4 cartons per day; 434ml 3 times per day gravity feeds.  Water flushes 60ml before and after and additional water flushes to meet estimated fluid needs.  Biochemical Data, Medical Tests and Procedures  Monitoring and Evaluation Plan: Electrolyte/renal panel, GI profile, Glucose/endocrine profile  Criteria: Labs WNL  Nutrition Focused Physical Findings  Monitoring and Evaluation Plan: Digestive System  Digestive System: Constipation, Diarrhea  Criteria: pt to take meds as providers have prescribed.  Other: Encouraged 64-80 oz of fluids per day - more as needed    Enteral Nutrition Goal:  [employer] Standard 1.4 gravity feeds, 5 cartons per day will provide 2275 calories, 100 g protein,  255g CHO, 1155ml free water.    Can do 542ml TID for total of 5 cartons.  Water flushes of 60 ml before and after each gravity feed.  Additional water flush of 240ml 3 times per day.    Time Spent  Prep time on day of patient encounter: 5 minutes  Time spent directly with patient, family or caregiver: 20 minutes  Additional Time Spent on Patient Care Activities: 10 minutes  Documentation Time: 15 minutes  Other Time Spent: 0 minutes  Total: 50 minutes          This service will continue to follow up as needed throughout SCC treatments.

## 2024-01-08 NOTE — SIGNIFICANT EVENT
01/08/24 1132   Prechemo Checklist   Has the patient been in the hospital, ED, or urgent care since last date of service No   Chemo/Immuno Consent Signed Yes   Protocol/Indications Verified Yes   Confirmed to previous date/time of medication Yes   Compared to previous dose Yes   All medications are dated accurately Yes   Pregnancy Test Negative Not applicable   Parameters Met Yes   BSA/Weight-Height Verified Yes   Dose Calculations Verified Yes

## 2024-01-09 ENCOUNTER — HOSPITAL ENCOUNTER (OUTPATIENT)
Dept: RADIATION ONCOLOGY | Facility: CLINIC | Age: 79
Setting detail: RADIATION/ONCOLOGY SERIES
Discharge: HOME | End: 2024-01-09
Payer: MEDICARE

## 2024-01-09 ENCOUNTER — APPOINTMENT (OUTPATIENT)
Dept: HEMATOLOGY/ONCOLOGY | Facility: CLINIC | Age: 79
End: 2024-01-09
Payer: MEDICARE

## 2024-01-09 ENCOUNTER — APPOINTMENT (OUTPATIENT)
Dept: HEMATOLOGY/ONCOLOGY | Facility: HOSPITAL | Age: 79
End: 2024-01-09
Payer: MEDICARE

## 2024-01-09 DIAGNOSIS — C10.8 MALIGNANT NEOPLASM OF OVERLAPPING SITES OF OROPHARYNX (MULTI): ICD-10-CM

## 2024-01-09 DIAGNOSIS — Z51.0 ENCOUNTER FOR ANTINEOPLASTIC RADIATION THERAPY: ICD-10-CM

## 2024-01-09 LAB
RAD ONC MSQ ACTUAL FRACTIONS DELIVERED: 20
RAD ONC MSQ ACTUAL SESSION DELIVERED DOSE: 200 CGRAY
RAD ONC MSQ ACTUAL TOTAL DOSE: 4000 CGRAY
RAD ONC MSQ ELAPSED DAYS: 29
RAD ONC MSQ LAST DATE: NORMAL
RAD ONC MSQ PRESCRIBED FRACTIONAL DOSE: 200 CGRAY
RAD ONC MSQ PRESCRIBED NUMBER OF FRACTIONS: 30
RAD ONC MSQ PRESCRIBED TECHNIQUE: NORMAL
RAD ONC MSQ PRESCRIBED TOTAL DOSE: 6000 CGRAY
RAD ONC MSQ PRESCRIPTION PATTERN COMMENT: NORMAL
RAD ONC MSQ START DATE: NORMAL
RAD ONC MSQ TREATMENT COURSE NUMBER: 1
RAD ONC MSQ TREATMENT SITE: NORMAL

## 2024-01-09 PROCEDURE — 1090000002 HH PPS REVENUE DEBIT

## 2024-01-09 PROCEDURE — 1090000001 HH PPS REVENUE CREDIT

## 2024-01-09 PROCEDURE — 77014 CHG CT GUIDANCE RADIATION THERAPY FLDS PLACEMENT: CPT | Performed by: RADIOLOGY

## 2024-01-09 PROCEDURE — 77386 HC INTENSITY-MODULATED RADIATION THERAPY (IMRT), COMPLEX: CPT | Performed by: RADIOLOGY

## 2024-01-10 ENCOUNTER — HOSPITAL ENCOUNTER (OUTPATIENT)
Dept: RADIATION ONCOLOGY | Facility: CLINIC | Age: 79
Setting detail: RADIATION/ONCOLOGY SERIES
Discharge: HOME | End: 2024-01-10
Payer: MEDICARE

## 2024-01-10 ENCOUNTER — HOME CARE VISIT (OUTPATIENT)
Dept: HOME HEALTH SERVICES | Facility: HOME HEALTH | Age: 79
End: 2024-01-10
Payer: MEDICARE

## 2024-01-10 ENCOUNTER — RADIATION ONCOLOGY OTV (OUTPATIENT)
Dept: RADIATION ONCOLOGY | Facility: CLINIC | Age: 79
End: 2024-01-10
Payer: MEDICARE

## 2024-01-10 VITALS — WEIGHT: 197.8 LBS | TEMPERATURE: 97.2 F | BODY MASS INDEX: 28.32 KG/M2

## 2024-01-10 VITALS
HEART RATE: 70 BPM | SYSTOLIC BLOOD PRESSURE: 128 MMHG | TEMPERATURE: 97.6 F | OXYGEN SATURATION: 97 % | RESPIRATION RATE: 20 BRPM | DIASTOLIC BLOOD PRESSURE: 72 MMHG

## 2024-01-10 DIAGNOSIS — C10.8 MALIGNANT NEOPLASM OF OVERLAPPING SITES OF OROPHARYNX (MULTI): ICD-10-CM

## 2024-01-10 DIAGNOSIS — Z51.0 ENCOUNTER FOR ANTINEOPLASTIC RADIATION THERAPY: ICD-10-CM

## 2024-01-10 DIAGNOSIS — K12.30 MUCOSITIS: Primary | ICD-10-CM

## 2024-01-10 LAB
RAD ONC MSQ ACTUAL FRACTIONS DELIVERED: 21
RAD ONC MSQ ACTUAL SESSION DELIVERED DOSE: 200 CGRAY
RAD ONC MSQ ACTUAL TOTAL DOSE: 4200 CGRAY
RAD ONC MSQ ELAPSED DAYS: 30
RAD ONC MSQ LAST DATE: NORMAL
RAD ONC MSQ PRESCRIBED FRACTIONAL DOSE: 200 CGRAY
RAD ONC MSQ PRESCRIBED NUMBER OF FRACTIONS: 30
RAD ONC MSQ PRESCRIBED TECHNIQUE: NORMAL
RAD ONC MSQ PRESCRIBED TOTAL DOSE: 6000 CGRAY
RAD ONC MSQ PRESCRIPTION PATTERN COMMENT: NORMAL
RAD ONC MSQ START DATE: NORMAL
RAD ONC MSQ TREATMENT COURSE NUMBER: 1
RAD ONC MSQ TREATMENT SITE: NORMAL

## 2024-01-10 PROCEDURE — 1090000001 HH PPS REVENUE CREDIT

## 2024-01-10 PROCEDURE — 1090000002 HH PPS REVENUE DEBIT

## 2024-01-10 PROCEDURE — 77427 RADIATION TX MANAGEMENT X5: CPT | Performed by: RADIOLOGY

## 2024-01-10 PROCEDURE — 77336 RADIATION PHYSICS CONSULT: CPT | Performed by: RADIOLOGY

## 2024-01-10 PROCEDURE — 77014 CHG CT GUIDANCE RADIATION THERAPY FLDS PLACEMENT: CPT | Performed by: RADIOLOGY

## 2024-01-10 PROCEDURE — G0299 HHS/HOSPICE OF RN EA 15 MIN: HCPCS | Mod: HHH

## 2024-01-10 PROCEDURE — 77386 HC INTENSITY-MODULATED RADIATION THERAPY (IMRT), COMPLEX: CPT | Performed by: RADIOLOGY

## 2024-01-10 RX ORDER — SUCRALFATE 1 G/10ML
1 SUSPENSION ORAL 4 TIMES DAILY
Qty: 1200 ML | Refills: 0 | Status: SHIPPED | OUTPATIENT
Start: 2024-01-10 | End: 2024-02-09

## 2024-01-10 ASSESSMENT — ENCOUNTER SYMPTOMS
APPETITE LEVEL: POOR
PERSON REPORTING PAIN: PATIENT
HIGHEST PAIN SEVERITY IN PAST 24 HOURS: 0/10
LOWEST PAIN SEVERITY IN PAST 24 HOURS: 0/10
DENIES PAIN: 1
SUBJECTIVE PAIN PROGRESSION: UNCHANGED
OCCASIONAL FEELINGS OF UNSTEADINESS: 0
LAST BOWEL MOVEMENT: 66849
PAIN SEVERITY GOAL: 0/10
LOSS OF SENSATION IN FEET: 0
CHANGE IN APPETITE: UNCHANGED

## 2024-01-10 ASSESSMENT — PAIN SCALES - PAIN ASSESSMENT IN ADVANCED DEMENTIA (PAINAD)
FACIALEXPRESSION: 0 - SMILING OR INEXPRESSIVE.
CONSOLABILITY: 0
CONSOLABILITY: 0 - NO NEED TO CONSOLE.
BREATHING: 0
BODYLANGUAGE: 0
BODYLANGUAGE: 0 - RELAXED.
FACIALEXPRESSION: 0
NEGVOCALIZATION: 0 - NONE.
NEGVOCALIZATION: 0
TOTALSCORE: 0

## 2024-01-10 ASSESSMENT — PAIN SCALES - GENERAL: PAINLEVEL: 0-NO PAIN

## 2024-01-10 NOTE — PROGRESS NOTES
Radiation Oncology On Treatment Visit    Patient Name:  Kevin Mendes  MRN:  [id number]  :  1945    Referring Provider: No ref. provider found  Primary Care Provider: Kd Lange DO  Care Team: Patient Care Team:  Kd Lange DO as PCP - General  Kd Lange DO as PCP - MSSP ACO Attributed Provider  Kyunghee Burkitt, DO as Consulting Physician (Hematology and Oncology)  Tsering Jade MD as Consulting Physician (Hematology and Oncology)  Sonya Pike RDN, LD as Dietitian (Nutrition)  KERRY Mendoza as  ()    Date of Service: 1/10/2024     Diagnosis:   Specialty Problems          Radiation Oncology Problems    Head and neck cancer (CMS/HCC)        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)         Treatment Summary:  Radiation Treatments       Active   Oropharynx_R (Started on 2023)   Most recent fraction: 200 cGy given on 1/10/2024   Total given: 4,200 cGy / 6,000 cGy  (21 of 30 fractions)   Elapsed Days: 30   Technique: VMAT           Completed   No historical radiation treatments to show.             SUBJECTIVE: Patient reports new onset sore throat this week, denies esophagitis or oral pain. Diarrhea improving this week compared to last week with the use of imodium. No nausea or vomiting. Home care nurse will be with patient today to check on PEG tube. Continued intermittent nose bleeds. Weight maintained this week. 4 cans of PEG tube feeding per day.       OBJECTIVE:   Vital Signs:  Temp 36.2 °C (97.2 °F) (Tympanic)   Wt 89.7 kg (197 lb 12.8 oz)   BMI 28.32 kg/m²    Pain Scale: The patient's current pain level was assessed.  They report currently having a pain of 0 out of 10.    Other Pertinent Findings:     Toxicity Assessment          2023    13:00 2023    09:00 2023    09:08 2023    09:13 1/3/2024    09:00 1/10/2024    09:00   Toxicity Assessment   Adverse Events Reviewed (WDL) Yes (Within Defined Limits) Yes (Within Defined  "Limits) Yes (Within Defined Limits) Yes (Within Defined Limits)  Yes (Within Defined Limits)   Treatment  and neck Head and neck Head and neck Head and neck Head and neck Head and neck   Anorexia Grade 1       appetite comes and goes, using PEG tube feedings 3 per day with water flush. Encouraged small protein rich snacks, nutrition following. Grade 1 Grade 1       PEG tube feedings 4 per day with water flushes. About to swallow pills, yogurt, bananas, cereal. Decreased appetite. Tryed \"gummies\" with no increase in appetite. Grade 1       Peg tube feedings 4 per day with water flushes. Decreased appetite. Taking pills, yogurt, bananas, and cereal. Tried \"gummies\" without increase in appetite. Grade 1       appetite improving, still some difficulty swallowing. 4 cans of tube feed spread across 3 feeds with water flushes Grade 1       Can swallow, decreased appetite. using PEG tube. 4 cans per day. Doing exercises   Anxiety Grade 1       nervous about possible side effects Grade 1       Dehydration Grade 0 Grade 0  Grade 0 Grade 0 Grade 0       drinking water through the day   Dermatitis Radiation Grade 0       reviewed skin care recommendations with patient Grade 0       using aquaphor  Grade 0       aquaphor Grade 0       reviewed skin care recommendations with patient Grade 1       aquaphor   Diarrhea Grade 1       had an episode last friday with several watery BM's, went to the ER on saturday after feeling better. Labs were normal per patient, received IVF and was discharged. Resolving now Grade 0  Grade 1       loose stool x1 episode/day Grade 1       increase in diarrhea on 1/1/2023. Now resolving. Had to take miralax due to no B for 3 days. Grade 1       improving with imodium. Struggling with balance between constipation and diarrhea. 2 imodium per day for 1 day this week seems to be helping   Fatigue Grade 0 Grade 1  Grade 1       urinary frequency with nocturia. Not sleeping well at night. Taking " "naps Grade 1 Grade 1   Nausea Grade 0       anti-emetics prn Grade 0  Grade 0 Grade 0       anti-emetics prn Grade 0   Pain Grade 0 Grade 1       mild throat pain relieved by tylenol  Grade 0 Grade 0 Grade 0   Vomiting Grade 0 Grade 0  Grade 0 Grade 0 Grade 0   Dysphagia Grade 0       softer foods Grade 1       softer foods /meds   Grade 1 Grade 1   Esophagitis Grade 0    Grade 0 Grade 0   Mucositis Oral Grade 0 Grade 1       dry mouth   Grade 0 Grade 0   Hearing Impaired Grade 0       basline, not worsening        Dry Eye --        blocked ducts per patient, eye Dr. following        Dry Mouth Grade 1       biotene and xylimelts Grade 1       mild increase in phlegm/usung suction machine  Grade 1       mild dry mouth.  has biotene but not using. Has suction machine, only used a few times. Grade 1       worse in the morning. Grade 1       not bothersome   Ear Pain Grade 0       can feel \"something\" on the right ear. Doesn't hurt Grade 0  Grade 1       intermittent ear pain relieved with tylenol Grade 0       resolved this week, using tylenol prn Grade 0   Tinnitus Grade 0 Grade 0  Grade 0 Grade 0 Grade 0   Oral Pain Grade 0 Grade 0  Grade 0 Grade 0 Grade 0   Hoarseness Grade 1 Grade 1       Voice Alteration Grade 1 Grade 1  Grade 1       reports deeper and raspy Grade 1 Grade 1        Assessment / Plan:  The patient is tolerating radiation therapy as anticipated.  Continue per current treatment plan.        "

## 2024-01-10 NOTE — HOME HEALTH
SKILLED NURSING VISIT FOR ASSESSMENT TEACHING OF MEDICATIONS DISEASE PROCESS. PT REPORTS DIARRHEA HAS RESOLVED ITSELF. PT STILL ABLE TO TAKE SOME ORAL FLUIDS BUT REPORTS HIS THROAT IS SORE AND MD HAS ORDERED MEDICATION WHICH IS AT PHARMACY.

## 2024-01-11 ENCOUNTER — HOSPITAL ENCOUNTER (OUTPATIENT)
Dept: RADIATION ONCOLOGY | Facility: CLINIC | Age: 79
Setting detail: RADIATION/ONCOLOGY SERIES
Discharge: HOME | End: 2024-01-11
Payer: MEDICARE

## 2024-01-11 DIAGNOSIS — Z51.0 ENCOUNTER FOR ANTINEOPLASTIC RADIATION THERAPY: ICD-10-CM

## 2024-01-11 DIAGNOSIS — C10.8 MALIGNANT NEOPLASM OF OVERLAPPING SITES OF OROPHARYNX (MULTI): ICD-10-CM

## 2024-01-11 LAB
RAD ONC MSQ ACTUAL FRACTIONS DELIVERED: 22
RAD ONC MSQ ACTUAL SESSION DELIVERED DOSE: 200 CGRAY
RAD ONC MSQ ACTUAL TOTAL DOSE: 4400 CGRAY
RAD ONC MSQ ELAPSED DAYS: 31
RAD ONC MSQ LAST DATE: NORMAL
RAD ONC MSQ PRESCRIBED FRACTIONAL DOSE: 200 CGRAY
RAD ONC MSQ PRESCRIBED NUMBER OF FRACTIONS: 30
RAD ONC MSQ PRESCRIBED TECHNIQUE: NORMAL
RAD ONC MSQ PRESCRIBED TOTAL DOSE: 6000 CGRAY
RAD ONC MSQ PRESCRIPTION PATTERN COMMENT: NORMAL
RAD ONC MSQ START DATE: NORMAL
RAD ONC MSQ TREATMENT COURSE NUMBER: 1
RAD ONC MSQ TREATMENT SITE: NORMAL

## 2024-01-11 PROCEDURE — 77386 HC INTENSITY-MODULATED RADIATION THERAPY (IMRT), COMPLEX: CPT | Performed by: RADIOLOGY

## 2024-01-11 PROCEDURE — 1090000002 HH PPS REVENUE DEBIT

## 2024-01-11 PROCEDURE — 77014 CHG CT GUIDANCE RADIATION THERAPY FLDS PLACEMENT: CPT | Performed by: RADIOLOGY

## 2024-01-11 PROCEDURE — 1090000001 HH PPS REVENUE CREDIT

## 2024-01-12 ENCOUNTER — APPOINTMENT (OUTPATIENT)
Dept: OTOLARYNGOLOGY | Facility: CLINIC | Age: 79
End: 2024-01-12
Payer: MEDICARE

## 2024-01-12 ENCOUNTER — HOSPITAL ENCOUNTER (OUTPATIENT)
Dept: RADIATION ONCOLOGY | Facility: CLINIC | Age: 79
Setting detail: RADIATION/ONCOLOGY SERIES
Discharge: HOME | End: 2024-01-12
Payer: MEDICARE

## 2024-01-12 DIAGNOSIS — Z51.0 ENCOUNTER FOR ANTINEOPLASTIC RADIATION THERAPY: ICD-10-CM

## 2024-01-12 DIAGNOSIS — C10.8 MALIGNANT NEOPLASM OF OVERLAPPING SITES OF OROPHARYNX (MULTI): ICD-10-CM

## 2024-01-12 LAB
RAD ONC MSQ ACTUAL FRACTIONS DELIVERED: 23
RAD ONC MSQ ACTUAL SESSION DELIVERED DOSE: 200 CGRAY
RAD ONC MSQ ACTUAL TOTAL DOSE: 4600 CGRAY
RAD ONC MSQ ELAPSED DAYS: 32
RAD ONC MSQ LAST DATE: NORMAL
RAD ONC MSQ PRESCRIBED FRACTIONAL DOSE: 200 CGRAY
RAD ONC MSQ PRESCRIBED NUMBER OF FRACTIONS: 30
RAD ONC MSQ PRESCRIBED TECHNIQUE: NORMAL
RAD ONC MSQ PRESCRIBED TOTAL DOSE: 6000 CGRAY
RAD ONC MSQ PRESCRIPTION PATTERN COMMENT: NORMAL
RAD ONC MSQ START DATE: NORMAL
RAD ONC MSQ TREATMENT COURSE NUMBER: 1
RAD ONC MSQ TREATMENT SITE: NORMAL

## 2024-01-12 PROCEDURE — 1090000002 HH PPS REVENUE DEBIT

## 2024-01-12 PROCEDURE — 1090000001 HH PPS REVENUE CREDIT

## 2024-01-12 PROCEDURE — 77014 CHG CT GUIDANCE RADIATION THERAPY FLDS PLACEMENT: CPT | Performed by: RADIOLOGY

## 2024-01-12 PROCEDURE — 77386 HC INTENSITY-MODULATED RADIATION THERAPY (IMRT), COMPLEX: CPT | Performed by: RADIOLOGY

## 2024-01-13 PROCEDURE — 1090000002 HH PPS REVENUE DEBIT

## 2024-01-13 PROCEDURE — 1090000001 HH PPS REVENUE CREDIT

## 2024-01-14 PROCEDURE — 1090000002 HH PPS REVENUE DEBIT

## 2024-01-14 PROCEDURE — 1090000001 HH PPS REVENUE CREDIT

## 2024-01-15 ENCOUNTER — INFUSION (OUTPATIENT)
Dept: HEMATOLOGY/ONCOLOGY | Facility: CLINIC | Age: 79
End: 2024-01-15
Payer: MEDICARE

## 2024-01-15 ENCOUNTER — APPOINTMENT (OUTPATIENT)
Dept: HEMATOLOGY/ONCOLOGY | Facility: CLINIC | Age: 79
End: 2024-01-15
Payer: MEDICARE

## 2024-01-15 ENCOUNTER — HOSPITAL ENCOUNTER (OUTPATIENT)
Dept: RADIATION ONCOLOGY | Facility: CLINIC | Age: 79
Setting detail: RADIATION/ONCOLOGY SERIES
Discharge: HOME | End: 2024-01-15
Payer: MEDICARE

## 2024-01-15 ENCOUNTER — NUTRITION (OUTPATIENT)
Dept: HEMATOLOGY/ONCOLOGY | Facility: CLINIC | Age: 79
End: 2024-01-15

## 2024-01-15 ENCOUNTER — OFFICE VISIT (OUTPATIENT)
Dept: HEMATOLOGY/ONCOLOGY | Facility: CLINIC | Age: 79
End: 2024-01-15
Payer: MEDICARE

## 2024-01-15 VITALS — BODY MASS INDEX: 28 KG/M2 | WEIGHT: 195.55 LBS | HEIGHT: 70 IN

## 2024-01-15 VITALS
HEART RATE: 90 BPM | OXYGEN SATURATION: 95 % | WEIGHT: 195.55 LBS | TEMPERATURE: 97.9 F | SYSTOLIC BLOOD PRESSURE: 130 MMHG | BODY MASS INDEX: 28 KG/M2 | RESPIRATION RATE: 18 BRPM | DIASTOLIC BLOOD PRESSURE: 63 MMHG

## 2024-01-15 DIAGNOSIS — C76.0 HEAD AND NECK CANCER (MULTI): ICD-10-CM

## 2024-01-15 DIAGNOSIS — Z51.0 ENCOUNTER FOR ANTINEOPLASTIC RADIATION THERAPY: ICD-10-CM

## 2024-01-15 DIAGNOSIS — C10.8 MALIGNANT NEOPLASM OF OVERLAPPING SITES OF OROPHARYNX (MULTI): ICD-10-CM

## 2024-01-15 DIAGNOSIS — G89.3 CANCER RELATED PAIN: ICD-10-CM

## 2024-01-15 DIAGNOSIS — C76.0 HEAD AND NECK CANCER (MULTI): Primary | ICD-10-CM

## 2024-01-15 DIAGNOSIS — C01 MALIGNANT NEOPLASM OF BASE OF TONGUE (MULTI): ICD-10-CM

## 2024-01-15 LAB
ALBUMIN SERPL BCP-MCNC: 4.3 G/DL (ref 3.4–5)
ALP SERPL-CCNC: 84 U/L (ref 33–136)
ALT SERPL W P-5'-P-CCNC: 9 U/L (ref 10–52)
ANION GAP SERPL CALC-SCNC: 14 MMOL/L (ref 10–20)
AST SERPL W P-5'-P-CCNC: 13 U/L (ref 9–39)
BASOPHILS # BLD AUTO: 0.02 X10*3/UL (ref 0–0.1)
BASOPHILS NFR BLD AUTO: 0.9 %
BILIRUB SERPL-MCNC: 0.6 MG/DL (ref 0–1.2)
BUN SERPL-MCNC: 33 MG/DL (ref 6–23)
CALCIUM SERPL-MCNC: 9.4 MG/DL (ref 8.6–10.3)
CHLORIDE SERPL-SCNC: 98 MMOL/L (ref 98–107)
CO2 SERPL-SCNC: 29 MMOL/L (ref 21–32)
CREAT SERPL-MCNC: 1.09 MG/DL (ref 0.5–1.3)
EGFRCR SERPLBLD CKD-EPI 2021: 69 ML/MIN/1.73M*2
EOSINOPHIL # BLD AUTO: 0.05 X10*3/UL (ref 0–0.4)
EOSINOPHIL NFR BLD AUTO: 2.2 %
ERYTHROCYTE [DISTWIDTH] IN BLOOD BY AUTOMATED COUNT: 17.2 % (ref 11.5–14.5)
GLUCOSE SERPL-MCNC: 257 MG/DL (ref 74–99)
HCT VFR BLD AUTO: 26.4 % (ref 41–52)
HGB BLD-MCNC: 8.7 G/DL (ref 13.5–17.5)
IMM GRANULOCYTES # BLD AUTO: 0 X10*3/UL (ref 0–0.5)
IMM GRANULOCYTES NFR BLD AUTO: 0 % (ref 0–0.9)
LYMPHOCYTES # BLD AUTO: 0.42 X10*3/UL (ref 0.8–3)
LYMPHOCYTES NFR BLD AUTO: 18.4 %
MAGNESIUM SERPL-MCNC: 1.97 MG/DL (ref 1.6–2.4)
MCH RBC QN AUTO: 30.1 PG (ref 26–34)
MCHC RBC AUTO-ENTMCNC: 33 G/DL (ref 32–36)
MCV RBC AUTO: 91 FL (ref 80–100)
MONOCYTES # BLD AUTO: 0.16 X10*3/UL (ref 0.05–0.8)
MONOCYTES NFR BLD AUTO: 7 %
NEUTROPHILS # BLD AUTO: 1.63 X10*3/UL (ref 1.6–5.5)
NEUTROPHILS NFR BLD AUTO: 71.5 %
NRBC BLD-RTO: ABNORMAL /100{WBCS}
PLATELET # BLD AUTO: 97 X10*3/UL (ref 150–450)
POTASSIUM SERPL-SCNC: 4.5 MMOL/L (ref 3.5–5.3)
PROT SERPL-MCNC: 7.5 G/DL (ref 6.4–8.2)
RAD ONC MSQ ACTUAL FRACTIONS DELIVERED: 24
RAD ONC MSQ ACTUAL SESSION DELIVERED DOSE: 200 CGRAY
RAD ONC MSQ ACTUAL TOTAL DOSE: 4800 CGRAY
RAD ONC MSQ ELAPSED DAYS: 35
RAD ONC MSQ LAST DATE: NORMAL
RAD ONC MSQ PRESCRIBED FRACTIONAL DOSE: 200 CGRAY
RAD ONC MSQ PRESCRIBED NUMBER OF FRACTIONS: 30
RAD ONC MSQ PRESCRIBED TECHNIQUE: NORMAL
RAD ONC MSQ PRESCRIBED TOTAL DOSE: 6000 CGRAY
RAD ONC MSQ PRESCRIPTION PATTERN COMMENT: NORMAL
RAD ONC MSQ START DATE: NORMAL
RAD ONC MSQ TREATMENT COURSE NUMBER: 1
RAD ONC MSQ TREATMENT SITE: NORMAL
RBC # BLD AUTO: 2.89 X10*6/UL (ref 4.5–5.9)
RBC MORPH BLD: NORMAL
SODIUM SERPL-SCNC: 136 MMOL/L (ref 136–145)
WBC # BLD AUTO: 2.3 X10*3/UL (ref 4.4–11.3)

## 2024-01-15 PROCEDURE — 1090000002 HH PPS REVENUE DEBIT

## 2024-01-15 PROCEDURE — 1090000001 HH PPS REVENUE CREDIT

## 2024-01-15 PROCEDURE — 36415 COLL VENOUS BLD VENIPUNCTURE: CPT

## 2024-01-15 PROCEDURE — 1157F ADVNC CARE PLAN IN RCRD: CPT | Performed by: NURSE PRACTITIONER

## 2024-01-15 PROCEDURE — 99214 OFFICE O/P EST MOD 30 MIN: CPT | Mod: 25 | Performed by: NURSE PRACTITIONER

## 2024-01-15 PROCEDURE — 99214 OFFICE O/P EST MOD 30 MIN: CPT | Performed by: NURSE PRACTITIONER

## 2024-01-15 PROCEDURE — 77014 CHG CT GUIDANCE RADIATION THERAPY FLDS PLACEMENT: CPT | Performed by: RADIOLOGY

## 2024-01-15 PROCEDURE — 83735 ASSAY OF MAGNESIUM: CPT | Performed by: INTERNAL MEDICINE

## 2024-01-15 PROCEDURE — 3075F SYST BP GE 130 - 139MM HG: CPT | Performed by: NURSE PRACTITIONER

## 2024-01-15 PROCEDURE — 1159F MED LIST DOCD IN RCRD: CPT | Performed by: NURSE PRACTITIONER

## 2024-01-15 PROCEDURE — 85025 COMPLETE CBC W/AUTO DIFF WBC: CPT | Performed by: INTERNAL MEDICINE

## 2024-01-15 PROCEDURE — 1125F AMNT PAIN NOTED PAIN PRSNT: CPT | Performed by: NURSE PRACTITIONER

## 2024-01-15 PROCEDURE — 80053 COMPREHEN METABOLIC PANEL: CPT | Performed by: INTERNAL MEDICINE

## 2024-01-15 PROCEDURE — 3078F DIAST BP <80 MM HG: CPT | Performed by: NURSE PRACTITIONER

## 2024-01-15 PROCEDURE — 77386 HC INTENSITY-MODULATED RADIATION THERAPY (IMRT), COMPLEX: CPT | Performed by: RADIOLOGY

## 2024-01-15 PROCEDURE — 1036F TOBACCO NON-USER: CPT | Performed by: NURSE PRACTITIONER

## 2024-01-15 RX ORDER — HEPARIN SODIUM,PORCINE/PF 10 UNIT/ML
50 SYRINGE (ML) INTRAVENOUS AS NEEDED
Status: CANCELLED | OUTPATIENT
Start: 2024-01-15

## 2024-01-15 RX ORDER — PROCHLORPERAZINE EDISYLATE 5 MG/ML
10 INJECTION INTRAMUSCULAR; INTRAVENOUS EVERY 6 HOURS PRN
Status: CANCELLED | OUTPATIENT
Start: 2024-01-15

## 2024-01-15 RX ORDER — DEXAMETHASONE SODIUM PHOSPHATE 100 MG/10ML
10 INJECTION INTRAMUSCULAR; INTRAVENOUS ONCE
Status: CANCELLED | OUTPATIENT
Start: 2024-01-15

## 2024-01-15 RX ORDER — OXYCODONE HCL 5 MG/5 ML
5 SOLUTION, ORAL ORAL EVERY 6 HOURS PRN
Qty: 90 ML | Refills: 0 | Status: SHIPPED | OUTPATIENT
Start: 2024-01-15 | End: 2024-01-22 | Stop reason: SDUPTHER

## 2024-01-15 RX ORDER — EPINEPHRINE 0.3 MG/.3ML
0.3 INJECTION SUBCUTANEOUS EVERY 5 MIN PRN
OUTPATIENT
Start: 2024-01-15

## 2024-01-15 RX ORDER — PALONOSETRON 0.05 MG/ML
0.25 INJECTION, SOLUTION INTRAVENOUS ONCE
Status: CANCELLED | OUTPATIENT
Start: 2024-01-15

## 2024-01-15 RX ORDER — DIPHENHYDRAMINE HCL 50 MG
50 CAPSULE ORAL ONCE
Status: CANCELLED | OUTPATIENT
Start: 2024-01-15

## 2024-01-15 RX ORDER — ALBUTEROL SULFATE 0.83 MG/ML
3 SOLUTION RESPIRATORY (INHALATION) AS NEEDED
OUTPATIENT
Start: 2024-01-15

## 2024-01-15 RX ORDER — ALBUTEROL SULFATE 0.83 MG/ML
3 SOLUTION RESPIRATORY (INHALATION) AS NEEDED
Status: CANCELLED | OUTPATIENT
Start: 2024-01-15

## 2024-01-15 RX ORDER — PROCHLORPERAZINE MALEATE 5 MG
10 TABLET ORAL EVERY 6 HOURS PRN
Status: CANCELLED | OUTPATIENT
Start: 2024-01-15

## 2024-01-15 RX ORDER — FAMOTIDINE 10 MG/ML
20 INJECTION INTRAVENOUS ONCE AS NEEDED
OUTPATIENT
Start: 2024-01-15

## 2024-01-15 RX ORDER — EPINEPHRINE 0.3 MG/.3ML
0.3 INJECTION SUBCUTANEOUS EVERY 5 MIN PRN
Status: CANCELLED | OUTPATIENT
Start: 2024-01-15

## 2024-01-15 RX ORDER — DIPHENHYDRAMINE HYDROCHLORIDE 50 MG/ML
50 INJECTION INTRAMUSCULAR; INTRAVENOUS AS NEEDED
Status: CANCELLED | OUTPATIENT
Start: 2024-01-15

## 2024-01-15 RX ORDER — FAMOTIDINE 10 MG/ML
20 INJECTION INTRAVENOUS ONCE AS NEEDED
Status: CANCELLED | OUTPATIENT
Start: 2024-01-15

## 2024-01-15 RX ORDER — DIPHENHYDRAMINE HYDROCHLORIDE 50 MG/ML
50 INJECTION INTRAMUSCULAR; INTRAVENOUS AS NEEDED
OUTPATIENT
Start: 2024-01-15

## 2024-01-15 RX ORDER — FAMOTIDINE 10 MG/ML
20 INJECTION INTRAVENOUS ONCE
Status: CANCELLED | OUTPATIENT
Start: 2024-01-15

## 2024-01-15 RX ORDER — HEPARIN 100 UNIT/ML
500 SYRINGE INTRAVENOUS AS NEEDED
Status: CANCELLED | OUTPATIENT
Start: 2024-01-15

## 2024-01-15 ASSESSMENT — ENCOUNTER SYMPTOMS
DEPRESSION: 0
OCCASIONAL FEELINGS OF UNSTEADINESS: 0
LOSS OF SENSATION IN FEET: 0

## 2024-01-15 ASSESSMENT — PAIN SCALES - GENERAL: PAINLEVEL: 10-WORST PAIN EVER

## 2024-01-15 NOTE — PROGRESS NOTES
"[facility] - Medical Oncology Follow-Up Visit    Patient ID: Kevin Mendes \"Vinh" is a 78 y.o. male      Current therapy: CARBOplatin (Paraplatin) 148.8 mg in sodium chloride 0.9% 114.88 mL IV, 148.8 mg, intravenous, Once, 5 of 7 cycles    Administration: 148.8 mg (12/11/2023), 160 mg (12/26/2023), 159 mg (12/18/2023), 210 mg (1/2/2024), 185 mg (1/8/2024)        PACLitaxeL (Taxol) 96 mg in dextrose 5 % in water (D5W) 116 mL IV, 45 mg/m2 = 96 mg, intravenous, Once, 5 of 7 cycles    Administration: 96 mg (12/11/2023), 96 mg (12/26/2023), 96 mg (12/18/2023), 96 mg (1/2/2024), 96 mg (1/8/2024)        palonosetron (Aloxi) injection 250 mcg, 250 mcg, intravenous, Once, 5 of 7 cycles    Administration: 250 mcg (12/11/2023), 250 mcg (12/26/2023), 250 mcg (12/18/2023), 250 mcg (1/2/2024), 250 mcg (1/8/2024)      Oncologic History:     Diagnosis: Base of tongue cancer  Staging: T3N0M0  Date of Diagnosis: 11/8/23     Providers:  ENT Surgeon: Dr. Garcia  MedOn:   Dr. Burkitt ([facility]) --> Dr. Kalie Torres: Dr. Diaz     Oncological history;  Patient was having sore throat with globus sensation for about 1 month.  CT scan (10/20/23) showed exophytic mass in the oropharynx coming from BOT.  PET CT (11/3/23) showed focal hypermetabolic activity along the right base of the tongue with extension inferiorly to the level of the epiglottis.  No distant mets.  Low dose CT chest showed nodule in LLL, but favors infectious/inflammatory.  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  PEG tube placed. He started concurrent chemoradiation with weekly carbo/taxol on 12/11/23.         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)  09/06/2018: Localized, primary osteoarthritis  09/25/2017: Low back pain, unspecified      Comment:  Acute low back pain  07/23/2009: Malignant melanoma of skin of trunk, except scrotum (CMS/  [facility])  No date: Malignant neoplasm of base of tongue (CMS/[facility])  No date: Personal history of other diseases of the circulatory system      Comment:  Personal history of supraventricular tachycardia  12/31/2022: Personal history of other diseases of urinary system      Comment:  History of hematuria  No date: Personal history of other endocrine, nutritional and   metabolic disease      Comment:  History of diabetes mellitus  04/05/2010: Polyneuropathy  01/12/2018: Presence of right artificial knee joint  07/23/2018: Sensorineural hearing loss, bilateral      Surgical History:    Surgical History         Past Surgical History:   Procedure Laterality Date    CATARACT EXTRACTION   04/07/2017     Cataract Surgery    HAND SURGERY   04/07/2017     Hand Surgery                                                                                                                                                          OTHER SURGICAL HISTORY   04/10/2014     Catheter Ablation Atrial Supraventricular Tachycardia    SHOULDER SURGERY   04/07/2017     Shoulder Surgery    TOTAL HIP ARTHROPLASTY   04/10/2014     Hip Replacement    TOTAL KNEE ARTHROPLASTY   04/10/2014     Knee Replacement         Family History:    Family History          Family History   Problem Relation Name Age of Onset    Other (heart failure following cardiac surgery) Mother             Family Oncology History:    Cancer-related family history is not on file.  Social History:    Social History            Tobacco Use    Smoking status: Never    Smokeless tobacco: Never   Vaping Use    Vaping Use: Never used   Substance Use Topics    Alcohol use: Yes       Alcohol/week: 2.0  standard drinks of alcohol       Types: 2 Cans of beer per week    Drug use: Never        Chief Concern: Here in clinic for follow-up on concurrent chemo/RT;     HPI Here in clinic for follow-up and treatment on concurrent chemo/RT. He endorses increased throat pain today, without any other associated signs/symptoms. We discussed trialing low dose oxycodone and will discuss with our pharmacist and send RX, he is agreeable. No fevers, chills, or cold symptoms. Breathing stable, no respiratory symptoms. Diarrhea is improved. Mild constipation, relieved with OTC Miralax. He continues tube feeding and is staying well hydrated. No other concerns or complaints.    Meds (Current):    Current Outpatient Medications:     aspirin 81 mg EC tablet, Take 1 tablet (81 mg) by mouth once daily., Disp: , Rfl:     glyBURIDE (Diabeta) 5 mg tablet, Take 2 tablets (10 mg) by mouth 2 times a day with meals., Disp: 360 tablet, Rfl: 2    LORazepam (Ativan) 1 mg tablet, Take 1 tablet (1 mg) by mouth once daily. Prior to each radiation treatment, Disp: , Rfl:     losartan (Cozaar) 100 mg tablet, Take 1 tablet (100 mg) by mouth once daily., Disp: 90 tablet, Rfl: 2    magic mouthwash (lidocaine, diphenhydrAMINE, Maalox 1:1:1), Swish and spit 10 mL every 6 hours if needed for mucositis for up to 60 doses., Disp: 4000 mL, Rfl: 0    metFORMIN  mg 24 hr tablet, Take 2 tablets (1,000 mg) by mouth once daily in the evening. Take with meals. Do not crush, chew, or split., Disp: 180 tablet, Rfl: 2    neomycin-polymyxin B-dexameth (Polydex) 3.5 mg/g-10,000 unit/g-0.1 % ointment ophthalmic ointment, 2 times a day., Disp: , Rfl:     neomycin-polymyxin-dexAMETHasone (Maxitrol) 3.5mg/mL-10,000 unit/mL-0.1 % ophthalmic suspension, 2 times a day., Disp: , Rfl:     ondansetron (Zofran) 8 mg tablet, Take 1 tablet (8 mg) by mouth every 8 hours if needed for nausea or vomiting., Disp: 30 tablet, Rfl: 5    pioglitazone (Actos) 45 mg tablet, Take 1 tablet  (45 mg) by mouth once daily., Disp: 90 tablet, Rfl: 2    PreviDent 5000 Booster Plus 1.1 % dental paste, Brush at bedtime and expectorate do not rinse, Disp: , Rfl:     prochlorperazine (Compazine) 10 mg tablet, Take 1 tablet (10 mg) by mouth every 6 hours if needed for nausea or vomiting., Disp: 30 tablet, Rfl: 5    simvastatin (Zocor) 40 mg tablet, Take 1 tablet (40 mg) by mouth once daily at bedtime., Disp: 90 tablet, Rfl: 2    sucralfate (Carafate) 100 mg/mL suspension, Take 10 mL (1 g) by mouth 4 times a day., Disp: 1200 mL, Rfl: 0    tamsulosin (Flomax) 0.4 mg 24 hr capsule, Take 1 capsule (0.4 mg) by mouth once daily., Disp: 90 capsule, Rfl: 2    Review of Systems - Oncology 12 point ROS was obtained and negative unless otherwise mentioned in the above HPI.      Objective   BSA: 2.09 meters squared  Wt Readings from Last 5 Encounters:   01/15/24 88.7 kg (195 lb 8.8 oz)   01/10/24 89.7 kg (197 lb 12.8 oz)   01/08/24 90.2 kg (198 lb 13.7 oz)   01/08/24 90.2 kg (198 lb 13.7 oz)   01/03/24 89.4 kg (197 lb 3.2 oz)     /63 (BP Location: Left arm, Patient Position: Sitting, BP Cuff Size: Adult)   Pulse 90   Temp 36.6 °C (97.9 °F) (Tympanic)   Resp 18   Wt 88.7 kg (195 lb 8.8 oz)   SpO2 95%   BMI 28.00 kg/m²     Physical Exam  Constitutional:       Appearance: Normal appearance.   Eyes:      Extraocular Movements: Extraocular movements intact.   Cardiovascular:      Rate and Rhythm: Normal rate and regular rhythm.      Heart sounds: Normal heart sounds.   Pulmonary:      Effort: Pulmonary effort is normal.      Breath sounds: Normal breath sounds.   Abdominal:      General: Bowel sounds are normal.      Palpations: Abdomen is soft.   Musculoskeletal:         General: Normal range of motion.   Skin:     General: Skin is warm and dry.   Neurological:      General: No focal deficit present.      Mental Status: He is alert and oriented to person, place, and time.   Psychiatric:         Mood and Affect: Mood  "normal.         Behavior: Behavior normal.    Not performed due to visit type.         Results:  Labs:  Lab Results   Component Value Date    WBC 4.1 (L) 01/08/2024    HGB 9.5 (L) 01/08/2024    HCT 29.5 (L) 01/08/2024    MCV 92 01/08/2024     (L) 01/08/2024      Lab Results   Component Value Date    NEUTROABS 3.28 01/08/2024      Lab Results   Component Value Date    GLUCOSE 276 (H) 01/08/2024    CALCIUM 9.2 01/08/2024     01/08/2024    K 5.1 01/08/2024    CO2 28 01/08/2024    CL 99 01/08/2024    BUN 34 (H) 01/08/2024    CREATININE 1.02 01/08/2024    MG 1.82 01/08/2024     Lab Results   Component Value Date    ALT 8 (L) 01/08/2024    AST 12 01/08/2024    ALKPHOS 84 01/08/2024    BILITOT 0.7 01/08/2024      Lab Results   Component Value Date    TSH 0.51 12/01/2023       Imaging:           No results found for this or any previous visit.    Assessment/Plan      Kevin Mendes \"Pat\" is a 78 y.o. male here for follow up   Mr. Mendes is 77 y/o male with recent dx of BOT SCC (T2N0M0, p16+, Stage I) who is referred to med onc to be considered for chemotherapy in addition to radiation.     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 11/3/23: PET/CT showed no distant mets except a pulmonary nodule in LLL with no hypermetabolic activity. Findings are favored to be infectious/inflammatory or granulomatous in nature (discussed in HN TB with radiologist). Close follow up with CT to assess for stability is recommended.  Since pt is not a surgical candidate, we will treat him with concurrent chemoradiation. His GFR is at 50s, prefer weekly carboplatin and paclitaxel. Dr. Burkitt previously discussed with patient about treatment schedule and chemotherapy related side effects in detail. Met with Dr. Diaz from radiation oncology.      - now s/p infection of PEG site which has resolved  - started concurrent chemoRT with weekly carbo/taxol today 12/11/23  - treatment held d/t platelets out of parameter "   - RTC in 1 week for follow-up

## 2024-01-15 NOTE — PROGRESS NOTES
Chemo treatment being held today for plts 97. Patient states understanding. Patient scheduled for chemo 1/22.

## 2024-01-15 NOTE — PROGRESS NOTES
"NUTRITION Follow-Up NOTE    Nutrition Assessment       Reason for Visit:  Kevin Mendes \"Katherine\" is a 78 y.o. male with a diagnosis of BOT cancer.  Patient had a PEG tube placed 11/8/2023.  Follow up visit today done at [facility] during pt infusion.    Remains to do 4 cartons of Pure life renal Standard 1.4 via PEG tube. He is doing 80 oz of fluids total either via PEG tube or orally daily.  Complaining of sore throat, only sips of water currently.        Lab Results   Component Value Date/Time    GLUCOSE 257 (H) 01/15/2024 0951     01/15/2024 0951    K 4.5 01/15/2024 0951    CL 98 01/15/2024 0951    CO2 29 01/15/2024 0951    ANIONGAP 14 01/15/2024 0951    BUN 33 (H) 01/15/2024 0951    CREATININE 1.09 01/15/2024 0951    EGFR 69 01/15/2024 0951    CALCIUM 9.4 01/15/2024 0951    ALBUMIN 4.3 01/15/2024 0951    ALKPHOS 84 01/15/2024 0951    PROT 7.5 01/15/2024 0951    AST 13 01/15/2024 0951    BILITOT 0.6 01/15/2024 0951    ALT 9 (L) 01/15/2024 0951     Anthropometrics:  Anthropometrics  Height: 178 cm (5' 10.08\")  Weight: 88.7 kg (195 lb 8.8 oz)  BMI (Calculated): 28  IBW/kg (Dietitian Calculated): 75.45 kg  Percent of IBW: 117 %  Weight Change  Weight History / % Weight Change: 1.5% weight loss in 1 week.  Significant Weight Loss: Yes  Interpretation of Weight Loss: 1-2% in 1 week        Wt Readings from Last 10 Encounters:   01/15/24 88.7 kg (195 lb 8.8 oz)   01/15/24 88.7 kg (195 lb 8.8 oz)   01/10/24 89.7 kg (197 lb 12.8 oz)   01/08/24 90.2 kg (198 lb 13.7 oz)   01/08/24 90.2 kg (198 lb 13.7 oz)   01/03/24 89.4 kg (197 lb 3.2 oz)   01/02/24 89.7 kg (197 lb 12.8 oz)   01/02/24 89.7 kg (197 lb 12.8 oz)   12/28/23 90.7 kg (200 lb)   12/27/23 91.8 kg (202 lb 6.4 oz)        Food And Nutrient Intake:  Food and Nutrient History  Energy Intake: Poor < 50 % (oral intake; TF intake good)  Fluid Intake: Good estimated ~ 80 oz daily  GI Symptoms: diarrhea, constipation (pt goes back and forth between the two.)  GI Symptoms " "greater than 2 weeks: yes                         Enteral Nutrition Intake  Enteral Nutrition Formula/Solution: Guzu Standard 1.4 - currently doing 4 cartons per day between 3 feeds, Gravity feeds  Feeding Tube Flush: 60ml before and 120ml after each feed ( 3 times per day)                             Medication and Complementary/Alternative Medicine Use  OTC Medication Use: takes imodium when diarrhea present. Then Miralax when constipation present.    Nutrition Focused Physical Exam:       Energy Needs  Calculated Energy Needs Using Equations  Height: 178 cm (5' 10.08\")  Estimated Energy Needs  Total Energy Estimated Needs (kCal): 2300 kCal  Total Estimated Energy Need per Day (kCal/kg): 25 kCal/kg  Estimated Fluid Needs  Total Fluid Estimated Needs (mL): 2300 mL  Total Fluid Estimated Needs (mL/kg): 25 mL/kg  Estimated Protein Needs  Total Protein Estimated Needs (g): 110 g  Total Protein Estimated Needs (g/kg): 1.2 g/kg    Nutrition Diagnosis      Nutrition Diagnosis  Patient has Nutrition Diagnosis: Yes  Diagnosis Status (1): Ongoing  Nutrition Diagnosis 1: Increased nutrient needs  Additional Assessment Information (1): no changes  Additional Nutrition Diagnosis: Diagnosis 2  Diagnosis Status (2): Ongoing  Nutrition Diagnosis 2: Predicted inadequate nutrient intake  Additional Assessment Information (2): no changes    Nutrition Interventions/Recommendations   Food and Nutrition Delivery  Meals & Snacks: Energy-modified diet, Protein-modified diet, Texture-Modified Diet  Goals: Small frequent meals/snacks softer foods for pleasure feeds and to continue to swallow throughout SCC treatments, increased fluids  Enteral Nutrition: Enteral nutrition site care, Feeding tube flush, Modify composition of enteral nutrition  Total Nutrition Provided: Continue with 4 cartons per day provides 1820 calories, 80g protein, 924ml free water per day  Goals: 5 cartons Guzu Standard 1.4; 2275 calories, 100g protein, " 1155ml free water per day    There are no Patient Instructions on file for this visit.    Nutrition Monitoring and Evaluation   Food/Nutrient Related History Monitoring  Monitoring and Evaluation Plan: Energy intake, Fluid intake, Protein intake, Enteral and parenteral nutrition intake  Energy Intake: Estimated energy intake  Criteria: 8701-6489 calories per day  Fluid Intake: Estimated fluid intake  Criteria: 6985-1308 calories per day  Estimated protein intake: Estimated protein intake  Criteria: 92-112g protein per day  Enteral and Parenteral Nutrition Intake: Enteral nutrition formula/solution, Enteral nutrition intake  Criteria: Continue with current TF of 4 cartons per day; 434ml 3 times per day gravity feeds.  Water flushes 60ml before and after and additional water flushes to meet estimated fluid needs.  Biochemical Data, Medical Tests and Procedures  Monitoring and Evaluation Plan: Electrolyte/renal panel, GI profile, Glucose/endocrine profile  Criteria: Labs WNL  Nutrition Focused Physical Findings  Monitoring and Evaluation Plan: Digestive System  Digestive System: Constipation, Diarrhea  Criteria: pt to take meds as providers have prescribed.  Other: Encouraged 64-80 oz of fluids per day - more as needed    Enteral Nutrition Goal:  e-Tag Standard 1.4 gravity feeds, 5 cartons per day will provide 2275 calories, 100 g protein, 255g CHO, 1155ml free water.    Can do 542ml TID for total of 5 cartons.  Water flushes of 60 ml before and after each gravity feed.  Additional water flush of 240ml 3 times per day.             This service will continue to follow up as needed throughout SCC treatments.

## 2024-01-16 ENCOUNTER — HOSPITAL ENCOUNTER (OUTPATIENT)
Dept: RADIATION ONCOLOGY | Facility: CLINIC | Age: 79
Setting detail: RADIATION/ONCOLOGY SERIES
Discharge: HOME | End: 2024-01-16
Payer: MEDICARE

## 2024-01-16 DIAGNOSIS — C10.8 MALIGNANT NEOPLASM OF OVERLAPPING SITES OF OROPHARYNX (MULTI): ICD-10-CM

## 2024-01-16 DIAGNOSIS — Z51.0 ENCOUNTER FOR ANTINEOPLASTIC RADIATION THERAPY: ICD-10-CM

## 2024-01-16 LAB
RAD ONC MSQ ACTUAL FRACTIONS DELIVERED: 25
RAD ONC MSQ ACTUAL SESSION DELIVERED DOSE: 200 CGRAY
RAD ONC MSQ ACTUAL TOTAL DOSE: 5000 CGRAY
RAD ONC MSQ ELAPSED DAYS: 36
RAD ONC MSQ LAST DATE: NORMAL
RAD ONC MSQ PRESCRIBED FRACTIONAL DOSE: 200 CGRAY
RAD ONC MSQ PRESCRIBED NUMBER OF FRACTIONS: 30
RAD ONC MSQ PRESCRIBED TECHNIQUE: NORMAL
RAD ONC MSQ PRESCRIBED TOTAL DOSE: 6000 CGRAY
RAD ONC MSQ PRESCRIPTION PATTERN COMMENT: NORMAL
RAD ONC MSQ START DATE: NORMAL
RAD ONC MSQ TREATMENT COURSE NUMBER: 1
RAD ONC MSQ TREATMENT SITE: NORMAL

## 2024-01-16 PROCEDURE — 77386 HC INTENSITY-MODULATED RADIATION THERAPY (IMRT), COMPLEX: CPT | Performed by: RADIOLOGY

## 2024-01-16 PROCEDURE — 1090000001 HH PPS REVENUE CREDIT

## 2024-01-16 PROCEDURE — 77014 CHG CT GUIDANCE RADIATION THERAPY FLDS PLACEMENT: CPT | Performed by: RADIOLOGY

## 2024-01-16 PROCEDURE — 1090000002 HH PPS REVENUE DEBIT

## 2024-01-17 ENCOUNTER — HOSPITAL ENCOUNTER (OUTPATIENT)
Dept: RADIATION ONCOLOGY | Facility: CLINIC | Age: 79
Setting detail: RADIATION/ONCOLOGY SERIES
Discharge: HOME | End: 2024-01-17
Payer: MEDICARE

## 2024-01-17 ENCOUNTER — RADIATION ONCOLOGY OTV (OUTPATIENT)
Dept: RADIATION ONCOLOGY | Facility: HOSPITAL | Age: 79
End: 2024-01-17
Payer: MEDICARE

## 2024-01-17 ENCOUNTER — APPOINTMENT (OUTPATIENT)
Dept: RADIATION ONCOLOGY | Facility: CLINIC | Age: 79
End: 2024-01-17
Payer: MEDICARE

## 2024-01-17 ENCOUNTER — HOME CARE VISIT (OUTPATIENT)
Dept: HOME HEALTH SERVICES | Facility: HOME HEALTH | Age: 79
End: 2024-01-17
Payer: MEDICARE

## 2024-01-17 VITALS
WEIGHT: 197 LBS | TEMPERATURE: 98.1 F | RESPIRATION RATE: 18 BRPM | OXYGEN SATURATION: 97 % | DIASTOLIC BLOOD PRESSURE: 64 MMHG | SYSTOLIC BLOOD PRESSURE: 133 MMHG | HEART RATE: 80 BPM | BODY MASS INDEX: 28.2 KG/M2

## 2024-01-17 DIAGNOSIS — C10.8 MALIGNANT NEOPLASM OF OVERLAPPING SITES OF OROPHARYNX (MULTI): ICD-10-CM

## 2024-01-17 DIAGNOSIS — Z51.0 ENCOUNTER FOR ANTINEOPLASTIC RADIATION THERAPY: ICD-10-CM

## 2024-01-17 LAB
RAD ONC MSQ ACTUAL FRACTIONS DELIVERED: 26
RAD ONC MSQ ACTUAL SESSION DELIVERED DOSE: 200 CGRAY
RAD ONC MSQ ACTUAL TOTAL DOSE: 5200 CGRAY
RAD ONC MSQ ELAPSED DAYS: 37
RAD ONC MSQ LAST DATE: NORMAL
RAD ONC MSQ PRESCRIBED FRACTIONAL DOSE: 200 CGRAY
RAD ONC MSQ PRESCRIBED NUMBER OF FRACTIONS: 30
RAD ONC MSQ PRESCRIBED TECHNIQUE: NORMAL
RAD ONC MSQ PRESCRIBED TOTAL DOSE: 6000 CGRAY
RAD ONC MSQ PRESCRIPTION PATTERN COMMENT: NORMAL
RAD ONC MSQ START DATE: NORMAL
RAD ONC MSQ TREATMENT COURSE NUMBER: 1
RAD ONC MSQ TREATMENT SITE: NORMAL

## 2024-01-17 PROCEDURE — 77386 HC INTENSITY-MODULATED RADIATION THERAPY (IMRT), COMPLEX: CPT | Performed by: RADIOLOGY

## 2024-01-17 PROCEDURE — 77014 CHG CT GUIDANCE RADIATION THERAPY FLDS PLACEMENT: CPT | Performed by: RADIOLOGY

## 2024-01-17 PROCEDURE — 1090000002 HH PPS REVENUE DEBIT

## 2024-01-17 PROCEDURE — 1090000003 HH PPS REVENUE ADJ

## 2024-01-17 PROCEDURE — G0300 HHS/HOSPICE OF LPN EA 15 MIN: HCPCS | Mod: HHH

## 2024-01-17 PROCEDURE — 77336 RADIATION PHYSICS CONSULT: CPT | Performed by: RADIOLOGY

## 2024-01-17 PROCEDURE — 77427 RADIATION TX MANAGEMENT X5: CPT | Performed by: RADIOLOGY

## 2024-01-17 PROCEDURE — 1090000001 HH PPS REVENUE CREDIT

## 2024-01-17 ASSESSMENT — ENCOUNTER SYMPTOMS
AGITATION: 1
PAIN LOCATION - RELIEVING FACTORS: MEDS
LOWEST PAIN SEVERITY IN PAST 24 HOURS: 3/10
PAIN LOCATION - PAIN FREQUENCY: FREQUENT
PAIN LOCATION - EXACERBATING FACTORS: SWALLOWING
CHANGE IN APPETITE: ABSENT
SORE THROAT: 1
HOARSE VOICE: 1
PAIN LOCATION - PAIN DURATION: 24/7
PERSON REPORTING PAIN: PATIENT
HIGHEST PAIN SEVERITY IN PAST 24 HOURS: 7/10
CONSTIPATION: 1
LAST BOWEL MOVEMENT: 66853
STOOL FREQUENCY: LESS THAN DAILY
DEPRESSION: 1
PAIN LOCATION: MOUTH
PAIN LOCATION - PAIN SEVERITY: 7/10
PAIN LOCATION - PAIN QUALITY: SHARP
SUBJECTIVE PAIN PROGRESSION: WAXING AND WANING
PAIN: 1

## 2024-01-17 ASSESSMENT — PAIN SCALES - GENERAL: PAINLEVEL: 10-WORST PAIN EVER

## 2024-01-17 NOTE — PROGRESS NOTES
Radiation Oncology On Treatment Visit    Patient Name:  Kevin Mendes  MRN:  [id number]  :  1945    Referring Provider: No ref. provider found  Primary Care Provider: Kd Lange DO  Care Team: Patient Care Team:  Kd Lange DO as PCP - General  Kd Lange DO as PCP - [facility] ACO Attributed Provider  Kyunghee Burkitt, DO as Consulting Physician (Hematology and Oncology)  Tsering Jade MD as Consulting Physician (Hematology and Oncology)  Sonya Pike RDN, LD as Dietitian (Nutrition)  KERRY Mendoza as  ()    Date of Service: 2024     Diagnosis:   Specialty Problems          Radiation Oncology Problems    Head and neck cancer (CMS/HCC)        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)         Treatment Summary:  Radiation Treatments       Active   Oropharynx_R (Started on 2023)   Most recent fraction: 200 cGy given on 2024   Total given: 5,200 cGy / 6,000 cGy  (26 of 30 fractions)   Elapsed Days: 37   Technique: VMAT           Completed   No historical radiation treatments to show.             SUBJECTIVE: Patient c/o 10/10 pain to throat with swallowing. Oxycodone prescribed yesterday, patient took by swallowing it and caused increased pain.  Educated and taking medications through PEG tube, syringes provided.  4 cans per day through PEG with water flushes. Patient reports up at night with frequent nocturia. Discussed increasing water flushes in morning and afternoon and limiting them in the evenings. G1 dermatitis, using aquaphor and skin care recommendations reviewed. Balancing between diarrhea and constipation.      OBJECTIVE:   Vital Signs:  /64 (BP Location: Right arm, Patient Position: Sitting, BP Cuff Size: Large adult)   Pulse 80   Temp 36.7 °C (98.1 °F) (Temporal)   Resp 18   Wt 89.4 kg (197 lb)   SpO2 97%   BMI 28.20 kg/m²    Pain Scale: The patient's current pain level was assessed.  They report currently having a pain of 10  "out of 10 to throat with swallowing.  Started new pain medication oxycodone.     Other Pertinent Findings:     Toxicity Assessment          12/20/2023    09:00 12/27/2023    09:08 12/27/2023    09:13 1/3/2024    09:00 1/10/2024    09:00 1/17/2024    09:26   Toxicity Assessment   Adverse Events Reviewed (WDL) Yes (Within Defined Limits) Yes (Within Defined Limits) Yes (Within Defined Limits)  Yes (Within Defined Limits) Yes (Within Defined Limits)   Treatment  and neck Head and neck Head and neck Head and neck Head and neck Head and neck   Anorexia Grade 1 Grade 1       PEG tube feedings 4 per day with water flushes. About to swallow pills, yogurt, bananas, cereal. Decreased appetite. Tryed \"gummies\" with no increase in appetite. Grade 1       Peg tube feedings 4 per day with water flushes. Decreased appetite. Taking pills, yogurt, bananas, and cereal. Tried \"gummies\" without increase in appetite. Grade 1       appetite improving, still some difficulty swallowing. 4 cans of tube feed spread across 3 feeds with water flushes Grade 1       Can swallow, decreased appetite. using PEG tube. 4 cans per day. Doing exercises Grade 1       only able to swallow liquids, though has pain. Decreased appetite. PEG tube 4 cans per day.   Anxiety Grade 1        Dehydration Grade 0  Grade 0 Grade 0 Grade 0       drinking water through the day Grade 0       Water flushes through PEG   Dermatitis Radiation Grade 0       using aquaphor  Grade 0       aquaphor Grade 0       reviewed skin care recommendations with patient Grade 1       aquaphor Grade 1       aquaphor. Skin care recommendations reviewed   Diarrhea Grade 0  Grade 1       loose stool x1 episode/day Grade 1       increase in diarrhea on 1/1/2023. Now resolving. Had to take miralax due to no B for 3 days. Grade 1       improving with imodium. Struggling with balance between constipation and diarrhea. 2 imodium per day for 1 day this week seems to be helping Grade 1    "    imodium with relief. Trying to balance constipation and diarrhea.   Fatigue Grade 1  Grade 1       urinary frequency with nocturia. Not sleeping well at night. Taking naps Grade 1 Grade 1 Grade 1       not sleeping well d/t frequent nocturia   Nausea Grade 0  Grade 0 Grade 0       anti-emetics prn Grade 0 Grade 0   Pain Grade 1       mild throat pain relieved by tylenol  Grade 0 Grade 0 Grade 0 Grade 1       10/10 pain to throat with swallowing. Oxycodone PRN   Vomiting Grade 0  Grade 0 Grade 0 Grade 0    Dysphagia Grade 1       softer foods /meds   Grade 1 Grade 1 Grade 1   Esophagitis    Grade 0 Grade 0 Grade 0   Mucositis Oral Grade 1       dry mouth   Grade 0 Grade 0 Grade 0   Dry Mouth Grade 1       mild increase in phlegm/usung suction machine  Grade 1       mild dry mouth.  has biotene but not using. Has suction machine, only used a few times. Grade 1       worse in the morning. Grade 1       not bothersome Grade 1   Ear Pain Grade 0  Grade 1       intermittent ear pain relieved with tylenol Grade 0       resolved this week, using tylenol prn Grade 0 Grade 0   Tinnitus Grade 0  Grade 0 Grade 0 Grade 0 Grade 0   Oral Pain Grade 0  Grade 0 Grade 0 Grade 0    Trismus      Grade 0   Hoarseness Grade 1        Voice Alteration Grade 1  Grade 1       reports deeper and raspy Grade 1 Grade 1 Grade 1        Assessment / Plan:  The patient is tolerating radiation therapy as anticipated.  Continue per current treatment plan.

## 2024-01-18 ENCOUNTER — APPOINTMENT (OUTPATIENT)
Dept: RADIATION ONCOLOGY | Facility: CLINIC | Age: 79
End: 2024-01-18
Payer: MEDICARE

## 2024-01-18 PROCEDURE — 1090000002 HH PPS REVENUE DEBIT

## 2024-01-18 PROCEDURE — 1090000001 HH PPS REVENUE CREDIT

## 2024-01-19 ENCOUNTER — HOSPITAL ENCOUNTER (OUTPATIENT)
Dept: RADIATION ONCOLOGY | Facility: CLINIC | Age: 79
Setting detail: RADIATION/ONCOLOGY SERIES
Discharge: HOME | End: 2024-01-19
Payer: MEDICARE

## 2024-01-19 ENCOUNTER — APPOINTMENT (OUTPATIENT)
Dept: RADIATION ONCOLOGY | Facility: CLINIC | Age: 79
End: 2024-01-19
Payer: MEDICARE

## 2024-01-19 DIAGNOSIS — Z51.0 ENCOUNTER FOR ANTINEOPLASTIC RADIATION THERAPY: ICD-10-CM

## 2024-01-19 DIAGNOSIS — C10.8 MALIGNANT NEOPLASM OF OVERLAPPING SITES OF OROPHARYNX (MULTI): ICD-10-CM

## 2024-01-19 LAB
RAD ONC MSQ ACTUAL FRACTIONS DELIVERED: 27
RAD ONC MSQ ACTUAL SESSION DELIVERED DOSE: 200 CGRAY
RAD ONC MSQ ACTUAL TOTAL DOSE: 5400 CGRAY
RAD ONC MSQ ELAPSED DAYS: 39
RAD ONC MSQ LAST DATE: NORMAL
RAD ONC MSQ PRESCRIBED FRACTIONAL DOSE: 200 CGRAY
RAD ONC MSQ PRESCRIBED NUMBER OF FRACTIONS: 30
RAD ONC MSQ PRESCRIBED TECHNIQUE: NORMAL
RAD ONC MSQ PRESCRIBED TOTAL DOSE: 6000 CGRAY
RAD ONC MSQ PRESCRIPTION PATTERN COMMENT: NORMAL
RAD ONC MSQ START DATE: NORMAL
RAD ONC MSQ TREATMENT COURSE NUMBER: 1
RAD ONC MSQ TREATMENT SITE: NORMAL

## 2024-01-19 PROCEDURE — 1090000001 HH PPS REVENUE CREDIT

## 2024-01-19 PROCEDURE — 77386 HC INTENSITY-MODULATED RADIATION THERAPY (IMRT), COMPLEX: CPT | Performed by: RADIOLOGY

## 2024-01-19 PROCEDURE — 1090000002 HH PPS REVENUE DEBIT

## 2024-01-19 PROCEDURE — 77014 CHG CT GUIDANCE RADIATION THERAPY FLDS PLACEMENT: CPT | Performed by: RADIOLOGY

## 2024-01-19 NOTE — PROGRESS NOTES
This nurse meet with Kevin today to review medications that can be crushed and placed thru his PEG tube.  Patient states he saw his home health nurse this past Wednesday and reviewed it with her as well.  Only medication unable to crush is Cozaar.  He states he is able to swallow it at this time with extra sips of water.  BP WNL.  Patient continues to have pain 9/10. Though does not take his Roxicodone in the morning prior to radiation treatment because he drives himself to appointments.  Encouraging patient to stay on schedule with his pain medication as much as possible.  He is now administering the roxicodone thru his PEG instead of PO. Patient states increased congestion and mucous.  He was not able to lay flat yesterday because of it, and declined radiation treatment.  He received treatment today.  Has suction at home and encouraged to use it. Patient encouraged to reach out to nursing with any further questions of concerns. Patient left [facility] ambulating and in stable condition.

## 2024-01-20 PROCEDURE — 1090000001 HH PPS REVENUE CREDIT

## 2024-01-20 PROCEDURE — 1090000002 HH PPS REVENUE DEBIT

## 2024-01-21 PROCEDURE — 1090000002 HH PPS REVENUE DEBIT

## 2024-01-21 PROCEDURE — 1090000001 HH PPS REVENUE CREDIT

## 2024-01-22 ENCOUNTER — HOSPITAL ENCOUNTER (OUTPATIENT)
Dept: RADIATION ONCOLOGY | Facility: CLINIC | Age: 79
Setting detail: RADIATION/ONCOLOGY SERIES
Discharge: HOME | End: 2024-01-22
Payer: MEDICARE

## 2024-01-22 ENCOUNTER — APPOINTMENT (OUTPATIENT)
Dept: RADIATION ONCOLOGY | Facility: CLINIC | Age: 79
End: 2024-01-22
Payer: MEDICARE

## 2024-01-22 ENCOUNTER — NURSE TRIAGE (OUTPATIENT)
Dept: ADMISSION | Facility: HOSPITAL | Age: 79
End: 2024-01-22

## 2024-01-22 ENCOUNTER — OFFICE VISIT (OUTPATIENT)
Dept: HEMATOLOGY/ONCOLOGY | Facility: CLINIC | Age: 79
End: 2024-01-22
Payer: MEDICARE

## 2024-01-22 VITALS
SYSTOLIC BLOOD PRESSURE: 119 MMHG | BODY MASS INDEX: 28.25 KG/M2 | HEART RATE: 96 BPM | WEIGHT: 197.31 LBS | DIASTOLIC BLOOD PRESSURE: 57 MMHG | TEMPERATURE: 98.6 F | OXYGEN SATURATION: 91 % | RESPIRATION RATE: 18 BRPM

## 2024-01-22 DIAGNOSIS — G89.3 CANCER RELATED PAIN: ICD-10-CM

## 2024-01-22 DIAGNOSIS — C76.0 HEAD AND NECK CANCER (MULTI): ICD-10-CM

## 2024-01-22 DIAGNOSIS — Z51.0 ENCOUNTER FOR ANTINEOPLASTIC RADIATION THERAPY: ICD-10-CM

## 2024-01-22 DIAGNOSIS — C10.8 MALIGNANT NEOPLASM OF OVERLAPPING SITES OF OROPHARYNX (MULTI): ICD-10-CM

## 2024-01-22 DIAGNOSIS — C01 MALIGNANT NEOPLASM OF BASE OF TONGUE (MULTI): ICD-10-CM

## 2024-01-22 LAB
RAD ONC MSQ ACTUAL FRACTIONS DELIVERED: 28
RAD ONC MSQ ACTUAL SESSION DELIVERED DOSE: 200 CGRAY
RAD ONC MSQ ACTUAL TOTAL DOSE: 5600 CGRAY
RAD ONC MSQ ELAPSED DAYS: 42
RAD ONC MSQ LAST DATE: NORMAL
RAD ONC MSQ PRESCRIBED FRACTIONAL DOSE: 200 CGRAY
RAD ONC MSQ PRESCRIBED NUMBER OF FRACTIONS: 30
RAD ONC MSQ PRESCRIBED TECHNIQUE: NORMAL
RAD ONC MSQ PRESCRIBED TOTAL DOSE: 6000 CGRAY
RAD ONC MSQ PRESCRIPTION PATTERN COMMENT: NORMAL
RAD ONC MSQ START DATE: NORMAL
RAD ONC MSQ TREATMENT COURSE NUMBER: 1
RAD ONC MSQ TREATMENT SITE: NORMAL

## 2024-01-22 PROCEDURE — 77386 HC INTENSITY-MODULATED RADIATION THERAPY (IMRT), COMPLEX: CPT | Performed by: RADIOLOGY

## 2024-01-22 PROCEDURE — 99214 OFFICE O/P EST MOD 30 MIN: CPT | Performed by: NURSE PRACTITIONER

## 2024-01-22 PROCEDURE — 1159F MED LIST DOCD IN RCRD: CPT | Performed by: NURSE PRACTITIONER

## 2024-01-22 PROCEDURE — 1090000001 HH PPS REVENUE CREDIT

## 2024-01-22 PROCEDURE — 1160F RVW MEDS BY RX/DR IN RCRD: CPT | Performed by: NURSE PRACTITIONER

## 2024-01-22 PROCEDURE — 1126F AMNT PAIN NOTED NONE PRSNT: CPT | Performed by: NURSE PRACTITIONER

## 2024-01-22 PROCEDURE — 1090000002 HH PPS REVENUE DEBIT

## 2024-01-22 PROCEDURE — 3078F DIAST BP <80 MM HG: CPT | Performed by: NURSE PRACTITIONER

## 2024-01-22 PROCEDURE — 77014 CHG CT GUIDANCE RADIATION THERAPY FLDS PLACEMENT: CPT | Performed by: RADIOLOGY

## 2024-01-22 PROCEDURE — 85025 COMPLETE CBC W/AUTO DIFF WBC: CPT | Performed by: INTERNAL MEDICINE

## 2024-01-22 PROCEDURE — 1036F TOBACCO NON-USER: CPT | Performed by: NURSE PRACTITIONER

## 2024-01-22 PROCEDURE — 3074F SYST BP LT 130 MM HG: CPT | Performed by: NURSE PRACTITIONER

## 2024-01-22 PROCEDURE — 80053 COMPREHEN METABOLIC PANEL: CPT | Performed by: NURSE PRACTITIONER

## 2024-01-22 PROCEDURE — 83735 ASSAY OF MAGNESIUM: CPT | Performed by: INTERNAL MEDICINE

## 2024-01-22 RX ORDER — OXYCODONE HCL 5 MG/5 ML
5 SOLUTION, ORAL ORAL EVERY 6 HOURS PRN
Qty: 90 ML | Refills: 0 | Status: SHIPPED | OUTPATIENT
Start: 2024-01-22 | End: 2024-01-29 | Stop reason: SDUPTHER

## 2024-01-22 ASSESSMENT — PAIN SCALES - GENERAL: PAINLEVEL: 0-NO PAIN

## 2024-01-22 NOTE — PROGRESS NOTES
"[facility] - Medical Oncology Follow-Up Visit    Patient ID: Kevin Mendes \"Vinh" is a 78 y.o. male      Current therapy: CARBOplatin (Paraplatin) 148.8 mg in sodium chloride 0.9% 114.88 mL IV, 148.8 mg, intravenous, Once, 6 of 7 cycles    Administration: 148.8 mg (12/11/2023), 160 mg (12/26/2023), 159 mg (12/18/2023), 210 mg (1/2/2024), 185 mg (1/8/2024)        PACLitaxeL (Taxol) 96 mg in dextrose 5 % in water (D5W) 116 mL IV, 45 mg/m2 = 96 mg, intravenous, Once, 6 of 7 cycles    Administration: 96 mg (12/11/2023), 96 mg (12/26/2023), 96 mg (12/18/2023), 96 mg (1/2/2024), 96 mg (1/8/2024)        palonosetron (Aloxi) injection 250 mcg, 250 mcg, intravenous, Once, 6 of 7 cycles    Administration: 250 mcg (12/11/2023), 250 mcg (12/26/2023), 250 mcg (12/18/2023), 250 mcg (1/2/2024), 250 mcg (1/8/2024)      Oncologic History:     Diagnosis: Base of tongue cancer  Staging: T3N0M0  Date of Diagnosis: 11/8/23     Providers:  ENT Surgeon: Dr. Garcia  MedOn:   Dr. Burkitt ([facility]) --> Dr. Kalie Torres: Dr. Diaz     Oncological history;  Patient was having sore throat with globus sensation for about 1 month.  CT scan (10/20/23) showed exophytic mass in the oropharynx coming from BOT.  PET CT (11/3/23) showed focal hypermetabolic activity along the right base of the tongue with extension inferiorly to the level of the epiglottis.  No distant mets.  Low dose CT chest showed nodule in LLL, but favors infectious/inflammatory.  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  PEG tube placed. He started concurrent chemoradiation with weekly carbo/taxol on 12/11/23.         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)  09/06/2018: Localized, primary osteoarthritis  09/25/2017: Low back pain, unspecified      Comment:  Acute low back pain  07/23/2009: Malignant melanoma of skin of trunk, except scrotum (CMS/  [facility])  No date: Malignant neoplasm of base of tongue (CMS/[facility])  No date: Personal history of other diseases of the circulatory system      Comment:  Personal history of supraventricular tachycardia  12/31/2022: Personal history of other diseases of urinary system      Comment:  History of hematuria  No date: Personal history of other endocrine, nutritional and   metabolic disease      Comment:  History of diabetes mellitus  04/05/2010: Polyneuropathy  01/12/2018: Presence of right artificial knee joint  07/23/2018: Sensorineural hearing loss, bilateral      Surgical History:    Surgical History         Past Surgical History:   Procedure Laterality Date    CATARACT EXTRACTION   04/07/2017     Cataract Surgery    HAND SURGERY   04/07/2017     Hand Surgery                                                                                                                                                          OTHER SURGICAL HISTORY   04/10/2014     Catheter Ablation Atrial Supraventricular Tachycardia    SHOULDER SURGERY   04/07/2017     Shoulder Surgery    TOTAL HIP ARTHROPLASTY   04/10/2014     Hip Replacement    TOTAL KNEE ARTHROPLASTY   04/10/2014     Knee Replacement         Family History:    Family History          Family History   Problem Relation Name Age of Onset    Other (heart failure following cardiac surgery) Mother             Family Oncology History:    Cancer-related family history is not on file.  Social History:    Social History            Tobacco Use    Smoking status: Never    Smokeless tobacco: Never   Vaping Use    Vaping Use: Never used   Substance Use Topics    Alcohol use: Yes       Alcohol/week: 2.0  standard drinks of alcohol       Types: 2 Cans of beer per week    Drug use: Never        Chief Concern: Here in clinic for follow-up on concurrent chemo/RT;     HPI Here in clinic for follow-up and treatment on concurrent chemo/RT. Anticipated RT completion 1/31/24. Throat pain controlled with oxycodone, requesting refill. Breathing stable, no respiratory complaints. Reports diarrhea after multiple doses of laxative, discussed using single dose after 1 day without a bowel movement as he waited 3 days which required stronger dose. He continues tube feedings, weight is stable. No fevers, chills, or cold symptoms.      Meds (Current):    Current Outpatient Medications:     aspirin 81 mg EC tablet, Take 1 tablet (81 mg) by mouth once daily., Disp: , Rfl:     glyBURIDE (Diabeta) 5 mg tablet, Take 2 tablets (10 mg) by mouth 2 times a day with meals., Disp: 360 tablet, Rfl: 2    LORazepam (Ativan) 1 mg tablet, Take 1 tablet (1 mg) by mouth once daily. Prior to each radiation treatment, Disp: , Rfl:     magic mouthwash (lidocaine, diphenhydrAMINE, Maalox 1:1:1), Swish and spit 10 mL every 6 hours if needed for mucositis for up to 60 doses., Disp: 4000 mL, Rfl: 0    metFORMIN  mg 24 hr tablet, Take 2 tablets (1,000 mg) by mouth once daily in the evening. Take with meals. Do not crush, chew, or split., Disp: 180 tablet, Rfl: 2    ondansetron (Zofran) 8 mg tablet, Take 1 tablet (8 mg) by mouth every 8 hours if needed for nausea or vomiting., Disp: 30 tablet, Rfl: 5    pioglitazone (Actos) 45 mg tablet, Take 1 tablet (45 mg) by mouth once daily., Disp: 90 tablet, Rfl: 2    PreviDent 5000 Booster Plus 1.1 % dental paste, Brush at bedtime and expectorate do not rinse, Disp: , Rfl:     prochlorperazine (Compazine) 10 mg tablet, Take 1 tablet (10 mg) by mouth every 6 hours if needed for nausea or vomiting., Disp: 30 tablet, Rfl: 5    simvastatin (Zocor) 40 mg tablet, Take 1 tablet (40 mg) by mouth once daily at bedtime.,  Disp: 90 tablet, Rfl: 2    sucralfate (Carafate) 100 mg/mL suspension, Take 10 mL (1 g) by mouth 4 times a day., Disp: 1200 mL, Rfl: 0    losartan (Cozaar) 100 mg tablet, Take 1 tablet (100 mg) by mouth once daily. (Patient not taking: Reported on 1/22/2024), Disp: 90 tablet, Rfl: 2    neomycin-polymyxin B-dexameth (Polydex) 3.5 mg/g-10,000 unit/g-0.1 % ointment ophthalmic ointment, 2 times a day., Disp: , Rfl:     neomycin-polymyxin-dexAMETHasone (Maxitrol) 3.5mg/mL-10,000 unit/mL-0.1 % ophthalmic suspension, 2 times a day., Disp: , Rfl:     oxyCODONE (Roxicodone) 5 mg/5 mL solution, Take 5 mL (5 mg) by mouth every 6 hours if needed for severe pain (7 - 10) for up to 7 days. May give through PEG tube if difficulty swallowing, Disp: 90 mL, Rfl: 0    tamsulosin (Flomax) 0.4 mg 24 hr capsule, Take 1 capsule (0.4 mg) by mouth once daily. (Patient not taking: Reported on 1/22/2024), Disp: 90 capsule, Rfl: 2    Review of Systems - Oncology 12 point ROS was obtained and negative unless otherwise mentioned in the above HPI.      Objective   BSA: 2.1 meters squared  Wt Readings from Last 5 Encounters:   01/22/24 89.5 kg (197 lb 5 oz)   01/17/24 89.4 kg (197 lb)   01/15/24 88.7 kg (195 lb 8.8 oz)   01/15/24 88.7 kg (195 lb 8.8 oz)   01/10/24 89.7 kg (197 lb 12.8 oz)     /57 (BP Location: Right arm, Patient Position: Sitting, BP Cuff Size: Adult)   Pulse 96   Temp 37 °C (98.6 °F) (Temporal)   Resp 18   Wt 89.5 kg (197 lb 5 oz)   SpO2 91%   BMI 28.25 kg/m²     Physical Exam  Constitutional:       Appearance: Normal appearance.   Eyes:      Extraocular Movements: Extraocular movements intact.   Cardiovascular:      Rate and Rhythm: Normal rate and regular rhythm.      Heart sounds: Normal heart sounds.   Pulmonary:      Effort: Pulmonary effort is normal.      Breath sounds: Normal breath sounds.   Abdominal:      General: Bowel sounds are normal.      Palpations: Abdomen is soft.   Musculoskeletal:         General:  "Normal range of motion.   Skin:     General: Skin is warm and dry.   Neurological:      General: No focal deficit present.      Mental Status: He is alert and oriented to person, place, and time.   Psychiatric:         Mood and Affect: Mood normal.         Behavior: Behavior normal.              Results:  Labs:  Lab Results   Component Value Date    WBC 1.4 (L) 01/22/2024    HGB 7.7 (L) 01/22/2024    HCT 23.5 (L) 01/22/2024    MCV 93 01/22/2024     01/22/2024      Lab Results   Component Value Date    NEUTROABS 0.81 (L) 01/22/2024      Lab Results   Component Value Date    GLUCOSE 328 (H) 01/22/2024    CALCIUM 8.6 01/22/2024     (L) 01/22/2024    K 4.8 01/22/2024    CO2 29 01/22/2024    CL 96 (L) 01/22/2024    BUN 29 (H) 01/22/2024    CREATININE 1.14 01/22/2024    MG 2.17 01/22/2024     Lab Results   Component Value Date    ALT 6 (L) 01/22/2024    AST 9 01/22/2024    ALKPHOS 91 01/22/2024    BILITOT 0.5 01/22/2024      Lab Results   Component Value Date    TSH 0.51 12/01/2023       Imaging:           No results found for this or any previous visit.    Assessment/Plan      Kevin Mendes \"Katherine\" is a 78 y.o. male here for follow up   Mr. Mendes is 77 y/o male with recent dx of BOT SCC (T2N0M0, p16+, Stage I) who is referred to med onc to be considered for chemotherapy in addition to radiation.     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 11/3/23: PET/CT showed no distant mets except a pulmonary nodule in LLL with no hypermetabolic activity. Findings are favored to be infectious/inflammatory or granulomatous in nature (discussed in HN TB with radiologist). Close follow up with CT to assess for stability is recommended.  Since pt is not a surgical candidate, we will treat him with concurrent chemoradiation. His GFR is at 50s, prefer weekly carboplatin and paclitaxel. Dr. Burkitt previously discussed with patient about treatment schedule and chemotherapy related side effects in detail. Met " with Dr. Diaz from radiation oncology.      - now s/p infection of PEG site which has resolved  - started concurrent chemoRT with weekly carbo/taxol today 12/11/23, anticipated completion 1/31/24.  - Treatment held today (1/22/24) d/t ANC out of parameter, will recheck labs next week  RTC in 1 week for follow-up

## 2024-01-22 NOTE — TELEPHONE ENCOUNTER
Kevin's wife Virginia called and said he was seeing Susan Waller this morning and wanted to make her aware she has noticed Kevin is sleeping but his right leg and arm raises up and starts to have jerking movements. She said on Saturday he had many staring spells but couldn't hear what she is saying or if he does he gives inappropriate answers to her questions. She said the staring spells have started over the weekend. The symptoms during sleep have been longer but wasn't sure if she should call regarding symptoms.   Additional Information  • Is the patient awake and alert?     Dandre's wife said he is sleeping but she notices his right leg and arm raise up and start to jerk for 10 seconds then go back down. She said he has staring spells throughout the day and if she is talking to him he doesn't hear her or gives inappropriate answers to her questions. This happened many times with the staring spells on Saturday. The night episodes have been a little longer but she wasn't sure if she should call.  • Commented on: What happens when the seizure occurs? Does the patient have any of these problems?     See in basket    Protocols used: Seizures

## 2024-01-23 ENCOUNTER — APPOINTMENT (OUTPATIENT)
Dept: RADIATION ONCOLOGY | Facility: CLINIC | Age: 79
End: 2024-01-23
Payer: MEDICARE

## 2024-01-23 ENCOUNTER — TELEPHONE (OUTPATIENT)
Dept: RADIATION ONCOLOGY | Facility: CLINIC | Age: 79
End: 2024-01-23
Payer: MEDICARE

## 2024-01-23 PROCEDURE — 1090000001 HH PPS REVENUE CREDIT

## 2024-01-23 PROCEDURE — 1090000002 HH PPS REVENUE DEBIT

## 2024-01-23 NOTE — TELEPHONE ENCOUNTER
Phone call taken by Herbie Lino RT(T). Patient's wife called to cancel his treatment today due to ice on driveway. Patient's wife also voiced concerns about him not taking the pain medication that was ordered for him. RT(T) encouraged to take it as prescribed to help relieve his symptoms and get through his radiation treatments.

## 2024-01-24 ENCOUNTER — RADIATION ONCOLOGY OTV (OUTPATIENT)
Dept: RADIATION ONCOLOGY | Facility: CLINIC | Age: 79
End: 2024-01-24
Payer: MEDICARE

## 2024-01-24 ENCOUNTER — HOSPITAL ENCOUNTER (OUTPATIENT)
Dept: RADIATION ONCOLOGY | Facility: CLINIC | Age: 79
Setting detail: RADIATION/ONCOLOGY SERIES
Discharge: HOME | End: 2024-01-24
Payer: MEDICARE

## 2024-01-24 ENCOUNTER — HOME CARE VISIT (OUTPATIENT)
Dept: HOME HEALTH SERVICES | Facility: HOME HEALTH | Age: 79
End: 2024-01-24
Payer: MEDICARE

## 2024-01-24 VITALS
OXYGEN SATURATION: 92 % | TEMPERATURE: 98.2 F | WEIGHT: 198 LBS | BODY MASS INDEX: 28.35 KG/M2 | RESPIRATION RATE: 18 BRPM | DIASTOLIC BLOOD PRESSURE: 58 MMHG | SYSTOLIC BLOOD PRESSURE: 128 MMHG | HEART RATE: 83 BPM

## 2024-01-24 VITALS
RESPIRATION RATE: 18 BRPM | DIASTOLIC BLOOD PRESSURE: 50 MMHG | HEART RATE: 78 BPM | TEMPERATURE: 98.7 F | OXYGEN SATURATION: 94 % | SYSTOLIC BLOOD PRESSURE: 124 MMHG

## 2024-01-24 DIAGNOSIS — C01 MALIGNANT NEOPLASM OF BASE OF TONGUE (MULTI): Primary | ICD-10-CM

## 2024-01-24 DIAGNOSIS — C10.8 MALIGNANT NEOPLASM OF OVERLAPPING SITES OF OROPHARYNX (MULTI): ICD-10-CM

## 2024-01-24 DIAGNOSIS — Z51.0 ENCOUNTER FOR ANTINEOPLASTIC RADIATION THERAPY: ICD-10-CM

## 2024-01-24 LAB
RAD ONC MSQ ACTUAL FRACTIONS DELIVERED: 29
RAD ONC MSQ ACTUAL SESSION DELIVERED DOSE: 200 CGRAY
RAD ONC MSQ ACTUAL TOTAL DOSE: 5800 CGRAY
RAD ONC MSQ ELAPSED DAYS: 44
RAD ONC MSQ LAST DATE: NORMAL
RAD ONC MSQ PRESCRIBED FRACTIONAL DOSE: 200 CGRAY
RAD ONC MSQ PRESCRIBED NUMBER OF FRACTIONS: 30
RAD ONC MSQ PRESCRIBED TECHNIQUE: NORMAL
RAD ONC MSQ PRESCRIBED TOTAL DOSE: 6000 CGRAY
RAD ONC MSQ PRESCRIPTION PATTERN COMMENT: NORMAL
RAD ONC MSQ START DATE: NORMAL
RAD ONC MSQ TREATMENT COURSE NUMBER: 1
RAD ONC MSQ TREATMENT SITE: NORMAL

## 2024-01-24 PROCEDURE — 1090000002 HH PPS REVENUE DEBIT

## 2024-01-24 PROCEDURE — 1090000001 HH PPS REVENUE CREDIT

## 2024-01-24 PROCEDURE — 0023 HH SOC

## 2024-01-24 PROCEDURE — 77014 CHG CT GUIDANCE RADIATION THERAPY FLDS PLACEMENT: CPT | Performed by: RADIOLOGY

## 2024-01-24 PROCEDURE — G0300 HHS/HOSPICE OF LPN EA 15 MIN: HCPCS | Mod: HHH

## 2024-01-24 PROCEDURE — 77386 HC INTENSITY-MODULATED RADIATION THERAPY (IMRT), COMPLEX: CPT | Performed by: RADIOLOGY

## 2024-01-24 ASSESSMENT — ENCOUNTER SYMPTOMS
PERSON REPORTING PAIN: PATIENT
PAIN: 1
CHANGE IN APPETITE: ABSENT
THROAT SWELLING: 1

## 2024-01-24 ASSESSMENT — PAIN SCALES - GENERAL: PAINLEVEL: 0-NO PAIN

## 2024-01-24 NOTE — PROGRESS NOTES
Radiation Oncology On Treatment Visit    Patient Name:  Kevin Mendes  MRN:  [id number]  :  1945    Referring Provider: No ref. provider found  Primary Care Provider: Kd Lange DO  Care Team: Patient Care Team:  Kd Lange DO as PCP - General  Kd Lange DO as PCP - [facility] A[facility] Attributed Provider  Kyunghee Burkitt, DO as Consulting Physician (Hematology and Oncology)  Tsering Jade MD as Consulting Physician (Hematology and Oncology)  Sonya Pike RDN, LD as Dietitian (Nutrition)  KERRY Mendoza as  ()    Date of Service: 2024     Diagnosis:   Specialty Problems          Radiation Oncology Problems    Head and neck cancer (CMS/HCC)        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)         Treatment Summary:  Radiation Treatments       Active   Oropharynx_R (Started on 2023)   Most recent fraction: 200 cGy given on 2024   Total given: 5,800 cGy / 6,000 cGy  (29 of 30 fractions)   Elapsed Days: 44   Technique: VMAT           Completed   No historical radiation treatments to show.             SUBJECTIVE: Weight maintained this week. Using PEG for most all nutrition, liquids and medications. Reviewed medication schedule with patient to ensure optimal management of treatment related side effects. Diarrhea resolved this week. Dry mouth        OBJECTIVE:   Vital Signs:  /58 (BP Location: Left arm, Patient Position: Sitting, BP Cuff Size: Adult)   Pulse 83   Temp 36.8 °C (98.2 °F) (Temporal)   Resp 18   Wt 89.8 kg (198 lb)   SpO2 92%   BMI 28.35 kg/m²    Pain Scale: The patient's current pain level was assessed.  They report currently having a pain of 0 out of 10.    Other Pertinent Findings:     Toxicity Assessment          2023    09:08 2023    09:13 1/3/2024    09:00 1/10/2024    09:00 2024    09:26 2024    09:00 2024    11:47   Toxicity Assessment   Adverse Events Reviewed (WDL) Yes (Within Defined Limits) Yes  "(Within Defined Limits)  Yes (Within Defined Limits) Yes (Within Defined Limits) Yes (Within Defined Limits) Yes (Within Defined Limits)   Treatment  and neck Head and neck Head and neck Head and neck Head and neck Head and neck    Anorexia Grade 1       PEG tube feedings 4 per day with water flushes. About to swallow pills, yogurt, bananas, cereal. Decreased appetite. Tryed \"gummies\" with no increase in appetite. Grade 1       Peg tube feedings 4 per day with water flushes. Decreased appetite. Taking pills, yogurt, bananas, and cereal. Tried \"gummies\" without increase in appetite. Grade 1       appetite improving, still some difficulty swallowing. 4 cans of tube feed spread across 3 feeds with water flushes Grade 1       Can swallow, decreased appetite. using PEG tube. 4 cans per day. Doing exercises Grade 1       only able to swallow liquids, though has pain. Decreased appetite. PEG tube 4 cans per day. Grade 1       most food, liquid and meds through the PEG Grade 1       most food, liquid and meds through the PEG   Dehydration  Grade 0 Grade 0 Grade 0       drinking water through the day Grade 0       Water flushes through PEG Grade 1 Grade 1   Dermatitis Radiation  Grade 0       aquaphor Grade 0       reviewed skin care recommendations with patient Grade 1       aquaphor Grade 1       aquaphor. Skin care recommendations reviewed Grade 1 Grade 1   Diarrhea  Grade 1       loose stool x1 episode/day Grade 1       increase in diarrhea on 1/1/2023. Now resolving. Had to take miralax due to no B for 3 days. Grade 1       improving with imodium. Struggling with balance between constipation and diarrhea. 2 imodium per day for 1 day this week seems to be helping Grade 1       imodium with relief. Trying to balance constipation and diarrhea.     Fatigue  Grade 1       urinary frequency with nocturia. Not sleeping well at night. Taking naps Grade 1 Grade 1 Grade 1       not sleeping well d/t frequent nocturia " Grade 1 Grade 1   Nausea  Grade 0 Grade 0       anti-emetics prn Grade 0 Grade 0 Grade 1 Grade 1   Pain  Grade 0 Grade 0 Grade 0 Grade 1       10/10 pain to throat with swallowing. Oxycodone PRN Grade 1       reviewed pain medication schedule with patient Grade 1       reviewed pain medication schedule with patient   Vomiting  Grade 0 Grade 0 Grade 0      Dysphagia   Grade 1 Grade 1 Grade 1 Grade 1 Grade 1   Esophagitis   Grade 0 Grade 0 Grade 0 Grade 0 Grade 0   Mucositis Oral   Grade 0 Grade 0 Grade 0 Grade 0 Grade 0   Dry Mouth  Grade 1       mild dry mouth.  has biotene but not using. Has suction machine, only used a few times. Grade 1       worse in the morning. Grade 1       not bothersome Grade 1     Ear Pain  Grade 1       intermittent ear pain relieved with tylenol Grade 0       resolved this week, using tylenol prn Grade 0 Grade 0     Tinnitus  Grade 0 Grade 0 Grade 0 Grade 0     Oral Pain  Grade 0 Grade 0 Grade 0  Grade 1 Grade 1   Trismus     Grade 0     Voice Alteration  Grade 1       reports deeper and raspy Grade 1 Grade 1 Grade 1 Grade 0 Grade 0        Assessment / Plan:  The patient is tolerating radiation therapy as anticipated.  Continue per current treatment plan.

## 2024-01-25 ENCOUNTER — HOSPITAL ENCOUNTER (OUTPATIENT)
Dept: RADIATION ONCOLOGY | Facility: CLINIC | Age: 79
Setting detail: RADIATION/ONCOLOGY SERIES
Discharge: HOME | End: 2024-01-25
Payer: MEDICARE

## 2024-01-25 DIAGNOSIS — C10.8 MALIGNANT NEOPLASM OF OVERLAPPING SITES OF OROPHARYNX (MULTI): ICD-10-CM

## 2024-01-25 DIAGNOSIS — Z51.0 ENCOUNTER FOR ANTINEOPLASTIC RADIATION THERAPY: ICD-10-CM

## 2024-01-25 LAB
RAD ONC MSQ ACTUAL FRACTIONS DELIVERED: 30
RAD ONC MSQ ACTUAL SESSION DELIVERED DOSE: 200 CGRAY
RAD ONC MSQ ACTUAL TOTAL DOSE: 6000 CGRAY
RAD ONC MSQ ELAPSED DAYS: 45
RAD ONC MSQ LAST DATE: NORMAL
RAD ONC MSQ PRESCRIBED FRACTIONAL DOSE: 200 CGRAY
RAD ONC MSQ PRESCRIBED NUMBER OF FRACTIONS: 30
RAD ONC MSQ PRESCRIBED TECHNIQUE: NORMAL
RAD ONC MSQ PRESCRIBED TOTAL DOSE: 6000 CGRAY
RAD ONC MSQ PRESCRIPTION PATTERN COMMENT: NORMAL
RAD ONC MSQ START DATE: NORMAL
RAD ONC MSQ TREATMENT COURSE NUMBER: 1
RAD ONC MSQ TREATMENT SITE: NORMAL

## 2024-01-25 PROCEDURE — 1090000002 HH PPS REVENUE DEBIT

## 2024-01-25 PROCEDURE — 1090000001 HH PPS REVENUE CREDIT

## 2024-01-25 PROCEDURE — 77014 CHG CT GUIDANCE RADIATION THERAPY FLDS PLACEMENT: CPT | Performed by: RADIOLOGY

## 2024-01-25 PROCEDURE — 77386 HC INTENSITY-MODULATED RADIATION THERAPY (IMRT), COMPLEX: CPT | Performed by: RADIOLOGY

## 2024-01-26 ENCOUNTER — TELEPHONE (OUTPATIENT)
Dept: ADMISSION | Facility: HOSPITAL | Age: 79
End: 2024-01-26
Payer: MEDICARE

## 2024-01-26 ENCOUNTER — HOSPITAL ENCOUNTER (OUTPATIENT)
Dept: RADIATION ONCOLOGY | Facility: CLINIC | Age: 79
Setting detail: RADIATION/ONCOLOGY SERIES
Discharge: HOME | End: 2024-01-26
Payer: MEDICARE

## 2024-01-26 DIAGNOSIS — C10.8 MALIGNANT NEOPLASM OF OVERLAPPING SITES OF OROPHARYNX (MULTI): ICD-10-CM

## 2024-01-26 DIAGNOSIS — Z51.0 ENCOUNTER FOR ANTINEOPLASTIC RADIATION THERAPY: ICD-10-CM

## 2024-01-26 LAB
RAD ONC MSQ ACTUAL FRACTIONS DELIVERED: 1
RAD ONC MSQ ACTUAL SESSION DELIVERED DOSE: 200 CGRAY
RAD ONC MSQ ACTUAL TOTAL DOSE: 200 CGRAY
RAD ONC MSQ ELAPSED DAYS: 0
RAD ONC MSQ LAST DATE: NORMAL
RAD ONC MSQ PRESCRIBED FRACTIONAL DOSE: 200 CGRAY
RAD ONC MSQ PRESCRIBED NUMBER OF FRACTIONS: 5
RAD ONC MSQ PRESCRIBED TECHNIQUE: NORMAL
RAD ONC MSQ PRESCRIBED TOTAL DOSE: 1000 CGRAY
RAD ONC MSQ START DATE: NORMAL
RAD ONC MSQ TREATMENT COURSE NUMBER: 1
RAD ONC MSQ TREATMENT SITE: NORMAL

## 2024-01-26 PROCEDURE — 1090000002 HH PPS REVENUE DEBIT

## 2024-01-26 PROCEDURE — 1090000001 HH PPS REVENUE CREDIT

## 2024-01-26 PROCEDURE — 77386 HC INTENSITY-MODULATED RADIATION THERAPY (IMRT), COMPLEX: CPT | Performed by: RADIOLOGY

## 2024-01-26 PROCEDURE — 77014 CHG CT GUIDANCE RADIATION THERAPY FLDS PLACEMENT: CPT | Performed by: RADIOLOGY

## 2024-01-26 NOTE — TELEPHONE ENCOUNTER
Spouse would like to be called at the next visit on 1/29. She has seizures and tremors and unfortunately is homebound. Please make sure to include her on that appt - thank you!

## 2024-01-27 PROCEDURE — 1090000002 HH PPS REVENUE DEBIT

## 2024-01-27 PROCEDURE — 1090000001 HH PPS REVENUE CREDIT

## 2024-01-28 PROCEDURE — 1090000002 HH PPS REVENUE DEBIT

## 2024-01-28 PROCEDURE — 1090000001 HH PPS REVENUE CREDIT

## 2024-01-29 ENCOUNTER — OFFICE VISIT (OUTPATIENT)
Dept: HEMATOLOGY/ONCOLOGY | Facility: CLINIC | Age: 79
End: 2024-01-29
Payer: MEDICARE

## 2024-01-29 ENCOUNTER — APPOINTMENT (OUTPATIENT)
Dept: HEMATOLOGY/ONCOLOGY | Facility: CLINIC | Age: 79
End: 2024-01-29
Payer: MEDICARE

## 2024-01-29 ENCOUNTER — NUTRITION (OUTPATIENT)
Dept: HEMATOLOGY/ONCOLOGY | Facility: CLINIC | Age: 79
End: 2024-01-29
Payer: MEDICARE

## 2024-01-29 ENCOUNTER — HOSPITAL ENCOUNTER (OUTPATIENT)
Dept: RADIATION ONCOLOGY | Facility: CLINIC | Age: 79
Setting detail: RADIATION/ONCOLOGY SERIES
Discharge: HOME | End: 2024-01-29
Payer: MEDICARE

## 2024-01-29 VITALS
HEART RATE: 78 BPM | WEIGHT: 195.33 LBS | RESPIRATION RATE: 18 BRPM | SYSTOLIC BLOOD PRESSURE: 148 MMHG | OXYGEN SATURATION: 91 % | DIASTOLIC BLOOD PRESSURE: 67 MMHG | TEMPERATURE: 98.2 F | BODY MASS INDEX: 27.96 KG/M2

## 2024-01-29 DIAGNOSIS — C10.8 MALIGNANT NEOPLASM OF OVERLAPPING SITES OF OROPHARYNX (MULTI): ICD-10-CM

## 2024-01-29 DIAGNOSIS — Z51.0 ENCOUNTER FOR ANTINEOPLASTIC RADIATION THERAPY: ICD-10-CM

## 2024-01-29 DIAGNOSIS — C01 MALIGNANT NEOPLASM OF BASE OF TONGUE (MULTI): ICD-10-CM

## 2024-01-29 DIAGNOSIS — C76.0 HEAD AND NECK CANCER (MULTI): ICD-10-CM

## 2024-01-29 DIAGNOSIS — G89.3 CANCER RELATED PAIN: ICD-10-CM

## 2024-01-29 LAB
RAD ONC MSQ ACTUAL FRACTIONS DELIVERED: 2
RAD ONC MSQ ACTUAL SESSION DELIVERED DOSE: 200 CGRAY
RAD ONC MSQ ACTUAL TOTAL DOSE: 400 CGRAY
RAD ONC MSQ ELAPSED DAYS: 3
RAD ONC MSQ LAST DATE: NORMAL
RAD ONC MSQ PRESCRIBED FRACTIONAL DOSE: 200 CGRAY
RAD ONC MSQ PRESCRIBED NUMBER OF FRACTIONS: 5
RAD ONC MSQ PRESCRIBED TECHNIQUE: NORMAL
RAD ONC MSQ PRESCRIBED TOTAL DOSE: 1000 CGRAY
RAD ONC MSQ START DATE: NORMAL
RAD ONC MSQ TREATMENT COURSE NUMBER: 1
RAD ONC MSQ TREATMENT SITE: NORMAL

## 2024-01-29 PROCEDURE — 3077F SYST BP >= 140 MM HG: CPT | Performed by: STUDENT IN AN ORGANIZED HEALTH CARE EDUCATION/TRAINING PROGRAM

## 2024-01-29 PROCEDURE — 1157F ADVNC CARE PLAN IN RCRD: CPT | Performed by: STUDENT IN AN ORGANIZED HEALTH CARE EDUCATION/TRAINING PROGRAM

## 2024-01-29 PROCEDURE — 99214 OFFICE O/P EST MOD 30 MIN: CPT | Mod: 27 | Performed by: STUDENT IN AN ORGANIZED HEALTH CARE EDUCATION/TRAINING PROGRAM

## 2024-01-29 PROCEDURE — 1159F MED LIST DOCD IN RCRD: CPT | Performed by: STUDENT IN AN ORGANIZED HEALTH CARE EDUCATION/TRAINING PROGRAM

## 2024-01-29 PROCEDURE — 77386 HC INTENSITY-MODULATED RADIATION THERAPY (IMRT), COMPLEX: CPT | Performed by: RADIOLOGY

## 2024-01-29 PROCEDURE — 99214 OFFICE O/P EST MOD 30 MIN: CPT | Performed by: STUDENT IN AN ORGANIZED HEALTH CARE EDUCATION/TRAINING PROGRAM

## 2024-01-29 PROCEDURE — 1090000002 HH PPS REVENUE DEBIT

## 2024-01-29 PROCEDURE — 3078F DIAST BP <80 MM HG: CPT | Performed by: STUDENT IN AN ORGANIZED HEALTH CARE EDUCATION/TRAINING PROGRAM

## 2024-01-29 PROCEDURE — 1126F AMNT PAIN NOTED NONE PRSNT: CPT | Performed by: STUDENT IN AN ORGANIZED HEALTH CARE EDUCATION/TRAINING PROGRAM

## 2024-01-29 PROCEDURE — 1036F TOBACCO NON-USER: CPT | Performed by: STUDENT IN AN ORGANIZED HEALTH CARE EDUCATION/TRAINING PROGRAM

## 2024-01-29 PROCEDURE — 1090000001 HH PPS REVENUE CREDIT

## 2024-01-29 PROCEDURE — 77014 CHG CT GUIDANCE RADIATION THERAPY FLDS PLACEMENT: CPT | Performed by: RADIOLOGY

## 2024-01-29 RX ORDER — OXYCODONE HCL 5 MG/5 ML
5 SOLUTION, ORAL ORAL EVERY 6 HOURS PRN
Qty: 90 ML | Refills: 0 | Status: SHIPPED | OUTPATIENT
Start: 2024-01-29 | End: 2024-02-01 | Stop reason: SDUPTHER

## 2024-01-29 ASSESSMENT — PAIN SCALES - GENERAL: PAINLEVEL: 0-NO PAIN

## 2024-01-29 NOTE — PROGRESS NOTES
"[facility] - Medical Oncology Follow-Up Visit    Patient ID: Kevin Mendes \"Vinh" is a 78 y.o. male      Current therapy: CARBOplatin (Paraplatin) 148.8 mg in sodium chloride 0.9% 114.88 mL IV, 148.8 mg, intravenous, Once, 6 of 7 cycles    Administration: 148.8 mg (12/11/2023), 160 mg (12/26/2023), 159 mg (12/18/2023), 210 mg (1/2/2024), 185 mg (1/8/2024)        PACLitaxeL (Taxol) 96 mg in dextrose 5 % in water (D5W) 116 mL IV, 45 mg/m2 = 96 mg, intravenous, Once, 6 of 7 cycles    Administration: 96 mg (12/11/2023), 96 mg (12/26/2023), 96 mg (12/18/2023), 96 mg (1/2/2024), 96 mg (1/8/2024)        palonosetron (Aloxi) injection 250 mcg, 250 mcg, intravenous, Once, 6 of 7 cycles    Administration: 250 mcg (12/11/2023), 250 mcg (12/26/2023), 250 mcg (12/18/2023), 250 mcg (1/2/2024), 250 mcg (1/8/2024)      Oncologic History:     Diagnosis: Base of tongue cancer  Staging: T3N0M0  Date of Diagnosis: 11/8/23     Providers:  ENT Surgeon: Dr. Garcia  MedOn:   Dr. Burkitt ([facility]) --> Dr. Kalie Torres: Dr. Diaz     Oncological history;  Patient was having sore throat with globus sensation for about 1 month.  CT scan (10/20/23) showed exophytic mass in the oropharynx coming from BOT.  PET CT (11/3/23) showed focal hypermetabolic activity along the right base of the tongue with extension inferiorly to the level of the epiglottis.  No distant mets.  Low dose CT chest showed nodule in LLL, but favors infectious/inflammatory.  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  PEG tube placed. He started concurrent chemoradiation with weekly carbo/taxol on 12/11/23.         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)  09/06/2018: Localized, primary osteoarthritis  09/25/2017: Low back pain, unspecified      Comment:  Acute low back pain  07/23/2009: Malignant melanoma of skin of trunk, except scrotum (CMS/  [facility])  No date: Malignant neoplasm of base of tongue (CMS/[facility])  No date: Personal history of other diseases of the circulatory system      Comment:  Personal history of supraventricular tachycardia  12/31/2022: Personal history of other diseases of urinary system      Comment:  History of hematuria  No date: Personal history of other endocrine, nutritional and   metabolic disease      Comment:  History of diabetes mellitus  04/05/2010: Polyneuropathy  01/12/2018: Presence of right artificial knee joint  07/23/2018: Sensorineural hearing loss, bilateral      Surgical History:    Surgical History         Past Surgical History:   Procedure Laterality Date    CATARACT EXTRACTION   04/07/2017     Cataract Surgery    HAND SURGERY   04/07/2017     Hand Surgery                                                                                                                                                          OTHER SURGICAL HISTORY   04/10/2014     Catheter Ablation Atrial Supraventricular Tachycardia    SHOULDER SURGERY   04/07/2017     Shoulder Surgery    TOTAL HIP ARTHROPLASTY   04/10/2014     Hip Replacement    TOTAL KNEE ARTHROPLASTY   04/10/2014     Knee Replacement         Family History:    Family History          Family History   Problem Relation Name Age of Onset    Other (heart failure following cardiac surgery) Mother             Family Oncology History:    Cancer-related family history is not on file.  Social History:    Social History            Tobacco Use    Smoking status: Never    Smokeless tobacco: Never   Vaping Use    Vaping Use: Never used   Substance Use Topics    Alcohol use: Yes       Alcohol/week: 2.0  standard drinks of alcohol       Types: 2 Cans of beer per week    Drug use: Never        Chief Concern: Here in clinic for follow-up on concurrent chemo/RT;     HPI   He is here today with his friend Jeniffer, and hi wife is on the phone. His throat is sore, and he's still fighting diarrhea. He uses oxycodone for the pain, and will take it if it's very bad. He's been taking it two times per day at least, sometimes more. He has not had diarrhea in 6 days and has had solid bowel movements every day otherwise. His energy level has been low, he is sleeping most of the day.       Meds (Current):    Current Outpatient Medications:     aspirin 81 mg EC tablet, Take 1 tablet (81 mg) by mouth once daily., Disp: , Rfl:     glyBURIDE (Diabeta) 5 mg tablet, Take 2 tablets (10 mg) by mouth 2 times a day with meals., Disp: 360 tablet, Rfl: 2    LORazepam (Ativan) 1 mg tablet, Take 1 tablet (1 mg) by mouth once daily. Prior to each radiation treatment, Disp: , Rfl:     losartan (Cozaar) 100 mg tablet, Take 1 tablet (100 mg) by mouth once daily. (Patient not taking: Reported on 1/22/2024), Disp: 90 tablet, Rfl: 2    magic mouthwash (lidocaine, diphenhydrAMINE, Maalox 1:1:1), Swish and spit 10 mL every 6 hours if needed for mucositis for up to 60 doses., Disp: 4000 mL, Rfl: 0    metFORMIN  mg 24 hr tablet, Take 2 tablets (1,000 mg) by mouth once daily in the evening. Take with meals. Do not crush, chew, or split., Disp: 180 tablet, Rfl: 2    neomycin-polymyxin B-dexameth (Polydex) 3.5 mg/g-10,000 unit/g-0.1 % ointment ophthalmic ointment, 2 times a day., Disp: , Rfl:     neomycin-polymyxin-dexAMETHasone (Maxitrol) 3.5mg/mL-10,000 unit/mL-0.1 % ophthalmic suspension, 2 times a day., Disp: , Rfl:     ondansetron (Zofran) 8 mg tablet, Take 1 tablet (8 mg) by mouth every 8 hours if needed for nausea or vomiting., Disp: 30 tablet, Rfl: 5    oxyCODONE (Roxicodone) 5 mg/5 mL solution, Take 5 mL (5 mg) by mouth every 6 hours if needed  for severe pain (7 - 10) for up to 7 days. May give through PEG tube if difficulty swallowing, Disp: 90 mL, Rfl: 0    pioglitazone (Actos) 45 mg tablet, Take 1 tablet (45 mg) by mouth once daily., Disp: 90 tablet, Rfl: 2    PreviDent 5000 Booster Plus 1.1 % dental paste, Brush at bedtime and expectorate do not rinse, Disp: , Rfl:     prochlorperazine (Compazine) 10 mg tablet, Take 1 tablet (10 mg) by mouth every 6 hours if needed for nausea or vomiting., Disp: 30 tablet, Rfl: 5    simvastatin (Zocor) 40 mg tablet, Take 1 tablet (40 mg) by mouth once daily at bedtime., Disp: 90 tablet, Rfl: 2    sucralfate (Carafate) 100 mg/mL suspension, Take 10 mL (1 g) by mouth 4 times a day., Disp: 1200 mL, Rfl: 0    tamsulosin (Flomax) 0.4 mg 24 hr capsule, Take 1 capsule (0.4 mg) by mouth once daily. (Patient not taking: Reported on 1/22/2024), Disp: 90 capsule, Rfl: 2    Review of Systems - Oncology 12 point ROS was obtained and negative unless otherwise mentioned in the above HPI.      Objective   BSA: 2.09 meters squared  Wt Readings from Last 5 Encounters:   01/29/24 88.6 kg (195 lb 5.2 oz)   01/24/24 89.8 kg (198 lb)   01/22/24 89.5 kg (197 lb 5 oz)   01/17/24 89.4 kg (197 lb)   01/15/24 88.7 kg (195 lb 8.8 oz)     /67 (BP Location: Right arm, Patient Position: Sitting, BP Cuff Size: Adult)   Pulse 78   Temp 36.8 °C (98.2 °F) (Temporal)   Resp 18   Wt 88.6 kg (195 lb 5.2 oz)   SpO2 91%   BMI 27.96 kg/m²     Physical Exam  Constitutional:       Appearance: Normal appearance.   Eyes:      Extraocular Movements: Extraocular movements intact.   Cardiovascular:      Rate and Rhythm: Normal rate and regular rhythm.      Heart sounds: Normal heart sounds.   Pulmonary:      Effort: Pulmonary effort is normal.      Breath sounds: Normal breath sounds.   Abdominal:      General: Bowel sounds are normal.      Palpations: Abdomen is soft.   Musculoskeletal:         General: Normal range of motion.   Skin:     General: Skin  "is warm and dry.   Neurological:      General: No focal deficit present.      Mental Status: He is alert and oriented to person, place, and time.   Psychiatric:         Mood and Affect: Mood normal.         Behavior: Behavior normal.            Results:  Labs:  Lab Results   Component Value Date    WBC 1.4 (L) 01/22/2024    HGB 7.7 (L) 01/22/2024    HCT 23.5 (L) 01/22/2024    MCV 93 01/22/2024     01/22/2024      Lab Results   Component Value Date    NEUTROABS 0.81 (L) 01/22/2024      Lab Results   Component Value Date    GLUCOSE 328 (H) 01/22/2024    CALCIUM 8.6 01/22/2024     (L) 01/22/2024    K 4.8 01/22/2024    CO2 29 01/22/2024    CL 96 (L) 01/22/2024    BUN 29 (H) 01/22/2024    CREATININE 1.14 01/22/2024    MG 2.17 01/22/2024     Lab Results   Component Value Date    ALT 6 (L) 01/22/2024    AST 9 01/22/2024    ALKPHOS 91 01/22/2024    BILITOT 0.5 01/22/2024      Lab Results   Component Value Date    TSH 0.51 12/01/2023       Imaging:           No results found for this or any previous visit.    Assessment/Plan      Kevin Mendes \"Katherine\" is a 78 y.o. male here for follow up   Mr. Mendes is 77 y/o male with recent dx of BOT SCC (T2N0M0, p16+, Stage I) who is referred to med onc to be considered for chemotherapy in addition to radiation.     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 11/3/23: PET/CT showed no distant mets except a pulmonary nodule in LLL with no hypermetabolic activity. Findings are favored to be infectious/inflammatory or granulomatous in nature (discussed in HN TB with radiologist). Close follow up with CT to assess for stability is recommended.  Since pt is not a surgical candidate, we will treat him with concurrent chemoradiation. His GFR is at 50s, prefer weekly carboplatin and paclitaxel. Dr. Burkitt previously discussed with patient about treatment schedule and chemotherapy related side effects in detail. Met with Dr. Diaz from radiation oncology.      - now " s/p infection of PEG site which has resolved  - started concurrent chemoRT with weekly carbo/taxol12/11/23, anticipated completion 1/31/24.  - Treatment held 1/22/24 for neutropenia. Will hold treatment today as well due to fatigue.  He will continue radiation and follow up with rad onc.   - RTC PRN

## 2024-01-29 NOTE — PROGRESS NOTES
"NUTRITION Follow-Up NOTE    Nutrition Assessment       Reason for Visit:  Kevin Mendes \"Katherine\" is a 78 y.o. male with a diagnosis of BOT cancer.  Patient had a PEG tube placed 11/8/2023.  Follow up visit today done at [facility] during pt infusion.    Remains to do 4 cartons of Blackwave Standard 1.4 via PEG tube. He is doing 80 oz of fluids total either via PEG tube or orally daily.  Spoke with patient wife, Mariana, on the phone.      Lab Results   Component Value Date/Time    GLUCOSE 328 (H) 01/22/2024 1007     (L) 01/22/2024 1007    K 4.8 01/22/2024 1007    CL 96 (L) 01/22/2024 1007    CO2 29 01/22/2024 1007    ANIONGAP 14 01/22/2024 1007    BUN 29 (H) 01/22/2024 1007    CREATININE 1.14 01/22/2024 1007    EGFR 65 01/22/2024 1007    CALCIUM 8.6 01/22/2024 1007    ALBUMIN 3.9 01/22/2024 1007    ALKPHOS 91 01/22/2024 1007    PROT 7.1 01/22/2024 1007    AST 9 01/22/2024 1007    BILITOT 0.5 01/22/2024 1007    ALT 6 (L) 01/22/2024 1007     Anthropometrics:  Anthropometrics  Height: 178 cm (5' 10.08\")  Weight: 88.6 kg (195 lb 5.2 oz)  BMI (Calculated): 27.96  IBW/kg (Dietitian Calculated): 75.45 kg  Percent of IBW: 117 %  Weight Change  Weight History / % Weight Change: 1% wt loss in 1 week  Significant Weight Loss: Yes  Interpretation of Weight Loss: 1-2% in 1 week        Wt Readings from Last 10 Encounters:   01/30/24 88.6 kg (195 lb 5.2 oz)   01/29/24 88.6 kg (195 lb 5.2 oz)   01/24/24 89.8 kg (198 lb)   01/22/24 89.5 kg (197 lb 5 oz)   01/17/24 89.4 kg (197 lb)   01/15/24 88.7 kg (195 lb 8.8 oz)   01/15/24 88.7 kg (195 lb 8.8 oz)   01/10/24 89.7 kg (197 lb 12.8 oz)   01/08/24 90.2 kg (198 lb 13.7 oz)   01/08/24 90.2 kg (198 lb 13.7 oz)        Food And Nutrient Intake:  Food and Nutrient History  Energy Intake: Poor < 50 % (no solids, just TF)  Fluid Intake: Good estimated ~ 80 oz daily  GI Symptoms: diarrhea, constipation (however this seemed to improve this week.)  GI Symptoms greater than 2 weeks: yes      " "                   Enteral Nutrition Intake  Enteral Nutrition Formula/Solution: Stylefie Standard 1.4 - currently doing 4 cartons per day between 3 feeds, Gravity feeds  Feeding Tube Flush: 60ml before and 120ml after each feed ( 3 times per day)                                  Nutrition Focused Physical Exam:       Energy Needs  Calculated Energy Needs Using Equations  Height: 178 cm (5' 10.08\")  Estimated Energy Needs  Total Energy Estimated Needs (kCal): 2300 kCal  Total Estimated Energy Need per Day (kCal/kg): 25 kCal/kg  Estimated Fluid Needs  Total Fluid Estimated Needs (mL): 2300 mL  Total Fluid Estimated Needs (mL/kg): 25 mL/kg  Estimated Protein Needs  Total Protein Estimated Needs (g): 110 g  Total Protein Estimated Needs (g/kg): 1.2 g/kg    Nutrition Diagnosis      Nutrition Diagnosis  Patient has Nutrition Diagnosis: Yes  Diagnosis Status (1): Ongoing  Nutrition Diagnosis 1: Increased nutrient needs  Additional Assessment Information (1): no changes  Additional Nutrition Diagnosis: Diagnosis 2  Diagnosis Status (2): Ongoing  Nutrition Diagnosis 2: Predicted inadequate nutrient intake  Additional Assessment Information (2): no changes    Nutrition Interventions/Recommendations   Food and Nutrition Delivery  Meals & Snacks: Energy-modified diet, Protein-modified diet, Texture-Modified Diet  Goals: Small frequent meals/snacks softer foods for pleasure feeds and to continue to swallow throughout SCC treatments, increased fluids  Enteral Nutrition: Enteral nutrition site care, Feeding tube flush, Modify composition of enteral nutrition  Total Nutrition Provided: Continue with 4 cartons per day provides 1820 calories, 80g protein, 924ml free water per day  Goals: 5 cartons Stylefie Standard 1.4; 2275 calories, 100g protein, 1155ml free water per day (increase to 5 cartons per day if severe weight loss noted in a week.)  Nutrition Education  Nutrition Education Content: Content related nutrition " education  Goals: Understand nutrition through PEG tube, and nutrition throughout SCC treatments.  Coordination of Nutrition Care by a Nutrition Professional  Collaboration and referral of nutrition care: Collaboration by nutrition professional with other providers  Goals: Carlos is DME for PEG tube formula and supplies    Patient Instructions   Continue current TF regimen.  If severe weight loss noted week to week, recommend increase to goal of 5 cartons per day.      Nutrition Monitoring and Evaluation   Food/Nutrient Related History Monitoring  Monitoring and Evaluation Plan: Energy intake, Fluid intake, Protein intake, Enteral and parenteral nutrition intake  Energy Intake: Estimated energy intake  Criteria: 3192-5284 calories per day  Fluid Intake: Estimated fluid intake  Criteria: 9868-5218 calories per day  Estimated protein intake: Estimated protein intake  Criteria: 92-112g protein per day  Enteral and Parenteral Nutrition Intake: Enteral nutrition formula/solution, Enteral nutrition intake  Criteria: Continue with current TF of 4 cartons per day; 434ml 3 times per day gravity feeds.  Water flushes 60ml before and after and additional water flushes to meet estimated fluid needs.  Biochemical Data, Medical Tests and Procedures  Monitoring and Evaluation Plan: Electrolyte/renal panel, GI profile, Glucose/endocrine profile  Criteria: Labs WNL  Nutrition Focused Physical Findings  Monitoring and Evaluation Plan: Digestive System  Digestive System: Constipation, Diarrhea  Criteria: pt to take meds as needed, as providers have prescribed  Other: Encouraged 64-80 oz of fluids per day - more as needed    Enteral Nutrition Goal:  SiCortex Standard 1.4 gravity feeds, 5 cartons per day will provide 2275 calories, 100 g protein, 255g CHO, 1155ml free water.    Can do 542ml TID for total of 5 cartons.  Water flushes of 60 ml before and after each gravity feed.  Additional water flush of 240ml 3 times per day.    Time  Spent  Prep time on day of patient encounter: 10 minutes  Time spent directly with patient, family or caregiver: 20 minutes  Additional Time Spent on Patient Care Activities: 0 minutes  Documentation Time: 15 minutes  Other Time Spent: 0 minutes  Total: 45 minutes    This service will continue to follow up as needed throughout SCC treatments.

## 2024-01-30 ENCOUNTER — HOSPITAL ENCOUNTER (OUTPATIENT)
Dept: RADIATION ONCOLOGY | Facility: CLINIC | Age: 79
Setting detail: RADIATION/ONCOLOGY SERIES
Discharge: HOME | End: 2024-01-30
Payer: MEDICARE

## 2024-01-30 VITALS — WEIGHT: 195.33 LBS | HEIGHT: 70 IN | BODY MASS INDEX: 27.96 KG/M2

## 2024-01-30 DIAGNOSIS — C10.8 MALIGNANT NEOPLASM OF OVERLAPPING SITES OF OROPHARYNX (MULTI): ICD-10-CM

## 2024-01-30 DIAGNOSIS — Z51.0 ENCOUNTER FOR ANTINEOPLASTIC RADIATION THERAPY: ICD-10-CM

## 2024-01-30 LAB
RAD ONC MSQ ACTUAL FRACTIONS DELIVERED: 3
RAD ONC MSQ ACTUAL SESSION DELIVERED DOSE: 200 CGRAY
RAD ONC MSQ ACTUAL TOTAL DOSE: 600 CGRAY
RAD ONC MSQ ELAPSED DAYS: 4
RAD ONC MSQ LAST DATE: NORMAL
RAD ONC MSQ PRESCRIBED FRACTIONAL DOSE: 200 CGRAY
RAD ONC MSQ PRESCRIBED NUMBER OF FRACTIONS: 5
RAD ONC MSQ PRESCRIBED TECHNIQUE: NORMAL
RAD ONC MSQ PRESCRIBED TOTAL DOSE: 1000 CGRAY
RAD ONC MSQ START DATE: NORMAL
RAD ONC MSQ TREATMENT COURSE NUMBER: 1
RAD ONC MSQ TREATMENT SITE: NORMAL

## 2024-01-30 PROCEDURE — 1090000002 HH PPS REVENUE DEBIT

## 2024-01-30 PROCEDURE — 77014 CHG CT GUIDANCE RADIATION THERAPY FLDS PLACEMENT: CPT | Performed by: RADIOLOGY

## 2024-01-30 PROCEDURE — 77386 HC INTENSITY-MODULATED RADIATION THERAPY (IMRT), COMPLEX: CPT | Performed by: RADIOLOGY

## 2024-01-30 PROCEDURE — 1090000001 HH PPS REVENUE CREDIT

## 2024-01-30 NOTE — PATIENT INSTRUCTIONS
Continue current TF regimen.  If severe weight loss noted week to week, recommend increase to goal of 5 cartons per day.

## 2024-01-31 ENCOUNTER — RADIATION ONCOLOGY OTV (OUTPATIENT)
Dept: RADIATION ONCOLOGY | Facility: CLINIC | Age: 79
End: 2024-01-31
Payer: MEDICARE

## 2024-01-31 ENCOUNTER — HOSPITAL ENCOUNTER (OUTPATIENT)
Dept: RADIATION ONCOLOGY | Facility: CLINIC | Age: 79
Setting detail: RADIATION/ONCOLOGY SERIES
Discharge: HOME | End: 2024-01-31
Payer: MEDICARE

## 2024-01-31 ENCOUNTER — TELEPHONE (OUTPATIENT)
Dept: ADMISSION | Facility: HOSPITAL | Age: 79
End: 2024-01-31
Payer: MEDICARE

## 2024-01-31 VITALS
HEART RATE: 86 BPM | TEMPERATURE: 97.9 F | WEIGHT: 192.6 LBS | RESPIRATION RATE: 18 BRPM | BODY MASS INDEX: 27.57 KG/M2 | DIASTOLIC BLOOD PRESSURE: 63 MMHG | OXYGEN SATURATION: 94 % | SYSTOLIC BLOOD PRESSURE: 140 MMHG

## 2024-01-31 DIAGNOSIS — C10.8 MALIGNANT NEOPLASM OF OVERLAPPING SITES OF OROPHARYNX (MULTI): ICD-10-CM

## 2024-01-31 DIAGNOSIS — Z51.0 ENCOUNTER FOR ANTINEOPLASTIC RADIATION THERAPY: ICD-10-CM

## 2024-01-31 LAB
RAD ONC MSQ ACTUAL FRACTIONS DELIVERED: 4
RAD ONC MSQ ACTUAL SESSION DELIVERED DOSE: 200 CGRAY
RAD ONC MSQ ACTUAL TOTAL DOSE: 800 CGRAY
RAD ONC MSQ ELAPSED DAYS: 5
RAD ONC MSQ LAST DATE: NORMAL
RAD ONC MSQ PRESCRIBED FRACTIONAL DOSE: 200 CGRAY
RAD ONC MSQ PRESCRIBED NUMBER OF FRACTIONS: 5
RAD ONC MSQ PRESCRIBED TECHNIQUE: NORMAL
RAD ONC MSQ PRESCRIBED TOTAL DOSE: 1000 CGRAY
RAD ONC MSQ START DATE: NORMAL
RAD ONC MSQ TREATMENT COURSE NUMBER: 1
RAD ONC MSQ TREATMENT SITE: NORMAL

## 2024-01-31 PROCEDURE — 77386 HC INTENSITY-MODULATED RADIATION THERAPY (IMRT), COMPLEX: CPT | Performed by: RADIOLOGY

## 2024-01-31 PROCEDURE — 1090000002 HH PPS REVENUE DEBIT

## 2024-01-31 PROCEDURE — 77014 CHG CT GUIDANCE RADIATION THERAPY FLDS PLACEMENT: CPT | Performed by: RADIOLOGY

## 2024-01-31 PROCEDURE — 1090000001 HH PPS REVENUE CREDIT

## 2024-01-31 PROCEDURE — 77427 RADIATION TX MANAGEMENT X5: CPT | Performed by: RADIOLOGY

## 2024-01-31 ASSESSMENT — PAIN SCALES - GENERAL: PAINLEVEL: 7

## 2024-01-31 NOTE — TELEPHONE ENCOUNTER
Per Dr. Jade, pt can (and should) use tylenol between doses of oxycodone.  Frequency of oxycodone can be increased on top of that if needed.  Discussed with spouse who will add tylenol for breakthrough pain.  She will continue to keep a medication log as she has been and will call back if pain needs increase.

## 2024-01-31 NOTE — TELEPHONE ENCOUNTER
Pt will complete radiation treatments tomorrow and at follow up with rad onc yesterday, Dr. Diaz indicated that he may experience increased pain over the next few weeks.  In the past 24 hrs, spouse has noted that pt is starting to need oxycodone more frequently, closer to every 6hrs as prescribed.  In anticipation that his pain may increase, spouse asks if frequency of oxycodone can be increased?  Alternatively, she purchased tylenol suspension and asked if she can give him that between oxycodone doses.

## 2024-01-31 NOTE — PROGRESS NOTES
Radiation Oncology On Treatment Visit    Patient Name:  Kevin Mendes  MRN:  [id number]  :  1945    Referring Provider: No ref. provider found  Primary Care Provider: Kd Lange DO  Care Team: Patient Care Team:  Kd Lange DO as PCP - General  Kd Lange DO as PCP - [facility] ACO Attributed Provider  Kyunghee Burkitt, DO as Consulting Physician (Hematology and Oncology)  Tsering Jade MD as Consulting Physician (Hematology and Oncology)  Sonya Pike RDN, LD as Dietitian (Nutrition)  KERRY Mendoza as  ()    Date of Service: 2024     Diagnosis:   Specialty Problems          Radiation Oncology Problems    Head and neck cancer (CMS/HCC)        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)         Treatment Summary:  Radiation Treatments       Active   OPX_BST (Started on 2024)   Most recent fraction: 200 cGy given on 2024   Total given: 800 cGy / 1,000 cGy  (4 of 5 fractions)   Elapsed Days: 5   Technique: VMAT           Completed   Oropharynx_R (Started on 2023)   Most recent fraction: 200 cGy given on 2024   Total given: 6,000 cGy / 6,000 cGy  (30 of 30 fractions)   Elapsed Days: 45   Technique: VMAT                   SUBJECTIVE: Fatigue. No oral discomfort. Taking more oxycodone. Bowel movements are good.      OBJECTIVE:   Vital Signs:  /63 (BP Location: Right arm, Patient Position: Sitting, BP Cuff Size: Large adult)   Pulse 86   Temp 36.6 °C (97.9 °F) (Temporal)   Resp 18   Wt 87.4 kg (192 lb 9.6 oz)   SpO2 94%   BMI 27.57 kg/m²    Pain Scale: The patient's current pain level was assessed.  They report currently having a pain of 0 out of 10.    Other Pertinent Findings:     Toxicity Assessment          1/3/2024    09:00 1/10/2024    09:00 2024    09:26 2024    09:00 2024    11:47 2024    09:00   Toxicity Assessment   Adverse Events Reviewed (WDL)  Yes (Within Defined Limits) Yes (Within Defined Limits) Yes  (Within Defined Limits) Yes (Within Defined Limits) Yes (Within Defined Limits)   Treatment  and neck Head and neck Head and neck Head and neck  Head and neck   Anorexia Grade 1       appetite improving, still some difficulty swallowing. 4 cans of tube feed spread across 3 feeds with water flushes Grade 1       Can swallow, decreased appetite. using PEG tube. 4 cans per day. Doing exercises Grade 1       only able to swallow liquids, though has pain. Decreased appetite. PEG tube 4 cans per day. Grade 1       most food, liquid and meds through the PEG Grade 1       most food, liquid and meds through the PEG Grade 2   Dehydration Grade 0 Grade 0       drinking water through the day Grade 0       Water flushes through PEG Grade 1 Grade 1 Grade 1   Dermatitis Radiation Grade 0       reviewed skin care recommendations with patient Grade 1       aquaphor Grade 1       aquaphor. Skin care recommendations reviewed Grade 1 Grade 1 Grade 1   Diarrhea Grade 1       increase in diarrhea on 1/1/2023. Now resolving. Had to take miralax due to no B for 3 days. Grade 1       improving with imodium. Struggling with balance between constipation and diarrhea. 2 imodium per day for 1 day this week seems to be helping Grade 1       imodium with relief. Trying to balance constipation and diarrhea.   Grade 0   Fatigue Grade 1 Grade 1 Grade 1       not sleeping well d/t frequent nocturia Grade 1 Grade 1 Grade 2   Nausea Grade 0       anti-emetics prn Grade 0 Grade 0 Grade 1 Grade 1 Grade 1   Pain Grade 0 Grade 0 Grade 1       10/10 pain to throat with swallowing. Oxycodone PRN Grade 1       reviewed pain medication schedule with patient Grade 1       reviewed pain medication schedule with patient Grade 1       throat pain   Vomiting Grade 0 Grade 0       Dysphagia Grade 1 Grade 1 Grade 1 Grade 1 Grade 1 Grade 2       sips only, no soft foods, thick saliva, encourage po   Esophagitis Grade 0 Grade 0 Grade 0 Grade 0 Grade 0 Grade 0    Mucositis Oral Grade 0 Grade 0 Grade 0 Grade 0 Grade 0 Grade 1   Dry Mouth Grade 1       worse in the morning. Grade 1       not bothersome Grade 1   Grade 1   Ear Pain Grade 0       resolved this week, using tylenol prn Grade 0 Grade 0      Tinnitus Grade 0 Grade 0 Grade 0      Oral Pain Grade 0 Grade 0  Grade 1 Grade 1 Grade 1   Trismus   Grade 0   Grade 0   Voice Alteration Grade 1 Grade 1 Grade 1 Grade 0 Grade 0 Grade 0        Assessment / Plan:  The patient is tolerating radiation therapy as anticipated.  Continue per current treatment plan.   Fu 2 weeks.

## 2024-02-01 ENCOUNTER — DOCUMENTATION (OUTPATIENT)
Dept: RADIATION ONCOLOGY | Facility: CLINIC | Age: 79
End: 2024-02-01

## 2024-02-01 ENCOUNTER — TELEPHONE (OUTPATIENT)
Dept: ADMISSION | Facility: HOSPITAL | Age: 79
End: 2024-02-01
Payer: MEDICARE

## 2024-02-01 ENCOUNTER — HOSPITAL ENCOUNTER (OUTPATIENT)
Dept: RADIATION ONCOLOGY | Facility: CLINIC | Age: 79
Setting detail: RADIATION/ONCOLOGY SERIES
Discharge: HOME | End: 2024-02-01
Payer: MEDICARE

## 2024-02-01 ENCOUNTER — HOME CARE VISIT (OUTPATIENT)
Dept: HOME HEALTH SERVICES | Facility: HOME HEALTH | Age: 79
End: 2024-02-01
Payer: MEDICARE

## 2024-02-01 VITALS
DIASTOLIC BLOOD PRESSURE: 70 MMHG | RESPIRATION RATE: 18 BRPM | SYSTOLIC BLOOD PRESSURE: 141 MMHG | HEART RATE: 76 BPM | TEMPERATURE: 98.9 F | OXYGEN SATURATION: 94 %

## 2024-02-01 DIAGNOSIS — C10.8 MALIGNANT NEOPLASM OF OVERLAPPING SITES OF OROPHARYNX (MULTI): ICD-10-CM

## 2024-02-01 DIAGNOSIS — Z51.0 ENCOUNTER FOR ANTINEOPLASTIC RADIATION THERAPY: ICD-10-CM

## 2024-02-01 DIAGNOSIS — C01 MALIGNANT NEOPLASM OF BASE OF TONGUE (MULTI): ICD-10-CM

## 2024-02-01 DIAGNOSIS — G89.3 CANCER RELATED PAIN: ICD-10-CM

## 2024-02-01 DIAGNOSIS — C76.0 HEAD AND NECK CANCER (MULTI): ICD-10-CM

## 2024-02-01 LAB
RAD ONC MSQ ACTUAL FRACTIONS DELIVERED: 5
RAD ONC MSQ ACTUAL SESSION DELIVERED DOSE: 200 CGRAY
RAD ONC MSQ ACTUAL TOTAL DOSE: 1000 CGRAY
RAD ONC MSQ ELAPSED DAYS: 6
RAD ONC MSQ LAST DATE: NORMAL
RAD ONC MSQ PRESCRIBED FRACTIONAL DOSE: 200 CGRAY
RAD ONC MSQ PRESCRIBED NUMBER OF FRACTIONS: 5
RAD ONC MSQ PRESCRIBED TECHNIQUE: NORMAL
RAD ONC MSQ PRESCRIBED TOTAL DOSE: 1000 CGRAY
RAD ONC MSQ START DATE: NORMAL
RAD ONC MSQ TREATMENT COURSE NUMBER: 1
RAD ONC MSQ TREATMENT SITE: NORMAL

## 2024-02-01 PROCEDURE — G0299 HHS/HOSPICE OF RN EA 15 MIN: HCPCS | Mod: HHH

## 2024-02-01 PROCEDURE — 77336 RADIATION PHYSICS CONSULT: CPT | Performed by: RADIOLOGY

## 2024-02-01 PROCEDURE — 1090000002 HH PPS REVENUE DEBIT

## 2024-02-01 PROCEDURE — 77014 CHG CT GUIDANCE RADIATION THERAPY FLDS PLACEMENT: CPT | Performed by: RADIOLOGY

## 2024-02-01 PROCEDURE — 77386 HC INTENSITY-MODULATED RADIATION THERAPY (IMRT), COMPLEX: CPT | Performed by: RADIOLOGY

## 2024-02-01 PROCEDURE — 1090000001 HH PPS REVENUE CREDIT

## 2024-02-01 RX ORDER — OXYCODONE HCL 5 MG/5 ML
5 SOLUTION, ORAL ORAL EVERY 6 HOURS PRN
Qty: 90 ML | Refills: 0 | Status: SHIPPED | OUTPATIENT
Start: 2024-02-01 | End: 2024-02-08 | Stop reason: ALTCHOICE

## 2024-02-01 SDOH — ECONOMIC STABILITY: GENERAL

## 2024-02-01 ASSESSMENT — ENCOUNTER SYMPTOMS
PAIN LOCATION - PAIN FREQUENCY: INTERMITTENT
PAIN: 1
PAIN LOCATION - PAIN SEVERITY: 7/10
PERSON REPORTING PAIN: PATIENT
LOWEST PAIN SEVERITY IN PAST 24 HOURS: 4/10
PAIN LOCATION - PAIN QUALITY: ACHING
PAIN LOCATION: THROAT
CHANGE IN APPETITE: ABSENT
SUBJECTIVE PAIN PROGRESSION: UNCHANGED
PAIN SEVERITY GOAL: 4/10
HIGHEST PAIN SEVERITY IN PAST 24 HOURS: 7/10
THROAT SWELLING: 1

## 2024-02-01 ASSESSMENT — ACTIVITIES OF DAILY LIVING (ADL): MONEY MANAGEMENT (EXPENSES/BILLS): INDEPENDENT

## 2024-02-01 NOTE — TELEPHONE ENCOUNTER
Spouse calling in requesting a new oxycodone prescription be sent in to [facility] to reflect the increase in frequency.   Spouse states he was taking oxycodone twice daily as of 01/29/24. Spouse reports pain increased the evening of 01/30/24, and he is now taking 4 times a day with tylenol in between.   Spouse reports patient will receive last radiation treatment today, and is concerned that they will not have enough pain medication to last throughout the weekend.

## 2024-02-01 NOTE — TELEPHONE ENCOUNTER
Call placed to spouse and let her know MD sent in updated prescription.  No further questions or concerns at this time.

## 2024-02-02 PROCEDURE — 1090000002 HH PPS REVENUE DEBIT

## 2024-02-02 PROCEDURE — 1090000001 HH PPS REVENUE CREDIT

## 2024-02-03 PROCEDURE — 1090000002 HH PPS REVENUE DEBIT

## 2024-02-03 PROCEDURE — 1090000001 HH PPS REVENUE CREDIT

## 2024-02-04 PROCEDURE — 1090000001 HH PPS REVENUE CREDIT

## 2024-02-04 PROCEDURE — 1090000002 HH PPS REVENUE DEBIT

## 2024-02-05 ENCOUNTER — NURSE TRIAGE (OUTPATIENT)
Dept: ADMISSION | Facility: HOSPITAL | Age: 79
End: 2024-02-05
Payer: MEDICARE

## 2024-02-05 PROCEDURE — 1090000002 HH PPS REVENUE DEBIT

## 2024-02-05 PROCEDURE — 1090000001 HH PPS REVENUE CREDIT

## 2024-02-05 NOTE — TELEPHONE ENCOUNTER
Pt's wife calling to report that pt is having thick mucous- mostly clear, at times blood tinged. Cough is waking him from sleep as well.   No fever, body aches, chills  No new/worsening dyspnea, chest pain, dizziness.   Pt takes all nutrition and fluids via feeding tube. Wife is using oxycodone every 6 hrs with tylenol in between to manage pain.   Wife checking if there is something to help with cough/mucous.   Last FUV 1/29 with no FUV scheduled.

## 2024-02-06 PROCEDURE — 1090000001 HH PPS REVENUE CREDIT

## 2024-02-06 PROCEDURE — 1090000002 HH PPS REVENUE DEBIT

## 2024-02-06 NOTE — TELEPHONE ENCOUNTER
Discussed with pharmacist- as long as they are both liquid then it's okay to administer together. Pt's wife aware.

## 2024-02-06 NOTE — TELEPHONE ENCOUNTER
Pt's wife called asking if she could give him liquid Oxycodone and Mucinex together via his PEG tube in the same syringe. Also asking about giving Tylenol and Mucinex together at the same time via the PEG. I told her I will double check with our pharmacist and call her back.

## 2024-02-07 ENCOUNTER — HOME CARE VISIT (OUTPATIENT)
Dept: HOME HEALTH SERVICES | Facility: HOME HEALTH | Age: 79
End: 2024-02-07
Payer: MEDICARE

## 2024-02-07 ENCOUNTER — TELEPHONE (OUTPATIENT)
Dept: PRIMARY CARE | Facility: CLINIC | Age: 79
End: 2024-02-07
Payer: MEDICARE

## 2024-02-07 VITALS
RESPIRATION RATE: 20 BRPM | HEART RATE: 60 BPM | DIASTOLIC BLOOD PRESSURE: 70 MMHG | TEMPERATURE: 96.8 F | OXYGEN SATURATION: 99 % | SYSTOLIC BLOOD PRESSURE: 122 MMHG

## 2024-02-07 DIAGNOSIS — C10.8 MALIGNANT NEOPLASM OF OVERLAPPING SITES OF OROPHARYNX (MULTI): ICD-10-CM

## 2024-02-07 DIAGNOSIS — Z51.0 ENCOUNTER FOR ANTINEOPLASTIC RADIATION THERAPY: ICD-10-CM

## 2024-02-07 DIAGNOSIS — E11.49 DIABETES MELLITUS TYPE 2 WITH NEUROLOGICAL MANIFESTATIONS (MULTI): Primary | ICD-10-CM

## 2024-02-07 PROCEDURE — 1090000001 HH PPS REVENUE CREDIT

## 2024-02-07 PROCEDURE — 1090000002 HH PPS REVENUE DEBIT

## 2024-02-07 PROCEDURE — G0299 HHS/HOSPICE OF RN EA 15 MIN: HCPCS | Mod: HHH

## 2024-02-07 ASSESSMENT — ENCOUNTER SYMPTOMS
OCCASIONAL FEELINGS OF UNSTEADINESS: 0
PERSON REPORTING PAIN: PATIENT
PAIN LOCATION: THROAT
LOSS OF SENSATION IN FEET: 0
PAIN LOCATION - PAIN QUALITY: PAINFUL
PAIN LOCATION - PAIN FREQUENCY: CONSTANT
HIGHEST PAIN SEVERITY IN PAST 24 HOURS: 6/10
SUBJECTIVE PAIN PROGRESSION: UNCHANGED
CHANGE IN APPETITE: UNCHANGED
DEPRESSION: 0
PAIN SEVERITY GOAL: 0/10
PAIN LOCATION - PAIN SEVERITY: 6/10
APPETITE LEVEL: POOR
LOWEST PAIN SEVERITY IN PAST 24 HOURS: 2/10
LAST BOWEL MOVEMENT: 66876
PAIN: 1

## 2024-02-07 ASSESSMENT — PAIN SCALES - PAIN ASSESSMENT IN ADVANCED DEMENTIA (PAINAD)
FACIALEXPRESSION: 0
CONSOLABILITY: 0 - NO NEED TO CONSOLE.
CONSOLABILITY: 0
BREATHING: 0
NEGVOCALIZATION: 0 - NONE.
BODYLANGUAGE: 0 - RELAXED.
FACIALEXPRESSION: 0 - SMILING OR INEXPRESSIVE.
BODYLANGUAGE: 0
NEGVOCALIZATION: 0
TOTALSCORE: 0

## 2024-02-07 NOTE — HOME HEALTH
SKILLED NURSING VISIT FOR ASSESSMENT AND TEACHING OF MEDICATIONS. PT FINISHED RADIATION THERAPY LAST WEEK AND IS HAVING THROAT PAIN AND UNABLE TO SWALLOW WHICH MD EXPLAINED TO HIM IS EXPECTED OVER NEXT FEW WEEKS. WIFE ADMINISTERING ENTERAL FEEDINGS. PT HAS LIQUID BMX OXYCODONE AND TYLENOL FOR PAIN. PT NOT HAVING ANY EPISODES OF DIARRHEA. SPOUSE REQUESTING NEXT WEEKS VISIT FOR RECERTIFICATION ON WEDS. TO BE LATER IN DAY. PT HAS MULTIPLE MD APPTS NEXT WEEK.

## 2024-02-07 NOTE — Clinical Note
PT STILL AWAITING THE LIQUID METFORMIN 1000MG. DAILY PRESCRIPTION TO BE [facility]ENT [facility]. SHE IS CONCERNED THAT FEEDING TUBE WILL GET BLOCKED DESPITE HER CRUSHING THE PILLS. PT AND SPOUSE ARE NOT CONCERNED ABOUTTHE COST. THANK YOU

## 2024-02-07 NOTE — TELEPHONE ENCOUNTER
Apoorva Mendes called for her  Kevin Mendes and she said she talked to you on Sunday and you said you would call a prescription into [facility]  for liquid Metformin.  He is on a feeding tube.      Metformin  mg    [facility]  [address]        [place] 04/10/24  LF 9/18/23 but not for liquid

## 2024-02-08 ENCOUNTER — NUTRITION (OUTPATIENT)
Dept: HEMATOLOGY/ONCOLOGY | Facility: CLINIC | Age: 79
End: 2024-02-08
Payer: MEDICARE

## 2024-02-08 ENCOUNTER — NURSE TRIAGE (OUTPATIENT)
Dept: ADMISSION | Facility: HOSPITAL | Age: 79
End: 2024-02-08
Payer: MEDICARE

## 2024-02-08 DIAGNOSIS — C01 MALIGNANT NEOPLASM OF BASE OF TONGUE (MULTI): ICD-10-CM

## 2024-02-08 DIAGNOSIS — C76.0 HEAD AND NECK CANCER (MULTI): ICD-10-CM

## 2024-02-08 DIAGNOSIS — G89.3 CANCER RELATED PAIN: ICD-10-CM

## 2024-02-08 PROCEDURE — 1090000001 HH PPS REVENUE CREDIT

## 2024-02-08 PROCEDURE — 1090000002 HH PPS REVENUE DEBIT

## 2024-02-08 RX ORDER — OXYCODONE HCL 5 MG/5 ML
5 SOLUTION, ORAL ORAL EVERY 4 HOURS PRN
Qty: 180 ML | Refills: 0 | Status: SHIPPED | OUTPATIENT
Start: 2024-02-08 | End: 2024-02-15 | Stop reason: SDUPTHER

## 2024-02-08 NOTE — TELEPHONE ENCOUNTER
[place] notified Dr. Jade refilled liquid Oxycodone for q4 prn. No further questions or concerns at this time.

## 2024-02-08 NOTE — PROGRESS NOTES
Radiation Oncology Treatment Summary    Patient Name:  Kevin Mendes  MRN:  [id number]  :  1945    Referring Provider: No ref. provider found  Primary Care Provider: Kd Lange DO    Brief History: Kevin Mendes is a 78 y.o. 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  The patient completed radiotherapy as outlined below.    Radiation Treatment Summary:    Radiation Treatments       Active   No active radiation treatments to show.     Completed   OPX_BST (Started on 2024)   Most recent fraction: 200 cGy given on 2024   Total given: 1,000 cGy / 1,000 cGy  (5 of 5 fractions)   Elapsed Days: 6   Technique: VMAT        Oropharynx_R (Started on 2023)   Most recent fraction: 200 cGy given on 2024   Total given: 6,000 cGy / 6,000 cGy  (30 of 30 fractions)   Elapsed Days: 45   Technique: VMAT                     Please see the patient's Mosaiq chart for further details regarding the radiation plan, including beam energy.    Concurrent Chemotherapy:  Treatment Plans       Name Type Plan Dates Plan Provider         Active    PACLitaxel / CARBOplatin with Concurrent Radiation, Weekly - Head and Neck Oncology Treatment  2023 - Present Kyunghee Burkitt, DO                    CTCAE Toxicity Overview:   Toxicity Assessment          1/10/2024    09:00 2024    09:26 2024    09:00 2024    11:47 2024    09:00   Toxicity Assessment   Adverse Events Reviewed (WDL) Yes (Within Defined Limits) Yes (Within Defined Limits) Yes (Within Defined Limits) Yes (Within Defined Limits) Yes (Within Defined Limits)   Treatment  and neck Head and neck Head and neck  Head and neck   Anorexia Grade 1       Can swallow, decreased appetite. using PEG tube. 4 cans per day. Doing exercises Grade 1       only able to swallow liquids, though has pain. Decreased appetite. PEG tube 4 cans per day. Grade 1       most food, liquid and meds  through the PEG Grade 1       most food, liquid and meds through the PEG Grade 2   Dehydration Grade 0       drinking water through the day Grade 0       Water flushes through PEG Grade 1 Grade 1 Grade 1   Dermatitis Radiation Grade 1       aquaphor Grade 1       aquaphor. Skin care recommendations reviewed Grade 1 Grade 1 Grade 1   Diarrhea Grade 1       improving with imodium. Struggling with balance between constipation and diarrhea. 2 imodium per day for 1 day this week seems to be helping Grade 1       imodium with relief. Trying to balance constipation and diarrhea.   Grade 0   Fatigue Grade 1 Grade 1       not sleeping well d/t frequent nocturia Grade 1 Grade 1 Grade 2   Nausea Grade 0 Grade 0 Grade 1 Grade 1 Grade 1   Pain Grade 0 Grade 1       10/10 pain to throat with swallowing. Oxycodone PRN Grade 1       reviewed pain medication schedule with patient Grade 1       reviewed pain medication schedule with patient Grade 1       throat pain   Vomiting Grade 0       Dysphagia Grade 1 Grade 1 Grade 1 Grade 1 Grade 2       sips only, no soft foods, thick saliva, encourage po   Esophagitis Grade 0 Grade 0 Grade 0 Grade 0 Grade 0   Mucositis Oral Grade 0 Grade 0 Grade 0 Grade 0 Grade 1   Dry Mouth Grade 1       not bothersome Grade 1   Grade 1   Ear Pain Grade 0 Grade 0      Tinnitus Grade 0 Grade 0      Oral Pain Grade 0  Grade 1 Grade 1 Grade 1   Trismus  Grade 0   Grade 0   Voice Alteration Grade 1 Grade 1 Grade 0 Grade 0 Grade 0     Patient Disposition: Follow up 2/19/2024

## 2024-02-08 NOTE — PROGRESS NOTES
"NUTRITION COMMUNICATION NOTE    Kevin Mendes \"Pat\" wife, Mariana.    REASON FOR COMMUNICATION:   Patient wife, Mariana, called yesterday and returned her call today.  She stated she had a question with whether she could add gatorade into his water flushes, but she asked the visiting nurse this questions and already got her answer. Advised her that she could do that, mix gatorade with water and utliize as part of 1 or 2 water flushes per day, but would recommend to then flush with plain water of about 30-60ml after.  She was receptive, and had said RN mentioned the same.  She stated Pat was having a hard time this week.  He is still doing 4 cartons per day.  Noted he has a MD apt next week, will check his weight from that visit.           "

## 2024-02-08 NOTE — TELEPHONE ENCOUNTER
Spouse called because Kevin needs a refill on his liquid Oxycodone. He takes 5mL q6. Asking this be [facility]. She knows he might not be due yet but is nervous that he may run out over the weekend/it takes time for the pharmacy to get the liquid medication.  She has been giving him liquid Tylenol 4 hours after giving him the Oxycodone as the Oxycodone starts to wear off after about 4 hours.   She is asking if the Oxycodone prescription could be changed to q4.   He finished radiation last week and was told from [facility] that the pain should start to subside after the next three weeks.     Additional Information   Commented on: Where is the pain? Is there more than one place where you're having pain?     Throat; he just completed radiation last week   Commented on: What helps these problems?     Oxycodone and Tylenol help    Protocols used: Pain

## 2024-02-09 ENCOUNTER — APPOINTMENT (OUTPATIENT)
Dept: OTOLARYNGOLOGY | Facility: CLINIC | Age: 79
End: 2024-02-09
Payer: MEDICARE

## 2024-02-09 PROCEDURE — 1090000002 HH PPS REVENUE DEBIT

## 2024-02-09 PROCEDURE — 1090000001 HH PPS REVENUE CREDIT

## 2024-02-10 PROCEDURE — 1090000001 HH PPS REVENUE CREDIT

## 2024-02-10 PROCEDURE — 1090000002 HH PPS REVENUE DEBIT

## 2024-02-11 PROCEDURE — 1090000001 HH PPS REVENUE CREDIT

## 2024-02-11 PROCEDURE — 1090000002 HH PPS REVENUE DEBIT

## 2024-02-12 PROCEDURE — 1090000002 HH PPS REVENUE DEBIT

## 2024-02-12 PROCEDURE — 1090000001 HH PPS REVENUE CREDIT

## 2024-02-13 ENCOUNTER — LAB (OUTPATIENT)
Dept: LAB | Facility: CLINIC | Age: 79
End: 2024-02-13
Payer: MEDICARE

## 2024-02-13 ENCOUNTER — OFFICE VISIT (OUTPATIENT)
Dept: OTOLARYNGOLOGY | Facility: CLINIC | Age: 79
End: 2024-02-13
Payer: MEDICARE

## 2024-02-13 VITALS — HEIGHT: 70 IN | WEIGHT: 192.8 LBS | BODY MASS INDEX: 27.6 KG/M2

## 2024-02-13 DIAGNOSIS — C01 MALIGNANT NEOPLASM OF BASE OF TONGUE (MULTI): ICD-10-CM

## 2024-02-13 LAB
ERYTHROCYTE [DISTWIDTH] IN BLOOD BY AUTOMATED COUNT: 18.8 % (ref 11.5–14.5)
HCT VFR BLD AUTO: 35 % (ref 41–52)
HGB BLD-MCNC: 10.9 G/DL (ref 13.5–17.5)
HOLD SPECIMEN: NORMAL
MCH RBC QN AUTO: 30.8 PG (ref 26–34)
MCHC RBC AUTO-ENTMCNC: 31.1 G/DL (ref 32–36)
MCV RBC AUTO: 99 FL (ref 80–100)
PLATELET # BLD AUTO: 335 X10*3/UL (ref 150–450)
RBC # BLD AUTO: 3.54 X10*6/UL (ref 4.5–5.9)
WBC # BLD AUTO: 10.4 X10*3/UL (ref 4.4–11.3)

## 2024-02-13 PROCEDURE — 31575 DIAGNOSTIC LARYNGOSCOPY: CPT | Performed by: OTOLARYNGOLOGY

## 2024-02-13 PROCEDURE — 1160F RVW MEDS BY RX/DR IN RCRD: CPT | Performed by: OTOLARYNGOLOGY

## 2024-02-13 PROCEDURE — 1090000001 HH PPS REVENUE CREDIT

## 2024-02-13 PROCEDURE — 1126F AMNT PAIN NOTED NONE PRSNT: CPT | Performed by: OTOLARYNGOLOGY

## 2024-02-13 PROCEDURE — 1036F TOBACCO NON-USER: CPT | Performed by: OTOLARYNGOLOGY

## 2024-02-13 PROCEDURE — 1157F ADVNC CARE PLAN IN RCRD: CPT | Performed by: OTOLARYNGOLOGY

## 2024-02-13 PROCEDURE — 36415 COLL VENOUS BLD VENIPUNCTURE: CPT

## 2024-02-13 PROCEDURE — 1159F MED LIST DOCD IN RCRD: CPT | Performed by: OTOLARYNGOLOGY

## 2024-02-13 PROCEDURE — 85027 COMPLETE CBC AUTOMATED: CPT

## 2024-02-13 PROCEDURE — 1090000002 HH PPS REVENUE DEBIT

## 2024-02-13 PROCEDURE — 99213 OFFICE O/P EST LOW 20 MIN: CPT | Performed by: OTOLARYNGOLOGY

## 2024-02-13 NOTE — PROGRESS NOTES
ENT Outpatient Consultation    Chief Complaint: Base of tongue mass  History Of Present Illness  Katherine Mendes is a 78 y.o. male referred by Dr. Holly for evaluation of a base of tongue mass.  Patient states that he developed a globus type sensation a little over 1 month ago.  This prompted his evaluation with Dr. Holly who noted a oropharyngeal mass.  A CT scan was obtained showing an exophytic mass in the oropharynx that appears to be coming from the base of tongue.  There is some invasion into the deeper aspect of the base of tongue and the mass extends out laterally towards the carotid.  He has a history of smoking and a lung nodule was also noted on his CT scan.  He is still tolerating p.o. intake.    11/10/23: Patient returns for follow-up.  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  Final pathology is pending.  We also placed a PEG tube and he is here today to have it checked    2/13/24: Patient returns for follow-up.  He has completed treatment and overall is doing well.  He has had continued sore throat but it has been improving over the past few nights.  He has not taken any p.o. intake.  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    Surgical History  He has a past surgical history that includes Total hip arthroplasty (04/10/2014); Total knee arthroplasty (04/10/2014); Other surgical history (04/10/2014); Hand surgery (04/07/2017); Cataract extraction (04/07/2017); and Shoulder surgery (04/07/2017).     Social History  He reports that he has quit smoking. His smoking use included cigarettes. He has never been exposed to tobacco smoke. He has never used smokeless tobacco. He reports current alcohol use of about 2.0 standard drinks of alcohol per week. He reports that he does not use drugs.    Family History  Family History   Problem Relation Name Age of Onset    Other (heart failure following cardiac surgery) Mother          Allergies  Cat dander and Canagliflozin     Physical Exam:  CONSTITUTIONAL:  No acute distress  VOICE:  No hoarseness or other abnormality  RESPIRATION:  Breathing comfortably, no stridor  CV:  No clubbing/cyanosis/edema in hands  EYES:  EOM intact, sclera normal  NEURO:  Alert and oriented times 3, Cranial nerves II-XII grossly intact and symmetric bilaterally  HEAD AND FACE:  Symmetric facial features, no masses or lesions, sinuses non-tender to palpation  SALIVARY GLANDS:  Parotid and submandibular glands normal bilaterally  EARS:  Normal external ears, external auditory canals, and TMs to otoscopy, normal hearing to whispered voice.  NOSE:  External nose midline, anterior rhinoscopy is normal with limited visualization to the anterior aspect of the interior turbinates, no bleeding or drainage, no lesions  ORAL CAVITY/OROPHARYNX/LIPS: I cannot visualize any mass in the oropharynx the remainder of the oral cavity has normal mucosa  PHARYNGEAL WALLS: No mass visualized  NECK/LYMPH: Some mildly enlarged LAD, no  thyroid masses, trachea midline.  There is a lipomatous lesion in the left inferior neck near the sternocleidomastoid  SKIN:  Neck skin is without scar or injury  PSYCH:  Alert and oriented with appropriate mood and affect     Procedure Note: Flexible Nasolaryngoscopy  Verbal informed consent was obtained from the patient/patient's guardian. 4% lidocaine mixed with phenylephrine was prepared and dripped into the nose. It was placed in the right naris. Following an appropriate amount of time to allow for adequate anesthesia, a flexible fiberoptic nasolaryngoscope was placed into the patient's right naris. The nasal cavity, nasopharynx, oropharynx, hypopharynx, and all endolaryngeal structures were visualized and were normal except as listed below. Significant findings included:  -No concerning mucosal lesions, still has some mucosal burns from recent treatment    Assessment and Plan  78 y.o. 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   SCC of base of tongue, p16 +, > 10 PPD smoking history, [id number] .  Completed treatment February 1, 2024    -Order placed for CBC  -Encouraged him to advance oral diet as tolerated  -Follow-up in approximately 2 months, earlier with any new concerns    Kalin Garcia MD

## 2024-02-14 ENCOUNTER — HOME CARE VISIT (OUTPATIENT)
Dept: HOME HEALTH SERVICES | Facility: HOME HEALTH | Age: 79
End: 2024-02-14
Payer: MEDICARE

## 2024-02-14 VITALS
OXYGEN SATURATION: 96 % | DIASTOLIC BLOOD PRESSURE: 68 MMHG | HEIGHT: 70 IN | RESPIRATION RATE: 20 BRPM | TEMPERATURE: 97.6 F | BODY MASS INDEX: 27.77 KG/M2 | WEIGHT: 194 LBS | HEART RATE: 75 BPM | SYSTOLIC BLOOD PRESSURE: 132 MMHG

## 2024-02-14 PROCEDURE — G0299 HHS/HOSPICE OF RN EA 15 MIN: HCPCS | Mod: HHH

## 2024-02-14 PROCEDURE — 1090000001 HH PPS REVENUE CREDIT

## 2024-02-14 PROCEDURE — 1090000002 HH PPS REVENUE DEBIT

## 2024-02-14 ASSESSMENT — PAIN SCALES - PAIN ASSESSMENT IN ADVANCED DEMENTIA (PAINAD)
TOTALSCORE: 0
CONSOLABILITY: 0 - NO NEED TO CONSOLE.
BODYLANGUAGE: 0 - RELAXED.
BODYLANGUAGE: 0
FACIALEXPRESSION: 0 - SMILING OR INEXPRESSIVE.
CONSOLABILITY: 0
BREATHING: 0
NEGVOCALIZATION: 0 - NONE.
NEGVOCALIZATION: 0
FACIALEXPRESSION: 0

## 2024-02-14 ASSESSMENT — LIFESTYLE VARIABLES: SMOKING_STATUS: 0

## 2024-02-14 ASSESSMENT — ENCOUNTER SYMPTOMS
PAIN LOCATION - EXACERBATING FACTORS: THROAT CANCER
TROUBLE SWALLOWING: 1
OCCASIONAL FEELINGS OF UNSTEADINESS: 0
PAIN SEVERITY GOAL: 0/10
BOWEL PATTERN NORMAL: 1
FATIGUE: 1
LAST BOWEL MOVEMENT: 66883
PAIN LOCATION - PAIN QUALITY: TENDER,ACHING
HIGHEST PAIN SEVERITY IN PAST 24 HOURS: 10/10
DEPRESSION: 0
CHANGE IN APPETITE: DECREASED
STOOL FREQUENCY: DAILY
PERSON REPORTING PAIN: PATIENT
SORE THROAT: 1
LOWEST PAIN SEVERITY IN PAST 24 HOURS: 2/10
LOSS OF SENSATION IN FEET: 0
PAIN LOCATION - RELIEVING FACTORS: MEDS
PAIN LOCATION - PAIN FREQUENCY: CONSTANT
PAIN LOCATION - PAIN SEVERITY: 7/10
HOARSE VOICE: 1
HYPERTENSION: 1
PAIN LOCATION: THROAT
APPETITE LEVEL: FAIR
SUBJECTIVE PAIN PROGRESSION: GRADUALLY IMPROVING
PAIN: 1

## 2024-02-14 ASSESSMENT — ACTIVITIES OF DAILY LIVING (ADL)
ENTERING_EXITING_HOME: MODERATE ASSIST
AMBULATION ASSISTANCE: STAND BY ASSIST
OASIS_M1830: 03
CURRENT_FUNCTION: STAND BY ASSIST

## 2024-02-14 NOTE — HOME HEALTH
Snv for recert. Pt lives with wife who is cg and very supportive. Afebrile.Vss. Pt now has completed chemo and radiation. Peg tube in place. Wife indepedant with enteral feedings and adm of meds. Pt is npo. Pt c/o throat pain with severity as high as 10. Taking roxicodone and tylenol for pain with good relief. Sn will continue to visit weekly xs 4 weeks.

## 2024-02-15 DIAGNOSIS — C76.0 HEAD AND NECK CANCER (MULTI): ICD-10-CM

## 2024-02-15 DIAGNOSIS — C01 MALIGNANT NEOPLASM OF BASE OF TONGUE (MULTI): ICD-10-CM

## 2024-02-15 DIAGNOSIS — G89.3 CANCER RELATED PAIN: ICD-10-CM

## 2024-02-15 PROCEDURE — 1090000002 HH PPS REVENUE DEBIT

## 2024-02-15 PROCEDURE — 1090000001 HH PPS REVENUE CREDIT

## 2024-02-15 RX ORDER — OXYCODONE HCL 5 MG/5 ML
5 SOLUTION, ORAL ORAL EVERY 4 HOURS PRN
Qty: 180 ML | Refills: 0 | Status: SHIPPED | OUTPATIENT
Start: 2024-02-15 | End: 2024-04-10 | Stop reason: ALTCHOICE

## 2024-02-16 PROCEDURE — 1090000002 HH PPS REVENUE DEBIT

## 2024-02-16 PROCEDURE — 1090000001 HH PPS REVENUE CREDIT

## 2024-02-17 PROCEDURE — 400014 HH F/U

## 2024-02-17 PROCEDURE — 1090000001 HH PPS REVENUE CREDIT

## 2024-02-17 PROCEDURE — 1090000002 HH PPS REVENUE DEBIT

## 2024-02-18 PROCEDURE — 1090000002 HH PPS REVENUE DEBIT

## 2024-02-18 PROCEDURE — 1090000001 HH PPS REVENUE CREDIT

## 2024-02-19 ENCOUNTER — TELEPHONE (OUTPATIENT)
Dept: RADIATION ONCOLOGY | Facility: HOSPITAL | Age: 79
End: 2024-02-19

## 2024-02-19 ENCOUNTER — HOSPITAL ENCOUNTER (OUTPATIENT)
Dept: RADIATION ONCOLOGY | Facility: CLINIC | Age: 79
Setting detail: RADIATION/ONCOLOGY SERIES
Discharge: HOME | End: 2024-02-19
Payer: MEDICARE

## 2024-02-19 VITALS
WEIGHT: 191.2 LBS | RESPIRATION RATE: 18 BRPM | HEART RATE: 87 BPM | DIASTOLIC BLOOD PRESSURE: 71 MMHG | BODY MASS INDEX: 27.43 KG/M2 | SYSTOLIC BLOOD PRESSURE: 145 MMHG | OXYGEN SATURATION: 94 % | TEMPERATURE: 95.9 F

## 2024-02-19 DIAGNOSIS — G47.00 INSOMNIA, UNSPECIFIED TYPE: ICD-10-CM

## 2024-02-19 DIAGNOSIS — K12.30 MUCOSITIS: ICD-10-CM

## 2024-02-19 DIAGNOSIS — C76.0 HEAD AND NECK CANCER (MULTI): Primary | ICD-10-CM

## 2024-02-19 DIAGNOSIS — R63.8 DECREASED ORAL INTAKE: ICD-10-CM

## 2024-02-19 PROCEDURE — 1090000002 HH PPS REVENUE DEBIT

## 2024-02-19 PROCEDURE — 1090000001 HH PPS REVENUE CREDIT

## 2024-02-19 PROCEDURE — 99024 POSTOP FOLLOW-UP VISIT: CPT | Performed by: RADIOLOGY

## 2024-02-19 RX ORDER — MIRTAZAPINE 15 MG/1
15 TABLET, FILM COATED ORAL NIGHTLY
Qty: 30 TABLET | Refills: 11 | Status: SHIPPED | OUTPATIENT
Start: 2024-02-19 | End: 2024-04-10 | Stop reason: SDUPTHER

## 2024-02-19 RX ORDER — SUCRALFATE 1 G/10ML
1 SUSPENSION ORAL 4 TIMES DAILY
Qty: 414 ML | Refills: 2 | Status: SHIPPED | OUTPATIENT
Start: 2024-02-19 | End: 2024-03-21

## 2024-02-19 ASSESSMENT — PAIN SCALES - GENERAL: PAINLEVEL: 0-NO PAIN

## 2024-02-19 NOTE — PROGRESS NOTES
"       Radiation Oncology Follow-Up    Patient Name:  Kevin Mendes  MRN:  [id number]  :  1945    Referring Provider: Kalin Garcia MD  Primary Care Provider: Kd Lange DO  Care Team: Patient Care Team:  Kd Lange DO as PCP - General  Kd Lange DO as PCP - MSSP ACO Attributed Provider  Kyunghee Burkitt, DO as Consulting Physician (Hematology and Oncology)  Tsering Jade MD as Consulting Physician (Hematology and Oncology)  Sonya Pike RDN, LD as Dietitian (Nutrition)  KERRY Mendoza as  ()    Date of Service: 2024     SUMMARY: 78 y.o. male with SCC of base of tongue, p16 +, > 10 PPD smoking history, [id number]     SUBJECTIVE  History of Present Illness:   Kevin Mendes \"Katherine\" is a 78 y.o. male who is presenting today for follow-up. Prior radiation treatment as follows:    Radiation Treatments       Active   No active radiation treatments to show.     Completed   OPX_BST (Started on 2024)   Most recent fraction: 200 cGy given on 2024   Total given: 1,000 cGy / 1,000 cGy  (5 of 5 fractions)   Elapsed Days: 6   Technique: VMAT        Oropharynx_R (Started on 2023)   Most recent fraction: 200 cGy given on 2024   Total given: 6,000 cGy / 6,000 cGy  (30 of 30 fractions)   Elapsed Days: 45   Technique: VMAT                       SUBJECTIVE:    Today, the patient reports feeling well overall. They continue to recover from radiation side effects. They can't tolerate anything PO yet. Have excessive mucous secretions and trouble sleeping         Review of Systems:   Review of Systems   Constitutional:  Positive for fatigue. Negative for appetite change, chills, diaphoresis, fever and unexpected weight change.        Down 1 lb since    HENT:   Positive for sore throat and trouble swallowing. Negative for hearing loss, lump/mass, mouth sores, nosebleeds, tinnitus and voice change.    Eyes: Negative.    Respiratory:  Positive for cough. Negative " for chest tightness, hemoptysis, shortness of breath and wheezing.         Coughing up thick clear mucous. Patient taking Mucinex and states it's not helping.   Cardiovascular: Negative.    Gastrointestinal:  Positive for diarrhea, nausea and vomiting. Negative for abdominal distention, abdominal pain, blood in stool, constipation and rectal pain.        X1 episode of diarrhea this past week.  Nausea and occasional vomiting when coughing up mucous. Antiemetics with relief   Endocrine: Negative.    Genitourinary:  Positive for frequency and nocturia. Negative for bladder incontinence, difficulty urinating, dyspareunia, dysuria, hematuria, pelvic pain and penile discharge.         Wake at night and sleeps during the day.   Musculoskeletal:  Negative for gait problem.   Skin: Negative.    Neurological:  Positive for extremity weakness. Negative for dizziness, gait problem, headaches, light-headedness, numbness, seizures and speech difficulty.   Hematological: Negative.    Psychiatric/Behavioral: Negative.       The patient's current pain level was assessed.  They report currently having a pain of 0 out of 10.  They feel their pain is under control with the use of pain medications.    Performance Status:   The Karnofsky performance scale today is 70, Cares for self; unable to carry on normal activity or to do active work (ECOG equivalent 1).          OBJECTIVE  Vital Signs:  /71 (BP Location: Right arm, Patient Position: Sitting, BP Cuff Size: Large adult)   Pulse 87   Temp 35.5 °C (95.9 °F) (Temporal)   Resp 18   Wt 86.7 kg (191 lb 3.2 oz)   SpO2 94%   BMI 27.43 kg/m²    Physical Exam:  Constitutional: Awake, alert and oriented. No acute distress. Appears stated age.  Eyes: Conjunctivae are clear without exudates or hemorrhage. Eyelids are normal in appearance without swelling or lesions.  ENMT: mucous membranes moist, no apparent injury, no lesions seen  Neck: Neck supple, no apparent injury, trachea  midline  Resp/Thorax: Patent airways,  good chest expansion. Thorax symmetric  Cardiovascular: The external chest is normal without lifts, heaves, or thrills. PMI is not visible.  GI: Nondistended, soft, non-tender.  /Gyn: Deferred  MSK: No tenderness noted on palpation of the spine. No ROM limitations.  Extremities: normal extremities, no cyanosis, erythema or edema.  Neurological: alert and oriented x3. Sensory and motor function appear intact. No gait abnormality observed.  Psych: Appropriate mood and behavior.  Skin: Warm and dry, no new lesions or rashes.           Labs:  Lab Results   Component Value Date    WBC 10.4 02/13/2024    HGB 10.9 (L) 02/13/2024    HCT 35.0 (L) 02/13/2024     02/13/2024    CHOL 113 09/14/2023    TRIG 83 09/14/2023    HDL 45.5 09/14/2023    ALT 6 (L) 01/22/2024    AST 9 01/22/2024     (L) 01/22/2024    K 4.8 01/22/2024    CL 96 (L) 01/22/2024    CREATININE 1.14 01/22/2024    BUN 29 (H) 01/22/2024    CO2 29 01/22/2024    TSH 0.51 12/01/2023    PSA 1.18 09/14/2023    INR 1.1 11/07/2023    GLUF 196 (H) 10/05/2020    HGBA1C 6.1 (A) 09/14/2023     Par    ASSESSMENT:  Kevin Mendes is a 78 y.o. 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      The patient continues to recover from radiation side effects and has no evidence of recurrence     PLAN:    The patient will follow-up with radiation oncology in 3 months  Follow-up with ENT  Surveillance PET/CT Ordered  TSH with reflex T4 every 6 months      NCCN Guidelines were applicable to guide this patients treatment plan.  Sukhjinder Diaz MD       Future Appointments   Date Time Provider Department Center   2/21/2024 To Be Determined Bernie Wilcox RN [facility]   2/28/2024 To Be Determined Bernie Wilcox RN [facility]   3/6/2024 To Be Determined Bernie Wilcox RN [facility]   3/13/2024 To Be Determined Bernie Wilcox RN [facility]   4/10/2024  2:00 PM Kd Lange DO  [id number] [place]   4/15/2024 12:45 PM Kalin Garcia MD [id number] [place]   6/14/2024  9:30 AM [facility] ADMIN ROOM PET CT [facility] [place]    6/14/2024 10:30 AM [facility] PET CT [facility] [place]    6/14/2024 11:30 AM Sukhjinder Diaz MD [id number] [place]   9/18/2024  2:00 PM Kd Lange DO [id number] [place]

## 2024-02-20 ENCOUNTER — TELEPHONE (OUTPATIENT)
Dept: RADIATION ONCOLOGY | Facility: CLINIC | Age: 79
End: 2024-02-20
Payer: MEDICARE

## 2024-02-20 PROCEDURE — 1090000002 HH PPS REVENUE DEBIT

## 2024-02-20 PROCEDURE — 1090000001 HH PPS REVENUE CREDIT

## 2024-02-20 NOTE — TELEPHONE ENCOUNTER
Newly prescribed Carafate was denied by insurance, they are not interested in buying the med or seeking a prior authorization. Dr Joe robledo.

## 2024-02-21 ENCOUNTER — HOME CARE VISIT (OUTPATIENT)
Dept: HOME HEALTH SERVICES | Facility: HOME HEALTH | Age: 79
End: 2024-02-21
Payer: MEDICARE

## 2024-02-21 VITALS
HEART RATE: 78 BPM | SYSTOLIC BLOOD PRESSURE: 122 MMHG | RESPIRATION RATE: 20 BRPM | DIASTOLIC BLOOD PRESSURE: 70 MMHG | TEMPERATURE: 96.9 F | OXYGEN SATURATION: 99 %

## 2024-02-21 PROCEDURE — 400014 HH F/U

## 2024-02-21 PROCEDURE — G0299 HHS/HOSPICE OF RN EA 15 MIN: HCPCS | Mod: HHH

## 2024-02-21 PROCEDURE — 1090000002 HH PPS REVENUE DEBIT

## 2024-02-21 PROCEDURE — 1090000001 HH PPS REVENUE CREDIT

## 2024-02-21 ASSESSMENT — ENCOUNTER SYMPTOMS
PAIN SEVERITY GOAL: 0/10
APPETITE LEVEL: POOR
SUBJECTIVE PAIN PROGRESSION: UNCHANGED
DEPRESSION: 0
DENIES PAIN: 1
CHANGE IN APPETITE: UNCHANGED
LOWEST PAIN SEVERITY IN PAST 24 HOURS: 0/10
LAST BOWEL MOVEMENT: 66891
LOSS OF SENSATION IN FEET: 0
PERSON REPORTING PAIN: PATIENT
OCCASIONAL FEELINGS OF UNSTEADINESS: 0
HIGHEST PAIN SEVERITY IN PAST 24 HOURS: 0/10

## 2024-02-21 ASSESSMENT — PAIN SCALES - PAIN ASSESSMENT IN ADVANCED DEMENTIA (PAINAD)
NEGVOCALIZATION: 0 - NONE.
NEGVOCALIZATION: 0
BODYLANGUAGE: 0 - RELAXED.
CONSOLABILITY: 0
BODYLANGUAGE: 0
FACIALEXPRESSION: 0
BREATHING: 0
TOTALSCORE: 0
FACIALEXPRESSION: 0 - SMILING OR INEXPRESSIVE.
CONSOLABILITY: 0 - NO NEED TO CONSOLE.

## 2024-02-21 NOTE — HOME HEALTH
SKILLED NURSING VISIT FOR ASSESSMENT. PT REPORTS HE IS FEELING BETTER AND HAS BEEN TRYING TO TAKE SOME LIQUIDS ORALLY.

## 2024-02-22 PROCEDURE — 1090000002 HH PPS REVENUE DEBIT

## 2024-02-22 PROCEDURE — 1090000001 HH PPS REVENUE CREDIT

## 2024-02-23 PROCEDURE — 1090000002 HH PPS REVENUE DEBIT

## 2024-02-23 PROCEDURE — 1090000001 HH PPS REVENUE CREDIT

## 2024-02-24 PROCEDURE — 1090000002 HH PPS REVENUE DEBIT

## 2024-02-24 PROCEDURE — 1090000001 HH PPS REVENUE CREDIT

## 2024-02-25 PROCEDURE — 1090000002 HH PPS REVENUE DEBIT

## 2024-02-25 PROCEDURE — 1090000001 HH PPS REVENUE CREDIT

## 2024-02-26 PROCEDURE — 1090000001 HH PPS REVENUE CREDIT

## 2024-02-26 PROCEDURE — 1090000002 HH PPS REVENUE DEBIT

## 2024-02-27 PROCEDURE — 1090000002 HH PPS REVENUE DEBIT

## 2024-02-27 PROCEDURE — 1090000001 HH PPS REVENUE CREDIT

## 2024-02-28 ENCOUNTER — HOME CARE VISIT (OUTPATIENT)
Dept: HOME HEALTH SERVICES | Facility: HOME HEALTH | Age: 79
End: 2024-02-28
Payer: MEDICARE

## 2024-02-28 VITALS
SYSTOLIC BLOOD PRESSURE: 140 MMHG | HEART RATE: 87 BPM | RESPIRATION RATE: 18 BRPM | TEMPERATURE: 98.8 F | DIASTOLIC BLOOD PRESSURE: 58 MMHG | OXYGEN SATURATION: 95 %

## 2024-02-28 PROCEDURE — 1090000001 HH PPS REVENUE CREDIT

## 2024-02-28 PROCEDURE — 1090000002 HH PPS REVENUE DEBIT

## 2024-02-28 PROCEDURE — G0299 HHS/HOSPICE OF RN EA 15 MIN: HCPCS | Mod: HHH

## 2024-02-28 SDOH — ECONOMIC STABILITY: GENERAL

## 2024-02-28 ASSESSMENT — ENCOUNTER SYMPTOMS
APPETITE LEVEL: FAIR
DENIES PAIN: 1
PERSON REPORTING PAIN: PATIENT

## 2024-02-28 ASSESSMENT — ACTIVITIES OF DAILY LIVING (ADL): MONEY MANAGEMENT (EXPENSES/BILLS): INDEPENDENT

## 2024-02-29 PROCEDURE — 1090000002 HH PPS REVENUE DEBIT

## 2024-02-29 PROCEDURE — 1090000001 HH PPS REVENUE CREDIT

## 2024-03-01 PROCEDURE — 1090000002 HH PPS REVENUE DEBIT

## 2024-03-01 PROCEDURE — 1090000001 HH PPS REVENUE CREDIT

## 2024-03-02 PROCEDURE — 1090000001 HH PPS REVENUE CREDIT

## 2024-03-02 PROCEDURE — 1090000002 HH PPS REVENUE DEBIT

## 2024-03-03 PROCEDURE — 1090000001 HH PPS REVENUE CREDIT

## 2024-03-03 PROCEDURE — 1090000002 HH PPS REVENUE DEBIT

## 2024-03-04 ENCOUNTER — APPOINTMENT (OUTPATIENT)
Dept: RADIOLOGY | Facility: HOSPITAL | Age: 79
End: 2024-03-04
Payer: MEDICARE

## 2024-03-04 ENCOUNTER — HOSPITAL ENCOUNTER (EMERGENCY)
Facility: HOSPITAL | Age: 79
Discharge: HOME | End: 2024-03-04
Payer: MEDICARE

## 2024-03-04 ENCOUNTER — TELEPHONE (OUTPATIENT)
Dept: RADIATION ONCOLOGY | Facility: CLINIC | Age: 79
End: 2024-03-04
Payer: MEDICARE

## 2024-03-04 ENCOUNTER — TELEPHONE (OUTPATIENT)
Dept: OTOLARYNGOLOGY | Facility: CLINIC | Age: 79
End: 2024-03-04
Payer: MEDICARE

## 2024-03-04 VITALS
BODY MASS INDEX: 27.2 KG/M2 | HEART RATE: 82 BPM | HEIGHT: 70 IN | WEIGHT: 190 LBS | SYSTOLIC BLOOD PRESSURE: 129 MMHG | TEMPERATURE: 98.6 F | OXYGEN SATURATION: 96 % | DIASTOLIC BLOOD PRESSURE: 67 MMHG | RESPIRATION RATE: 16 BRPM

## 2024-03-04 DIAGNOSIS — S39.012A LUMBAR STRAIN, INITIAL ENCOUNTER: Primary | ICD-10-CM

## 2024-03-04 DIAGNOSIS — R53.81 MALAISE AND FATIGUE: ICD-10-CM

## 2024-03-04 DIAGNOSIS — R53.83 MALAISE AND FATIGUE: ICD-10-CM

## 2024-03-04 LAB
ALBUMIN SERPL BCP-MCNC: 3.5 G/DL (ref 3.4–5)
ALP SERPL-CCNC: 58 U/L (ref 33–136)
ALT SERPL W P-5'-P-CCNC: 6 U/L (ref 10–52)
ANION GAP SERPL CALC-SCNC: 11 MMOL/L (ref 10–20)
APPEARANCE UR: CLEAR
AST SERPL W P-5'-P-CCNC: 11 U/L (ref 9–39)
BASOPHILS # BLD AUTO: 0.04 X10*3/UL (ref 0–0.1)
BASOPHILS NFR BLD AUTO: 0.4 %
BILIRUB SERPL-MCNC: 0.4 MG/DL (ref 0–1.2)
BILIRUB UR STRIP.AUTO-MCNC: NEGATIVE MG/DL
BUN SERPL-MCNC: 37 MG/DL (ref 6–23)
CALCIUM SERPL-MCNC: 9.4 MG/DL (ref 8.6–10.3)
CHLORIDE SERPL-SCNC: 96 MMOL/L (ref 98–107)
CO2 SERPL-SCNC: 31 MMOL/L (ref 21–32)
COLOR UR: YELLOW
CREAT SERPL-MCNC: 1.19 MG/DL (ref 0.5–1.3)
EGFRCR SERPLBLD CKD-EPI 2021: 62 ML/MIN/1.73M*2
EOSINOPHIL # BLD AUTO: 0.33 X10*3/UL (ref 0–0.4)
EOSINOPHIL NFR BLD AUTO: 3 %
ERYTHROCYTE [DISTWIDTH] IN BLOOD BY AUTOMATED COUNT: 15.8 % (ref 11.5–14.5)
FLUAV RNA RESP QL NAA+PROBE: NOT DETECTED
FLUBV RNA RESP QL NAA+PROBE: NOT DETECTED
GLUCOSE SERPL-MCNC: 270 MG/DL (ref 74–99)
GLUCOSE UR STRIP.AUTO-MCNC: ABNORMAL MG/DL
HCT VFR BLD AUTO: 32.9 % (ref 41–52)
HGB BLD-MCNC: 10.3 G/DL (ref 13.5–17.5)
IMM GRANULOCYTES # BLD AUTO: 0.06 X10*3/UL (ref 0–0.5)
IMM GRANULOCYTES NFR BLD AUTO: 0.6 % (ref 0–0.9)
KETONES UR STRIP.AUTO-MCNC: NEGATIVE MG/DL
LACTATE SERPL-SCNC: 1.5 MMOL/L (ref 0.4–2)
LEUKOCYTE ESTERASE UR QL STRIP.AUTO: NEGATIVE
LYMPHOCYTES # BLD AUTO: 0.62 X10*3/UL (ref 0.8–3)
LYMPHOCYTES NFR BLD AUTO: 5.7 %
MAGNESIUM SERPL-MCNC: 1.9 MG/DL (ref 1.6–2.4)
MCH RBC QN AUTO: 30.4 PG (ref 26–34)
MCHC RBC AUTO-ENTMCNC: 31.3 G/DL (ref 32–36)
MCV RBC AUTO: 97 FL (ref 80–100)
MONOCYTES # BLD AUTO: 1.34 X10*3/UL (ref 0.05–0.8)
MONOCYTES NFR BLD AUTO: 12.3 %
NEUTROPHILS # BLD AUTO: 8.47 X10*3/UL (ref 1.6–5.5)
NEUTROPHILS NFR BLD AUTO: 78 %
NITRITE UR QL STRIP.AUTO: NEGATIVE
NRBC BLD-RTO: 0 /100 WBCS (ref 0–0)
PH UR STRIP.AUTO: 6 [PH]
PLATELET # BLD AUTO: 177 X10*3/UL (ref 150–450)
POTASSIUM SERPL-SCNC: 4.6 MMOL/L (ref 3.5–5.3)
PROT SERPL-MCNC: 7 G/DL (ref 6.4–8.2)
PROT UR STRIP.AUTO-MCNC: ABNORMAL MG/DL
RBC # BLD AUTO: 3.39 X10*6/UL (ref 4.5–5.9)
RBC # UR STRIP.AUTO: ABNORMAL /UL
RBC #/AREA URNS AUTO: >20 /HPF
RSV RNA RESP QL NAA+PROBE: NOT DETECTED
SARS-COV-2 RNA RESP QL NAA+PROBE: NOT DETECTED
SODIUM SERPL-SCNC: 133 MMOL/L (ref 136–145)
SP GR UR STRIP.AUTO: 1.01
UROBILINOGEN UR STRIP.AUTO-MCNC: <2 MG/DL
WBC # BLD AUTO: 10.9 X10*3/UL (ref 4.4–11.3)
WBC #/AREA URNS AUTO: ABNORMAL /HPF

## 2024-03-04 PROCEDURE — 36415 COLL VENOUS BLD VENIPUNCTURE: CPT | Performed by: PHYSICIAN ASSISTANT

## 2024-03-04 PROCEDURE — 83605 ASSAY OF LACTIC ACID: CPT | Performed by: PHYSICIAN ASSISTANT

## 2024-03-04 PROCEDURE — 1090000001 HH PPS REVENUE CREDIT

## 2024-03-04 PROCEDURE — 70450 CT HEAD/BRAIN W/O DYE: CPT | Performed by: RADIOLOGY

## 2024-03-04 PROCEDURE — 70450 CT HEAD/BRAIN W/O DYE: CPT

## 2024-03-04 PROCEDURE — 85025 COMPLETE CBC W/AUTO DIFF WBC: CPT | Performed by: PHYSICIAN ASSISTANT

## 2024-03-04 PROCEDURE — 99285 EMERGENCY DEPT VISIT HI MDM: CPT | Mod: 25

## 2024-03-04 PROCEDURE — 83735 ASSAY OF MAGNESIUM: CPT | Performed by: PHYSICIAN ASSISTANT

## 2024-03-04 PROCEDURE — 2500000005 HC RX 250 GENERAL PHARMACY W/O HCPCS: Performed by: PHYSICIAN ASSISTANT

## 2024-03-04 PROCEDURE — 72131 CT LUMBAR SPINE W/O DYE: CPT | Performed by: RADIOLOGY

## 2024-03-04 PROCEDURE — 99285 EMERGENCY DEPT VISIT HI MDM: CPT | Performed by: PHYSICIAN ASSISTANT

## 2024-03-04 PROCEDURE — 2500000004 HC RX 250 GENERAL PHARMACY W/ HCPCS (ALT 636 FOR OP/ED): Performed by: PHYSICIAN ASSISTANT

## 2024-03-04 PROCEDURE — 71046 X-RAY EXAM CHEST 2 VIEWS: CPT | Performed by: RADIOLOGY

## 2024-03-04 PROCEDURE — 71046 X-RAY EXAM CHEST 2 VIEWS: CPT

## 2024-03-04 PROCEDURE — 72131 CT LUMBAR SPINE W/O DYE: CPT

## 2024-03-04 PROCEDURE — 81001 URINALYSIS AUTO W/SCOPE: CPT | Performed by: PHYSICIAN ASSISTANT

## 2024-03-04 PROCEDURE — 80053 COMPREHEN METABOLIC PANEL: CPT | Performed by: PHYSICIAN ASSISTANT

## 2024-03-04 PROCEDURE — 87637 SARSCOV2&INF A&B&RSV AMP PRB: CPT | Performed by: PHYSICIAN ASSISTANT

## 2024-03-04 PROCEDURE — 96360 HYDRATION IV INFUSION INIT: CPT

## 2024-03-04 PROCEDURE — 1090000002 HH PPS REVENUE DEBIT

## 2024-03-04 RX ORDER — ACETAMINOPHEN 325 MG/1
975 TABLET ORAL ONCE
Status: COMPLETED | OUTPATIENT
Start: 2024-03-04 | End: 2024-03-04

## 2024-03-04 RX ORDER — LIDOCAINE 50 MG/G
1 PATCH TOPICAL DAILY
Qty: 10 PATCH | Refills: 0 | Status: SHIPPED | OUTPATIENT
Start: 2024-03-04 | End: 2024-03-14

## 2024-03-04 RX ORDER — LIDOCAINE 560 MG/1
1 PATCH PERCUTANEOUS; TOPICAL; TRANSDERMAL ONCE
Status: DISCONTINUED | OUTPATIENT
Start: 2024-03-04 | End: 2024-03-04 | Stop reason: HOSPADM

## 2024-03-04 RX ORDER — ACETAMINOPHEN 325 MG/1
975 TABLET ORAL ONCE
Status: DISCONTINUED | OUTPATIENT
Start: 2024-03-04 | End: 2024-03-04

## 2024-03-04 RX ORDER — ACETAMINOPHEN 325 MG/1
650 TABLET ORAL EVERY 6 HOURS PRN
Qty: 20 TABLET | Refills: 0 | Status: SHIPPED | OUTPATIENT
Start: 2024-03-04 | End: 2024-03-09

## 2024-03-04 RX ADMIN — ACETAMINOPHEN 975 MG: 325 TABLET ORAL at 11:09

## 2024-03-04 RX ADMIN — LIDOCAINE 1 PATCH: 4 PATCH TOPICAL at 10:44

## 2024-03-04 RX ADMIN — SODIUM CHLORIDE, POTASSIUM CHLORIDE, SODIUM LACTATE AND CALCIUM CHLORIDE 1000 ML: 600; 310; 30; 20 INJECTION, SOLUTION INTRAVENOUS at 10:45

## 2024-03-04 ASSESSMENT — PAIN DESCRIPTION - LOCATION
LOCATION: BACK
LOCATION: BACK

## 2024-03-04 ASSESSMENT — PAIN SCALES - GENERAL
PAINLEVEL_OUTOF10: 3
PAINLEVEL_OUTOF10: 7
PAINLEVEL_OUTOF10: 3
PAINLEVEL_OUTOF10: 9
PAINLEVEL_OUTOF10: 3
PAINLEVEL_OUTOF10: 3
PAINLEVEL_OUTOF10: 9

## 2024-03-04 ASSESSMENT — PAIN DESCRIPTION - ORIENTATION: ORIENTATION: LOWER

## 2024-03-04 ASSESSMENT — COLUMBIA-SUICIDE SEVERITY RATING SCALE - C-SSRS
2. HAVE YOU ACTUALLY HAD ANY THOUGHTS OF KILLING YOURSELF?: NO
1. IN THE PAST MONTH, HAVE YOU WISHED YOU WERE DEAD OR WISHED YOU COULD GO TO SLEEP AND NOT WAKE UP?: NO
6. HAVE YOU EVER DONE ANYTHING, STARTED TO DO ANYTHING, OR PREPARED TO DO ANYTHING TO END YOUR LIFE?: NO

## 2024-03-04 ASSESSMENT — PAIN - FUNCTIONAL ASSESSMENT
PAIN_FUNCTIONAL_ASSESSMENT: 0-10
PAIN_FUNCTIONAL_ASSESSMENT: 0-10

## 2024-03-04 ASSESSMENT — LIFESTYLE VARIABLES
EVER FELT BAD OR GUILTY ABOUT YOUR DRINKING: NO
HAVE PEOPLE ANNOYED YOU BY CRITICIZING YOUR DRINKING: NO
EVER HAD A DRINK FIRST THING IN THE MORNING TO STEADY YOUR NERVES TO GET RID OF A HANGOVER: NO
HAVE YOU EVER FELT YOU SHOULD CUT DOWN ON YOUR DRINKING: NO

## 2024-03-04 ASSESSMENT — PAIN DESCRIPTION - PAIN TYPE: TYPE: ACUTE PAIN

## 2024-03-04 NOTE — TELEPHONE ENCOUNTER
Returned call to [place] and left a vm asking if she could call me back and leave some good times we can get in touch later.

## 2024-03-04 NOTE — ED PROVIDER NOTES
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  Per the patient over the past 3 days he has been feeling very fatigued and is having difficulty getting up out of bed.  He endorses some mild lightheadedness and states that he feels very drained.  He endorses having new right-sided low back pain without any associated paresthesias, weakness, or areas of decrease sensation.  No associated bowel or bladder incontinence/retention or saddle anesthesia.  He denies any recent falls, trauma, or injuries.  He denies any headache, visual changes, hearing changes, difficulty swallowing, difficulty speaking.  He does endorse possible decreased urinary output as well as a chronic cough which she attributed to his previous cancer.  He states that he takes all p.o. intake through his PEG tube.  He denies any known sick contacts or recent changes to his activity or medications.  He states that normally he is able to walk unassisted and states that he does live at home.      History provided by:  Patient   used: No             Visit Vitals  /67   Pulse 82   Temp 37 °C (98.6 °F)   Resp 16   Ht 1.778 m (5' 10\")   Wt 86.2 kg (190 lb)   SpO2 96%   BMI 27.26 kg/m²   Smoking Status Former   BSA 2.06 m²          Physical Exam     Physical exam:   General: Vitals noted, no distress. Afebrile.   EENT:  Hearing grossly intact. Normal phonation. MMM. Airway patient. PERRL. EOMI. TMs and EACs clear bilaterally.  Neck: No midline tenderness or paraspinal tenderness. FROM.   Cardiac: Regular, rate, rhythm. Normal S1 and S2.  No murmurs, gallops, rubs.   Pulmonary: Good air exchange. Lungs clear bilaterally. No wheezes, rhonchi, rales. No accessory muscle use.   Abdomen: Soft, nonsurgical. Nontender. No " peritoneal signs. Normoactive bowel sounds.   Back: No CVA tenderness.  Minimal midline L-spine tenderness ovation as well as right-sided paraspinal lumbar tenderness palpation without any obvious deformity or step-off.  No rashes or lesions.  No midline or paraspinal thoracic tenderness palpation.  Extremities: No peripheral edema.  Full range of motion. Moves all extremities freely. No tenderness throughout extremities.   Skin: No rash. Warm and Dry.   Neuro: No focal neurologic deficits. CN 2-12 grossly intact. Sensation equal bilaterally.  5 out of 5 strength bilateral upper and lower extremities.  Clear speech.  No nystagmus.        Labs Reviewed   CBC WITH AUTO DIFFERENTIAL - Abnormal       Result Value    WBC 10.9      nRBC 0.0      RBC 3.39 (*)     Hemoglobin 10.3 (*)     Hematocrit 32.9 (*)     MCV 97      MCH 30.4      MCHC 31.3 (*)     RDW 15.8 (*)     Platelets 177      Neutrophils % 78.0      Immature Granulocytes %, Automated 0.6      Lymphocytes % 5.7      Monocytes % 12.3      Eosinophils % 3.0      Basophils % 0.4      Neutrophils Absolute 8.47 (*)     Immature Granulocytes Absolute, Automated 0.06      Lymphocytes Absolute 0.62 (*)     Monocytes Absolute 1.34 (*)     Eosinophils Absolute 0.33      Basophils Absolute 0.04     COMPREHENSIVE METABOLIC PANEL - Abnormal    Glucose 270 (*)     Sodium 133 (*)     Potassium 4.6      Chloride 96 (*)     Bicarbonate 31      Anion Gap 11      Urea Nitrogen 37 (*)     Creatinine 1.19      eGFR 62      Calcium 9.4      Albumin 3.5      Alkaline Phosphatase 58      Total Protein 7.0      AST 11      Bilirubin, Total 0.4      ALT 6 (*)    URINALYSIS WITH REFLEX CULTURE AND MICROSCOPIC - Abnormal    Color, Urine Yellow      Appearance, Urine Clear      Specific Gravity, Urine 1.009      pH, Urine 6.0      Protein, Urine 30 (1+) (*)     Glucose, Urine >=500 (3+) (*)     Blood, Urine MODERATE (2+) (*)     Ketones, Urine NEGATIVE      Bilirubin, Urine NEGATIVE       Urobilinogen, Urine <2.0      Nitrite, Urine NEGATIVE      Leukocyte Esterase, Urine NEGATIVE     URINALYSIS MICROSCOPIC WITH REFLEX CULTURE - Abnormal    WBC, Urine 1-5      RBC, Urine >20 (*)    MAGNESIUM - Normal    Magnesium 1.90     SARS-COV-2 AND INFLUENZA A/B PCR - Normal    Flu A Result Not Detected      Flu B Result Not Detected      Coronavirus 2019, PCR Not Detected      Narrative:     This assay has received FDA Emergency Use Authorization (EUA) and  is only authorized for the duration of time that circumstances exist to justify the authorization of the emergency use of in vitro diagnostic tests for the detection of SARS-CoV-2 virus and/or diagnosis of COVID-19 infection under section 564(b)(1) of the Act, 21 U.S.C. 360bbb-3(b)(1). Testing for SARS-CoV-2 is only recommended for patients who meet current clinical and/or epidemiological criteria as defined by federal, state, or local public health directives. This assay is an in vitro diagnostic nucleic acid amplification test for the qualitative detection of SARS-CoV-2, Influenza A, and Influenza B from nasopharyngeal specimens and has been validated for use at [facility]. Negative results do not preclude COVID-19 infections or Influenza A/B infections, and should not be used as the sole basis for diagnosis, treatment, or other management decisions. If Influenza A/B and RSV PCR results are negative, testing for Parainfluenza virus, Adenovirus and Metapneumovirus is routinely performed for [facility] pediatric oncology and intensive care inpatients, and is available on other patients by placing an add-on request.    RSV PCR - Normal    RSV PCR Not Detected      Narrative:     This assay is an FDA-cleared, in vitro diagnostic nucleic acid amplification test for the detection of RSV from nasopharyngeal specimens, and has been validated for use at [facility]. Negative results do not preclude RSV infections, and should  not be used as the sole basis for diagnosis, treatment, or other management decisions. If Influenza A/B and RSV PCR results are negative, testing for Parainfluenza virus, Adenovirus and Metapneumovirus is routinely performed for pediatric oncology and intensive care inpatients at [facility], and is available on other patients by placing an add-on request.       LACTATE - Normal    Lactate 1.5      Narrative:     Venipuncture immediately after or during the administration of Metamizole may lead to falsely low results. Testing should be performed immediately  prior to Metamizole dosing.   URINALYSIS WITH REFLEX CULTURE AND MICROSCOPIC    Narrative:     The following orders were created for panel order Urinalysis with Reflex Culture and Microscopic.  Procedure                               Abnormality         Status                     ---------                               -----------         ------                     Urinalysis with Reflex C...[[id number]]  Abnormal            Final result               Extra Urine Gray Tube[[id number]]                            In process                   Please view results for these tests on the individual orders.   EXTRA URINE GRAY TUBE       CT lumbar spine wo IV contrast   Final Result   Grossly stable appearance of the lumbar spine including 2 mm L3   retrolisthesis, and degenerative changes with stenosis most   pronounced L4-5.        Nonobstructing calculus right kidney.        Arterial vascular calcifications.        MACRO:   None        Signed by: Cody Wilkes 3/4/2024 12:32 PM   Dictation workstation:   [id number]      CT head wo IV contrast   Final Result   No detected acute intracranial hemorrhage, mass, or mass effect.        Stable atrophy.        Stable mucous retention cyst or polypoid mucosal thickening right   maxillary sinus.        MACRO:   None        Signed by: Cody Wilkes 3/4/2024 12:36 PM   Dictation workstation:   [id number]      XR chest 2 views    Final Result   Negative exam.        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 Moss, for fatigue over the past 3 days with more difficulty with ambulation secondary to this fatigue as well as low back pain.  Upon arrival to the ED SpO2 at 94% on room air, heart rate 101 bpm.  On examination lungs clear to auscultation.  No audible current murmurs.  Abdomen soft and nontender.  PEG tube in place.  5 out of 5 strength bilateral upper and lower extremities.  Clear speech.  No facial droop.  TMs and EACs clear.  No nystagmus.  He did have some midline L-spine as well as right-sided paraspinal lumbar tenderness palpation without any obvious deformity or step-off.  IV established laboratory studies obtained.  Patient ordered 1 L of IV fluids.  Chest x-ray as well as CT lumbar spine ordered.  I did reach out to the patient's radiation oncologist who stated they had recommended he be evaluated for the fatigue, possible gait instability, and possible decreased urinary output.  On my reevaluation following the IV fluids the patient was feeling significantly improved.  Vitals within normal limits.  Patient urinalysis was positive for blood, however this did not appear to be new when compared to previous urinalysis results.  His urine was sent for culture.  He denied any urinary symptoms at this time is nitrate and leukocyte Estrace negative so I have low suspicion for UTI.  Viral swabs negative.  CMP shows slightly elevated glucose of 270.  Creatinine baseline 1.1.  BUN also baseline at 37 when compared to previous laboratory studies.  Lactate normal at 1.5.  Magnesium 1.9.  CBC did  show patient's hemoglobin was 10.3, however this does appear to be a chronic anemia and is unchanged from his baseline.  Normal WBC at 10.9.  X-ray of his lumbar spine showed grossly stable appearance of the lumbar spine including 2 mm L3 retrolisthesis and degenerative changes with stenosis most pronounced at L4/5.  There was a nonobstructing calculus of the right kidney.  Chest x-ray showed no evidence of pneumonia.  CT head showed no intracranial bleed, mass, or mass effect.  I discussed all these results with the patient.  At this time his pain had improved following the lidocaine patch and Tylenol and his dizziness had resolved following the IV fluids.  I discussed with the patient that his dizziness could be related to dehydration and recommended he follow-up close with his primary care provider.  I did recommend he also follow-up with his primary oncologist for further workup on an outpatient basis as needed.  I did discuss with the patient that CT head did not show any large abnormalities, however if his dizziness persists he should follow-up with his primary oncologist for further workup and possible MRI as this would be more sensitive test.  As he did not have any dizziness at this time and had a normal neurologic examination and a steady gait it was not emergently indicated.  The patient felt comfortable being discharged home at this time.  He was written prescriptions for lidocaine patches and Tylenol.  He was given signs and symptoms that he should return to the ED with.  He was agreeable to this plan.  I did send his primary oncologist a message informing him of the patient's improvement of his symptoms as well as his workup today.  Patient was discharged from emergency department in stable condition.    Amount and/or Complexity of Data Reviewed  Labs: ordered.  Radiology: ordered and independent interpretation performed.     Details: Chest x-ray that visualized pneumonia or pneumothorax  CT head  without any visualized intracranial bleed, skull fracture, or large mass  CT lumbar spine without any visualized lumbar fracture or subluxation    Risk  Prescription drug management.         ED Course as of 03/04/24 1410   Mon Mar 04, 2024   1357 Blood, Urine(!): MODERATE (2+)  Moderate blood and red cells present in patient's urine.  This does not appear to be a new finding.  Patient denies any urinary symptoms at this time.  He was advised to follow-up with his primary care provider concerning this.  His urine is sent for culture. [AW]      ED Course User Index  [AW] Consuelo Mena PA-C         Diagnoses as of 03/04/24 1410   Lumbar strain, initial encounter   Malaise and fatigue       Procedures    DYLAN Cifuentes, PA-C     Consuelo Mena PA-C  03/04/24 1410

## 2024-03-05 ENCOUNTER — NUTRITION (OUTPATIENT)
Dept: HEMATOLOGY/ONCOLOGY | Facility: CLINIC | Age: 79
End: 2024-03-05
Payer: MEDICARE

## 2024-03-05 ENCOUNTER — HOME CARE VISIT (OUTPATIENT)
Dept: HOME HEALTH SERVICES | Facility: HOME HEALTH | Age: 79
End: 2024-03-05
Payer: MEDICARE

## 2024-03-05 VITALS — OXYGEN SATURATION: 96 % | TEMPERATURE: 98.4 F | HEART RATE: 89 BPM

## 2024-03-05 PROBLEM — R22.1 MASS OF NECK: Status: RESOLVED | Noted: 2024-03-05 | Resolved: 2024-03-05

## 2024-03-05 PROBLEM — N13.4 HYDROURETER: Status: RESOLVED | Noted: 2024-03-05 | Resolved: 2024-03-05

## 2024-03-05 PROBLEM — K14.8 TONGUE MASS: Status: RESOLVED | Noted: 2023-10-05 | Resolved: 2024-03-05

## 2024-03-05 PROBLEM — N39.0 URINARY TRACT INFECTION: Status: RESOLVED | Noted: 2024-03-05 | Resolved: 2024-03-05

## 2024-03-05 PROBLEM — R13.12 OROPHARYNGEAL DYSPHAGIA: Status: RESOLVED | Noted: 2024-03-05 | Resolved: 2024-03-05

## 2024-03-05 PROBLEM — R19.7 DIARRHEA: Status: RESOLVED | Noted: 2024-03-05 | Resolved: 2024-03-05

## 2024-03-05 PROBLEM — G89.18 ACUTE POSTOPERATIVE PAIN: Status: RESOLVED | Noted: 2023-11-08 | Resolved: 2024-03-05

## 2024-03-05 PROBLEM — D64.9 ANEMIA: Status: ACTIVE | Noted: 2023-10-17

## 2024-03-05 PROBLEM — G47.00 INSOMNIA: Status: RESOLVED | Noted: 2024-02-19 | Resolved: 2024-03-05

## 2024-03-05 PROBLEM — T14.8XXA OPEN WOUND: Status: RESOLVED | Noted: 2024-03-05 | Resolved: 2024-03-05

## 2024-03-05 PROBLEM — L24.A9 IRRITANT CONTACT DERMATITIS DUE FRICTION OR CONTACT WITH OTHER SPECIFIED BODY FLUIDS: Status: RESOLVED | Noted: 2023-11-27 | Resolved: 2024-03-05

## 2024-03-05 PROBLEM — Z86.39 HISTORY OF DIABETES MELLITUS: Status: RESOLVED | Noted: 2024-03-05 | Resolved: 2024-03-05

## 2024-03-05 PROBLEM — D72.819 LEUKOPENIA: Status: RESOLVED | Noted: 2024-03-05 | Resolved: 2024-03-05

## 2024-03-05 PROBLEM — N40.0 ENLARGED PROSTATE: Status: ACTIVE | Noted: 2023-02-17

## 2024-03-05 PROBLEM — G89.3 PAIN DUE TO NEOPLASM: Status: RESOLVED | Noted: 2024-01-22 | Resolved: 2024-03-05

## 2024-03-05 PROBLEM — Z86.79 HISTORY OF CARDIAC ARRHYTHMIA: Status: ACTIVE | Noted: 2024-03-05

## 2024-03-05 LAB — HOLD SPECIMEN: NORMAL

## 2024-03-05 PROCEDURE — 1090000001 HH PPS REVENUE CREDIT

## 2024-03-05 PROCEDURE — G0153 HHCP-SVS OF S/L PATH,EA 15MN: HCPCS | Mod: HHH

## 2024-03-05 PROCEDURE — 1090000002 HH PPS REVENUE DEBIT

## 2024-03-05 ASSESSMENT — PAIN SCALES - PAIN ASSESSMENT IN ADVANCED DEMENTIA (PAINAD)
FACIALEXPRESSION: 0
CONSOLABILITY: 0 - NO NEED TO CONSOLE.
TOTALSCORE: 0
FACIALEXPRESSION: 0 - SMILING OR INEXPRESSIVE.
NEGVOCALIZATION: 0 - NONE.
BREATHING: 0
CONSOLABILITY: 0
BODYLANGUAGE: 0
NEGVOCALIZATION: 0
BODYLANGUAGE: 0 - RELAXED.

## 2024-03-05 ASSESSMENT — ENCOUNTER SYMPTOMS
AGGRESSION WITHIN DEFINED LIMITS: 1
ANGER WITHIN DEFINED LIMITS: 1

## 2024-03-05 NOTE — PROGRESS NOTES
"NUTRITION COMMUNICATION NOTE    Kevin J Cinthya \"Pat\"     REASON FOR COMMUNICATION:   Phone call from Pat's wife, Mariana, today.  She stated Pat was in the ER yesterday with fatigue and not himself.  They had given him some fluids, and she stated sounds like it was dehydration.  He was active Thursday or Friday last week, and then ended up sleeping most of the day Saturday and Sunday.       He is not eating or drinking anything by mouth, all nutrition and fluids in PEG tube.  He is doing 4 cartons of TF (Provides 1500 calories. 68g protein, 764ml free water per day).  Water flushes of 2 cups after his 8am feeds, 2 cups after his 1pm feeds, and 2 cups after his 6pm feeds.  She stated and will do 60ml water flush after meds.      Mariana stated he has an appointment with his PCP tomorrow, and sees SLP with home care on Thursday.      We discussed she can do 4 oz extra water flush in between the 8am and 1pm feed, and another 4 oz water flush in between his 1pm and 6pm feed.  This would provide patient with 1680ml from all water flushes per day.  And total of 2444ml water from water flushes and free water in TF formula.      Plan to await PCP appointment tomorrow, as well as SLP evaluation on Thursday.  Will call Mariana back on Friday.  If SLP eval ok for solids and liquids, recommend pt start to eat by mouth again.  Will await recommendations from SLP and PCP.              "

## 2024-03-06 ENCOUNTER — OFFICE VISIT (OUTPATIENT)
Dept: PRIMARY CARE | Facility: CLINIC | Age: 79
End: 2024-03-06
Payer: MEDICARE

## 2024-03-06 ENCOUNTER — HOME CARE VISIT (OUTPATIENT)
Dept: HOME HEALTH SERVICES | Facility: HOME HEALTH | Age: 79
End: 2024-03-06
Payer: MEDICARE

## 2024-03-06 VITALS
DIASTOLIC BLOOD PRESSURE: 76 MMHG | RESPIRATION RATE: 18 BRPM | HEART RATE: 88 BPM | OXYGEN SATURATION: 96 % | SYSTOLIC BLOOD PRESSURE: 132 MMHG | TEMPERATURE: 97.6 F

## 2024-03-06 VITALS
WEIGHT: 194 LBS | OXYGEN SATURATION: 97 % | SYSTOLIC BLOOD PRESSURE: 154 MMHG | DIASTOLIC BLOOD PRESSURE: 75 MMHG | HEIGHT: 71 IN | HEART RATE: 58 BPM | BODY MASS INDEX: 27.16 KG/M2

## 2024-03-06 DIAGNOSIS — E11.22 CONTROLLED TYPE 2 DIABETES MELLITUS WITH STAGE 3 CHRONIC KIDNEY DISEASE, WITHOUT LONG-TERM CURRENT USE OF INSULIN (MULTI): ICD-10-CM

## 2024-03-06 DIAGNOSIS — C01 MALIGNANT NEOPLASM OF BASE OF TONGUE (MULTI): ICD-10-CM

## 2024-03-06 DIAGNOSIS — S39.012D STRAIN OF LUMBAR REGION, SUBSEQUENT ENCOUNTER: ICD-10-CM

## 2024-03-06 DIAGNOSIS — R53.83 MALAISE AND FATIGUE: ICD-10-CM

## 2024-03-06 DIAGNOSIS — N18.30 CONTROLLED TYPE 2 DIABETES MELLITUS WITH STAGE 3 CHRONIC KIDNEY DISEASE, WITHOUT LONG-TERM CURRENT USE OF INSULIN (MULTI): ICD-10-CM

## 2024-03-06 DIAGNOSIS — D69.2 OTHER NONTHROMBOCYTOPENIC PURPURA (CMS-HCC): ICD-10-CM

## 2024-03-06 DIAGNOSIS — I47.10 SVT (SUPRAVENTRICULAR TACHYCARDIA) (CMS-HCC): ICD-10-CM

## 2024-03-06 DIAGNOSIS — R53.81 MALAISE AND FATIGUE: ICD-10-CM

## 2024-03-06 DIAGNOSIS — E86.0 DEHYDRATION: Primary | ICD-10-CM

## 2024-03-06 PROCEDURE — 1160F RVW MEDS BY RX/DR IN RCRD: CPT | Performed by: FAMILY MEDICINE

## 2024-03-06 PROCEDURE — 1090000001 HH PPS REVENUE CREDIT

## 2024-03-06 PROCEDURE — 99213 OFFICE O/P EST LOW 20 MIN: CPT | Performed by: FAMILY MEDICINE

## 2024-03-06 PROCEDURE — 1123F ACP DISCUSS/DSCN MKR DOCD: CPT | Performed by: FAMILY MEDICINE

## 2024-03-06 PROCEDURE — 1126F AMNT PAIN NOTED NONE PRSNT: CPT | Performed by: FAMILY MEDICINE

## 2024-03-06 PROCEDURE — 1157F ADVNC CARE PLAN IN RCRD: CPT | Performed by: FAMILY MEDICINE

## 2024-03-06 PROCEDURE — 1036F TOBACCO NON-USER: CPT | Performed by: FAMILY MEDICINE

## 2024-03-06 PROCEDURE — G0299 HHS/HOSPICE OF RN EA 15 MIN: HCPCS | Mod: HHH

## 2024-03-06 PROCEDURE — 1159F MED LIST DOCD IN RCRD: CPT | Performed by: FAMILY MEDICINE

## 2024-03-06 PROCEDURE — 3078F DIAST BP <80 MM HG: CPT | Performed by: FAMILY MEDICINE

## 2024-03-06 PROCEDURE — 1090000002 HH PPS REVENUE DEBIT

## 2024-03-06 PROCEDURE — 3077F SYST BP >= 140 MM HG: CPT | Performed by: FAMILY MEDICINE

## 2024-03-06 ASSESSMENT — ENCOUNTER SYMPTOMS
PAIN LOCATION: BACK
PAIN: 1
PAIN LOCATION - PAIN SEVERITY: 6/10
BOWEL PATTERN NORMAL: 1
PERSON REPORTING PAIN: PATIENT

## 2024-03-06 NOTE — PROGRESS NOTES
"Subjective     Patient ID: [id number]     Kevin Mendes \"Katherine\" is a 78 y.o. male who presents for Hospital Follow-up.    HPI  ER for dehydration.  IV fluids helped.  2 days later felt fatigued again.  Oncologist told him to go back to ER on 3/4/24.  Has had lower back pain for past week or 2.  Really set him back.  Feeling much better today.  Using LidoDerm 5%    Not much of an appetite.  Just beginning to eat by mouth  Has PEG tube for majority of nutrtion and hydration      Objective   /75   Pulse 58   Ht 1.803 m (5' 11\")   Wt 88 kg (194 lb)   SpO2 97%   BMI 27.06 kg/m²    Physical Exam:   Alert and oriented x 3.  No acute distress.  Ambulating independently.    Reviewed emergency department/hospitalization records.    Assessment/Plan   1. Dehydration  Maintain hydration - at least 80 oz fluids per day via PEG    2. Strain of lumbar region, subsequent encounter  Continue home exercise program    3. Malaise and fatigue  Maintain hydration    4. Other nonthrombocytopenic purpura (CMS/HCC)  monitored    5. Controlled type 2 diabetes mellitus with stage 3 chronic kidney disease, without long-term current use of insulin (CMS/HCC)  controlled    6. SVT (supraventricular tachycardia)  asymptomatic    7. 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  Follow up with oncology        Follow up in office as scheduled for medical management    I will continue to monitor, evaluate, assess and treat all problems/diagnoses as appropriate and continue to collaborate with specialists.    Contact office or send a  MY Chart message with any questions or concerns    Encouraged to sign up with my  My Chart  Patient will only be notified of labs that require medical intervention.    Prescriptions will not be filled unless you are compliant with your follow up appointments or have a follow up appointment scheduled as per instruction of your physician. Refills should be requested at the time of your " visit.    **Charting was completed using voice recognition technology and may include unintended errors**    Kd Lange DO, [facility]  [id number]  [place] [address] [phone]    Problem List Items Addressed This Visit    None      Kd Lange DO

## 2024-03-07 PROCEDURE — 1090000002 HH PPS REVENUE DEBIT

## 2024-03-07 PROCEDURE — 1090000001 HH PPS REVENUE CREDIT

## 2024-03-08 ENCOUNTER — HOME CARE VISIT (OUTPATIENT)
Dept: HOME HEALTH SERVICES | Facility: HOME HEALTH | Age: 79
End: 2024-03-08
Payer: MEDICARE

## 2024-03-08 ENCOUNTER — NUTRITION (OUTPATIENT)
Dept: HEMATOLOGY/ONCOLOGY | Facility: CLINIC | Age: 79
End: 2024-03-08
Payer: MEDICARE

## 2024-03-08 PROCEDURE — G0153 HHCP-SVS OF S/L PATH,EA 15MN: HCPCS | Mod: HHH

## 2024-03-08 PROCEDURE — 1090000002 HH PPS REVENUE DEBIT

## 2024-03-08 PROCEDURE — 1090000001 HH PPS REVENUE CREDIT

## 2024-03-08 NOTE — PROGRESS NOTES
"NUTRITION COMMUNICATION NOTE    Kevin Mendes \"Pat\"     REASON FOR COMMUNICATION:   Phone call from Pat's wife, Mariana, today.       He is not eating or drinking anything by mouth, all nutrition and fluids in PEG tube.  He is doing 4 cartons of TF (Provides 1500 calories. 68g protein, 764ml free water per day).  Water flushes of 2 cups after his 8am feeds, 2 cups after his 1pm feeds, and 2 cups after his 6pm feeds.  She stated and will do 60ml water flush after meds.      They met with his PCP this week and saw SLP with home care.  Mariana stated that SLP is having eat pudding and try to drink a small cup of water daily.  She stated he is eating the pudding ok, having difficulty with water.      She stated he does not complain of pain, still with some taste changes.      She has been doing 4 oz extra water flush in between the 8am and 1pm feed, and another 4 oz water flush in between his 1pm and 6pm feed.  This would provide patient with 1680ml from all water flushes per day.  And total of 2444ml water from water flushes and free water in TF formula.      Recommended today to try for 60ml of gatorade via flush and then water flush of 60ml in between the 8am and 1pm feed, instead of 120ml of water.  Then again 60ml gatorade and flush with 60ml water in between 1pm and 6pm feed.  She state she would do another 30ml gatorade at night with 30ml water flush.     If ok by SLP patient can also drink some broth as tolerated.  Will continue to follow up with patient wife.               "

## 2024-03-09 PROCEDURE — 1090000001 HH PPS REVENUE CREDIT

## 2024-03-09 PROCEDURE — 1090000002 HH PPS REVENUE DEBIT

## 2024-03-09 ASSESSMENT — PAIN SCALES - PAIN ASSESSMENT IN ADVANCED DEMENTIA (PAINAD)
BODYLANGUAGE: 0
FACIALEXPRESSION: 0
FACIALEXPRESSION: 0 - SMILING OR INEXPRESSIVE.
NEGVOCALIZATION: 0
BODYLANGUAGE: 0 - RELAXED.
CONSOLABILITY: 0
TOTALSCORE: 0
BREATHING: 0
NEGVOCALIZATION: 0 - NONE.
CONSOLABILITY: 0 - NO NEED TO CONSOLE.

## 2024-03-09 ASSESSMENT — ENCOUNTER SYMPTOMS
DENIES PAIN: 1
PERSON REPORTING PAIN: PATIENT
ANGER WITHIN DEFINED LIMITS: 1
AGGRESSION WITHIN DEFINED LIMITS: 1

## 2024-03-10 PROCEDURE — 1090000001 HH PPS REVENUE CREDIT

## 2024-03-10 PROCEDURE — 1090000002 HH PPS REVENUE DEBIT

## 2024-03-11 PROCEDURE — 1090000001 HH PPS REVENUE CREDIT

## 2024-03-11 PROCEDURE — 1090000002 HH PPS REVENUE DEBIT

## 2024-03-12 ENCOUNTER — HOME CARE VISIT (OUTPATIENT)
Dept: HOME HEALTH SERVICES | Facility: HOME HEALTH | Age: 79
End: 2024-03-12
Payer: MEDICARE

## 2024-03-12 PROCEDURE — 1090000001 HH PPS REVENUE CREDIT

## 2024-03-12 PROCEDURE — 1090000002 HH PPS REVENUE DEBIT

## 2024-03-12 PROCEDURE — G0153 HHCP-SVS OF S/L PATH,EA 15MN: HCPCS | Mod: HHH

## 2024-03-12 ASSESSMENT — ENCOUNTER SYMPTOMS
PERSON REPORTING PAIN: PATIENT
DENIES PAIN: 1

## 2024-03-12 ASSESSMENT — PAIN SCALES - PAIN ASSESSMENT IN ADVANCED DEMENTIA (PAINAD)
NEGVOCALIZATION: 0 - NONE.
BREATHING: 0
BODYLANGUAGE: 0 - RELAXED.
FACIALEXPRESSION: 0
NEGVOCALIZATION: 0
CONSOLABILITY: 0
TOTALSCORE: 0
FACIALEXPRESSION: 0 - SMILING OR INEXPRESSIVE.
CONSOLABILITY: 0 - NO NEED TO CONSOLE.
BODYLANGUAGE: 0

## 2024-03-13 ENCOUNTER — NUTRITION (OUTPATIENT)
Dept: HEMATOLOGY/ONCOLOGY | Facility: CLINIC | Age: 79
End: 2024-03-13
Payer: MEDICARE

## 2024-03-13 ENCOUNTER — HOME CARE VISIT (OUTPATIENT)
Dept: HOME HEALTH SERVICES | Facility: HOME HEALTH | Age: 79
End: 2024-03-13
Payer: MEDICARE

## 2024-03-13 VITALS
TEMPERATURE: 98.6 F | DIASTOLIC BLOOD PRESSURE: 68 MMHG | RESPIRATION RATE: 20 BRPM | HEART RATE: 72 BPM | SYSTOLIC BLOOD PRESSURE: 128 MMHG | OXYGEN SATURATION: 95 %

## 2024-03-13 PROCEDURE — 1090000001 HH PPS REVENUE CREDIT

## 2024-03-13 PROCEDURE — G0299 HHS/HOSPICE OF RN EA 15 MIN: HCPCS | Mod: HHH

## 2024-03-13 PROCEDURE — 1090000002 HH PPS REVENUE DEBIT

## 2024-03-13 ASSESSMENT — ENCOUNTER SYMPTOMS
PERSON REPORTING PAIN: PATIENT
HIGHEST PAIN SEVERITY IN PAST 24 HOURS: 0/10
LOSS OF SENSATION IN FEET: 0
SUBJECTIVE PAIN PROGRESSION: UNCHANGED
DENIES PAIN: 1
OCCASIONAL FEELINGS OF UNSTEADINESS: 0
LOWEST PAIN SEVERITY IN PAST 24 HOURS: 0/10
LAST BOWEL MOVEMENT: 66912
PAIN SEVERITY GOAL: 0/10
DEPRESSION: 0

## 2024-03-13 ASSESSMENT — PAIN SCALES - PAIN ASSESSMENT IN ADVANCED DEMENTIA (PAINAD)
BREATHING: 0
NEGVOCALIZATION: 0
BODYLANGUAGE: 0 - RELAXED.
FACIALEXPRESSION: 0 - SMILING OR INEXPRESSIVE.
BODYLANGUAGE: 0
TOTALSCORE: 0
CONSOLABILITY: 0 - NO NEED TO CONSOLE.
FACIALEXPRESSION: 0
CONSOLABILITY: 0
NEGVOCALIZATION: 0 - NONE.

## 2024-03-13 NOTE — HOME HEALTH
SKILLED NURSING VISIT FOR ASSESSMENT AND DISCIPLINE DISCHARGE. PT REPORTS TOLERATING YOGURT AND THICKENED LIQUIDS. ENTERAL FEEDINGS AND WATER FLUSH CONTINUE. SPOUSE HAVING OWN HEALTH ISSUES. SN DISCUSSED ASKING CHILDREN TO ASSIST IN HER CAREGIVING OF PT AND/OR HIRING ASSISTANCE. SHE DECLINES AT THIS TIME AND STATES SHE WILL NEED HER CHILDREN TO ASSIST WITH MOVING IN EVENT THEY CAN NO LONGER STAY IN CURRENT RESIDENCE. SN DISCUSSED ASSISTED LINING. PT MUCH IMPROVED SINCE RADIATION TREATMENTS COMPLETED. SPEECH THERAPY CONTINUES.

## 2024-03-13 NOTE — PROGRESS NOTES
"NUTRITION COMMUNICATION NOTE    Kevin Mendes \"Pat\"     REASON FOR COMMUNICATION:   I called and spoke with Katherine today to follow up from conversation with Mariana last week.      He stated SLP has been coming out to the house, she had mentioned a MBS test, but he was not sure about it.  I encouraged him to consider it, as it would be helpful to better plan which textures to recommend and to increase his oral intake safely.      He is still not eating or drinking anything by mouth, all nutrition and fluids in PEG tube.  He stated he was able to have a yogurt today.   He is doing 4 cartons of TF (Provides 1500 calories. 68g protein, 764ml free water per day).  Water flushes of 2 cups after his 8am feeds, 2 cups after his 1pm feeds, and 2 cups after his 6pm feeds.  She stated and will do 60ml water flush after meds.      He stated the pudding was not really going down ok compared to the yogurt today, having difficulty drinking water.      He does not complain of pain, still with some taste changes.      She had been doing 4 oz extra water flush in between the 8am and 1pm feed, and another 4 oz water flush in between his 1pm and 6pm feed.  This would provide patient with 1680ml from all water flushes per day.  And total of 2444ml water from water flushes and free water in TF formula.      Recommended today to continue 60ml of gatorade via flush and then water flush of 60ml in between the 8am and 1pm feed, instead of 120ml of water.  Then again 60ml gatorade and flush with 60ml water in between 1pm and 6pm feed.  She state she would do another 30ml gatorade at night with 30ml water flush.     If MBS test done, encouraged them to call me and we can discuss ways to increase oral intake.               "

## 2024-03-14 ENCOUNTER — HOME CARE VISIT (OUTPATIENT)
Dept: HOME HEALTH SERVICES | Facility: HOME HEALTH | Age: 79
End: 2024-03-14
Payer: MEDICARE

## 2024-03-14 PROCEDURE — 1090000001 HH PPS REVENUE CREDIT

## 2024-03-14 PROCEDURE — G0153 HHCP-SVS OF S/L PATH,EA 15MN: HCPCS | Mod: HHH

## 2024-03-14 PROCEDURE — 1090000002 HH PPS REVENUE DEBIT

## 2024-03-14 ASSESSMENT — PAIN SCALES - PAIN ASSESSMENT IN ADVANCED DEMENTIA (PAINAD)
FACIALEXPRESSION: 0 - SMILING OR INEXPRESSIVE.
NEGVOCALIZATION: 0
TOTALSCORE: 0
FACIALEXPRESSION: 0
NEGVOCALIZATION: 0 - NONE.
BREATHING: 0
CONSOLABILITY: 0
BODYLANGUAGE: 0
CONSOLABILITY: 0 - NO NEED TO CONSOLE.
BODYLANGUAGE: 0 - RELAXED.

## 2024-03-14 ASSESSMENT — ENCOUNTER SYMPTOMS
PERSON REPORTING PAIN: PATIENT
DENIES PAIN: 1

## 2024-03-15 PROCEDURE — 1090000001 HH PPS REVENUE CREDIT

## 2024-03-15 PROCEDURE — 1090000002 HH PPS REVENUE DEBIT

## 2024-03-16 PROCEDURE — 1090000002 HH PPS REVENUE DEBIT

## 2024-03-16 PROCEDURE — 1090000001 HH PPS REVENUE CREDIT

## 2024-03-17 PROCEDURE — 1090000001 HH PPS REVENUE CREDIT

## 2024-03-17 PROCEDURE — 1090000002 HH PPS REVENUE DEBIT

## 2024-03-18 PROCEDURE — 1090000002 HH PPS REVENUE DEBIT

## 2024-03-18 PROCEDURE — 1090000001 HH PPS REVENUE CREDIT

## 2024-03-19 ENCOUNTER — TELEPHONE (OUTPATIENT)
Dept: PRIMARY CARE | Facility: CLINIC | Age: 79
End: 2024-03-19
Payer: MEDICARE

## 2024-03-19 ENCOUNTER — HOME CARE VISIT (OUTPATIENT)
Dept: HOME HEALTH SERVICES | Facility: HOME HEALTH | Age: 79
End: 2024-03-19
Payer: MEDICARE

## 2024-03-19 DIAGNOSIS — R13.10 DYSPHAGIA, UNSPECIFIED TYPE: Primary | ICD-10-CM

## 2024-03-19 PROCEDURE — 1090000001 HH PPS REVENUE CREDIT

## 2024-03-19 PROCEDURE — G0153 HHCP-SVS OF S/L PATH,EA 15MN: HCPCS | Mod: HHH

## 2024-03-19 PROCEDURE — 400014 HH F/U

## 2024-03-19 PROCEDURE — 1090000002 HH PPS REVENUE DEBIT

## 2024-03-19 ASSESSMENT — PAIN SCALES - PAIN ASSESSMENT IN ADVANCED DEMENTIA (PAINAD)
FACIALEXPRESSION: 0 - SMILING OR INEXPRESSIVE.
BODYLANGUAGE: 0 - RELAXED.
BODYLANGUAGE: 0
NEGVOCALIZATION: 0
NEGVOCALIZATION: 0 - NONE.
BREATHING: 0
CONSOLABILITY: 0 - NO NEED TO CONSOLE.
TOTALSCORE: 0
FACIALEXPRESSION: 0
CONSOLABILITY: 0

## 2024-03-19 ASSESSMENT — ENCOUNTER SYMPTOMS
PERSON REPORTING PAIN: PATIENT
DENIES PAIN: 1

## 2024-03-19 NOTE — TELEPHONE ENCOUNTER
Katlin with  [facility] is calling to have you place an order for a modified barium swallow study. The patient is post radiation for head/neck cancer. He has been coughing when drinking liquids. She is concerned he is aspirating or something else is going on.    Katlin stated she sent you a message on the provider end, but wasn't sure if you had received it.

## 2024-03-20 PROCEDURE — 1090000001 HH PPS REVENUE CREDIT

## 2024-03-20 PROCEDURE — 1090000002 HH PPS REVENUE DEBIT

## 2024-03-21 PROCEDURE — 1090000001 HH PPS REVENUE CREDIT

## 2024-03-21 PROCEDURE — 1090000002 HH PPS REVENUE DEBIT

## 2024-03-22 ENCOUNTER — HOME CARE VISIT (OUTPATIENT)
Dept: HOME HEALTH SERVICES | Facility: HOME HEALTH | Age: 79
End: 2024-03-22
Payer: MEDICARE

## 2024-03-22 VITALS — TEMPERATURE: 98.3 F | OXYGEN SATURATION: 96 % | HEART RATE: 83 BPM

## 2024-03-22 PROCEDURE — 1090000002 HH PPS REVENUE DEBIT

## 2024-03-22 PROCEDURE — G0153 HHCP-SVS OF S/L PATH,EA 15MN: HCPCS | Mod: HHH

## 2024-03-22 PROCEDURE — 1090000001 HH PPS REVENUE CREDIT

## 2024-03-22 ASSESSMENT — PAIN SCALES - PAIN ASSESSMENT IN ADVANCED DEMENTIA (PAINAD)
CONSOLABILITY: 0 - NO NEED TO CONSOLE.
TOTALSCORE: 0
BODYLANGUAGE: 0 - RELAXED.
NEGVOCALIZATION: 0 - NONE.
FACIALEXPRESSION: 0 - SMILING OR INEXPRESSIVE.
BREATHING: 0
NEGVOCALIZATION: 0
CONSOLABILITY: 0
BODYLANGUAGE: 0
FACIALEXPRESSION: 0

## 2024-03-22 ASSESSMENT — ENCOUNTER SYMPTOMS
PERSON REPORTING PAIN: PATIENT
DENIES PAIN: 1

## 2024-03-23 PROCEDURE — 1090000001 HH PPS REVENUE CREDIT

## 2024-03-23 PROCEDURE — 1090000002 HH PPS REVENUE DEBIT

## 2024-03-24 PROCEDURE — 1090000001 HH PPS REVENUE CREDIT

## 2024-03-24 PROCEDURE — 1090000002 HH PPS REVENUE DEBIT

## 2024-03-25 PROCEDURE — 1090000001 HH PPS REVENUE CREDIT

## 2024-03-25 PROCEDURE — 1090000002 HH PPS REVENUE DEBIT

## 2024-03-26 ENCOUNTER — HOME CARE VISIT (OUTPATIENT)
Dept: HOME HEALTH SERVICES | Facility: HOME HEALTH | Age: 79
End: 2024-03-26
Payer: MEDICARE

## 2024-03-26 PROCEDURE — 1090000001 HH PPS REVENUE CREDIT

## 2024-03-26 PROCEDURE — 1090000002 HH PPS REVENUE DEBIT

## 2024-03-26 PROCEDURE — G0153 HHCP-SVS OF S/L PATH,EA 15MN: HCPCS | Mod: HHH

## 2024-03-26 ASSESSMENT — PAIN SCALES - PAIN ASSESSMENT IN ADVANCED DEMENTIA (PAINAD)
CONSOLABILITY: 0
TOTALSCORE: 0
NEGVOCALIZATION: 0
FACIALEXPRESSION: 0 - SMILING OR INEXPRESSIVE.
BODYLANGUAGE: 0
NEGVOCALIZATION: 0 - NONE.
BODYLANGUAGE: 0 - RELAXED.
BREATHING: 0
CONSOLABILITY: 0 - NO NEED TO CONSOLE.
FACIALEXPRESSION: 0

## 2024-03-26 ASSESSMENT — ENCOUNTER SYMPTOMS
PERSON REPORTING PAIN: PATIENT
DENIES PAIN: 1

## 2024-03-27 PROCEDURE — 1090000001 HH PPS REVENUE CREDIT

## 2024-03-27 PROCEDURE — 1090000002 HH PPS REVENUE DEBIT

## 2024-03-28 ENCOUNTER — HOME CARE VISIT (OUTPATIENT)
Dept: HOME HEALTH SERVICES | Facility: HOME HEALTH | Age: 79
End: 2024-03-28
Payer: MEDICARE

## 2024-03-28 PROCEDURE — 1090000002 HH PPS REVENUE DEBIT

## 2024-03-28 PROCEDURE — G0153 HHCP-SVS OF S/L PATH,EA 15MN: HCPCS | Mod: HHH

## 2024-03-28 PROCEDURE — 1090000001 HH PPS REVENUE CREDIT

## 2024-03-28 ASSESSMENT — ENCOUNTER SYMPTOMS
DENIES PAIN: 1
AGGRESSION WITHIN DEFINED LIMITS: 1
ANGER WITHIN DEFINED LIMITS: 1
PERSON REPORTING PAIN: PATIENT

## 2024-03-28 ASSESSMENT — PAIN SCALES - PAIN ASSESSMENT IN ADVANCED DEMENTIA (PAINAD)
TOTALSCORE: 0
CONSOLABILITY: 0
FACIALEXPRESSION: 0
FACIALEXPRESSION: 0 - SMILING OR INEXPRESSIVE.
CONSOLABILITY: 0 - NO NEED TO CONSOLE.
BODYLANGUAGE: 0
NEGVOCALIZATION: 0 - NONE.
BODYLANGUAGE: 0 - RELAXED.
BREATHING: 0
NEGVOCALIZATION: 0

## 2024-03-29 PROCEDURE — 1090000002 HH PPS REVENUE DEBIT

## 2024-03-29 PROCEDURE — 1090000001 HH PPS REVENUE CREDIT

## 2024-03-30 PROCEDURE — 1090000002 HH PPS REVENUE DEBIT

## 2024-03-30 PROCEDURE — 1090000001 HH PPS REVENUE CREDIT

## 2024-03-31 PROCEDURE — 1090000001 HH PPS REVENUE CREDIT

## 2024-03-31 PROCEDURE — 1090000002 HH PPS REVENUE DEBIT

## 2024-04-01 PROCEDURE — 1090000001 HH PPS REVENUE CREDIT

## 2024-04-01 PROCEDURE — 1090000002 HH PPS REVENUE DEBIT

## 2024-04-02 ENCOUNTER — HOSPITAL ENCOUNTER (OUTPATIENT)
Dept: RADIOLOGY | Facility: HOSPITAL | Age: 79
Discharge: HOME | End: 2024-04-02
Payer: MEDICARE

## 2024-04-02 DIAGNOSIS — R13.10 DYSPHAGIA, UNSPECIFIED TYPE: ICD-10-CM

## 2024-04-02 PROCEDURE — 92611 MOTION FLUOROSCOPY/SWALLOW: CPT | Mod: GN | Performed by: SPEECH-LANGUAGE PATHOLOGIST

## 2024-04-02 PROCEDURE — 74230 X-RAY XM SWLNG FUNCJ C+: CPT | Performed by: RADIOLOGY

## 2024-04-02 PROCEDURE — 1090000002 HH PPS REVENUE DEBIT

## 2024-04-02 PROCEDURE — 74230 X-RAY XM SWLNG FUNCJ C+: CPT

## 2024-04-02 PROCEDURE — 1090000001 HH PPS REVENUE CREDIT

## 2024-04-02 PROCEDURE — 2500000001 HC RX 250 WO HCPCS SELF ADMINISTERED DRUGS (ALT 637 FOR MEDICARE OP): Performed by: FAMILY MEDICINE

## 2024-04-02 RX ADMIN — BARIUM SULFATE 80 ML: 400 SUSPENSION ORAL at 15:01

## 2024-04-02 RX ADMIN — BARIUM SULFATE 20 ML: 0.81 POWDER, FOR SUSPENSION ORAL at 15:02

## 2024-04-02 NOTE — PROGRESS NOTES
"    Modified Barium Swallow Study     Patient Name: Kevin Mendes \"Katherine\"  MRN: [id number]  : 1945  Today's Date: 24          Recommendations:  SOFT EASY TO CHEW FOODS WITH NECTAR THICK LIQUIDS - DOUBLE SWALLOW, THROAT CLEAR THEN SWALLOW, REFLUX MEDICATION, AND BRITTON FREE WATER PROTOCOL    Assessment/Impression:    Full detailed SLP/Radiologist Modified Barium Swallow study report can be found under Chart Review tab, Imaging tab and  titled \"FL Modified Barium Swallow Study\"      Pt. Presenting with a MILD ORAL AND MODERATE PHARYNGEAL DYSPHAGIA - LARYNGEAL PENETRATION AND ASPIRATION NOTED PRE AND POST SWALLOW WITH THIN LIQUIDS AND RESIDUALS DUE TO REFLUX    Plan:  Treatment/Interventions: Pharyngeal exercises, Oral motor exercises, Patient/family education, Bolus trials  SLP Plan: Skilled SLP warranted  SLP Frequency: To be determined by treating SLP in home health setting  Duration: 30 days    Discussed POC: Patient, Nursing   Discussed Risks/Benefits: Yes  Patient/Caregiver Agreeable: Yes      GOALS:  Pt. to tolerate least restrictive diet without pulmonary compromise, Pt. to use safe swallow strategies independently in all observed trials , Pt. to tolerate trials of thin liquids with SLP only with no s/s aspiration or laryngeal penetration , Pt. to complete pharyngeal strengthening techniques, Pt. to complete hyolaryngeal excursion techniques     Education:   Pt. Educated on results of MBS study, recommended diet and recommended safe swallow strategies   "

## 2024-04-03 ENCOUNTER — LAB (OUTPATIENT)
Dept: LAB | Facility: LAB | Age: 79
End: 2024-04-03
Payer: MEDICARE

## 2024-04-03 DIAGNOSIS — E55.9 VITAMIN D DEFICIENCY: ICD-10-CM

## 2024-04-03 DIAGNOSIS — E29.1 HYPOGONADISM MALE: ICD-10-CM

## 2024-04-03 DIAGNOSIS — I10 HYPERTENSION, UNSPECIFIED TYPE: ICD-10-CM

## 2024-04-03 DIAGNOSIS — N40.0 BENIGN PROSTATIC HYPERPLASIA, UNSPECIFIED WHETHER LOWER URINARY TRACT SYMPTOMS PRESENT: ICD-10-CM

## 2024-04-03 DIAGNOSIS — E78.5 HYPERLIPIDEMIA, UNSPECIFIED HYPERLIPIDEMIA TYPE: ICD-10-CM

## 2024-04-03 DIAGNOSIS — E11.9 TYPE 2 DIABETES MELLITUS WITHOUT COMPLICATION, UNSPECIFIED WHETHER LONG TERM INSULIN USE (MULTI): ICD-10-CM

## 2024-04-03 DIAGNOSIS — R39.198 SLOWING OF URINARY STREAM: ICD-10-CM

## 2024-04-03 LAB
25(OH)D3 SERPL-MCNC: 40 NG/ML (ref 30–100)
ALBUMIN SERPL BCP-MCNC: 4.2 G/DL (ref 3.4–5)
ALP SERPL-CCNC: 69 U/L (ref 33–136)
ALT SERPL W P-5'-P-CCNC: 9 U/L (ref 10–52)
ANION GAP SERPL CALC-SCNC: 13 MMOL/L (ref 10–20)
APPEARANCE UR: CLEAR
AST SERPL W P-5'-P-CCNC: 15 U/L (ref 9–39)
BILIRUB SERPL-MCNC: 0.5 MG/DL (ref 0–1.2)
BILIRUB UR STRIP.AUTO-MCNC: NEGATIVE MG/DL
BUN SERPL-MCNC: 27 MG/DL (ref 6–23)
CALCIUM SERPL-MCNC: 9.6 MG/DL (ref 8.6–10.3)
CHLORIDE SERPL-SCNC: 96 MMOL/L (ref 98–107)
CHOLEST SERPL-MCNC: 151 MG/DL (ref 0–199)
CHOLESTEROL/HDL RATIO: 3.2
CO2 SERPL-SCNC: 30 MMOL/L (ref 21–32)
COLOR UR: YELLOW
CREAT SERPL-MCNC: 0.9 MG/DL (ref 0.5–1.3)
EGFRCR SERPLBLD CKD-EPI 2021: 87 ML/MIN/1.73M*2
ERYTHROCYTE [DISTWIDTH] IN BLOOD BY AUTOMATED COUNT: 14.7 % (ref 11.5–14.5)
EST. AVERAGE GLUCOSE BLD GHB EST-MCNC: 180 MG/DL
GLUCOSE SERPL-MCNC: 351 MG/DL (ref 74–99)
GLUCOSE UR STRIP.AUTO-MCNC: ABNORMAL MG/DL
HBA1C MFR BLD: 7.9 %
HCT VFR BLD AUTO: 39.8 % (ref 41–52)
HDLC SERPL-MCNC: 47 MG/DL
HGB BLD-MCNC: 12.6 G/DL (ref 13.5–17.5)
KETONES UR STRIP.AUTO-MCNC: NEGATIVE MG/DL
LDLC SERPL CALC-MCNC: 73 MG/DL
LEUKOCYTE ESTERASE UR QL STRIP.AUTO: NEGATIVE
MCH RBC QN AUTO: 29.9 PG (ref 26–34)
MCHC RBC AUTO-ENTMCNC: 31.7 G/DL (ref 32–36)
MCV RBC AUTO: 95 FL (ref 80–100)
NITRITE UR QL STRIP.AUTO: NEGATIVE
NON HDL CHOLESTEROL: 104 MG/DL (ref 0–149)
NRBC BLD-RTO: 0 /100 WBCS (ref 0–0)
PH UR STRIP.AUTO: 5 [PH]
PLATELET # BLD AUTO: 206 X10*3/UL (ref 150–450)
POTASSIUM SERPL-SCNC: 4.5 MMOL/L (ref 3.5–5.3)
PROT SERPL-MCNC: 7 G/DL (ref 6.4–8.2)
PROT UR STRIP.AUTO-MCNC: ABNORMAL MG/DL
PSA SERPL-MCNC: 1.2 NG/ML
RBC # BLD AUTO: 4.21 X10*6/UL (ref 4.5–5.9)
RBC # UR STRIP.AUTO: NEGATIVE /UL
RBC #/AREA URNS AUTO: NORMAL /HPF
SODIUM SERPL-SCNC: 134 MMOL/L (ref 136–145)
SP GR UR STRIP.AUTO: 1.03
TESTOST SERPL-MCNC: 385 NG/DL (ref 240–1000)
TRIGL SERPL-MCNC: 153 MG/DL (ref 0–149)
TSH SERPL-ACNC: 2.41 MIU/L (ref 0.44–3.98)
UROBILINOGEN UR STRIP.AUTO-MCNC: <2 MG/DL
VLDL: 31 MG/DL (ref 0–40)
WBC # BLD AUTO: 6.4 X10*3/UL (ref 4.4–11.3)
WBC #/AREA URNS AUTO: NORMAL /HPF

## 2024-04-03 PROCEDURE — 84153 ASSAY OF PSA TOTAL: CPT

## 2024-04-03 PROCEDURE — 82306 VITAMIN D 25 HYDROXY: CPT

## 2024-04-03 PROCEDURE — 80061 LIPID PANEL: CPT

## 2024-04-03 PROCEDURE — 1090000001 HH PPS REVENUE CREDIT

## 2024-04-03 PROCEDURE — 81001 URINALYSIS AUTO W/SCOPE: CPT

## 2024-04-03 PROCEDURE — 1090000002 HH PPS REVENUE DEBIT

## 2024-04-03 PROCEDURE — 84443 ASSAY THYROID STIM HORMONE: CPT

## 2024-04-03 PROCEDURE — 80053 COMPREHEN METABOLIC PANEL: CPT

## 2024-04-03 PROCEDURE — 36415 COLL VENOUS BLD VENIPUNCTURE: CPT

## 2024-04-03 PROCEDURE — 85027 COMPLETE CBC AUTOMATED: CPT

## 2024-04-03 PROCEDURE — 84403 ASSAY OF TOTAL TESTOSTERONE: CPT

## 2024-04-03 PROCEDURE — 83036 HEMOGLOBIN GLYCOSYLATED A1C: CPT

## 2024-04-04 PROCEDURE — 1090000002 HH PPS REVENUE DEBIT

## 2024-04-04 PROCEDURE — 1090000001 HH PPS REVENUE CREDIT

## 2024-04-05 ENCOUNTER — HOME CARE VISIT (OUTPATIENT)
Dept: HOME HEALTH SERVICES | Facility: HOME HEALTH | Age: 79
End: 2024-04-05
Payer: MEDICARE

## 2024-04-05 PROCEDURE — 1090000002 HH PPS REVENUE DEBIT

## 2024-04-05 PROCEDURE — 1090000001 HH PPS REVENUE CREDIT

## 2024-04-05 PROCEDURE — G0153 HHCP-SVS OF S/L PATH,EA 15MN: HCPCS | Mod: HHH

## 2024-04-05 ASSESSMENT — PAIN SCALES - PAIN ASSESSMENT IN ADVANCED DEMENTIA (PAINAD)
CONSOLABILITY: 0 - NO NEED TO CONSOLE.
FACIALEXPRESSION: 0 - SMILING OR INEXPRESSIVE.
TOTALSCORE: 0
NEGVOCALIZATION: 0 - NONE.
NEGVOCALIZATION: 0
CONSOLABILITY: 0
FACIALEXPRESSION: 0
BODYLANGUAGE: 0
BODYLANGUAGE: 0 - RELAXED.
BREATHING: 0

## 2024-04-05 ASSESSMENT — ENCOUNTER SYMPTOMS
PERSON REPORTING PAIN: PATIENT
DENIES PAIN: 1

## 2024-04-06 PROCEDURE — 1090000002 HH PPS REVENUE DEBIT

## 2024-04-06 PROCEDURE — 1090000001 HH PPS REVENUE CREDIT

## 2024-04-07 PROCEDURE — 1090000002 HH PPS REVENUE DEBIT

## 2024-04-07 PROCEDURE — 1090000001 HH PPS REVENUE CREDIT

## 2024-04-08 ENCOUNTER — HOME CARE VISIT (OUTPATIENT)
Dept: HOME HEALTH SERVICES | Facility: HOME HEALTH | Age: 79
End: 2024-04-08
Payer: MEDICARE

## 2024-04-08 PROCEDURE — 1090000001 HH PPS REVENUE CREDIT

## 2024-04-08 PROCEDURE — G0153 HHCP-SVS OF S/L PATH,EA 15MN: HCPCS | Mod: HHH

## 2024-04-08 PROCEDURE — 1090000002 HH PPS REVENUE DEBIT

## 2024-04-08 ASSESSMENT — PAIN SCALES - PAIN ASSESSMENT IN ADVANCED DEMENTIA (PAINAD)
NEGVOCALIZATION: 0
CONSOLABILITY: 0
BREATHING: 0
FACIALEXPRESSION: 0 - SMILING OR INEXPRESSIVE.
NEGVOCALIZATION: 0 - NONE.
BODYLANGUAGE: 0 - RELAXED.
BODYLANGUAGE: 0
FACIALEXPRESSION: 0
TOTALSCORE: 0
CONSOLABILITY: 0 - NO NEED TO CONSOLE.

## 2024-04-08 ASSESSMENT — ENCOUNTER SYMPTOMS
PERSON REPORTING PAIN: PATIENT
DENIES PAIN: 1

## 2024-04-09 PROCEDURE — 1090000002 HH PPS REVENUE DEBIT

## 2024-04-09 PROCEDURE — 1090000001 HH PPS REVENUE CREDIT

## 2024-04-10 ENCOUNTER — OFFICE VISIT (OUTPATIENT)
Dept: PRIMARY CARE | Facility: CLINIC | Age: 79
End: 2024-04-10
Payer: MEDICARE

## 2024-04-10 VITALS
OXYGEN SATURATION: 94 % | DIASTOLIC BLOOD PRESSURE: 63 MMHG | WEIGHT: 187 LBS | BODY MASS INDEX: 26.18 KG/M2 | HEART RATE: 90 BPM | HEIGHT: 71 IN | SYSTOLIC BLOOD PRESSURE: 131 MMHG

## 2024-04-10 DIAGNOSIS — I10 HYPERTENSION, UNSPECIFIED TYPE: ICD-10-CM

## 2024-04-10 DIAGNOSIS — N18.30 CONTROLLED TYPE 2 DIABETES MELLITUS WITH STAGE 3 CHRONIC KIDNEY DISEASE, WITHOUT LONG-TERM CURRENT USE OF INSULIN (MULTI): ICD-10-CM

## 2024-04-10 DIAGNOSIS — E78.5 HYPERLIPIDEMIA, UNSPECIFIED HYPERLIPIDEMIA TYPE: ICD-10-CM

## 2024-04-10 DIAGNOSIS — Z00.00 MEDICARE ANNUAL WELLNESS VISIT, SUBSEQUENT: ICD-10-CM

## 2024-04-10 DIAGNOSIS — E11.49 DIABETES MELLITUS TYPE 2 WITH NEUROLOGICAL MANIFESTATIONS (MULTI): ICD-10-CM

## 2024-04-10 DIAGNOSIS — R39.198 SLOWING OF URINARY STREAM: ICD-10-CM

## 2024-04-10 DIAGNOSIS — K21.00 GASTROESOPHAGEAL REFLUX DISEASE WITH ESOPHAGITIS WITHOUT HEMORRHAGE: ICD-10-CM

## 2024-04-10 DIAGNOSIS — E55.9 VITAMIN D DEFICIENCY: ICD-10-CM

## 2024-04-10 DIAGNOSIS — R25.1 TREMOR: ICD-10-CM

## 2024-04-10 DIAGNOSIS — N40.1 BENIGN PROSTATIC HYPERPLASIA WITH URINARY FREQUENCY: ICD-10-CM

## 2024-04-10 DIAGNOSIS — E29.1 HYPOGONADISM MALE: Primary | ICD-10-CM

## 2024-04-10 DIAGNOSIS — E11.22 CONTROLLED TYPE 2 DIABETES MELLITUS WITH STAGE 3 CHRONIC KIDNEY DISEASE, WITHOUT LONG-TERM CURRENT USE OF INSULIN (MULTI): ICD-10-CM

## 2024-04-10 DIAGNOSIS — R63.8 DECREASED ORAL INTAKE: ICD-10-CM

## 2024-04-10 DIAGNOSIS — R13.10 DYSPHAGIA, UNSPECIFIED TYPE: ICD-10-CM

## 2024-04-10 DIAGNOSIS — R35.0 BENIGN PROSTATIC HYPERPLASIA WITH URINARY FREQUENCY: ICD-10-CM

## 2024-04-10 PROCEDURE — G0446 INTENS BEHAVE THER CARDIO DX: HCPCS | Performed by: FAMILY MEDICINE

## 2024-04-10 PROCEDURE — 1170F FXNL STATUS ASSESSED: CPT | Performed by: FAMILY MEDICINE

## 2024-04-10 PROCEDURE — 99497 ADVNCD CARE PLAN 30 MIN: CPT | Performed by: FAMILY MEDICINE

## 2024-04-10 PROCEDURE — 1090000002 HH PPS REVENUE DEBIT

## 2024-04-10 PROCEDURE — 1090000001 HH PPS REVENUE CREDIT

## 2024-04-10 PROCEDURE — 1157F ADVNC CARE PLAN IN RCRD: CPT | Performed by: FAMILY MEDICINE

## 2024-04-10 PROCEDURE — 3078F DIAST BP <80 MM HG: CPT | Performed by: FAMILY MEDICINE

## 2024-04-10 PROCEDURE — 1123F ACP DISCUSS/DSCN MKR DOCD: CPT | Performed by: FAMILY MEDICINE

## 2024-04-10 PROCEDURE — 1160F RVW MEDS BY RX/DR IN RCRD: CPT | Performed by: FAMILY MEDICINE

## 2024-04-10 PROCEDURE — 1158F ADVNC CARE PLAN TLK DOCD: CPT | Performed by: FAMILY MEDICINE

## 2024-04-10 PROCEDURE — 3075F SYST BP GE 130 - 139MM HG: CPT | Performed by: FAMILY MEDICINE

## 2024-04-10 PROCEDURE — G0439 PPPS, SUBSEQ VISIT: HCPCS | Performed by: FAMILY MEDICINE

## 2024-04-10 PROCEDURE — 1036F TOBACCO NON-USER: CPT | Performed by: FAMILY MEDICINE

## 2024-04-10 PROCEDURE — 99215 OFFICE O/P EST HI 40 MIN: CPT | Performed by: FAMILY MEDICINE

## 2024-04-10 PROCEDURE — 1159F MED LIST DOCD IN RCRD: CPT | Performed by: FAMILY MEDICINE

## 2024-04-10 RX ORDER — PIOGLITAZONEHYDROCHLORIDE 45 MG/1
45 TABLET ORAL DAILY
Qty: 90 TABLET | Refills: 2 | Status: SHIPPED | OUTPATIENT
Start: 2024-04-10

## 2024-04-10 RX ORDER — GLYBURIDE 5 MG/1
10 TABLET ORAL
Qty: 360 TABLET | Refills: 2 | Status: SHIPPED | OUTPATIENT
Start: 2024-04-10

## 2024-04-10 RX ORDER — FAMOTIDINE 20 MG/1
TABLET, FILM COATED ORAL
Qty: 90 TABLET | Refills: 2 | Status: SHIPPED | OUTPATIENT
Start: 2024-04-10

## 2024-04-10 RX ORDER — SIMVASTATIN 40 MG/1
40 TABLET, FILM COATED ORAL NIGHTLY
Qty: 90 TABLET | Refills: 2 | Status: SHIPPED | OUTPATIENT
Start: 2024-04-10 | End: 2024-05-14

## 2024-04-10 RX ORDER — PRIMIDONE 50 MG/1
50 TABLET ORAL 2 TIMES DAILY
Qty: 60 TABLET | Refills: 5 | Status: SHIPPED | OUTPATIENT
Start: 2024-04-10 | End: 2024-05-14 | Stop reason: SDUPTHER

## 2024-04-10 RX ORDER — MIRTAZAPINE 15 MG/1
15 TABLET, FILM COATED ORAL NIGHTLY
Qty: 90 TABLET | Refills: 2 | Status: SHIPPED | OUTPATIENT
Start: 2024-04-10 | End: 2024-05-14

## 2024-04-10 RX ORDER — ALFUZOSIN HYDROCHLORIDE 10 MG/1
10 TABLET, EXTENDED RELEASE ORAL DAILY
Qty: 90 TABLET | Refills: 2 | Status: SHIPPED | OUTPATIENT
Start: 2024-04-10 | End: 2025-01-05

## 2024-04-10 NOTE — PROGRESS NOTES
General Medical Management Note and annual Medicare wellness visit    78 y.o. male presents for Medical Management  Wife present for today's visit and provides significant history  HPI    Dysphagia: PEG tube in place.    - Aspiration on Mod Ba Swallow test on 4/2/24.  Now with [facility] soft foods and nectar thick fluids.  In speech therapy.  Swallowing is improving but unable to consume enough calories orally.    PEG tube feedings TID (4 containers/day).  Working with speech therapy.    Weight loss 15# since Dec 2023  Chemotherapy for oral pharyngeal cancer.  PET scan in May.  Modified barium swallow also showed gastroesophageal reflux.  Recommended treatment consideration.    DM2 - worsened control past 3 months - ever since PEG feeding    Has lost appetite.  Stopped Remeron but doesn't know why.  Will resume.    Bilateral upper extremities with Tremors began during radiation/chemotherapy, but worsening for past 2 wks.  Lessening     Appointment with ENT, Dr. Garcia.      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    Insomnia 02/19/2024    Leukopenia 03/05/2024    Localized, primary osteoarthritis 09/06/2018    Low back pain, unspecified 09/25/2017    Acute low back pain    Malignant melanoma of skin of trunk, except scrotum (CMS/HCC) 07/23/2009    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)     Mass of neck 03/05/2024    Open wound 03/05/2024    Oropharyngeal dysphagia 03/05/2024    Personal history of other diseases of the circulatory system     Personal history of supraventricular tachycardia     Personal history of other diseases of urinary system 12/31/2022    History of hematuria    Personal history of other endocrine, nutritional and metabolic disease     History of diabetes mellitus    Polyneuropathy 04/05/2010    Presence of right artificial knee joint 01/12/2018    Sensorineural hearing loss, bilateral 07/23/2018    Tongue mass 10/05/2023    Urinary tract infection 03/05/2024      Past Surgical History:   Procedure Laterality Date    CATARACT EXTRACTION  04/07/2017    Cataract Surgery    HAND SURGERY  04/07/2017    Hand Surgery                                                                                                                                                          OTHER SURGICAL HISTORY  04/10/2014    Catheter Ablation Atrial Supraventricular Tachycardia    SHOULDER SURGERY  04/07/2017    Shoulder Surgery    TOTAL HIP ARTHROPLASTY  04/10/2014    Hip Replacement    TOTAL KNEE ARTHROPLASTY  04/10/2014    Knee Replacement     Family History   Problem Relation Name Age of Onset    Other (heart failure following cardiac surgery) Mother        Social History     Socioeconomic History    Marital status:      Spouse name: Not on file    Number of children: Not on file    Years of education: Not on file    Highest education level: Not on file   Occupational History    Not on file   Tobacco Use    Smoking status: Former     Types: Cigarettes     Passive exposure: Never    Smokeless tobacco: Never   Vaping Use    Vaping status: Never Used   Substance and Sexual Activity    Alcohol use: Yes     Alcohol/week: 2.0 standard drinks of alcohol     Types: 2 Cans of beer per week    Drug use: Never    Sexual activity: Defer   Other Topics Concern    Not on file   Social History Narrative    Not on file     Social Determinants of Health     Financial Resource Strain: Not on file   Food Insecurity: Not on file   Transportation Needs: No Transportation Needs (12/19/2023)    OASIS : Transportation      Lack of Transportation (Medical): No     Lack of Transportation (Non-Medical): No     Patient Unable or Declines to Respond: No   Physical Activity: Not on file   Stress: Not on file   Social Connections: Feeling Socially Integrated (12/19/2023)    OASIS : Social Isolation     Frequency of experiencing loneliness or isolation: Never   Intimate Partner Violence: Not on file   Housing Stability: Not on file       Current Outpatient Medications on File Prior to Visit   Medication Sig Dispense Refill    aspirin 81 mg EC tablet Take 1 tablet (81 mg) by mouth once daily.      glyBURIDE (Diabeta) 5 mg tablet Take 2 tablets (10 mg) by mouth 2 times a day with meals. 360 tablet 2    LORazepam (Ativan) 1 mg tablet Take 1 tablet (1 mg) by mouth once daily. Prior to each radiation treatment      magic mouthwash (lidocaine, diphenhydrAMINE, Maalox 1:1:1) Swish and spit 10 mL every 6 hours if needed for mucositis for up to 60 doses. 4000 mL 0    metFORMIN 500 mg/5 mL suspension,extended rel recon Take 1,000 mg by mouth 2 times a day. 600 mL 2    neomycin-polymyxin B-dexameth (Polydex) 3.5 mg/g-10,000 unit/g-0.1 % ointment ophthalmic ointment 2 times a day.      neomycin-polymyxin-dexAMETHasone (Maxitrol) 3.5mg/mL-10,000 unit/mL-0.1 % ophthalmic suspension 2 times a day.      ondansetron (Zofran) 8 mg tablet Take 1 tablet (8 mg) by mouth every 8 hours if needed for nausea or vomiting. 30 tablet 5    pioglitazone (Actos) 45 mg tablet Take 1 tablet (45 mg) by mouth once daily. 90 tablet 2    PreviDent 5000 Booster Plus 1.1 % dental paste Brush at bedtime and expectorate do not rinse      simvastatin (Zocor) 40 mg tablet Take 1 tablet (40 mg) by mouth once daily at bedtime. 90 tablet 2    guaiFENesin (Robitussin) 100 mg/5 mL syrup 20 mL by g-tube route 4 times a day as needed for cough.      losartan (Cozaar) 100 mg tablet Take 1 tablet (100 mg) by mouth once daily. (Patient not taking: Reported on 1/22/2024) 90 tablet 2     "metFORMIN  mg 24 hr tablet Take 2 tablets (1,000 mg) by mouth once daily in the evening. Take with meals. Do not crush, chew, or split. (Patient not taking: Reported on 3/6/2024) 180 tablet 2    mirtazapine (Remeron) 15 mg tablet Take 1 tablet (15 mg) by mouth once daily at bedtime. (Patient not taking: Reported on 3/6/2024) 30 tablet 11    oxyCODONE (Roxicodone) 5 mg/5 mL solution Take 5 mL (5 mg) by mouth every 4 hours if needed for severe pain (7 - 10). May give through PEG tube if difficulty swallowing (Patient not taking: Reported on 3/6/2024) 180 mL 0    prochlorperazine (Compazine) 10 mg tablet Take 1 tablet (10 mg) by mouth every 6 hours if needed for nausea or vomiting. 30 tablet 5    tamsulosin (Flomax) 0.4 mg 24 hr capsule Take 1 capsule (0.4 mg) by mouth once daily. (Patient not taking: Reported on 1/22/2024) 90 capsule 2     No current facility-administered medications on file prior to visit.       Allergies   Allergen Reactions    Cat Dander Unknown    Canagliflozin Rash         ROS: Denies chest pain, SOB, Headache, GI problems     Visit Vitals  /63   Pulse 90   Ht 1.803 m (5' 11\")   Wt 84.8 kg (187 lb)   SpO2 94%   BMI 26.08 kg/m²   Smoking Status Former   BSA 2.06 m²        PHYSICAL EXAM:  Alert and oriented x3.  Eyes: EOM grossly intact  Neck supple without lymph adenopathy or carotid bruit.  No masses or thyromegaly  Heart regular rate and rhythm without murmur.  Lungs clear to auscultation.  Legs without edema.  Gait is non-antalgic  Speech clear.  Hearing adequate.      Reviewed lab, modified barium swallow and recent radiology reports with patient and wife.  The 10-year ASCVD risk score (Doris SEARS, et al., 2019) is: 56.9%    Values used to calculate the score:      Age: 78 years      Sex: Male      Is Non- : No      Diabetic: Yes      Tobacco smoker: No      Systolic Blood Pressure: 131 mmHg      Is BP treated: Yes      HDL Cholesterol: 47 mg/dL      Total " Cholesterol: 151 mg/dL    DIAGNOSIS/PLAN:    1. Hypogonadism male  - CBC; Future  - Testosterone; Future  - TSH with reflex to Free T4 if abnormal; Future    2. Dysphagia, unspecified type.  Due to chemotherapy and radiation therapy for oropharyngeal cancer  Continue PEG tube feeding and speech therapy.    3. Tremor  -Undetermined etiology.  Patient willing to try Mysoline.  Will begin with 1 tablet daily and may increase to 2 tablets daily if needed to control tremors.  Patient will discuss tremors with radiation oncology.  If symptoms are worsening, will refer to neurology for assessment.  - primidone (Mysoline) 50 mg tablet; Take 1 tablet (50 mg) by mouth 2 times a day.  Dispense: 60 tablet; Refill: 5    4. Gastroesophageal reflux disease with esophagitis without hemorrhage  - famotidine (Pepcid) 20 mg tablet; 1 po in AM for acid reflux  Dispense: 90 tablet; Refill: 2    5. Controlled type 2 diabetes mellitus with stage 3 chronic kidney disease, without long-term current use of insulin (CMS/[facility])  -Encouraged increased frequency of exercise.  Control worsened likely due to change in diet from oral to tube feed.  - Hemoglobin A1C; Future  - glyBURIDE (Diabeta) 5 mg tablet; Take 2 tablets (10 mg) by mouth 2 times a day with meals.  Dispense: 360 tablet; Refill: 2  - metFORMIN 500 mg/5 mL suspension,extended rel recon; Take 1,000 mg by mouth 2 times a day.  Dispense: 600 mL; Refill: 2  - pioglitazone (Actos) 45 mg tablet; Take 1 tablet (45 mg) by mouth once daily.  Dispense: 90 tablet; Refill: 2    6. Hypertension, unspecified type  - CBC; Future  - Comprehensive Metabolic Panel; Future  - Lipid Panel; Future  - TSH with reflex to Free T4 if abnormal; Future    7. Hyperlipidemia, unspecified hyperlipidemia type  - CBC; Future  - Comprehensive Metabolic Panel; Future  - Lipid Panel; Future  - TSH with reflex to Free T4 if abnormal; Future  - simvastatin (Zocor) 40 mg tablet; Take 1 tablet (40 mg) by mouth once daily  at bedtime.  Dispense: 90 tablet; Refill: 2    8. Slowing of urinary stream  - Prostate Specific Antigen; Future  - Urinalysis with Reflex Microscopic; Future    10. Vitamin D deficiency  - CBC; Future  - Vitamin D 25-Hydroxy,Total (for eval of Vitamin D levels); Future  - TSH with reflex to Free T4 if abnormal; Future    12. Medicare annual wellness visit, subsequent  Living Will / Advanced Care Planning:  Discussed advance care planning including explanation and discussion of advanced directives.  Patient does have current up-to-date documents.      13. Diabetes mellitus type 2 with neurological manifestations (CMS/[facility])    14. Benign prostatic hyperplasia with lower urinary tract symptoms  - alfuzosin (UroxatraL) 10 mg 24 hr tablet; Take 1 tablet (10 mg) by mouth once daily. Do not crush, chew, or split.  Dispense: 90 tablet; Refill: 2  - -Increased frequency.  Reviewed use of Uroxatrol.  Patient will use for 3 months to determine effectiveness.  Consider Myrbetriq in the future.  - Prostate Specific Antigen; Future  - Urinalysis with Reflex Microscopic; Future  15. Decreased oral intake  -Continue with speech therapy.  Goal is to decrease and eliminate need for PEG tube.  - mirtazapine (Remeron) 15 mg tablet; Take 1 tablet (15 mg) by mouth once daily at bedtime.  Dispense: 90 tablet; Refill: 2    Prolonged visit due to multiple medical issues.  New medications added.  Remeron will be resumed to improve appetite.          Return to office in 6 months for comprehensive medical evaluation, long-term medication use monitoring, and preventative services screening    We will continue to monitor, evaluate, assess and treat all problems/diagnoses as appropriate and continue to collaborate with specialists.    Encouraged to sign up with [facility]    Contact office or send a  C[facility]by message with any questions or concerns    Patient will only be notified of labs that require medical intervention.    Prescriptions will not  "be filled unless you are compliant with your follow up appointments or have a follow up appointment scheduled as per instruction of your physician. Refills should be requested at the time of your visit.    **Charting was completed using voice recognition technology and may include unintended errors**    Kd Lange DO, DWAYNE  [id number]  [place] [address]  [phone]      Kd Lange DO, DWAYNE    Subjective   Reason for Visit: Kevin Mendes is an 78 y.o. male here for a Medicare Wellness visit.          Reviewed all medications by prescribing practitioner or clinical pharmacist (such as prescriptions, OTCs, herbal therapies and supplements) and documented in the medical record.    HPI    Patient Care Team:  Kd Lange DO as PCP - General  Kd Lange DO as PCP - [facility] Attributed Provider  Kyunghee Burkitt, DO as Consulting Physician (Hematology and Oncology)  Tsering Jade MD as Consulting Physician (Hematology and Oncology)  Sonya Pike RDN, JASVIR as Dietitian (Nutrition)  KERRY Mendoza as  ()     Review of Systems    Objective   Vitals:  /63   Pulse 90   Ht 1.803 m (5' 11\")   Wt 84.8 kg (187 lb)   SpO2 94%   BMI 26.08 kg/m²       Physical Exam    Assessment/Plan   Problem List Items Addressed This Visit       Hyperlipidemia    Relevant Orders    CBC (Completed)    Comprehensive Metabolic Panel (Completed)    Lipid Panel (Completed)    TSH with reflex to Free T4 if abnormal (Completed)    Hypertension    Relevant Orders    CBC (Completed)    Comprehensive Metabolic Panel (Completed)    Lipid Panel (Completed)    TSH with reflex to Free T4 if abnormal (Completed)     Other Visit Diagnoses       Routine general medical examination at health care facility    -  Primary    Hypogonadism male        Relevant Orders    CBC (Completed)    Testosterone (Completed)    TSH with reflex to Free T4 if abnormal (Completed)    Slowing of urinary stream        " Relevant Orders    Prostate Specific Antigen (Completed)    Urinalysis with Reflex Microscopic (Completed)    Benign prostatic hyperplasia, unspecified whether lower urinary tract symptoms present        Relevant Orders    Prostate Specific Antigen (Completed)    Urinalysis with Reflex Microscopic (Completed)    Vitamin D deficiency        Relevant Orders    CBC (Completed)    Vitamin D 25-Hydroxy,Total (for eval of Vitamin D levels) (Completed)    TSH with reflex to Free T4 if abnormal (Completed)    Type 2 diabetes mellitus without complication, unspecified whether long term insulin use (CMS/[facility])        Relevant Orders    CBC (Completed)    Comprehensive Metabolic Panel (Completed)    Lipid Panel (Completed)    TSH with reflex to Free T4 if abnormal (Completed)    Hemoglobin A1C (Completed)

## 2024-04-10 NOTE — PATIENT INSTRUCTIONS
Remeron (mirtazapine) 15mg at  bedtime:  to increase appetite.  Take late in the evening because it can be sedating    Start primodone for tremor.  Once daily, can increase twice daily if needed to control tremor    Uroxatrol for enlarged prostate - can take any time of day.  Will take 2-3 wks to start working.  Should help bladder empty more completely so you urinate less often

## 2024-04-11 ENCOUNTER — TELEPHONE (OUTPATIENT)
Dept: PRIMARY CARE | Facility: CLINIC | Age: 79
End: 2024-04-11
Payer: MEDICARE

## 2024-04-11 ENCOUNTER — HOME CARE VISIT (OUTPATIENT)
Dept: HOME HEALTH SERVICES | Facility: HOME HEALTH | Age: 79
End: 2024-04-11
Payer: MEDICARE

## 2024-04-11 VITALS — OXYGEN SATURATION: 95 % | HEART RATE: 86 BPM

## 2024-04-11 PROCEDURE — 1090000001 HH PPS REVENUE CREDIT

## 2024-04-11 PROCEDURE — G0153 HHCP-SVS OF S/L PATH,EA 15MN: HCPCS | Mod: HHH

## 2024-04-11 PROCEDURE — 1090000002 HH PPS REVENUE DEBIT

## 2024-04-11 ASSESSMENT — ACTIVITIES OF DAILY LIVING (ADL)
BATHING: INDEPENDENT
MANAGING_FINANCES: INDEPENDENT
GROCERY_SHOPPING: INDEPENDENT
DRESSING: INDEPENDENT
DOING_HOUSEWORK: INDEPENDENT
TAKING_MEDICATION: INDEPENDENT

## 2024-04-11 ASSESSMENT — PATIENT HEALTH QUESTIONNAIRE - PHQ9
SUM OF ALL RESPONSES TO PHQ9 QUESTIONS 1 AND 2: 0
2. FEELING DOWN, DEPRESSED OR HOPELESS: NOT AT ALL
1. LITTLE INTEREST OR PLEASURE IN DOING THINGS: NOT AT ALL

## 2024-04-11 ASSESSMENT — ENCOUNTER SYMPTOMS
PERSON REPORTING PAIN: PATIENT
DENIES PAIN: 1

## 2024-04-11 ASSESSMENT — PAIN SCALES - PAIN ASSESSMENT IN ADVANCED DEMENTIA (PAINAD)
FACIALEXPRESSION: 0
NEGVOCALIZATION: 0 - NONE.
BODYLANGUAGE: 0
FACIALEXPRESSION: 0 - SMILING OR INEXPRESSIVE.
CONSOLABILITY: 0
CONSOLABILITY: 0 - NO NEED TO CONSOLE.
BREATHING: 0
NEGVOCALIZATION: 0
TOTALSCORE: 0
BODYLANGUAGE: 0 - RELAXED.

## 2024-04-11 NOTE — TELEPHONE ENCOUNTER
Pharmacy called as well. They said an entirely new medication needs to be sent. They did not specify an alternative

## 2024-04-11 NOTE — TELEPHONE ENCOUNTER
"You prescribed Alfuzosin for patient yesterday. Patient has a feeding tube and cannot swallow pills. It states on the bottle \"do not crush, split, or chew\" and patients wife is confused. Please advise patients wife  "

## 2024-04-12 ENCOUNTER — TELEPHONE (OUTPATIENT)
Dept: PRIMARY CARE | Facility: CLINIC | Age: 79
End: 2024-04-12
Payer: MEDICARE

## 2024-04-12 ENCOUNTER — NUTRITION (OUTPATIENT)
Dept: HEMATOLOGY/ONCOLOGY | Facility: CLINIC | Age: 79
End: 2024-04-12
Payer: MEDICARE

## 2024-04-12 VITALS — WEIGHT: 186.95 LBS | BODY MASS INDEX: 26.76 KG/M2 | HEIGHT: 70 IN

## 2024-04-12 DIAGNOSIS — E11.22 CONTROLLED TYPE 2 DIABETES MELLITUS WITH STAGE 3 CHRONIC KIDNEY DISEASE, WITHOUT LONG-TERM CURRENT USE OF INSULIN (MULTI): ICD-10-CM

## 2024-04-12 DIAGNOSIS — N18.30 CONTROLLED TYPE 2 DIABETES MELLITUS WITH STAGE 3 CHRONIC KIDNEY DISEASE, WITHOUT LONG-TERM CURRENT USE OF INSULIN (MULTI): ICD-10-CM

## 2024-04-12 PROCEDURE — 1090000001 HH PPS REVENUE CREDIT

## 2024-04-12 PROCEDURE — 1090000002 HH PPS REVENUE DEBIT

## 2024-04-12 NOTE — TELEPHONE ENCOUNTER
Pt's wife is calling stating that pt's physical therapist called stating that pt was suppose to be on a diabetic formula and that you stated that he should be but isn't currently on a diabetic formula. Pt's wife is asking if you can call Sonya Pike the pt's Nutritionist at [facility] who prescribes his formula to see why he is not on a diabetic formula and to discuss how to order this for the patient? Sonya Pike's phone number is [phone]. Pt stated that he is currently taking [employer] formula right now and she has a bunch of cases that she has opened and wouldn't be able to return since the boxes have been opened already due to her bad shoulder. Pt is asking if someone can let them know what to do with these and if someone can update her once this has been handled?

## 2024-04-12 NOTE — TELEPHONE ENCOUNTER
Mrs. Mendes would like you to call her before and decisions are made about the formula Mr. Mendes is drinking. Her number is [phone]  Thank-you    JW spoke with Mrs. Mendes and conversed with the ST.  Patient needing extra calories.  Dietician doesn't think he can get it with a diabetic formula.    - plan:  continue same DM meds including liquid metformin, glyburide and Actos.  Recheck A1C in 3 months.  If remains >7%, will likely need to add insulin.  - hopefully patient will be back on regular po diet at that time.    HTN: told by [facility] staff not to take Cozaar because it should not be crushed, but did not make arrangements for substitute, so patient has not been taking a BP med.  - today's home bp with wrist cuff is 138/78  - confirmed with pharmacist at [facility] Cozaar can be crushed.  - spoke with patient's wife again and told to restart Cozaar

## 2024-04-12 NOTE — PROGRESS NOTES
"NUTRITION COMMUNICATION NOTE    Kevin Mendes \"Pat\"     REASON FOR COMMUNICATION:   Received phone call today from Katherine's wife, Mariana.  Followed with patient throughout his BOT cancer chemo/Rt treatments.  Katherine has been on tubefeeding for sole source of nutrition since his cancer treatments.  He is currently working with SLP to eat orally again.      He has been doing 4 cartons [employer] Standard 1.4 bolus feeds since mid treatments.  He had been only willing to do 3 bolus feeds throughout, it took some time to convince patient to get up to 4 cartons per day; he does 1 1/3 (434 ml) cartons 3 times per day. Mariana reports she does 2 cups of water after each bolus feed (total of 6 cups per day), plus some extra flushes of 60ml throughout the day.  His goal TF had actually been 5 cartons per day based on his estimated nutritional needs, but patient had refused to get up to 5, and with his weight being stable during treatments he remained to do 4.     Anthropometrics:  Anthropometrics  Height: 178 cm (5' 10.08\") (height from [facility])  Weight: 84.8 kg (186 lb 15.2 oz)  BMI (Calculated): 26.76  IBW/kg (Dietitian Calculated): 75.45 kg  Percent of IBW: 112 %  Weight Change  Weight History / % Weight Change: 8 lb weight loss (4%) in the last 3 months     Energy Needs  Calculated Energy Needs Using Equations  Height: 178 cm (5' 10.08\") (height from [facility])  Weight Used for Equation Calculations: 84.8 kg (186 lb 15.2 oz)  Erlin- St. Schneider Equation (Overweight or Obese Patients): 1571  Activity Factor: 1.2  Total Energy Needs: 1800  Estimated Fluid Needs  Total Fluid Estimated Needs (mL): 2100 mL  Total Fluid Estimated Needs (mL/kg): 25 mL/kg  Estimated Protein Needs  Total Protein Estimated Needs (g): 102 g  Total Protein Estimated Needs (g/kg): 1.2 g/kg  Estimated Carbohydrate Needs  Total Carbohydrate Estimated Needs (g): 225 g  Method for Estimating Needs: 1800 calories/50% of calories from CHO/4g per calorie     She stated today " that patient had a follow visit with PCP, and it was noted his blood sugars have been elevated since treatments.  She stated he was relating it to the TF formula, and MD wanted patient on a diabetic formula.    Current TF formula [employer] Standard 1.4, 4 cartons per day is providin ml total volume  1820 calories  80 g protein   204 g CHO (20 grams fiber); each carton contains 51g CHO  924ml free water     [employer] Glucose Support:  Recommend patient would need 6 cartons per day to provide equivalent calories and protein to current product.  6 cartons per day would provide:  1500 ml total volume  1800 calories  96 g protein  156 g CHO (36 grams of fiber) ; each carton contains 26g CHO.  1140 ml free water    When comparing the two products; feel hesitant to change TF as he has been refusing to increase current TF passed 4 cartons per day.  If switch formula, pt would have to increase to 6 cartons per day to meet estimated calorie, and protein needs to prevent further weight loss, and per conversation with Mariana, he would not want to do that.  As well as if the end goal is for him to eat orally, I believe total volume of 6 cartons per day would prevent him from wanted to eat orally.      I discussed with Mariana my recommendations to spread current TF formula out into smaller bolus feeds, so therefore spreading CHO out throughout the day.  She was willing to try to do 4 bolus feeds per day and then do 1.5 cups of water after for water flushes.  I discussed if patient is able/safe, to increase activity as this aids in blood sugar control.  She also stated she just received this months supply for TF, and DME will not take it back.    DME: [facility]     Encourage Mariana to call if further questions arise.  Would recommend pt check his blood sugars at home: fasting, and 2 hours after at least 1 of his bolus feeds to get a better idea of his blood sugar control.

## 2024-04-13 PROCEDURE — 1090000001 HH PPS REVENUE CREDIT

## 2024-04-13 PROCEDURE — 1090000002 HH PPS REVENUE DEBIT

## 2024-04-14 PROCEDURE — 1090000002 HH PPS REVENUE DEBIT

## 2024-04-14 PROCEDURE — 1090000001 HH PPS REVENUE CREDIT

## 2024-04-15 ENCOUNTER — OFFICE VISIT (OUTPATIENT)
Dept: OTOLARYNGOLOGY | Facility: CLINIC | Age: 79
End: 2024-04-15
Payer: MEDICARE

## 2024-04-15 VITALS
WEIGHT: 191.2 LBS | HEIGHT: 70 IN | BODY MASS INDEX: 27.37 KG/M2 | DIASTOLIC BLOOD PRESSURE: 74 MMHG | SYSTOLIC BLOOD PRESSURE: 136 MMHG | TEMPERATURE: 98.2 F

## 2024-04-15 DIAGNOSIS — C01 MALIGNANT NEOPLASM OF BASE OF TONGUE (MULTI): ICD-10-CM

## 2024-04-15 PROCEDURE — 1090000001 HH PPS REVENUE CREDIT

## 2024-04-15 PROCEDURE — 1160F RVW MEDS BY RX/DR IN RCRD: CPT | Performed by: OTOLARYNGOLOGY

## 2024-04-15 PROCEDURE — 1157F ADVNC CARE PLAN IN RCRD: CPT | Performed by: OTOLARYNGOLOGY

## 2024-04-15 PROCEDURE — 3078F DIAST BP <80 MM HG: CPT | Performed by: OTOLARYNGOLOGY

## 2024-04-15 PROCEDURE — 1159F MED LIST DOCD IN RCRD: CPT | Performed by: OTOLARYNGOLOGY

## 2024-04-15 PROCEDURE — 1123F ACP DISCUSS/DSCN MKR DOCD: CPT | Performed by: OTOLARYNGOLOGY

## 2024-04-15 PROCEDURE — 3075F SYST BP GE 130 - 139MM HG: CPT | Performed by: OTOLARYNGOLOGY

## 2024-04-15 PROCEDURE — 1090000002 HH PPS REVENUE DEBIT

## 2024-04-15 PROCEDURE — 1036F TOBACCO NON-USER: CPT | Performed by: OTOLARYNGOLOGY

## 2024-04-15 PROCEDURE — 99213 OFFICE O/P EST LOW 20 MIN: CPT | Performed by: OTOLARYNGOLOGY

## 2024-04-15 PROCEDURE — 31575 DIAGNOSTIC LARYNGOSCOPY: CPT | Performed by: OTOLARYNGOLOGY

## 2024-04-15 RX ORDER — ESOMEPRAZOLE MAGNESIUM 40 MG/1
40 GRANULE, DELAYED RELEASE ORAL
Qty: 30 PACKET | Refills: 11 | Status: SHIPPED | OUTPATIENT
Start: 2024-04-15 | End: 2025-04-15

## 2024-04-15 NOTE — PROGRESS NOTES
ENT Outpatient Consultation    Chief Complaint: Base of tongue mass  History Of Present Illness  Katherine Mendes is a 78 y.o. male referred by Dr. Holly for evaluation of a base of tongue mass.  Patient states that he developed a globus type sensation a little over 1 month ago.  This prompted his evaluation with Dr. Holly who noted a oropharyngeal mass.  A CT scan was obtained showing an exophytic mass in the oropharynx that appears to be coming from the base of tongue.  There is some invasion into the deeper aspect of the base of tongue and the mass extends out laterally towards the carotid.  He has a history of smoking and a lung nodule was also noted on his CT scan.  He is still tolerating p.o. intake.    11/10/23: Patient returns for follow-up.  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  Final pathology is pending.  We also placed a PEG tube and he is here today to have it checked    2/13/24: Patient returns for follow-up.  He has completed treatment and overall is doing well.  He has had continued sore throat but it has been improving over the past few nights.  He has not taken any p.o. intake.  His CBC showed a drop in his cell counts and they would like to have it rechecked    4/15/24: Patient returns for follow-up.  Continues to have significant dysphagia.  He is working closely with speech.  He also has loss of appetite.  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), Insomnia (02/19/2024), Leukopenia (03/05/2024), Localized, primary  osteoarthritis (09/06/2018), Low back pain, unspecified (09/25/2017), Malignant melanoma of skin of trunk, except scrotum (Multi) (07/23/2009), Malignant neoplasm of base of tongue (Multi), Mass of neck (03/05/2024), Open wound (03/05/2024), Oropharyngeal dysphagia (03/05/2024), Personal history of other diseases of the circulatory system, Personal history of other diseases of urinary system (12/31/2022), Personal history of other endocrine, nutritional and metabolic disease, Polyneuropathy (04/05/2010), Presence of right artificial knee joint (01/12/2018), Sensorineural hearing loss, bilateral (07/23/2018), Tongue mass (10/05/2023), and Urinary tract infection (03/05/2024).    Surgical History  He has a past surgical history that includes Total hip arthroplasty (04/10/2014); Total knee arthroplasty (04/10/2014); Other surgical history (04/10/2014); Hand surgery (04/07/2017); Cataract extraction (04/07/2017); and Shoulder surgery (04/07/2017).     Social History  He reports that he has quit smoking. His smoking use included cigarettes. He has never been exposed to tobacco smoke. He has never used smokeless tobacco. He reports current alcohol use of about 2.0 standard drinks of alcohol per week. He reports that he does not currently use drugs.    Family History  Family History   Problem Relation Name Age of Onset    Other (heart failure following cardiac surgery) Mother          Allergies  Cat dander and Canagliflozin     Physical Exam:  CONSTITUTIONAL:  No acute distress  VOICE:  No hoarseness or other abnormality  RESPIRATION:  Breathing comfortably, no stridor  CV:  No clubbing/cyanosis/edema in hands  EYES:  EOM intact, sclera normal  NEURO:  Alert and oriented times 3, Cranial nerves II-XII grossly intact and symmetric bilaterally  HEAD AND FACE:  Symmetric facial features, no masses or lesions, sinuses non-tender to palpation  SALIVARY GLANDS:  Parotid and submandibular glands normal bilaterally  EARS:  Normal  external ears, external auditory canals, and TMs to otoscopy, normal hearing to whispered voice.  NOSE:  External nose midline, anterior rhinoscopy is normal with limited visualization to the anterior aspect of the interior turbinates, no bleeding or drainage, no lesions  ORAL CAVITY/OROPHARYNX/LIPS: I cannot visualize any mass in the oropharynx the remainder of the oral cavity has normal mucosa  PHARYNGEAL WALLS: No mass visualized  NECK/LYMPH: No palpable LAD, no thyroid masses, trachea midline.  There is a lipomatous lesion in the left inferior neck near the sternocleidomastoid  SKIN:  Neck skin is without scar or injury  PSYCH:  Alert and oriented with appropriate mood and affect     Procedure Note: Flexible Nasolaryngoscopy  Verbal informed consent was obtained from the patient/patient's guardian. 4% lidocaine mixed with phenylephrine was prepared and dripped into the nose. It was placed in the right naris. Following an appropriate amount of time to allow for adequate anesthesia, a flexible fiberoptic nasolaryngoscope was placed into the patient's right naris. The nasal cavity, nasopharynx, oropharynx, hypopharynx, and all endolaryngeal structures were visualized and were normal except as listed below. Significant findings included:  -No concerning mucosal lesions, mucosal burns have improved    Assessment and Plan  78 y.o. 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   SCC of base of tongue, p16 +, > 10 PPD smoking history, [id number] .  Completed treatment February 1, 2024      -Encouraged him to advance oral diet as tolerated, continue to work with speech  -Follow-up in approximately 3 months, earlier with any new concerns  -Posttreatment scans scheduled for early May  -Patient has had significant reflux and is unable to swallow pills.  Order was placed for packet that he can mix for his PEG tube    Kalin Garcia MD

## 2024-04-16 ENCOUNTER — HOME CARE VISIT (OUTPATIENT)
Dept: HOME HEALTH SERVICES | Facility: HOME HEALTH | Age: 79
End: 2024-04-16
Payer: MEDICARE

## 2024-04-16 VITALS — RESPIRATION RATE: 18 BRPM | TEMPERATURE: 97 F | HEART RATE: 81 BPM | OXYGEN SATURATION: 96 %

## 2024-04-16 PROCEDURE — G0153 HHCP-SVS OF S/L PATH,EA 15MN: HCPCS | Mod: HHH

## 2024-04-16 PROCEDURE — 1090000002 HH PPS REVENUE DEBIT

## 2024-04-16 PROCEDURE — 1090000001 HH PPS REVENUE CREDIT

## 2024-04-16 ASSESSMENT — ACTIVITIES OF DAILY LIVING (ADL)
DRESSING_LB_CURRENT_FUNCTION: INDEPENDENT
GROOMING_CURRENT_FUNCTION: INDEPENDENT
BATHING ASSESSED: 1
BATHING_CURRENT_FUNCTION: INDEPENDENT
TOILETING: 1
PREPARING MEALS: INDEPENDENT
ORAL_CARE_CURRENT_FUNCTION: INDEPENDENT
FEEDING: INDEPENDENT
TOILETING: INDEPENDENT
AMBULATION ASSISTANCE: INDEPENDENT
CURRENT_FUNCTION: INDEPENDENT
AMBULATION ASSISTANCE: 1
PHYSICAL TRANSFERS ASSESSED: 1
ORAL_CARE_ASSESSED: 1
DRESSING_UB_CURRENT_FUNCTION: INDEPENDENT
FEEDING ASSESSED: 1
GROOMING ASSESSED: 1
OASIS_M1830: 00

## 2024-04-16 ASSESSMENT — ENCOUNTER SYMPTOMS
PERSON REPORTING PAIN: PATIENT
OCCASIONAL FEELINGS OF UNSTEADINESS: 0
ANGER WITHIN DEFINED LIMITS: 1
DEPRESSION: 0
DENIES PAIN: 1
LOSS OF SENSATION IN FEET: 0
AGGRESSION WITHIN DEFINED LIMITS: 1

## 2024-04-16 ASSESSMENT — PAIN SCALES - PAIN ASSESSMENT IN ADVANCED DEMENTIA (PAINAD)
NEGVOCALIZATION: 0 - NONE.
NEGVOCALIZATION: 0
CONSOLABILITY: 0
TOTALSCORE: 0
BODYLANGUAGE: 0
BREATHING: 0
FACIALEXPRESSION: 0 - SMILING OR INEXPRESSIVE.
BODYLANGUAGE: 0 - RELAXED.
FACIALEXPRESSION: 0
CONSOLABILITY: 0 - NO NEED TO CONSOLE.

## 2024-04-17 ENCOUNTER — APPOINTMENT (OUTPATIENT)
Dept: RADIOLOGY | Facility: HOSPITAL | Age: 79
End: 2024-04-17
Payer: MEDICARE

## 2024-04-17 PROCEDURE — 1090000002 HH PPS REVENUE DEBIT

## 2024-04-17 PROCEDURE — 1090000001 HH PPS REVENUE CREDIT

## 2024-04-18 ENCOUNTER — HOME CARE VISIT (OUTPATIENT)
Dept: HOME HEALTH SERVICES | Facility: HOME HEALTH | Age: 79
End: 2024-04-18
Payer: MEDICARE

## 2024-04-18 PROCEDURE — G0153 HHCP-SVS OF S/L PATH,EA 15MN: HCPCS | Mod: HHH

## 2024-04-18 PROCEDURE — 1090000002 HH PPS REVENUE DEBIT

## 2024-04-18 PROCEDURE — 400014 HH F/U

## 2024-04-18 PROCEDURE — 1090000001 HH PPS REVENUE CREDIT

## 2024-04-18 ASSESSMENT — PAIN SCALES - PAIN ASSESSMENT IN ADVANCED DEMENTIA (PAINAD)
BODYLANGUAGE: 0 - RELAXED.
BODYLANGUAGE: 0
BREATHING: 0
CONSOLABILITY: 0 - NO NEED TO CONSOLE.
NEGVOCALIZATION: 0
TOTALSCORE: 0
FACIALEXPRESSION: 0
CONSOLABILITY: 0
FACIALEXPRESSION: 0 - SMILING OR INEXPRESSIVE.
NEGVOCALIZATION: 0 - NONE.

## 2024-04-18 ASSESSMENT — ENCOUNTER SYMPTOMS
DENIES PAIN: 1
PERSON REPORTING PAIN: PATIENT

## 2024-04-19 PROCEDURE — 1090000002 HH PPS REVENUE DEBIT

## 2024-04-19 PROCEDURE — 1090000001 HH PPS REVENUE CREDIT

## 2024-04-19 ASSESSMENT — ACTIVITIES OF DAILY LIVING (ADL): ENTERING_EXITING_HOME: MINIMUM ASSIST

## 2024-04-20 PROCEDURE — 1090000002 HH PPS REVENUE DEBIT

## 2024-04-20 PROCEDURE — 1090000001 HH PPS REVENUE CREDIT

## 2024-04-21 PROCEDURE — 1090000002 HH PPS REVENUE DEBIT

## 2024-04-21 PROCEDURE — 1090000001 HH PPS REVENUE CREDIT

## 2024-04-22 PROCEDURE — 1090000002 HH PPS REVENUE DEBIT

## 2024-04-22 PROCEDURE — 1090000001 HH PPS REVENUE CREDIT

## 2024-04-23 ENCOUNTER — HOME CARE VISIT (OUTPATIENT)
Dept: HOME HEALTH SERVICES | Facility: HOME HEALTH | Age: 79
End: 2024-04-23
Payer: MEDICARE

## 2024-04-23 VITALS — OXYGEN SATURATION: 97 % | HEART RATE: 90 BPM

## 2024-04-23 PROCEDURE — 1090000001 HH PPS REVENUE CREDIT

## 2024-04-23 PROCEDURE — G0153 HHCP-SVS OF S/L PATH,EA 15MN: HCPCS | Mod: HHH

## 2024-04-23 PROCEDURE — 1090000002 HH PPS REVENUE DEBIT

## 2024-04-23 ASSESSMENT — PAIN SCALES - PAIN ASSESSMENT IN ADVANCED DEMENTIA (PAINAD)
BODYLANGUAGE: 0 - RELAXED.
FACIALEXPRESSION: 0 - SMILING OR INEXPRESSIVE.
TOTALSCORE: 0
NEGVOCALIZATION: 0 - NONE.
FACIALEXPRESSION: 0
CONSOLABILITY: 0
BODYLANGUAGE: 0
CONSOLABILITY: 0 - NO NEED TO CONSOLE.
BREATHING: 0
NEGVOCALIZATION: 0

## 2024-04-23 ASSESSMENT — ENCOUNTER SYMPTOMS
DENIES PAIN: 1
PERSON REPORTING PAIN: PATIENT

## 2024-04-24 PROCEDURE — 1090000001 HH PPS REVENUE CREDIT

## 2024-04-24 PROCEDURE — 1090000002 HH PPS REVENUE DEBIT

## 2024-04-25 ENCOUNTER — HOME CARE VISIT (OUTPATIENT)
Dept: HOME HEALTH SERVICES | Facility: HOME HEALTH | Age: 79
End: 2024-04-25
Payer: MEDICARE

## 2024-04-25 VITALS — OXYGEN SATURATION: 97 % | HEART RATE: 81 BPM

## 2024-04-25 DIAGNOSIS — R13.10 DYSPHAGIA, UNSPECIFIED TYPE: Primary | ICD-10-CM

## 2024-04-25 PROCEDURE — G0153 HHCP-SVS OF S/L PATH,EA 15MN: HCPCS | Mod: HHH

## 2024-04-25 PROCEDURE — 1090000001 HH PPS REVENUE CREDIT

## 2024-04-25 PROCEDURE — 1090000002 HH PPS REVENUE DEBIT

## 2024-04-25 ASSESSMENT — PAIN SCALES - PAIN ASSESSMENT IN ADVANCED DEMENTIA (PAINAD)
BODYLANGUAGE: 0
BODYLANGUAGE: 0 - RELAXED.
CONSOLABILITY: 0 - NO NEED TO CONSOLE.
FACIALEXPRESSION: 0
CONSOLABILITY: 0
NEGVOCALIZATION: 0 - NONE.
FACIALEXPRESSION: 0 - SMILING OR INEXPRESSIVE.
TOTALSCORE: 0
NEGVOCALIZATION: 0
BREATHING: 0

## 2024-04-25 ASSESSMENT — ENCOUNTER SYMPTOMS
DENIES PAIN: 1
PERSON REPORTING PAIN: PATIENT

## 2024-04-26 PROCEDURE — 1090000002 HH PPS REVENUE DEBIT

## 2024-04-26 PROCEDURE — 1090000001 HH PPS REVENUE CREDIT

## 2024-04-27 PROCEDURE — 1090000001 HH PPS REVENUE CREDIT

## 2024-04-27 PROCEDURE — 1090000002 HH PPS REVENUE DEBIT

## 2024-04-28 PROCEDURE — 1090000002 HH PPS REVENUE DEBIT

## 2024-04-28 PROCEDURE — 1090000001 HH PPS REVENUE CREDIT

## 2024-04-29 PROCEDURE — 1090000001 HH PPS REVENUE CREDIT

## 2024-04-29 PROCEDURE — 1090000002 HH PPS REVENUE DEBIT

## 2024-04-30 ENCOUNTER — HOME CARE VISIT (OUTPATIENT)
Dept: HOME HEALTH SERVICES | Facility: HOME HEALTH | Age: 79
End: 2024-04-30
Payer: MEDICARE

## 2024-04-30 VITALS — OXYGEN SATURATION: 98 % | TEMPERATURE: 98.4 F | HEART RATE: 84 BPM

## 2024-04-30 PROCEDURE — 1090000002 HH PPS REVENUE DEBIT

## 2024-04-30 PROCEDURE — G0179 MD RECERTIFICATION HHA PT: HCPCS | Performed by: RADIOLOGY

## 2024-04-30 PROCEDURE — 1090000001 HH PPS REVENUE CREDIT

## 2024-04-30 PROCEDURE — G0153 HHCP-SVS OF S/L PATH,EA 15MN: HCPCS | Mod: HHH

## 2024-04-30 ASSESSMENT — PAIN SCALES - PAIN ASSESSMENT IN ADVANCED DEMENTIA (PAINAD)
FACIALEXPRESSION: 0
CONSOLABILITY: 0
NEGVOCALIZATION: 0 - NONE.
BREATHING: 0
BODYLANGUAGE: 0 - RELAXED.
BODYLANGUAGE: 0
NEGVOCALIZATION: 0
CONSOLABILITY: 0 - NO NEED TO CONSOLE.
FACIALEXPRESSION: 0 - SMILING OR INEXPRESSIVE.
TOTALSCORE: 0

## 2024-04-30 ASSESSMENT — ENCOUNTER SYMPTOMS
PERSON REPORTING PAIN: PATIENT
DENIES PAIN: 1

## 2024-05-01 PROCEDURE — 1090000002 HH PPS REVENUE DEBIT

## 2024-05-01 PROCEDURE — 1090000001 HH PPS REVENUE CREDIT

## 2024-05-02 ENCOUNTER — HOME CARE VISIT (OUTPATIENT)
Dept: HOME HEALTH SERVICES | Facility: HOME HEALTH | Age: 79
End: 2024-05-02
Payer: MEDICARE

## 2024-05-02 PROCEDURE — 1090000001 HH PPS REVENUE CREDIT

## 2024-05-02 PROCEDURE — 1090000002 HH PPS REVENUE DEBIT

## 2024-05-02 PROCEDURE — G0153 HHCP-SVS OF S/L PATH,EA 15MN: HCPCS | Mod: HHH

## 2024-05-02 ASSESSMENT — ENCOUNTER SYMPTOMS
PERSON REPORTING PAIN: PATIENT
DENIES PAIN: 1

## 2024-05-02 ASSESSMENT — PAIN SCALES - PAIN ASSESSMENT IN ADVANCED DEMENTIA (PAINAD)
CONSOLABILITY: 0
BREATHING: 0
BODYLANGUAGE: 0 - RELAXED.
TOTALSCORE: 0
BODYLANGUAGE: 0
NEGVOCALIZATION: 0
NEGVOCALIZATION: 0 - NONE.
FACIALEXPRESSION: 0 - SMILING OR INEXPRESSIVE.
FACIALEXPRESSION: 0
CONSOLABILITY: 0 - NO NEED TO CONSOLE.

## 2024-05-03 PROCEDURE — 1090000002 HH PPS REVENUE DEBIT

## 2024-05-03 PROCEDURE — 1090000001 HH PPS REVENUE CREDIT

## 2024-05-04 PROCEDURE — 1090000001 HH PPS REVENUE CREDIT

## 2024-05-04 PROCEDURE — 1090000002 HH PPS REVENUE DEBIT

## 2024-05-05 PROCEDURE — 1090000002 HH PPS REVENUE DEBIT

## 2024-05-05 PROCEDURE — 1090000001 HH PPS REVENUE CREDIT

## 2024-05-06 ENCOUNTER — HOME CARE VISIT (OUTPATIENT)
Dept: HOME HEALTH SERVICES | Facility: HOME HEALTH | Age: 79
End: 2024-05-06
Payer: MEDICARE

## 2024-05-06 ENCOUNTER — TELEPHONE (OUTPATIENT)
Dept: PRIMARY CARE | Facility: CLINIC | Age: 79
End: 2024-05-06
Payer: MEDICARE

## 2024-05-06 PROCEDURE — G0153 HHCP-SVS OF S/L PATH,EA 15MN: HCPCS | Mod: HHH

## 2024-05-06 PROCEDURE — 1090000002 HH PPS REVENUE DEBIT

## 2024-05-06 PROCEDURE — 1090000001 HH PPS REVENUE CREDIT

## 2024-05-06 ASSESSMENT — PAIN SCALES - PAIN ASSESSMENT IN ADVANCED DEMENTIA (PAINAD)
BODYLANGUAGE: 0 - RELAXED.
TOTALSCORE: 0
BREATHING: 0
FACIALEXPRESSION: 0
NEGVOCALIZATION: 0 - NONE.
BODYLANGUAGE: 0
CONSOLABILITY: 0
CONSOLABILITY: 0 - NO NEED TO CONSOLE.
FACIALEXPRESSION: 0 - SMILING OR INEXPRESSIVE.
NEGVOCALIZATION: 0

## 2024-05-06 ASSESSMENT — ENCOUNTER SYMPTOMS
PERSON REPORTING PAIN: PATIENT
DENIES PAIN: 1

## 2024-05-06 NOTE — TELEPHONE ENCOUNTER
[facility] pharmacy called stating that the Metformin ER is on back order and they have the Immediate release and have been filling that for him. Spoke with pharmacy they did switch the script written on 4/18/24 to the Immediate release.

## 2024-05-07 PROCEDURE — 1090000001 HH PPS REVENUE CREDIT

## 2024-05-07 PROCEDURE — 1090000002 HH PPS REVENUE DEBIT

## 2024-05-08 ENCOUNTER — TELEPHONE (OUTPATIENT)
Dept: RADIATION ONCOLOGY | Facility: CLINIC | Age: 79
End: 2024-05-08
Payer: MEDICARE

## 2024-05-08 PROCEDURE — 1090000001 HH PPS REVENUE CREDIT

## 2024-05-08 PROCEDURE — 1090000002 HH PPS REVENUE DEBIT

## 2024-05-08 NOTE — TELEPHONE ENCOUNTER
Patient's surveillance PET CT is this Friday and still not approved by the insurance (it was scheduled back in Feb).  Please advise.

## 2024-05-09 ENCOUNTER — HOME CARE VISIT (OUTPATIENT)
Dept: HOME HEALTH SERVICES | Facility: HOME HEALTH | Age: 79
End: 2024-05-09
Payer: MEDICARE

## 2024-05-09 PROCEDURE — 1090000002 HH PPS REVENUE DEBIT

## 2024-05-09 PROCEDURE — 1090000001 HH PPS REVENUE CREDIT

## 2024-05-09 PROCEDURE — G0153 HHCP-SVS OF S/L PATH,EA 15MN: HCPCS | Mod: HHH

## 2024-05-09 ASSESSMENT — PAIN SCALES - PAIN ASSESSMENT IN ADVANCED DEMENTIA (PAINAD)
CONSOLABILITY: 0 - NO NEED TO CONSOLE.
FACIALEXPRESSION: 0 - SMILING OR INEXPRESSIVE.
BODYLANGUAGE: 0 - RELAXED.
NEGVOCALIZATION: 0
BREATHING: 0
BODYLANGUAGE: 0
CONSOLABILITY: 0
NEGVOCALIZATION: 0 - NONE.
FACIALEXPRESSION: 0
TOTALSCORE: 0

## 2024-05-09 ASSESSMENT — ENCOUNTER SYMPTOMS
DENIES PAIN: 1
PERSON REPORTING PAIN: PATIENT

## 2024-05-10 ENCOUNTER — APPOINTMENT (OUTPATIENT)
Dept: RADIATION ONCOLOGY | Facility: CLINIC | Age: 79
End: 2024-05-10
Payer: MEDICARE

## 2024-05-10 ENCOUNTER — APPOINTMENT (OUTPATIENT)
Dept: RADIOLOGY | Facility: CLINIC | Age: 79
End: 2024-05-10
Payer: MEDICARE

## 2024-05-10 PROCEDURE — 1090000002 HH PPS REVENUE DEBIT

## 2024-05-10 PROCEDURE — 1090000001 HH PPS REVENUE CREDIT

## 2024-05-11 PROCEDURE — 1090000001 HH PPS REVENUE CREDIT

## 2024-05-11 PROCEDURE — 1090000002 HH PPS REVENUE DEBIT

## 2024-05-12 PROCEDURE — 1090000002 HH PPS REVENUE DEBIT

## 2024-05-12 PROCEDURE — 1090000001 HH PPS REVENUE CREDIT

## 2024-05-13 PROCEDURE — 1090000001 HH PPS REVENUE CREDIT

## 2024-05-13 PROCEDURE — 1090000002 HH PPS REVENUE DEBIT

## 2024-05-14 ENCOUNTER — TELEPHONE (OUTPATIENT)
Dept: PRIMARY CARE | Facility: CLINIC | Age: 79
End: 2024-05-14
Payer: MEDICARE

## 2024-05-14 ENCOUNTER — HOME CARE VISIT (OUTPATIENT)
Dept: HOME HEALTH SERVICES | Facility: HOME HEALTH | Age: 79
End: 2024-05-14
Payer: MEDICARE

## 2024-05-14 DIAGNOSIS — E78.5 HYPERLIPIDEMIA, UNSPECIFIED HYPERLIPIDEMIA TYPE: Primary | ICD-10-CM

## 2024-05-14 DIAGNOSIS — R25.1 TREMOR: ICD-10-CM

## 2024-05-14 PROCEDURE — 1090000001 HH PPS REVENUE CREDIT

## 2024-05-14 PROCEDURE — 1090000002 HH PPS REVENUE DEBIT

## 2024-05-14 PROCEDURE — G0153 HHCP-SVS OF S/L PATH,EA 15MN: HCPCS | Mod: HHH

## 2024-05-14 RX ORDER — PRIMIDONE 50 MG/1
50 TABLET ORAL 3 TIMES DAILY
Qty: 90 TABLET | Refills: 5 | Status: SHIPPED | OUTPATIENT
Start: 2024-05-14 | End: 2024-11-10

## 2024-05-14 RX ORDER — ROSUVASTATIN CALCIUM 20 MG/1
20 TABLET, COATED ORAL DAILY
Qty: 90 TABLET | Refills: 2 | Status: SHIPPED | OUTPATIENT
Start: 2024-05-14 | End: 2025-02-08

## 2024-05-14 ASSESSMENT — PAIN SCALES - PAIN ASSESSMENT IN ADVANCED DEMENTIA (PAINAD)
BODYLANGUAGE: 0 - RELAXED.
FACIALEXPRESSION: 0
FACIALEXPRESSION: 0 - SMILING OR INEXPRESSIVE.
BREATHING: 0
CONSOLABILITY: 0
TOTALSCORE: 0
NEGVOCALIZATION: 0 - NONE.
BODYLANGUAGE: 0
CONSOLABILITY: 0 - NO NEED TO CONSOLE.
NEGVOCALIZATION: 0

## 2024-05-14 ASSESSMENT — ENCOUNTER SYMPTOMS
DENIES PAIN: 1
PERSON REPORTING PAIN: PATIENT

## 2024-05-14 NOTE — TELEPHONE ENCOUNTER
Pt's wife is calling in regards to the Primidone 50 Mg. She states that he takes it twice a day and but the medications doesn't seem to last all day. She would like to know if he can start taking it 3 times a day and if so can you send a new script to [facility]? She is asking if someone can give her a call to let her know if the medication is sent?    Medication:     Primidone 50 MG; Take 1 tablet 2 times a day.    Pharmacy: [facility]   Phone Number: [phone]

## 2024-05-15 ENCOUNTER — HOSPITAL ENCOUNTER (OUTPATIENT)
Dept: RADIOLOGY | Facility: HOSPITAL | Age: 79
Discharge: HOME | End: 2024-05-15
Payer: MEDICARE

## 2024-05-15 DIAGNOSIS — R13.10 DYSPHAGIA, UNSPECIFIED TYPE: ICD-10-CM

## 2024-05-15 PROCEDURE — 74230 X-RAY XM SWLNG FUNCJ C+: CPT

## 2024-05-15 PROCEDURE — 92611 MOTION FLUOROSCOPY/SWALLOW: CPT | Mod: GN

## 2024-05-15 PROCEDURE — 2500000005 HC RX 250 GENERAL PHARMACY W/O HCPCS: Performed by: FAMILY MEDICINE

## 2024-05-15 PROCEDURE — 1090000001 HH PPS REVENUE CREDIT

## 2024-05-15 PROCEDURE — 1090000002 HH PPS REVENUE DEBIT

## 2024-05-15 PROCEDURE — 92526 ORAL FUNCTION THERAPY: CPT | Mod: GN

## 2024-05-15 PROCEDURE — 74230 X-RAY XM SWLNG FUNCJ C+: CPT | Performed by: RADIOLOGY

## 2024-05-15 RX ADMIN — BARIUM SULFATE 80 ML: 0.81 POWDER, FOR SUSPENSION ORAL at 10:57

## 2024-05-15 RX ADMIN — BARIUM SULFATE 100 ML: 400 SUSPENSION ORAL at 10:56

## 2024-05-15 NOTE — PROCEDURES
"  Modified Barium Swallow Study     Patient Name: Kevin Mendes \"Katherine\"  MRN: [id number]  : 1945  Today's Date: 05/15/24   Time in: 10:15  Time out: 11:00       Modified Barium Swallow Study completed after informed verbal consent. Trials of thin, nectar/mildly thick liquid, puree, regular solids and barium tablet with thin liquid were given.     SPEECH FINDINGS:   Reason for referral: Assess swallow post dysphagia tx. Pt. Has been getting home health SLP 2x/wk   Patient hx: dysphagia s/p PEG, Chemotherapy for oral pharyngeal cancer, DM 2, tremors   Respiratory status: room air    Current diet: Has started PO trials of puree/nectar thick, continues with feeding via PEG     FINAL SPEECH RECOMMENDATIONS  Diet recommendations: CONTINUE WITH MAIN NUTRITION/MEDS VIA PEG. Pt. Reported he has been having smoothies at home and has no change in respiratory status with doing so. Recommend he continue with this, however was educated to stop with any change in respiratory status, fever, etc. Frequent aggressive oral care and thin water under FFWP   Swallow Strategies: - Small bites  - Effortful swallow  -Frequent aggressive oral care     Pt needs ENT/GI consult to resolved cricopharyngeal dysfunction as this will not improve from exercises alone. Pt. Was educated on recommendation and in agreement to see ENT.     Plan:  Treatment/Interventions: Pharyngeal exercises, Patient/family education, Bolus trials. Diet tolerance/advancement. ENT consult for cricopharyngeal dysfunction   SLP Plan: Skilled SLP warranted  SLP Frequency: 2x per week  Duration: 30 days     Discussed POC: Patient  Discussed Risks/Benefits: Yes  Patient/Caregiver Agreeable: Yes    Short term goals: established 05/15/24   Pt will complete effortful swallows to strengthen pharyngeal muscles for improved bolus clearance through the pharynx.  2. Pt. Will complete Chin tuck against resistance to improve hyolaryngeal excursion and upper esophageal sphincter " opening   3. Pt. To demonstrate understanding of and use of Cooper free water protocol.       Education: SLP provided education to Patient regarding MBSS impressions, recommendations and POC at this time. Verbal understanding and agreement given on all accounts.   Treatment: ST provided extensive education to pt/pt family regarding anatomy/physiology of swallow function, risk of aspiration/aspiration pna, diet modifications, and the use of compensatory swallow strategies to promote pt safety upon PO intake including small bites/sips, attempt to use effortful swallow.    Additional consult suggested: ENT/GI     Repeat study/dc plan: Repeat study after intervention for cricopharyngeal dysfunction         MBSImP Physiological Component  ORAL PHASE:  Lip Closure - No labial escape/anterior loss of bolus   Tongue Control During Bolus Hold - Escape to lateral buccal cavity and/or floor of mouth   Bolus prep/mastication - Slow prolonged mastication with complete re-collection necessary  - didn't have top dentures present   Bolus transport/lingual motion - Brisk tongue motion for A-P movement of the bolus   Oral residue - Trace residue lining oral structures     PHARYNGEAL PHASE:  Initiation of pharyngeal swallow - Bolus head at posterior angle of ramus   Soft palate elevation - No bolus between soft palate/pharyngeal wall   Laryngeal elevation - Complete superior movement of thyroid cartilage with contact of arytenoids to epiglottic petiole   Anterior hyoid excursion - Partial anterior movement   Epiglottic movement - Partial inversion  Laryngeal vestibule closure - Incomplete - narrow column of air/contrast in laryngeal vestibule   Pharyngeal stripping wave - Present, however, diminished   Pharyngoesophageal segment opening - Minimal distension/incomplete duration with marked obstruction of flow of bolus  resulting in aspiration after the swallow   Tongue base retraction - Trace column of contrast or air between tongue  base and pharyngeal wall   Pharyngeal residue - Hypertrophy of the cricopharyngeus impeding bolus flow resulting in barium retention at the level of the pyriform sinuses and within the cervical esophageal segment  Laryngeal penetration - after the swallow with thin, puree and cookie trial  Aspiration - after the swallow due to cricopharyngeal dysfunction and return of bolus into pharynx   Response to laryngeal penetration/aspiration- throat clearing and coughing, inconsistently cleared penetrated/aspirated material.   Strategies attempted- effortful swallow- minimally increased clearance, liquid wash     ESOPHAGEAL PHASE:  Esophageal clearance - Esophageal retention with retrograde flow through the pharyngoesophageal segment       SLP Impressions with severity rating:   Pt presents with severe pharyngeal /esophageal dysphagia characterized by deficits noted above.     Rosenbek's Penetration Aspiration Scale  Thin Liquids: 7. OVERT ASPIRATION, material is not cleared - contrast passes glottis, visible residue, W/pt response  After the Swallow  Nectar Thick Liquids: 1. NO ASPIRATION & NO PENETRATION - no aspiration, contrast does not enter airway however suspected after study turned off   Puree: 7. OVERT ASPIRATION, material is not cleared - contrast passes glottis, visible residue, W/pt response  After the Swallow  Solids: 7. OVERT ASPIRATION, material is not cleared - contrast passes glottis, visible residue, W/pt response  After the Swallow

## 2024-05-16 PROCEDURE — 1090000001 HH PPS REVENUE CREDIT

## 2024-05-16 PROCEDURE — 1090000002 HH PPS REVENUE DEBIT

## 2024-05-17 ENCOUNTER — HOME CARE VISIT (OUTPATIENT)
Dept: HOME HEALTH SERVICES | Facility: HOME HEALTH | Age: 79
End: 2024-05-17
Payer: MEDICARE

## 2024-05-17 VITALS — HEART RATE: 84 BPM | OXYGEN SATURATION: 96 %

## 2024-05-17 PROCEDURE — 1090000001 HH PPS REVENUE CREDIT

## 2024-05-17 PROCEDURE — G0153 HHCP-SVS OF S/L PATH,EA 15MN: HCPCS | Mod: HHH

## 2024-05-17 PROCEDURE — 400014 HH F/U

## 2024-05-17 PROCEDURE — 1090000002 HH PPS REVENUE DEBIT

## 2024-05-17 PROCEDURE — 1090000003 HH PPS REVENUE ADJ

## 2024-05-17 ASSESSMENT — PAIN SCALES - PAIN ASSESSMENT IN ADVANCED DEMENTIA (PAINAD)
BODYLANGUAGE: 0
BODYLANGUAGE: 0 - RELAXED.
NEGVOCALIZATION: 0
FACIALEXPRESSION: 0 - SMILING OR INEXPRESSIVE.
NEGVOCALIZATION: 0 - NONE.
CONSOLABILITY: 0
BREATHING: 0
CONSOLABILITY: 0 - NO NEED TO CONSOLE.
FACIALEXPRESSION: 0
TOTALSCORE: 0

## 2024-05-17 ASSESSMENT — ENCOUNTER SYMPTOMS
PERSON REPORTING PAIN: PATIENT
DENIES PAIN: 1

## 2024-05-18 PROCEDURE — 1090000001 HH PPS REVENUE CREDIT

## 2024-05-18 PROCEDURE — 1090000002 HH PPS REVENUE DEBIT

## 2024-05-19 PROCEDURE — 1090000002 HH PPS REVENUE DEBIT

## 2024-05-19 PROCEDURE — 1090000001 HH PPS REVENUE CREDIT

## 2024-05-20 DIAGNOSIS — R13.10 DYSPHAGIA, UNSPECIFIED TYPE: Primary | ICD-10-CM

## 2024-05-20 PROCEDURE — 1090000002 HH PPS REVENUE DEBIT

## 2024-05-20 PROCEDURE — 1090000001 HH PPS REVENUE CREDIT

## 2024-05-21 ENCOUNTER — PROCEDURE VISIT (OUTPATIENT)
Dept: SPEECH THERAPY | Facility: CLINIC | Age: 79
End: 2024-05-21
Payer: MEDICARE

## 2024-05-21 ENCOUNTER — PATIENT MESSAGE (OUTPATIENT)
Dept: OTOLARYNGOLOGY | Facility: HOSPITAL | Age: 79
End: 2024-05-21

## 2024-05-21 ENCOUNTER — OFFICE VISIT (OUTPATIENT)
Dept: OTOLARYNGOLOGY | Facility: CLINIC | Age: 79
End: 2024-05-21
Payer: MEDICARE

## 2024-05-21 VITALS — BODY MASS INDEX: 27.16 KG/M2 | HEIGHT: 70 IN | TEMPERATURE: 97.3 F | WEIGHT: 189.7 LBS

## 2024-05-21 DIAGNOSIS — C01 MALIGNANT NEOPLASM OF BASE OF TONGUE (MULTI): ICD-10-CM

## 2024-05-21 DIAGNOSIS — R13.10 DYSPHAGIA, UNSPECIFIED TYPE: Primary | ICD-10-CM

## 2024-05-21 DIAGNOSIS — T66.XXXS RADIATION INJURY, SEQUELA: ICD-10-CM

## 2024-05-21 DIAGNOSIS — Q39.4 CRICOPHARYNGEAL WEB (HHS-HCC): ICD-10-CM

## 2024-05-21 PROCEDURE — 1159F MED LIST DOCD IN RCRD: CPT | Performed by: OTOLARYNGOLOGY

## 2024-05-21 PROCEDURE — 1090000002 HH PPS REVENUE DEBIT

## 2024-05-21 PROCEDURE — 1036F TOBACCO NON-USER: CPT | Performed by: OTOLARYNGOLOGY

## 2024-05-21 PROCEDURE — 99215 OFFICE O/P EST HI 40 MIN: CPT | Performed by: OTOLARYNGOLOGY

## 2024-05-21 PROCEDURE — 1160F RVW MEDS BY RX/DR IN RCRD: CPT | Performed by: OTOLARYNGOLOGY

## 2024-05-21 PROCEDURE — 92613 ENDOSCOPY SWALLOW (FEES) I&R: CPT | Performed by: OTOLARYNGOLOGY

## 2024-05-21 PROCEDURE — 1123F ACP DISCUSS/DSCN MKR DOCD: CPT | Performed by: OTOLARYNGOLOGY

## 2024-05-21 PROCEDURE — 31575 DIAGNOSTIC LARYNGOSCOPY: CPT | Performed by: OTOLARYNGOLOGY

## 2024-05-21 PROCEDURE — 1157F ADVNC CARE PLAN IN RCRD: CPT | Performed by: OTOLARYNGOLOGY

## 2024-05-21 PROCEDURE — 92612 ENDOSCOPY SWALLOW (FEES) VID: CPT | Mod: GN

## 2024-05-21 PROCEDURE — 1090000001 HH PPS REVENUE CREDIT

## 2024-05-21 NOTE — H&P (VIEW-ONLY)
"Patient: Kevin Mendes   MRN: [id number] YOB: 1945   Sex: male Age: 78 y.o.  Date of Service: 2024       ASSESSMENT AND PLAN  I discussed the findings with Kevin Mendes \"Vinh" and have recommended the followin. PEG dependent dysphagia in setting of multimodality treatment for p16+ BOT SCC. MBS with CP impression w/ web and PES narrowing, significant residue. FEES in office today demonstrated some improvement in residue with right head turn (though did not eliminate).   - Continue swallow therapy, primary nutrition via PEG  - Meticulous oral care  - Esophagoscopy and dilation, steroid injection to posterior pharyngeal wall in endosuite ([place]). Plan for series of 2-3. Risks, benefits, and alternatives of esophagoscopy and dilation discussed with the patient, including but not limited to bleeding, infection, pain, need for repeat procedure, no improvement in symptoms, and rarely, esophageal perforation. The patient expressed understanding and agrees to proceed.       CHIEF COMPLAINT  Chief Complaint   Patient presents with    Dysphagia       HISTORY OF PRESENT ILLNESS  Kevin Curran" is a 78 y.o. male referred for evaluation of PEG dependent dysphagia in the setting of [id number] p16+ BOT SCC s/p chemoXRT completed 24.      The patient reports his primary nutrition is through his PEG (placed 23). He is trying sips of water with chin tuck but still feels like he is coughing or choking with liquids. He did not do much swallowing during radiation, did not have much of an appetite.    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), Insomnia (02/19/2024), Leukopenia (03/05/2024), Localized, primary osteoarthritis (09/06/2018), Low back pain, unspecified (09/25/2017), Malignant melanoma of skin of trunk, except scrotum (Multi) (07/23/2009), Malignant neoplasm of base of tongue (Multi), Mass of neck (03/05/2024), Open wound (03/05/2024), Oropharyngeal dysphagia (03/05/2024), Personal history of other diseases of the circulatory system, Personal history of other diseases of urinary system (12/31/2022), Personal history of other endocrine, nutritional and metabolic disease, Polyneuropathy (04/05/2010), Presence of right artificial knee joint (01/12/2018), Sensorineural hearing loss, bilateral (07/23/2018), Tongue mass (10/05/2023), and Urinary tract infection (03/05/2024). Surgical History  He has a past surgical history that includes Total hip arthroplasty (04/10/2014); Total knee arthroplasty (04/10/2014); Other surgical history (04/10/2014); Hand surgery (04/07/2017); Cataract extraction (04/07/2017); and Shoulder surgery (04/07/2017).   Social History  He reports that he has quit smoking. His smoking use included cigarettes. He has never been exposed to tobacco smoke. He has never used smokeless tobacco. He reports current alcohol use of about 2.0 standard drinks of alcohol per week. He reports that he does not currently use drugs. Allergies  Cat dander     Family History  Family History   Problem Relation Name Age of Onset    Other (heart failure following cardiac surgery) Mother          REVIEW OF SYSTEMS  All 10 systems were reviewed and negative except for above.      PHYSICAL EXAM  ENT Physical Exam   GENERAL: Well-nourished and developed, alert and appropriate, no distress, voice [id number]  RESPIRATORY: Breathing quietly, no stridor  HEAD: Normocephalic atraumatic  FACE: Symmetric, no masses or lesions  EYES:   "Pupils reactive, sclera clear, external ocular muscles intact, no nystagmus.    EARS:  Pinnae normal.   NOSE:  No anterior lesions, masses or polyps.  ORAL CAVITY/OROPHARYNX:  Buccal mucosa is moist without lesions or masses, tongue midline and palate elevates symmetrically. Tongue mobility intact.  NECK:  There is no lymphadenopathy or thyromegaly.  Moderate submental lymphedema  NEUROLOGIC:  Cranial nerves II-XII grossly intact.       Last Recorded Vitals  Temperature 36.3 °C (97.3 °F), height 1.778 m (5' 10\"), weight 86 kg (189 lb 11.2 oz).    RESULTS    Patient Reported Outcome Measures  N/A    Laboratory, Radiology, and Pathology  I personally reviewed the following results, with the following interpretation:   MBS 5/15/24 - mild cervical osteophytes, CP impression/web, mild PPW thickening. Esophageal FT without obvious abnormalities        PROCEDURES  Laryngoscopy with flexible endoscopic evaluation swallow    Date/Time: 5/21/2024 10:52 AM    Performed by: Isabel Beckman MD  Authorized by: Isabel Beckman MD       Flexible Fiberoptic Laryngoscopy     Patient failed a mirror exam due to limitations of equipment and the need for laryngoscopy to assess laryngeal anatomy and function    PREOPERATIVE DIAGNOSIS: dysphagia, head and neck cancer    POSTOPERATIVE DIAGNOSIS: Same    PROCEDURE: Transnasal videolaryngoscopy    ANESTHESIA:  Topical    COMPLICATIONS:  None    SPECIMENS:  None    PROCEDURE IN DETAIL:  The patient was brought into the endoscopy suite, placed in the upright position.  The nasal cavity was topically decongested anesthetized.  The distal chip video laryngoscope was passed through the nasal cavity.  The nasal cavity and nasopharynx were within normal limits except noted below. The following findings on laryngoscopy were noted:         Tongue Base: no masses or lesions, mild pooled secretions         Left vocal fold mobility: mobile         Right vocal fold mobility: mobile         Glottal closure: " complete         Laryngeal muscle tension: severe         Symmetry: symmetric         Vocal fold free edge: no masses or lesions          Other: post radiation changes throughout, moderate supraglottic edema, pooled secretions postcricoid and bilateral pyriforms    The patient tolerated the procedure well.            Flexible Fiberoptic Endoscopic Evaluation of Swallowing (FEES)    A flexible endoscopic evaluation of swallowing was indicated to assess pharyngeal function. The patient was given trials of thin liquids    Findings: penetration during and after the swallow, moderate residue most significant in postcricoid region, suspected aspiration on residue. Minimal improvement with chin tuck. Some improvement with right head turn.     Please see SLP report for complete details.      ----------------------------------------------------------------------  Isabel Beckman MD, MAEd    [facility]  Department of Otolaryngology - Head and Neck Surgery  [facility]    The total time I spent in care of this patient today (excluding time spent on other billable services) is as follows:    Time Spent  Prep time on day of patient encounter: 15 minutes  Time spent directly with patient, family or caregiver: 30 minutes  Additional Time Spent on Patient Care Activities: 5 minutes  Documentation Time: 10 minutes  Other Time Spent: 0 minutes  Total: 60 minutes

## 2024-05-21 NOTE — PROCEDURES
"Speech-Language Pathology    SLP Adult Outpatient MBSS/FEES Evaluation    Patient Name: Kevin Mendes \"Katherine\"  MRN: [id number]  Today's Date: 5/21/2024   Time Calculation  Start Time: 1000  Stop Time: 1045  Time Calculation (min): 45 min         Current problem/ Reason for consult:  Mr. Mendes received in clinic today with Dr. 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     PMH:  Arthritis, DM, Hyperlipidemia, HTN.    Recommendations:  Continue PEG tube for all nutrition, hydration and medications.  Complete oral care frequently throughout the day.   Free water/clears protocol throughout the day.   During water consumption, single sips with head rotation to the right  Hard multiple swallows.  Follow up with SLP services for dysphagia management.  Complete daily prophylactic exercise program to address speech and swallow muslces.         Impression:  Severe pharyngeal dysphagia with reduced swallow safety and efficiency.    Assessment:  A flexible endoscopic examination of swallowing (FEES) was completed in clinic today to visualize the pharyngeal-laryngeal anatomy, secretion management, swallowing function, and effectiveness of compensatory strategies while consuming foods and liquids of various volumes and textures. Surgical lube was utilized to lubricate the endoscope prior to insertion.  All boluses were self-administered or a by clinician. Natural and single swallowing conditions were provided with the instructions. All liquid and solids trials were dyed white and green with clear water given between trials to improve quality of images.     Oral: Intact oral prepping with liquid boluses.     Pharyngeal: Reduced white out duration followed by evidence of deep penetration during the swallow with min amounts of thin liquids pooling in the intra-arytenoid space. Following the swallow min amounts of pharyngeal stasis at the vallecula " and pyriform sinuses with evidence of silent aspiration after the swallow. Migrating residues into the subglottis observed via the intra-arytenoid space without immediate cough response. Pt with effective cued cough to clear materials from the vestibule.     Compensatory Strategies:  Chin tuck not effective and resulted in increased amounts of penetration and aspiration from the pyriform residues. Head rotation to the right with multiple hard swallows; resulted in reduced pyriform residues and amounts of aspiration after the swallow. Aspiration still evident but less amounts and frequency.     Plan:  SLP service for swallow treatment.  Esophagoscopy.      Goals:  Pt will complete effortful swallows 10 reps, 3 times per day for 7 days a week; to strengthen pharyngeal muscles for improve bolus clearance through the pharynx.    Pt will produce a series of lingual exercises/stretches independent of cues 10 reps, 3 times per day, 7 days a week; to maintain strength and motility of the lingual muscles and reduce the effects of radiation for safe and efficient PO intake.  Pt will complete a series of neck stretches independent of cues, 5 times a day for 7 days a week; to reduce the effects of radiation on the muscles necessary for optimal head position for safe and efficient PO intake.  Pt will complete isometric shaker for 1 min 3 reps at 3 sets a day, independent of cues; to improve hyolaryngeal elevation/excursion and UES opening during the swallow.   Pt will utilize and recall compensatory strategies independent of cues 100% of the time to tolerate the least restrictive diet without overt s/s of pharyngeal deficits.      Treatment Provided:  SLP reviewed the expected changes to speech and swallow function during treatment. Dental care, and oral hygiene in relation to aspiration discussed. As well as the additional prognostic indicators that result in higher risks of development of aspiration pneumonia. Xerostomia  alleviation and treatment discussed with the importance of initiating exercises to maintain strength, mobility and endurance. FEES exam images and recommendations discussed with swallow guidelines reviewed. Pt able to return demonstrate adequate skill with techniques.                      Rosenbeck:  Consistencies/Score: Thin: 6 - Material enters airway, passes below cords, ejected via cough    FEES:  FEES Verbal Consent: Fiberoptic endoscopic evaluation of the swallow exam was completed once informed verbal consent was obtained - Yes  Scope Passed: Through right nare  Patient Tolerated Procedure: Well  FEES Oral Phase  Volitional Hold With Thin Liquid Bolus:: Yes, intact  Anterior Oral Spillage (Lip Seal):: No  Oral Residues After The Swallow:: No  FEES Anatomy: Structural Appearances  Velopharryngeal Port Closure: Complete  Vocal Fold Appearance: Edematous   Laryngeal Function:  Adduction: Full  Abduction: Incomplete  Arytenoid Movement: Asymmetrical  Vocal Quality: Clear  Secretion Management  Secretion Management: Valleculae (pyriform sinuses- posterior pharyneal wall and intra-arytenoid space.)  Amounts/Color/Texture: Minimal, Frothy, White  Patient Response to Secretions: Reflexive throat clear- reflexive cough, Re-swallow, Not effective to clear  Aspiration of Secretions: Yes     Silent aspiration after the swallow.  Right head rotation reducing aspiration amts.      SLP Outcome Measures:  Functional Oral Intake Scale   FIOS level Comment   X Level 1  Nothing by Mouth    Level 2 Tube dependent with minimal attempts of food and liquid.    Level 3 Tube dependent with consistent oral intake of food and liquid.    Level 4 Total oral diet of a single consistency.    Level 5 Total oral diet with multiple consistencies, but requires special preparations and compensations.    Level 6 Total oral diet with multiple consistencies without special preparations but specific food limitations.    Level 7 Total oral diet with  no restrictions.

## 2024-05-21 NOTE — PROGRESS NOTES
"Patient: Kevin Mendes   MRN: [id number] YOB: 1945   Sex: male Age: 78 y.o.  Date of Service: 2024       ASSESSMENT AND PLAN  I discussed the findings with Kevin Mendes \"Vinh" and have recommended the followin. PEG dependent dysphagia in setting of multimodality treatment for p16+ BOT SCC. MBS with CP impression w/ web and PES narrowing, significant residue. FEES in office today demonstrated some improvement in residue with right head turn (though did not eliminate).   - Continue swallow therapy, primary nutrition via PEG  - Meticulous oral care  - Esophagoscopy and dilation, steroid injection to posterior pharyngeal wall in endosuite ([facility]). Plan for series of 2-3. Risks, benefits, and alternatives of esophagoscopy and dilation discussed with the patient, including but not limited to bleeding, infection, pain, need for repeat procedure, no improvement in symptoms, and rarely, esophageal perforation. The patient expressed understanding and agrees to proceed.       CHIEF COMPLAINT  Chief Complaint   Patient presents with    Dysphagia       HISTORY OF PRESENT ILLNESS  Kevin Curran" is a 78 y.o. male referred for evaluation of PEG dependent dysphagia in the setting of [id number] p16+ BOT SCC s/p chemoXRT completed 24.      The patient reports his primary nutrition is through his PEG (placed 23). He is trying sips of water with chin tuck but still feels like he is coughing or choking with liquids. He did not do much swallowing during radiation, did not have much of an appetite.    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), Insomnia (02/19/2024), Leukopenia (03/05/2024), Localized, primary osteoarthritis (09/06/2018), Low back pain, unspecified (09/25/2017), Malignant melanoma of skin of trunk, except scrotum (Multi) (07/23/2009), Malignant neoplasm of base of tongue (Multi), Mass of neck (03/05/2024), Open wound (03/05/2024), Oropharyngeal dysphagia (03/05/2024), Personal history of other diseases of the circulatory system, Personal history of other diseases of urinary system (12/31/2022), Personal history of other endocrine, nutritional and metabolic disease, Polyneuropathy (04/05/2010), Presence of right artificial knee joint (01/12/2018), Sensorineural hearing loss, bilateral (07/23/2018), Tongue mass (10/05/2023), and Urinary tract infection (03/05/2024). Surgical History  He has a past surgical history that includes Total hip arthroplasty (04/10/2014); Total knee arthroplasty (04/10/2014); Other surgical history (04/10/2014); Hand surgery (04/07/2017); Cataract extraction (04/07/2017); and Shoulder surgery (04/07/2017).   Social History  He reports that he has quit smoking. His smoking use included cigarettes. He has never been exposed to tobacco smoke. He has never used smokeless tobacco. He reports current alcohol use of about 2.0 standard drinks of alcohol per week. He reports that he does not currently use drugs. Allergies  Cat dander     Family History  Family History   Problem Relation Name Age of Onset    Other (heart failure following cardiac surgery) Mother          REVIEW OF SYSTEMS  All 10 systems were reviewed and negative except for above.      PHYSICAL EXAM  ENT Physical Exam   GENERAL: Well-nourished and developed, alert and appropriate, no distress, voice [id number]  RESPIRATORY: Breathing quietly, no stridor  HEAD: Normocephalic atraumatic  FACE: Symmetric, no masses or lesions  EYES:   "Pupils reactive, sclera clear, external ocular muscles intact, no nystagmus.    EARS:  Pinnae normal.   NOSE:  No anterior lesions, masses or polyps.  ORAL CAVITY/OROPHARYNX:  Buccal mucosa is moist without lesions or masses, tongue midline and palate elevates symmetrically. Tongue mobility intact.  NECK:  There is no lymphadenopathy or thyromegaly.  Moderate submental lymphedema  NEUROLOGIC:  Cranial nerves II-XII grossly intact.       Last Recorded Vitals  Temperature 36.3 °C (97.3 °F), height 1.778 m (5' 10\"), weight 86 kg (189 lb 11.2 oz).    RESULTS    Patient Reported Outcome Measures  N/A    Laboratory, Radiology, and Pathology  I personally reviewed the following results, with the following interpretation:   MBS 5/15/24 - mild cervical osteophytes, CP impression/web, mild PPW thickening. Esophageal FT without obvious abnormalities        PROCEDURES  Laryngoscopy with flexible endoscopic evaluation swallow    Date/Time: 5/21/2024 10:52 AM    Performed by: Isabel Beckman MD  Authorized by: Isabel Beckman MD       Flexible Fiberoptic Laryngoscopy     Patient failed a mirror exam due to limitations of equipment and the need for laryngoscopy to assess laryngeal anatomy and function    PREOPERATIVE DIAGNOSIS: dysphagia, head and neck cancer    POSTOPERATIVE DIAGNOSIS: Same    PROCEDURE: Transnasal videolaryngoscopy    ANESTHESIA:  Topical    COMPLICATIONS:  None    SPECIMENS:  None    PROCEDURE IN DETAIL:  The patient was brought into the endoscopy suite, placed in the upright position.  The nasal cavity was topically decongested anesthetized.  The distal chip video laryngoscope was passed through the nasal cavity.  The nasal cavity and nasopharynx were within normal limits except noted below. The following findings on laryngoscopy were noted:         Tongue Base: no masses or lesions, mild pooled secretions         Left vocal fold mobility: mobile         Right vocal fold mobility: mobile         Glottal closure: " complete         Laryngeal muscle tension: severe         Symmetry: symmetric         Vocal fold free edge: no masses or lesions          Other: post radiation changes throughout, moderate supraglottic edema, pooled secretions postcricoid and bilateral pyriforms    The patient tolerated the procedure well.            Flexible Fiberoptic Endoscopic Evaluation of Swallowing (FEES)    A flexible endoscopic evaluation of swallowing was indicated to assess pharyngeal function. The patient was given trials of thin liquids    Findings: penetration during and after the swallow, moderate residue most significant in postcricoid region, suspected aspiration on residue. Minimal improvement with chin tuck. Some improvement with right head turn.     Please see SLP report for complete details.      ----------------------------------------------------------------------  Isabel Beckman MD, MAEd    [facility]  Department of Otolaryngology - Head and Neck Surgery  [facility]    The total time I spent in care of this patient today (excluding time spent on other billable services) is as follows:    Time Spent  Prep time on day of patient encounter: 15 minutes  Time spent directly with patient, family or caregiver: 30 minutes  Additional Time Spent on Patient Care Activities: 5 minutes  Documentation Time: 10 minutes  Other Time Spent: 0 minutes  Total: 60 minutes

## 2024-05-22 PROCEDURE — 1090000001 HH PPS REVENUE CREDIT

## 2024-05-22 PROCEDURE — 1090000002 HH PPS REVENUE DEBIT

## 2024-05-23 PROCEDURE — 1090000002 HH PPS REVENUE DEBIT

## 2024-05-23 PROCEDURE — 1090000001 HH PPS REVENUE CREDIT

## 2024-05-24 PROCEDURE — 1090000001 HH PPS REVENUE CREDIT

## 2024-05-24 PROCEDURE — 1090000002 HH PPS REVENUE DEBIT

## 2024-05-25 PROCEDURE — 1090000001 HH PPS REVENUE CREDIT

## 2024-05-25 PROCEDURE — 1090000002 HH PPS REVENUE DEBIT

## 2024-05-26 PROCEDURE — 1090000001 HH PPS REVENUE CREDIT

## 2024-05-26 PROCEDURE — 1090000002 HH PPS REVENUE DEBIT

## 2024-05-27 PROCEDURE — 1090000001 HH PPS REVENUE CREDIT

## 2024-05-27 PROCEDURE — 1090000002 HH PPS REVENUE DEBIT

## 2024-05-28 ENCOUNTER — TREATMENT (OUTPATIENT)
Dept: SPEECH THERAPY | Facility: CLINIC | Age: 79
End: 2024-05-28
Payer: MEDICARE

## 2024-05-28 VITALS
TEMPERATURE: 97.9 F | BODY MASS INDEX: 27.37 KG/M2 | DIASTOLIC BLOOD PRESSURE: 70 MMHG | HEIGHT: 70 IN | WEIGHT: 191.2 LBS | SYSTOLIC BLOOD PRESSURE: 133 MMHG | OXYGEN SATURATION: 95 %

## 2024-05-28 DIAGNOSIS — R13.13 PHARYNGEAL DYSPHAGIA: Primary | ICD-10-CM

## 2024-05-28 PROCEDURE — 1090000001 HH PPS REVENUE CREDIT

## 2024-05-28 PROCEDURE — 1090000002 HH PPS REVENUE DEBIT

## 2024-05-28 PROCEDURE — 2700000081 HC SLP EMST 150 EXP MUSCLE STRGTH TRAINER

## 2024-05-28 PROCEDURE — 92612 ENDOSCOPY SWALLOW (FEES) VID: CPT | Mod: GN

## 2024-05-28 NOTE — PROCEDURES
"Speech-Language Pathology    SLP Adult Outpatient MBSS/FEES Evaluation    Patient Name: Kevin Mendes \"Katherine\"  MRN: [id number]  Today's Date: 5/28/2024   Time Calculation  Start Time: 1500  Stop Time: 1559  Time Calculation (min): 59 min       Current problem/ Reason for consult:  Mr. Mendes received in clinic today with Dr. 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      PMH:  Arthritis, DM, Hyperlipidemia, HTN.    Recommendations:  Continue PEG tube for all nutrition, hydration and medications.  Complete oral care frequently throughout the day.   Bolus driven therapy with 4 ounces of purees and clear liquids.   During PO consumption, single sips or tsp bolus with head rotation to the right  Hard multiple swallows.  Volitional cough and re-swallow.  Follow up with SLP services for dysphagia management.  Complete daily prophylactic exercise program to address speech and swallow muslces.       Impression:  Severe pharyngeal dysphagia secondary to multimodality treatment for head and neck cancer reducing safety and efficiency of the swallow mechanism.       Assessment:  A flexible endoscopic examination of swallowing (FEES) was completed in clinic today to visualize the pharyngeal-laryngeal anatomy, secretion management, swallowing function, and effectiveness of compensatory strategies while consuming foods and liquids of various volumes and textures. Surgical lube was utilized to lubricate the endoscope prior to insertion.  All boluses were self-administered or a by clinician. Natural and single swallowing conditions were provided with the instructions. All liquid and solids trials were dyed white and green with clear water given between trials to improve quality of images.     Oral: Intact oral prepping across all consistencies presented.  Pharyngeal: Reduced white out duration followed by evidence of deep penetration during the " swallow with min amounts of thin liquids pooling in the intra-arytenoid space. Following the swallow min-mod amounts of pharyngeal stasis with thin liquids and purees at the level of the pyriform sinuses with evidence of silent and overt aspiration after the swallow. Migrating residues noted to aspirate the subglottis observed via the intra-arytenoid space with inconsistent reflexive cough response. Pt with effective cued cough to clear materials from the vestibule with repeated dry swallows in attempt to clear pharyngeal stasis. Suspect reoccurring penetration and overt aspiration after the swallow.     Compensatory Strategies: Head rotation to the right effective to eliminate amounts of pharyngeal stasis (efficiency) at the level of the pyriforms across all consistencies; therefor reducing frequency and amounts of aspiration after the swallow.     Epiglottis: 0- Normal   Vallecula: 0- Normal   Pharyngoepiglottic folds:  1- Mild edema   Aryepiglottic folds:  1- Mild edema   Arytenoids:  2- Moderate edema  Pyriform sinuses: 0- Normal   False Vocal folds:  1- Mild edema   True Vocal folds:  2- Moderate edema      Plan:  SLP service for swallow treatment.  Esophagoscopy.     Goals:  Pt will complete effortful swallows 10 reps, 3 times per day for 7 days a week; to strengthen pharyngeal muscles for improve bolus clearance through the pharynx.    Pt will produce a series of lingual exercises/stretches independent of cues 10 reps, 3 times per day, 7 days a week; to maintain strength and motility of the lingual muscles and reduce the effects of radiation for safe and efficient PO intake.  Pt will complete a series of neck stretches independent of cues, 5 times a day for 7 days a week; to reduce the effects of radiation on the muscles necessary for optimal head position for safe and efficient PO intake.  Pt will complete isometric shaker for 1 min 3 reps at 3 sets a day, independent of cues; to improve hyolaryngeal  elevation/excursion and UES opening during the swallow.   Pt will utilize and recall compensatory strategies independent of cues 100% of the time to tolerate the least restrictive diet without overt s/s of pharyngeal deficits.  Pt will complete EMST 5 reps, 5x a day for 5 days @ 30 cmH2o, independent of cues to strengthening movement of the hyolaryngeal complex necessary for airway protection during the swallow.       Treatment Provided:  SLP reviewed the expected changes to speech and swallow function during treatment. Dental care, and oral hygiene in relation to aspiration discussed. As well as the additional prognostic indicators that result in higher risks of development of aspiration pneumonia. The importance of initiating exercises to maintain strength, mobility and endurance. FEES exam images and recommendations discussed with swallow guidelines reviewed. Pt able to return demonstrate adequate skill with techniques                   [place]:  Consistencies/Score: Thin: 6 - Material enters airway, passes below cords, ejected via cough  Consistencies/Score: Solid: 6 - Material enters airway, passes below cords, ejected via cough      SLP Outcome Measures:  Functional Oral Intake Scale   FIOS level Comment    Level 1  Nothing by Mouth    Level 2 Tube dependent with minimal attempts of food and liquid.   X Level 3 Tube dependent with consistent oral intake of food and liquid.    Level 4 Total oral diet of a single consistency.    Level 5 Total oral diet with multiple consistencies, but requires special preparations and compensations.    Level 6 Total oral diet with multiple consistencies without special preparations but specific food limitations.    Level 7 Total oral diet with no restrictions.       Consultations/Referrals/Coordination of Services: n/a

## 2024-05-29 ENCOUNTER — APPOINTMENT (OUTPATIENT)
Dept: SPEECH THERAPY | Facility: HOSPITAL | Age: 79
End: 2024-05-29
Payer: MEDICARE

## 2024-05-29 PROCEDURE — 1090000002 HH PPS REVENUE DEBIT

## 2024-05-29 PROCEDURE — 1090000001 HH PPS REVENUE CREDIT

## 2024-05-30 PROCEDURE — 1090000001 HH PPS REVENUE CREDIT

## 2024-05-30 PROCEDURE — 1090000002 HH PPS REVENUE DEBIT

## 2024-05-31 ENCOUNTER — HOME CARE VISIT (OUTPATIENT)
Dept: HOME HEALTH SERVICES | Facility: HOME HEALTH | Age: 79
End: 2024-05-31
Payer: MEDICARE

## 2024-05-31 PROCEDURE — 1090000001 HH PPS REVENUE CREDIT

## 2024-05-31 PROCEDURE — 1090000002 HH PPS REVENUE DEBIT

## 2024-06-01 PROCEDURE — 1090000002 HH PPS REVENUE DEBIT

## 2024-06-01 PROCEDURE — 1090000001 HH PPS REVENUE CREDIT

## 2024-06-01 ASSESSMENT — ACTIVITIES OF DAILY LIVING (ADL)
OASIS_M1830: 00
HOME_HEALTH_OASIS: 01

## 2024-06-02 PROCEDURE — 1090000001 HH PPS REVENUE CREDIT

## 2024-06-02 PROCEDURE — 1090000002 HH PPS REVENUE DEBIT

## 2024-06-06 RX ORDER — METFORMIN HYDROCHLORIDE 500 MG/5ML
FOR SUSPENSION, EXTENDED RELEASE ORAL
Qty: 600 ML | Refills: 2 | OUTPATIENT
Start: 2024-06-06

## 2024-06-10 ENCOUNTER — TELEPHONE (OUTPATIENT)
Dept: RADIATION ONCOLOGY | Facility: CLINIC | Age: 79
End: 2024-06-10
Payer: MEDICARE

## 2024-06-10 NOTE — TELEPHONE ENCOUNTER
Patient called to see if RN could verify that upcoming PET on 6/14/2024 was authorized by insurance. RN called [facility] and it was suggested to have patient call their insurance due to PET scan showing that it is still not authorized by insurance. RN provided patient with a customer service number for Medicare and a contact number for [facility]. Patient encouraged to call with any other concerns.

## 2024-06-12 ENCOUNTER — ANESTHESIA EVENT (OUTPATIENT)
Dept: OPERATING ROOM | Facility: CLINIC | Age: 79
End: 2024-06-12
Payer: MEDICARE

## 2024-06-13 ENCOUNTER — ANESTHESIA (OUTPATIENT)
Dept: OPERATING ROOM | Facility: CLINIC | Age: 79
End: 2024-06-13
Payer: MEDICARE

## 2024-06-13 ENCOUNTER — HOSPITAL ENCOUNTER (OUTPATIENT)
Dept: OPERATING ROOM | Facility: CLINIC | Age: 79
Discharge: HOME | End: 2024-06-13
Payer: MEDICARE

## 2024-06-13 VITALS
OXYGEN SATURATION: 97 % | TEMPERATURE: 97.7 F | DIASTOLIC BLOOD PRESSURE: 73 MMHG | WEIGHT: 189.15 LBS | HEIGHT: 70 IN | RESPIRATION RATE: 18 BRPM | BODY MASS INDEX: 27.08 KG/M2 | SYSTOLIC BLOOD PRESSURE: 146 MMHG | HEART RATE: 81 BPM

## 2024-06-13 DIAGNOSIS — R13.10 DYSPHAGIA, UNSPECIFIED TYPE: Primary | ICD-10-CM

## 2024-06-13 DIAGNOSIS — J38.4 EDEMA OF LARYNX: ICD-10-CM

## 2024-06-13 DIAGNOSIS — Q39.4 CRICOPHARYNGEAL WEB (HHS-HCC): ICD-10-CM

## 2024-06-13 DIAGNOSIS — C76.0 HEAD AND NECK CANCER (MULTI): ICD-10-CM

## 2024-06-13 DIAGNOSIS — T66.XXXS TOXICITY OF RADIATION, LATE EFFECT: ICD-10-CM

## 2024-06-13 DIAGNOSIS — R63.8 DECREASED ORAL INTAKE: ICD-10-CM

## 2024-06-13 LAB — GLUCOSE BLD MANUAL STRIP-MCNC: 162 MG/DL (ref 74–99)

## 2024-06-13 PROCEDURE — 2720000007 HC OR 272 NO HCPCS: Performed by: NURSE ANESTHETIST, CERTIFIED REGISTERED

## 2024-06-13 PROCEDURE — 3600000002 HC OR TIME - INITIAL BASE CHARGE - PROCEDURE LEVEL TWO: Performed by: NURSE ANESTHETIST, CERTIFIED REGISTERED

## 2024-06-13 PROCEDURE — 7100000010 HC PHASE TWO TIME - EACH INCREMENTAL 1 MINUTE: Performed by: NURSE ANESTHETIST, CERTIFIED REGISTERED

## 2024-06-13 PROCEDURE — A4217 STERILE WATER/SALINE, 500 ML: HCPCS | Performed by: OTOLARYNGOLOGY

## 2024-06-13 PROCEDURE — 2500000001 HC RX 250 WO HCPCS SELF ADMINISTERED DRUGS (ALT 637 FOR MEDICARE OP): Performed by: OTOLARYNGOLOGY

## 2024-06-13 PROCEDURE — 2500000004 HC RX 250 GENERAL PHARMACY W/ HCPCS (ALT 636 FOR OP/ED): Performed by: OTOLARYNGOLOGY

## 2024-06-13 PROCEDURE — 7100000009 HC PHASE TWO TIME - INITIAL BASE CHARGE: Performed by: NURSE ANESTHETIST, CERTIFIED REGISTERED

## 2024-06-13 PROCEDURE — 82947 ASSAY GLUCOSE BLOOD QUANT: CPT

## 2024-06-13 PROCEDURE — 2500000005 HC RX 250 GENERAL PHARMACY W/O HCPCS: Performed by: NURSE ANESTHETIST, CERTIFIED REGISTERED

## 2024-06-13 PROCEDURE — 2500000004 HC RX 250 GENERAL PHARMACY W/ HCPCS (ALT 636 FOR OP/ED): Performed by: NURSE ANESTHETIST, CERTIFIED REGISTERED

## 2024-06-13 PROCEDURE — 43233 EGD BALLOON DIL ESOPH30 MM/>: CPT | Performed by: OTOLARYNGOLOGY

## 2024-06-13 PROCEDURE — 3700000001 HC GENERAL ANESTHESIA TIME - INITIAL BASE CHARGE: Performed by: NURSE ANESTHETIST, CERTIFIED REGISTERED

## 2024-06-13 PROCEDURE — 3600000007 HC OR TIME - EACH INCREMENTAL 1 MINUTE - PROCEDURE LEVEL TWO: Performed by: NURSE ANESTHETIST, CERTIFIED REGISTERED

## 2024-06-13 PROCEDURE — 2500000005 HC RX 250 GENERAL PHARMACY W/O HCPCS: Performed by: OTOLARYNGOLOGY

## 2024-06-13 PROCEDURE — 3700000002 HC GENERAL ANESTHESIA TIME - EACH INCREMENTAL 1 MINUTE: Performed by: NURSE ANESTHETIST, CERTIFIED REGISTERED

## 2024-06-13 RX ORDER — ONDANSETRON HYDROCHLORIDE 2 MG/ML
4 INJECTION, SOLUTION INTRAVENOUS ONCE AS NEEDED
Status: DISCONTINUED | OUTPATIENT
Start: 2024-06-13 | End: 2024-06-14 | Stop reason: HOSPADM

## 2024-06-13 RX ORDER — NALOXONE HYDROCHLORIDE 0.4 MG/ML
0.2 INJECTION, SOLUTION INTRAMUSCULAR; INTRAVENOUS; SUBCUTANEOUS EVERY 5 MIN PRN
Status: CANCELLED | OUTPATIENT
Start: 2024-06-13

## 2024-06-13 RX ORDER — OXYMETAZOLINE HCL 0.05 %
SPRAY, NON-AEROSOL (ML) NASAL AS NEEDED
Status: COMPLETED | OUTPATIENT
Start: 2024-06-13 | End: 2024-06-13

## 2024-06-13 RX ORDER — SODIUM CHLORIDE, SODIUM LACTATE, POTASSIUM CHLORIDE, CALCIUM CHLORIDE 600; 310; 30; 20 MG/100ML; MG/100ML; MG/100ML; MG/100ML
100 INJECTION, SOLUTION INTRAVENOUS CONTINUOUS
Status: DISCONTINUED | OUTPATIENT
Start: 2024-06-13 | End: 2024-06-14 | Stop reason: HOSPADM

## 2024-06-13 RX ORDER — WATER 1 ML/ML
IRRIGANT IRRIGATION AS NEEDED
Status: COMPLETED | OUTPATIENT
Start: 2024-06-13 | End: 2024-06-13

## 2024-06-13 RX ORDER — SODIUM CHLORIDE, SODIUM LACTATE, POTASSIUM CHLORIDE, CALCIUM CHLORIDE 600; 310; 30; 20 MG/100ML; MG/100ML; MG/100ML; MG/100ML
20 INJECTION, SOLUTION INTRAVENOUS CONTINUOUS
Status: CANCELLED | OUTPATIENT
Start: 2024-06-13

## 2024-06-13 RX ORDER — SODIUM CHLORIDE 9 MG/ML
20 INJECTION, SOLUTION INTRAVENOUS CONTINUOUS
Status: CANCELLED | OUTPATIENT
Start: 2024-06-13

## 2024-06-13 RX ORDER — GLYCOPYRROLATE 0.2 MG/ML
INJECTION INTRAMUSCULAR; INTRAVENOUS AS NEEDED
Status: DISCONTINUED | OUTPATIENT
Start: 2024-06-13 | End: 2024-06-13

## 2024-06-13 RX ORDER — LIDOCAINE HYDROCHLORIDE 20 MG/ML
INJECTION, SOLUTION INFILTRATION; PERINEURAL AS NEEDED
Status: DISCONTINUED | OUTPATIENT
Start: 2024-06-13 | End: 2024-06-13

## 2024-06-13 RX ORDER — LIDOCAINE IN NACL,ISO-OSMOT/PF 30 MG/3 ML
0.1 SYRINGE (ML) INJECTION ONCE
Status: DISCONTINUED | OUTPATIENT
Start: 2024-06-13 | End: 2024-06-14 | Stop reason: HOSPADM

## 2024-06-13 RX ORDER — ONDANSETRON HYDROCHLORIDE 2 MG/ML
4 INJECTION, SOLUTION INTRAVENOUS ONCE AS NEEDED
Status: CANCELLED | OUTPATIENT
Start: 2024-06-13

## 2024-06-13 RX ORDER — LIDOCAINE HYDROCHLORIDE 40 MG/ML
SOLUTION TOPICAL AS NEEDED
Status: COMPLETED | OUTPATIENT
Start: 2024-06-13 | End: 2024-06-13

## 2024-06-13 RX ORDER — TRIAMCINOLONE ACETONIDE 40 MG/ML
INJECTION, SUSPENSION INTRA-ARTICULAR; INTRAMUSCULAR AS NEEDED
Status: COMPLETED | OUTPATIENT
Start: 2024-06-13 | End: 2024-06-13

## 2024-06-13 RX ORDER — SODIUM CHLORIDE, SODIUM LACTATE, POTASSIUM CHLORIDE, CALCIUM CHLORIDE 600; 310; 30; 20 MG/100ML; MG/100ML; MG/100ML; MG/100ML
INJECTION, SOLUTION INTRAVENOUS CONTINUOUS PRN
Status: DISCONTINUED | OUTPATIENT
Start: 2024-06-13 | End: 2024-06-13

## 2024-06-13 RX ORDER — PROPOFOL 10 MG/ML
INJECTION, EMULSION INTRAVENOUS CONTINUOUS PRN
Status: DISCONTINUED | OUTPATIENT
Start: 2024-06-13 | End: 2024-06-13

## 2024-06-13 RX ORDER — FLUMAZENIL 0.1 MG/ML
0.2 INJECTION INTRAVENOUS ONCE AS NEEDED
Status: CANCELLED | OUTPATIENT
Start: 2024-06-13

## 2024-06-13 RX ORDER — PROPOFOL 10 MG/ML
INJECTION, EMULSION INTRAVENOUS AS NEEDED
Status: DISCONTINUED | OUTPATIENT
Start: 2024-06-13 | End: 2024-06-13

## 2024-06-13 SDOH — HEALTH STABILITY: MENTAL HEALTH: CURRENT SMOKER: 0

## 2024-06-13 ASSESSMENT — PAIN - FUNCTIONAL ASSESSMENT
PAIN_FUNCTIONAL_ASSESSMENT: 0-10

## 2024-06-13 ASSESSMENT — COLUMBIA-SUICIDE SEVERITY RATING SCALE - C-SSRS
2. HAVE YOU ACTUALLY HAD ANY THOUGHTS OF KILLING YOURSELF?: NO
6. HAVE YOU EVER DONE ANYTHING, STARTED TO DO ANYTHING, OR PREPARED TO DO ANYTHING TO END YOUR LIFE?: NO
1. IN THE PAST MONTH, HAVE YOU WISHED YOU WERE DEAD OR WISHED YOU COULD GO TO SLEEP AND NOT WAKE UP?: NO

## 2024-06-13 ASSESSMENT — PAIN SCALES - GENERAL
PAINLEVEL_OUTOF10: 0 - NO PAIN

## 2024-06-13 NOTE — ANESTHESIA POSTPROCEDURE EVALUATION
"Patient: Kevin Mendes \"Pat\"    Procedure Summary       Date: 06/13/24 Room / Location: [facility] OR    Anesthesia Start: 0759 Anesthesia Stop:     Procedure: EGD Diagnosis: Dysphagia, unspecified type    Scheduled Providers: Isabel Beckman MD Responsible Provider: IDANIA Murry    Anesthesia Type: MAC ASA Status: 3            Anesthesia Type: MAC    Vitals Value Taken Time   /65 06/13/24 0826   Temp 36.5 06/13/24 0826   Pulse 85 06/13/24 0826   Resp 18 06/13/24 0826   SpO2 96 06/13/24 0826       Anesthesia Post Evaluation    Patient location during evaluation: bedside  Patient participation: complete - patient participated  Level of consciousness: responsive to light touch  Pain management: adequate  Airway patency: patent  Cardiovascular status: acceptable  Respiratory status: acceptable  Hydration status: acceptable  Postoperative Nausea and Vomiting: none        No notable events documented.    "

## 2024-06-13 NOTE — DISCHARGE INSTRUCTIONS
Postoperative Instructions    General: Pain of the nose and throat can be normal after these procedures. A small amount of blood from the nose or mouth can be expected.  Diet: Start with liquids after anesthesia. Soft foods may feel best for the first 2-3 days following your procedure. Soft foods include soup, noodles, scrambled eggs, oatmeal, yogurt, smoothies, applesauce, mashed potatoes, pasta or ice cream. Avoid toast, chips, hard crusted breads, and steak or similar meats. After your throat begins to feel less sore, you can continue your normal diet.   Pain Control: You may experience a mild to moderate sore throat or tongue for several days following the procedure. The throat pain may also seem to cause earaches from referred pain. We encourage use of acetaminophen (Tylenol) and /or ibuprofen (Motrin/Advil) for most pain control. These two medications can be taken together or can be alternated. We will sometimes prescribe you something stronger for pain for “break through.”  Use it only as needed. Do not drive while taking any narcotic pain medications.  Follow-up: Your follow-up with your doctor should be scheduled 2-3 weeks following surgery. If a biopsy was preformed, results will usually be available in the system 7 days following the surgery. You will be informed of the results.   Please call the office or go to the emergency room if you experience any of the following: difficulty breathing, inability to swallow, severe chest pain, fever great than 101 degrees, significant bleeding, or any new/concerning symptoms.  Contact:  During office hours between 8 AM and 5 PM, please call Dr. Beckman's office at [phone].  If you have an emergency over night or on the weekend, please call [phone] and ask the  to connect you to the ENT resident on call.

## 2024-06-13 NOTE — ANESTHESIA PREPROCEDURE EVALUATION
"Patient: Kevin Mendes \"Pat\"    Procedure Information       Date/Time: 06/13/24 0800    Scheduled providers: Isabel Beckman MD    Procedure: EGD    Location: [facility] OR            Relevant Problems   Cardiac   (+) Hyperlipidemia   (+) Hypertension   (+) SVT (supraventricular tachycardia) (CMS-HCC)      GI   (+) Dysphagia   (+) GERD (gastroesophageal reflux disease)      /Renal   (+) Benign prostatic hyperplasia without lower urinary tract symptoms   (+) Nephrolithiasis      Liver   (+) Malignant neoplasm of base of tongue (Multi)      Endocrine   (+) Type 2 diabetes mellitus with renal manifestations, controlled (Multi)      Hematology   (+) Anemia      Musculoskeletal   (+) DJD of shoulder   (+) Degenerative joint disease of knee   (+) Disc degeneration, lumbar       Clinical information reviewed:                   NPO Detail:  NPO/Void Status  Date of Last Liquid: 06/12/24  Time of Last Liquid: 2100  Date of Last Solid: 06/12/24  Time of Last Solid: 2100         Physical Exam    Airway  Mallampati: III  TM distance: >3 FB  Neck ROM: full     Cardiovascular - normal exam     Dental     Comments: Multiple missing teeth, very poor dentition, discolored, chipped teeth. Denies that any are loose. Left his bridge at home.   Pulmonary - normal exam     Abdominal            Anesthesia Plan    History of general anesthesia?: yes  History of complications of general anesthesia?: yes    ASA 3     MAC   (Difficult airway, video laryngoscope- 2 attempts.)  The patient is not a current smoker.    intravenous induction   Anesthetic plan and risks discussed with patient.      "

## 2024-06-14 ENCOUNTER — APPOINTMENT (OUTPATIENT)
Dept: RADIOLOGY | Facility: CLINIC | Age: 79
End: 2024-06-14
Payer: MEDICARE

## 2024-06-14 ENCOUNTER — HOSPITAL ENCOUNTER (OUTPATIENT)
Dept: RADIOLOGY | Facility: CLINIC | Age: 79
Discharge: HOME | End: 2024-06-14
Payer: MEDICARE

## 2024-06-14 ENCOUNTER — HOSPITAL ENCOUNTER (OUTPATIENT)
Dept: RADIATION ONCOLOGY | Facility: CLINIC | Age: 79
Setting detail: RADIATION/ONCOLOGY SERIES
Discharge: HOME | End: 2024-06-14
Payer: MEDICARE

## 2024-06-14 ENCOUNTER — APPOINTMENT (OUTPATIENT)
Dept: RADIATION ONCOLOGY | Facility: CLINIC | Age: 79
End: 2024-06-14
Payer: MEDICARE

## 2024-06-14 VITALS
DIASTOLIC BLOOD PRESSURE: 72 MMHG | WEIGHT: 187.83 LBS | SYSTOLIC BLOOD PRESSURE: 147 MMHG | TEMPERATURE: 98.4 F | BODY MASS INDEX: 26.95 KG/M2 | HEART RATE: 69 BPM | OXYGEN SATURATION: 97 % | RESPIRATION RATE: 18 BRPM

## 2024-06-14 DIAGNOSIS — C76.0 HEAD AND NECK CANCER (MULTI): ICD-10-CM

## 2024-06-14 DIAGNOSIS — R53.83 OTHER FATIGUE: ICD-10-CM

## 2024-06-14 DIAGNOSIS — Z92.3 HISTORY OF RADIATION THERAPY: Primary | ICD-10-CM

## 2024-06-14 PROCEDURE — 78815 PET IMAGE W/CT SKULL-THIGH: CPT | Mod: PS

## 2024-06-14 PROCEDURE — A9552 F18 FDG: HCPCS | Performed by: RADIOLOGY

## 2024-06-14 PROCEDURE — 99214 OFFICE O/P EST MOD 30 MIN: CPT | Performed by: RADIOLOGY

## 2024-06-14 PROCEDURE — 3430000001 HC RX 343 DIAGNOSTIC RADIOPHARMACEUTICALS: Performed by: RADIOLOGY

## 2024-06-14 RX ORDER — FLUDEOXYGLUCOSE F 18 200 MCI/ML
12.3 INJECTION, SOLUTION INTRAVENOUS
Status: COMPLETED | OUTPATIENT
Start: 2024-06-14 | End: 2024-06-14

## 2024-06-14 ASSESSMENT — ENCOUNTER SYMPTOMS
RESPIRATORY NEGATIVE: 1
ENDOCRINE NEGATIVE: 1
FATIGUE: 1
GASTROINTESTINAL NEGATIVE: 1
LOSS OF SENSATION IN FEET: 0
OCCASIONAL FEELINGS OF UNSTEADINESS: 0
BRUISES/BLEEDS EASILY: 1
FREQUENCY: 1
CARDIOVASCULAR NEGATIVE: 1
PSYCHIATRIC NEGATIVE: 1
DEPRESSION: 1
EYES NEGATIVE: 1
EXTREMITY WEAKNESS: 1

## 2024-06-14 ASSESSMENT — PATIENT HEALTH QUESTIONNAIRE - PHQ9
10. IF YOU CHECKED OFF ANY PROBLEMS, HOW DIFFICULT HAVE THESE PROBLEMS MADE IT FOR YOU TO DO YOUR WORK, TAKE CARE OF THINGS AT HOME, OR GET ALONG WITH OTHER PEOPLE: SOMEWHAT DIFFICULT
SUM OF ALL RESPONSES TO PHQ9 QUESTIONS 1 AND 2: 2
1. LITTLE INTEREST OR PLEASURE IN DOING THINGS: SEVERAL DAYS
2. FEELING DOWN, DEPRESSED OR HOPELESS: SEVERAL DAYS

## 2024-06-14 ASSESSMENT — PAIN SCALES - GENERAL: PAINLEVEL: 0-NO PAIN

## 2024-06-14 NOTE — PROGRESS NOTES
"Radiation Oncology Follow-Up    Patient Name:  Kevin Mendes  MRN:  [id number]  :  1945    Referring Provider: Kalin Garcia MD  Primary Care Provider: Kd Lange DO  Care Team: Patient Care Team:  Kd Lange DO as PCP - General  Kyunghee Burkitt, DO as Consulting Physician (Hematology and Oncology)  Tsering Jade MD as Consulting Physician (Hematology and Oncology)  Sonya Pike RDN, JASVIR as Dietitian (Nutrition)  KERRY Mendoza as  ()    Date of Service: 2024     SUMMARY: 78 y.o. 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 male who is presenting today for follow-up. Prior radiation treatment as follows:    Radiation Treatments       Active   No active radiation treatments to show.     Completed   OPX_BST (Started on 2024)   Most recent fraction: 200 cGy given on 2024   Total given: 1,000 cGy / 1,000 cGy  (5 of 5 fractions)   Elapsed Days: 6   Technique: VMAT        Oropharynx_R (Started on 2023)   Most recent fraction: 200 cGy given on 2024   Total given: 6,000 cGy / 6,000 cGy  (30 of 30 fractions)   Elapsed Days: 45   Technique: VMAT                     INTERIM HISTORY:    The patient recently underwent PET/CT with no evidence of disease (pending final radiology read). I have reviewed the films    Most recent scope exam by ENT revealed ADOLPH    SUBJECTIVE:    Today, the patient reports feeling well overall. They continue to recover from radiation side effects. He is still not eating anything by mouth. Had recent esophogeal dilation. Encouraged the patient to try different foods and see which ones he is able to take in.         Review of Systems:  A 14-point review of systems is negative except for what's listed in the history of present illness      Performance Status:   ECOG 1         OBJECTIVE  Vital Signs:  /72 (BP " Location: Right arm, Patient Position: Sitting, BP Cuff Size: Adult)   Pulse 69   Temp 36.9 °C (98.4 °F) (Temporal)   Resp 18   Wt 85.2 kg (187 lb 13.3 oz)   SpO2 97%   BMI 26.95 kg/m²    Physical Exam:  Constitutional: Awake, alert and oriented. No acute distress. Appears stated age.  Eyes: Conjunctivae are clear without exudates or hemorrhage. Eyelids are normal in appearance without swelling or lesions.  ENMT: mucous membranes moist, no apparent injury, no lesions seen  Neck: Neck supple, no apparent injury, trachea midline  Resp/Thorax: Patent airways,  good chest expansion. Thorax symmetric  Cardiovascular: The external chest is normal without lifts, heaves, or thrills. PMI is not visible.  GI: Nondistended, soft, non-tender.  /Gyn: Deferred  MSK: No tenderness noted on palpation of the spine. No ROM limitations.  Extremities: normal extremities, no cyanosis, erythema or edema.  Neurological: alert and oriented x3. Sensory and motor function appear intact. No gait abnormality observed.  Psych: Appropriate mood and behavior.  Skin: Warm and dry, no new lesions or rashes.             Labs:  Lab Results   Component Value Date    WBC 6.4 04/03/2024    HGB 12.6 (L) 04/03/2024    HCT 39.8 (L) 04/03/2024     04/03/2024    CHOL 151 04/03/2024    TRIG 153 (H) 04/03/2024    HDL 47.0 04/03/2024    ALT 9 (L) 04/03/2024    AST 15 04/03/2024     (L) 04/03/2024    K 4.5 04/03/2024    CL 96 (L) 04/03/2024    CREATININE 0.90 04/03/2024    BUN 27 (H) 04/03/2024    CO2 30 04/03/2024    TSH 2.41 04/03/2024    PSA 1.20 04/03/2024    INR 1.1 11/07/2023    GLUF 196 (H) 10/05/2020    HGBA1C 7.9 (H) 04/03/2024         ASSESSMENT:  Kevin Mendes is a 78 y.o. male with Malignant neoplasm of base of tongue (Multi), Clinical: Stage I (cT2, cN0, cM0, p16+).      The patient continues to recover from radiation side effects and has no evidence of recurrence     PLAN:    The patient will follow-up with radiation oncology  as needed   Follow-up with ENT as directed  Starting 6 months post radiation, TSH with reflex T4 every 6 months  Pt will need to follow-up with speech therapy      NCCN Guidelines were applicable to guide this patients treatment plan.  Sukhjinder Diaz MD       Future Appointments   Date Time Provider Department Center   6/14/2024 10:00 AM Sukhjinder Diaz MD [id number] [facility]   7/15/2024  1:15 PM Kalin Garcia MD [id number] [facility]   9/18/2024  2:00 PM Kd Lange DO [id number] [facility]

## 2024-06-14 NOTE — PROGRESS NOTES
"Radiation Oncology Nursing Note    Pain: The patient's current pain level was assessed.  They report currently having a pain of 0 out of 10.  They feel their pain is under control without the use of pain medications.    Review of Systems:  Review of Systems   Constitutional:  Positive for fatigue (always tired).   HENT:   Positive for hearing loss (worsening slowloy overtime, no hearing aids).    Eyes: Negative.    Respiratory: Negative.     Cardiovascular: Negative.    Gastrointestinal: Negative.    Endocrine: Negative.    Genitourinary:  Positive for frequency (\"20 times per day\").    Musculoskeletal:  Positive for gait problem.   Skin: Negative.         Pain about PEG tube site.   Derm follows   Neurological:  Positive for extremity weakness (generalized weakness throughout since starting treatment) and gait problem.   Hematological:  Bruises/bleeds easily.   Psychiatric/Behavioral: Negative.          "

## 2024-06-17 ENCOUNTER — TELEPHONE (OUTPATIENT)
Dept: OTOLARYNGOLOGY | Facility: HOSPITAL | Age: 79
End: 2024-06-17
Payer: MEDICARE

## 2024-06-17 ENCOUNTER — SOCIAL WORK (OUTPATIENT)
Dept: CASE MANAGEMENT | Facility: HOSPITAL | Age: 79
End: 2024-06-17
Payer: MEDICARE

## 2024-06-17 ENCOUNTER — PATIENT MESSAGE (OUTPATIENT)
Dept: OTOLARYNGOLOGY | Facility: CLINIC | Age: 79
End: 2024-06-17
Payer: MEDICARE

## 2024-06-17 DIAGNOSIS — R13.10 DYSPHAGIA, UNSPECIFIED TYPE: ICD-10-CM

## 2024-06-17 DIAGNOSIS — T66.XXXS RADIATION INJURY, SEQUELA: ICD-10-CM

## 2024-06-17 DIAGNOSIS — Q39.4 CRICOPHARYNGEAL WEB (HHS-HCC): ICD-10-CM

## 2024-06-17 NOTE — TELEPHONE ENCOUNTER
Pt called to schedule his repeat procedure with Dr. Beckman [facility]. Pt confirmed for 7/11/24 with arrival time of 7:00 AM for an 8:00 AM start time.

## 2024-06-17 NOTE — PROGRESS NOTES
Social Work Note  6/17/2024 SW contacted patient as RN was concerned about assistance for patient and wife.  Patient stated his wife is unable to assist now as she has having physical concerns herself.  We discussed programing available.  Wife was in the background.  SW discussed services for wife that she could get orders through her PCP.  At this time, patient is unable to do what this writer suggested.  SW provided patient this writer's contact information and encouraged them to call with home needs.  SW will remain available to assist patient.  Fariba Regan, MSW, LSW

## 2024-07-02 ENCOUNTER — TREATMENT (OUTPATIENT)
Dept: SPEECH THERAPY | Facility: CLINIC | Age: 79
End: 2024-07-02
Payer: MEDICARE

## 2024-07-02 DIAGNOSIS — R13.12 OROPHARYNGEAL DYSPHAGIA: Primary | ICD-10-CM

## 2024-07-02 PROCEDURE — 92526 ORAL FUNCTION THERAPY: CPT | Mod: GN

## 2024-07-03 NOTE — PROGRESS NOTES
"Speech-Language Pathology    [facility] Adult Outpatient Swallow Treatment     Patient Name: Kevin Mendes \"Katherine\"  MRN: [id number]  Today's Date: 7/3/2024  Time Calculation  Start Time: 1515  Stop Time: 1600  Time Calculation (min): 45 min     SUBJECTIVE  Patient alert and oriented and ready to participate in office visit this date. Pt currently consuming all caloric intake via PEG tube with attempts at bolus driven therapy with water and purees. Pt reports only swallowing water at this time utilizing compensatory strategies identified on FEES exam.    The dysphagia history includes:      Dysphagia for solids               Yes    Dysphagia for liquids              Yes    Dysphagia for pills                  Yes  Associated weight loss            No    Recent pneumonia/bronchitis  No  GERD/Acid reflux   No      OBJECTIVE  LONG TERM GOAL  Improve overall swallow safety and efficiency to tolerate the least restrictive diet.    SHORT TERM GOALS (established 05/28/24)  Pt will complete effortful swallows 10 reps, 3 times per day for 7 days a week; to strengthen pharyngeal muscles for improve bolus clearance through the pharynx.    Pt will produce a series of lingual exercises/stretches independent of cues 10 reps, 3 times per day, 7 days a week; to maintain strength and motility of the lingual muscles and reduce the effects of radiation for safe and efficient PO intake.  Pt will complete a series of neck stretches independent of cues, 5 times a day for 7 days a week; to reduce the effects of radiation on the muscles necessary for optimal head position for safe and efficient PO intake.  Pt will complete isometric shaker for 1 min 3 reps at 3 sets a day, independent of cues; to improve hyolaryngeal elevation/excursion and UES opening during the swallow.   Pt will utilize and recall compensatory strategies independent of cues 100% of the time to tolerate the least restrictive diet without overt s/s of pharyngeal deficits.  Pt will " complete EMST 5 reps, 5x a day for 5 days @ 30 cmH2o, independent of cues to strengthening movement of the hyolaryngeal complex necessary for airway protection during the swallow.     TREATMENT  Instructed patient this date in: diet modifications with bolus driven therapy, oral care regiment and asp pna prevention, swallow exercises.    Results and recommendations from recent FEES exam discussed with patient in detail.     - Pt consumed PO trials of water, purees and soft solids. Pt tolerated thin liquids sips x3 ounces and 4 ounces of purees utilizing head rotation to the right with multiple hard swallows. Occasional throat clear re-swallow noted but no coughing or choking. Pt advanced to soft solids (oreo's saturated in milk) with notable coughing observed. Cough productive to clear vocal quality. Pt encouraged to increase bolus driven therapy to multiple times a day with purees and thin liquids with utilization of compensatory techniques of head rotation to the right and hard multiple swallows. Plan to attempt breakfast each morning via PO.     - Prognostic indicators for risk in development of aspiration PNA discussed and provided in handout form given identified aspiration on instrumental exam.     - Swallow exercises reviewed and pt able to return demonstrate with adequate skill.     Clinician modeled all techniques and accurate patient follow through confirmed.  Handouts emailed/provided to facilitate optimal home carryover.    RECOMMENDATIONS:  Continue PEG tube for nutrition, medications and hydration.  Consume PO trials of purees and thin liquids 3x a day; utilizing compensatory techniques.  Complete oral care frequently throughout the day.     PLAN  Continue current plan of care; follow up with SLP post dilation on Jul 11th. Plan for repeat FEES exam.   Monitor home program  Patient instructed to call if there are problems  Patient was referred to dietician for TF management with introduction of more  PO.  Follow up with [facility] for alejandro button consideration and ability to play gold with PEG tube in place.    Progress with POC, as tolerated  Discussed POC with   Patient/caregiver agreeable with POC

## 2024-07-05 ENCOUNTER — TELEPHONE (OUTPATIENT)
Dept: PRIMARY CARE | Facility: CLINIC | Age: 79
End: 2024-07-05
Payer: MEDICARE

## 2024-07-05 DIAGNOSIS — N18.30 CONTROLLED TYPE 2 DIABETES MELLITUS WITH STAGE 3 CHRONIC KIDNEY DISEASE, WITHOUT LONG-TERM CURRENT USE OF INSULIN (MULTI): ICD-10-CM

## 2024-07-05 DIAGNOSIS — E11.22 CONTROLLED TYPE 2 DIABETES MELLITUS WITH STAGE 3 CHRONIC KIDNEY DISEASE, WITHOUT LONG-TERM CURRENT USE OF INSULIN (MULTI): ICD-10-CM

## 2024-07-05 NOTE — TELEPHONE ENCOUNTER
Patient's wife called and is requesting you send a new prescription to [facility] for his metformin suspension. [facility] is closing and will not transfer this script   Thank-you

## 2024-07-10 ENCOUNTER — ANESTHESIA EVENT (OUTPATIENT)
Dept: OPERATING ROOM | Facility: CLINIC | Age: 79
End: 2024-07-10
Payer: MEDICARE

## 2024-07-11 ENCOUNTER — HOSPITAL ENCOUNTER (OUTPATIENT)
Dept: OPERATING ROOM | Facility: CLINIC | Age: 79
Setting detail: OUTPATIENT SURGERY
Discharge: HOME | End: 2024-07-11
Payer: MEDICARE

## 2024-07-11 ENCOUNTER — ANESTHESIA (OUTPATIENT)
Dept: OPERATING ROOM | Facility: CLINIC | Age: 79
End: 2024-07-11
Payer: MEDICARE

## 2024-07-11 VITALS
BODY MASS INDEX: 27.14 KG/M2 | OXYGEN SATURATION: 97 % | DIASTOLIC BLOOD PRESSURE: 63 MMHG | RESPIRATION RATE: 16 BRPM | HEART RATE: 69 BPM | SYSTOLIC BLOOD PRESSURE: 131 MMHG | WEIGHT: 189.15 LBS | TEMPERATURE: 97.5 F

## 2024-07-11 DIAGNOSIS — T17.908S ASPIRATION INTO AIRWAY, SEQUELA: ICD-10-CM

## 2024-07-11 DIAGNOSIS — I89.0 LYMPHEDEMA: ICD-10-CM

## 2024-07-11 DIAGNOSIS — Q39.4 CRICOPHARYNGEAL WEB (HHS-HCC): ICD-10-CM

## 2024-07-11 DIAGNOSIS — R13.10 DYSPHAGIA, UNSPECIFIED TYPE: Primary | ICD-10-CM

## 2024-07-11 DIAGNOSIS — T66.XXXS RADIATION INJURY, SEQUELA: ICD-10-CM

## 2024-07-11 LAB — GLUCOSE BLD MANUAL STRIP-MCNC: 89 MG/DL (ref 74–99)

## 2024-07-11 PROCEDURE — 2720000007 HC OR 272 NO HCPCS: Performed by: NURSE ANESTHETIST, CERTIFIED REGISTERED

## 2024-07-11 PROCEDURE — 82947 ASSAY GLUCOSE BLOOD QUANT: CPT

## 2024-07-11 PROCEDURE — 3600000007 HC OR TIME - EACH INCREMENTAL 1 MINUTE - PROCEDURE LEVEL TWO: Performed by: NURSE ANESTHETIST, CERTIFIED REGISTERED

## 2024-07-11 PROCEDURE — 7100000009 HC PHASE TWO TIME - INITIAL BASE CHARGE: Performed by: NURSE ANESTHETIST, CERTIFIED REGISTERED

## 2024-07-11 PROCEDURE — 3700000002 HC GENERAL ANESTHESIA TIME - EACH INCREMENTAL 1 MINUTE: Performed by: NURSE ANESTHETIST, CERTIFIED REGISTERED

## 2024-07-11 PROCEDURE — 3700000001 HC GENERAL ANESTHESIA TIME - INITIAL BASE CHARGE: Performed by: NURSE ANESTHETIST, CERTIFIED REGISTERED

## 2024-07-11 PROCEDURE — 2500000004 HC RX 250 GENERAL PHARMACY W/ HCPCS (ALT 636 FOR OP/ED): Performed by: OTOLARYNGOLOGY

## 2024-07-11 PROCEDURE — 3600000002 HC OR TIME - INITIAL BASE CHARGE - PROCEDURE LEVEL TWO: Performed by: NURSE ANESTHETIST, CERTIFIED REGISTERED

## 2024-07-11 PROCEDURE — A31622 PR BRONCHOSCOPY,DIAGNOSTIC: Performed by: NURSE ANESTHETIST, CERTIFIED REGISTERED

## 2024-07-11 PROCEDURE — 2500000004 HC RX 250 GENERAL PHARMACY W/ HCPCS (ALT 636 FOR OP/ED): Performed by: NURSE ANESTHETIST, CERTIFIED REGISTERED

## 2024-07-11 PROCEDURE — C1725 CATH, TRANSLUMIN NON-LASER: HCPCS | Performed by: NURSE ANESTHETIST, CERTIFIED REGISTERED

## 2024-07-11 PROCEDURE — 43233 EGD BALLOON DIL ESOPH30 MM/>: CPT | Performed by: OTOLARYNGOLOGY

## 2024-07-11 PROCEDURE — 31622 DX BRONCHOSCOPE/WASH: CPT | Performed by: OTOLARYNGOLOGY

## 2024-07-11 PROCEDURE — 7100000010 HC PHASE TWO TIME - EACH INCREMENTAL 1 MINUTE: Performed by: NURSE ANESTHETIST, CERTIFIED REGISTERED

## 2024-07-11 RX ORDER — SODIUM CHLORIDE, SODIUM LACTATE, POTASSIUM CHLORIDE, CALCIUM CHLORIDE 600; 310; 30; 20 MG/100ML; MG/100ML; MG/100ML; MG/100ML
INJECTION, SOLUTION INTRAVENOUS CONTINUOUS PRN
Status: DISCONTINUED | OUTPATIENT
Start: 2024-07-11 | End: 2024-07-11

## 2024-07-11 RX ORDER — TRIAMCINOLONE ACETONIDE 40 MG/ML
INJECTION, SUSPENSION INTRA-ARTICULAR; INTRAMUSCULAR AS NEEDED
Status: COMPLETED | OUTPATIENT
Start: 2024-07-11 | End: 2024-07-11

## 2024-07-11 RX ORDER — PROPOFOL 10 MG/ML
INJECTION, EMULSION INTRAVENOUS CONTINUOUS PRN
Status: DISCONTINUED | OUTPATIENT
Start: 2024-07-11 | End: 2024-07-11

## 2024-07-11 RX ORDER — GLYCOPYRROLATE 0.2 MG/ML
INJECTION INTRAMUSCULAR; INTRAVENOUS AS NEEDED
Status: DISCONTINUED | OUTPATIENT
Start: 2024-07-11 | End: 2024-07-11

## 2024-07-11 SDOH — HEALTH STABILITY: MENTAL HEALTH: CURRENT SMOKER: 0

## 2024-07-11 ASSESSMENT — PAIN - FUNCTIONAL ASSESSMENT
PAIN_FUNCTIONAL_ASSESSMENT: 0-10

## 2024-07-11 ASSESSMENT — PAIN SCALES - GENERAL
PAINLEVEL_OUTOF10: 0 - NO PAIN

## 2024-07-11 NOTE — ANESTHESIA PREPROCEDURE EVALUATION
"Patient: Kevin Mendes \"Pat\"    Procedure Information       Date/Time: 07/11/24 0845    Scheduled providers: Isabel Beckman MD    Procedure: EGD    Location: [facility] OR            Relevant Problems   Cardiac   (+) Hyperlipidemia   (+) Hypertension   (+) SVT (supraventricular tachycardia) (CMS-HCC)      GI   (+) Dysphagia   (+) GERD (gastroesophageal reflux disease)      /Renal   (+) Benign prostatic hyperplasia without lower urinary tract symptoms   (+) Nephrolithiasis      Liver   (+) Malignant neoplasm of base of tongue (Multi)      Endocrine   (+) Type 2 diabetes mellitus with renal manifestations, controlled (Multi)      Hematology   (+) Anemia      Musculoskeletal   (+) DJD of shoulder   (+) Degenerative joint disease of knee   (+) Disc degeneration, lumbar       Clinical information reviewed:    Allergies  Meds               NPO Detail:  NPO/Void Status  Date of Last Liquid: 07/10/24  Time of Last Liquid: 2000  Last Intake Type: Clear fluids (only tube feedings per peg tube)         Physical Exam    Airway  Mallampati: III  TM distance: >3 FB  Neck ROM: full     Cardiovascular - normal exam     Dental     Comments: Bridge removed by pt. Poor dentition; multiple missing teeth, chipped and discolored.   Pulmonary - normal exam     Abdominal            Anesthesia Plan    History of general anesthesia?: yes  History of complications of general anesthesia?: yes    ASA 3     MAC   ( Known difficult airway. Airway secured with glidescope, 2 attempts in previous GA)  The patient is not a current smoker.    intravenous induction   Anesthetic plan and risks discussed with patient.      "

## 2024-07-11 NOTE — DISCHARGE INSTRUCTIONS
Postoperative Instructions    General: Pain of the nose and throat can be normal after these procedures. A small amount of blood from the nose or mouth can be expected.  Diet: Start with liquids after anesthesia. Soft foods may feel best for the first 2-3 days following your procedure. Soft foods include soup, noodles, scrambled eggs, oatmeal, yogurt, smoothies, applesauce, mashed potatoes, pasta or ice cream. Avoid toast, chips, hard crusted breads, and steak or similar meats. After your throat begins to feel less sore, you can continue your normal diet.   Pain Control: You may experience a mild to moderate sore throat or tongue for several days following the procedure. The throat pain may also seem to cause earaches from referred pain. We encourage use of acetaminophen (Tylenol) and /or ibuprofen (Motrin/Advil) for most pain control. These two medications can be taken together or can be alternated. We will sometimes prescribe you something stronger for pain for “break through.”  Use it only as needed. Do not drive while taking any narcotic pain medications.  Follow-up: Your follow-up with your doctor should be scheduled 2-3 weeks following surgery. If a biopsy was preformed, results will usually be available in the system 7 days following the surgery. You will be informed of the results.   Please call the office or go to the emergency room if you experience any of the following: difficulty breathing, inability to swallow, severe chest pain, fever great than 101 degrees, significant bleeding, or any new/concerning symptoms.  Contact:  During office hours between 8 AM and 5 PM, please call Dr. Beckman's office at [phone].  If you have an emergency over night or on the weekend, please call [phone] and ask the  to connect you to the ENT resident on call.

## 2024-07-11 NOTE — H&P
History Of Present Illness  Katherine Mendes is a 78 y.o. male presenting with PEG dependent dysphagia in setting of multimodality treatment for p16+ BOT SCC. MBS with CP impression w/ web and PES narrowing, significant residue. Last dilation 6/13/24 to 32mm.      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), Insomnia (02/19/2024), Leukopenia (03/05/2024), Localized, primary osteoarthritis (09/06/2018), Low back pain, unspecified (09/25/2017), Malignant melanoma of skin of trunk, except scrotum (Multi) (07/23/2009), Malignant neoplasm of base of tongue (Multi), Mass of neck (03/05/2024), Open wound (03/05/2024), Oropharyngeal dysphagia (03/05/2024), Personal history of other diseases of the circulatory system, Personal history of other diseases of urinary system (12/31/2022), Personal history of other endocrine, nutritional and metabolic disease, Polyneuropathy (04/05/2010), Presence of right artificial knee joint (01/12/2018), Sensorineural hearing loss, bilateral (07/23/2018), Tongue mass (10/05/2023), Type 2 diabetes mellitus (Multi), and Urinary tract infection (03/05/2024).    Surgical History  He has a past surgical history that includes Total hip arthroplasty (04/10/2014); Total knee arthroplasty (04/10/2014); Other surgical history (04/10/2014); Hand surgery (04/07/2017); Cataract extraction (04/07/2017); and Shoulder surgery (04/07/2017).     Social History  He reports that he has quit smoking. His smoking use included cigarettes. He has been exposed to tobacco smoke. He  has never used smokeless tobacco. He reports that he does not currently use alcohol. He reports that he does not use drugs.    Family History  Family History   Problem Relation Name Age of Onset    Other (heart failure following cardiac surgery) Mother          Allergies  Cat dander    Review of Systems     Physical Exam  GENERAL: Well-nourished and developed, alert and appropriate, no distress, voice [id number]  RESPIRATORY: Breathing quietly, no stridor  HEAD: Normocephalic atraumatic  FACE: Symmetric, no masses or lesions  EYES:  Pupils reactive, sclera clear, external ocular muscles intact, no nystagmus.    EARS:  Pinnae normal.   NOSE:  No anterior lesions, masses or polyps.  ORAL CAVITY/OROPHARYNX:  Buccal mucosa is moist without lesions or masses, tongue midline and palate elevates symmetrically. Tongue mobility intact.  NECK:  There is no lymphadenopathy or thyromegaly.  Moderate submental lymphedema  NEUROLOGIC:  Cranial nerves II-XII grossly intact.      Last Recorded Vitals  There were no vitals taken for this visit.    Relevant Results           Assessment/Plan   Active Problems:  There are no active Hospital Problems.    1. PEG dependent dysphagia in setting of multimodality treatment for p16+ BOT SCC   - Scheduled for repeat esophagoscopy and dilation, steroid injection to posterior pharyngeal wall     Isabel Beckman MD

## 2024-07-11 NOTE — ANESTHESIA POSTPROCEDURE EVALUATION
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[facility])      Radiation injury, sequela    Scheduled Providers: Isabel Beckman MD Responsible Provider: ELY Murry-CRNA    Anesthesia Type: MAC ASA Status: 3            Anesthesia Type: MAC    Vitals Value Taken Time   /63 07/11/24 0909   Temp 36.5 07/11/24 0909   Pulse 75 07/11/24 0909   Resp 16 07/11/24 0909   SpO2 100 07/11/24 0909       Anesthesia Post Evaluation    Patient location during evaluation: bedside  Patient participation: complete - patient participated  Level of consciousness: awake  Pain management: adequate  Airway patency: patent  Cardiovascular status: acceptable  Respiratory status: acceptable and room air  Hydration status: acceptable  Postoperative Nausea and Vomiting: none        No notable events documented.    "

## 2024-07-15 ENCOUNTER — APPOINTMENT (OUTPATIENT)
Dept: OTOLARYNGOLOGY | Facility: CLINIC | Age: 79
End: 2024-07-15
Payer: MEDICARE

## 2024-07-15 VITALS
SYSTOLIC BLOOD PRESSURE: 118 MMHG | BODY MASS INDEX: 26.6 KG/M2 | WEIGHT: 185.8 LBS | HEIGHT: 70 IN | DIASTOLIC BLOOD PRESSURE: 71 MMHG | TEMPERATURE: 98 F

## 2024-07-15 DIAGNOSIS — R13.10 DYSPHAGIA, UNSPECIFIED TYPE: ICD-10-CM

## 2024-07-15 PROCEDURE — 1036F TOBACCO NON-USER: CPT | Performed by: OTOLARYNGOLOGY

## 2024-07-15 PROCEDURE — 1157F ADVNC CARE PLAN IN RCRD: CPT | Performed by: OTOLARYNGOLOGY

## 2024-07-15 PROCEDURE — 3074F SYST BP LT 130 MM HG: CPT | Performed by: OTOLARYNGOLOGY

## 2024-07-15 PROCEDURE — 1159F MED LIST DOCD IN RCRD: CPT | Performed by: OTOLARYNGOLOGY

## 2024-07-15 PROCEDURE — 3078F DIAST BP <80 MM HG: CPT | Performed by: OTOLARYNGOLOGY

## 2024-07-15 PROCEDURE — 99213 OFFICE O/P EST LOW 20 MIN: CPT | Performed by: OTOLARYNGOLOGY

## 2024-07-15 PROCEDURE — 31575 DIAGNOSTIC LARYNGOSCOPY: CPT | Performed by: OTOLARYNGOLOGY

## 2024-07-15 PROCEDURE — 1123F ACP DISCUSS/DSCN MKR DOCD: CPT | Performed by: OTOLARYNGOLOGY

## 2024-07-15 PROCEDURE — 1160F RVW MEDS BY RX/DR IN RCRD: CPT | Performed by: OTOLARYNGOLOGY

## 2024-07-15 NOTE — PROGRESS NOTES
ENT Outpatient Consultation    Chief Complaint: Base of tongue mass  History Of Present Illness  Katherine Mendes is a 78 y.o. male referred by Dr. Holly for evaluation of a base of tongue mass.  Patient states that he developed a globus type sensation a little over 1 month ago.  This prompted his evaluation with Dr. Holly who noted a oropharyngeal mass.  A CT scan was obtained showing an exophytic mass in the oropharynx that appears to be coming from the base of tongue.  There is some invasion into the deeper aspect of the base of tongue and the mass extends out laterally towards the carotid.  He has a history of smoking and a lung nodule was also noted on his CT scan.  He is still tolerating p.o. intake.    11/10/23: Patient returns for follow-up.  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  Final pathology is pending.  We also placed a PEG tube and he is here today to have it checked    2/13/24: Patient returns for follow-up.  He has completed treatment and overall is doing well.  He has had continued sore throat but it has been improving over the past few nights.  He has not taken any p.o. intake.  His CBC showed a drop in his cell counts and they would like to have it rechecked    4/15/24: Patient returns for follow-up.  Continues to have significant dysphagia.  He is working closely with speech.  He also has loss of appetite.  He is using PEG tube daily    7/15/24: Patient returns for follow-up.  He remains frustrated regarding his swallowing.  He is still PEG tube dependent.  He has been working with Dr. Beckman and her team.  He is inquiring about possible placement of a Asif button.  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), Insomnia (02/19/2024), Leukopenia (03/05/2024), Localized, primary osteoarthritis (09/06/2018), Low back pain, unspecified (09/25/2017), Malignant melanoma of skin of trunk, except scrotum (Multi) (07/23/2009), Malignant neoplasm of base of tongue (Multi), Mass of neck (03/05/2024), Open wound (03/05/2024), Oropharyngeal dysphagia (03/05/2024), Personal history of other diseases of the circulatory system, Personal history of other diseases of urinary system (12/31/2022), Personal history of other endocrine, nutritional and metabolic disease, Polyneuropathy (04/05/2010), Presence of right artificial knee joint (01/12/2018), Sensorineural hearing loss, bilateral (07/23/2018), Tongue mass (10/05/2023), Type 2 diabetes mellitus (Multi), and Urinary tract infection (03/05/2024).    Surgical History  He has a past surgical history that includes Total hip arthroplasty (04/10/2014); Total knee arthroplasty (04/10/2014); Other surgical history (04/10/2014); Hand surgery (04/07/2017); Cataract extraction (04/07/2017); and Shoulder surgery (04/07/2017).     Social History  He reports that he has quit smoking. His smoking use included cigarettes. He has been exposed to tobacco smoke. He has never used smokeless tobacco. He reports that he does not currently use alcohol. He reports that he does not use drugs.    Family History  Family History   Problem Relation Name Age of Onset    Other (heart failure following cardiac surgery) Mother          Allergies  Cat dander     Physical Exam:  CONSTITUTIONAL:  No acute distress  VOICE:  No hoarseness or other abnormality  RESPIRATION:  Breathing comfortably, no stridor  CV:  No clubbing/cyanosis/edema in hands  EYES:  EOM  intact, sclera normal  NEURO:  Alert and oriented times 3, Cranial nerves II-XII grossly intact and symmetric bilaterally  HEAD AND FACE:  Symmetric facial features, no masses or lesions, sinuses non-tender to palpation  SALIVARY GLANDS:  Parotid and submandibular glands normal bilaterally  EARS:  Normal external ears, external auditory canals, and TMs to otoscopy, normal hearing to whispered voice.  NOSE:  External nose midline, anterior rhinoscopy is normal with limited visualization to the anterior aspect of the interior turbinates, no bleeding or drainage, no lesions  ORAL CAVITY/OROPHARYNX/LIPS: I cannot visualize any mass in the oropharynx the remainder of the oral cavity has normal mucosa  PHARYNGEAL WALLS: No mass visualized  NECK/LYMPH: No palpable LAD, no thyroid masses, trachea midline.  There is a lipomatous lesion in the left inferior neck near the sternocleidomastoid  SKIN:  Neck skin is without scar or injury  PSYCH:  Alert and oriented with appropriate mood and affect     Procedure Note: Flexible Nasolaryngoscopy  Verbal informed consent was obtained from the patient/patient's guardian. 4% lidocaine mixed with phenylephrine was prepared and dripped into the nose. It was placed in the right naris. Following an appropriate amount of time to allow for adequate anesthesia, a flexible fiberoptic nasolaryngoscope was placed into the patient's right naris. The nasal cavity, nasopharynx, oropharynx, hypopharynx, and all endolaryngeal structures were visualized and were normal except as listed below. Significant findings included:  -No concerning mucosal lesions, mucosal burns have improved    Assessment and Plan  78 y.o. 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   SCC of base of tongue, p16 +, > 10 PPD smoking history, [id number] .  Completed treatment February 1, 2024      -Encouraged him to continue working with speech and Dr. Beckman to help improve  swallowing.  I will reach out to her about the Asif button  -Follow-up in approximately 3 months, earlier with any new concerns    Kalin Garcia MD

## 2024-07-16 DIAGNOSIS — R13.10 DYSPHAGIA, UNSPECIFIED TYPE: ICD-10-CM

## 2024-07-22 ENCOUNTER — EVALUATION (OUTPATIENT)
Dept: OCCUPATIONAL THERAPY | Facility: CLINIC | Age: 79
End: 2024-07-22
Payer: MEDICARE

## 2024-07-22 DIAGNOSIS — I89.0 LYMPHEDEMA OF FACE: Primary | ICD-10-CM

## 2024-07-22 DIAGNOSIS — M43.6 NECK STIFFNESS: ICD-10-CM

## 2024-07-22 DIAGNOSIS — I89.0 LYMPHEDEMA, NOT ELSEWHERE CLASSIFIED: ICD-10-CM

## 2024-07-22 DIAGNOSIS — M62.81 MUSCLE WEAKNESS (GENERALIZED): ICD-10-CM

## 2024-07-22 DIAGNOSIS — Q39.4 CRICOPHARYNGEAL WEB (HHS-HCC): ICD-10-CM

## 2024-07-22 DIAGNOSIS — T66.XXXS RADIATION INJURY, SEQUELA: ICD-10-CM

## 2024-07-22 DIAGNOSIS — R13.10 DYSPHAGIA, UNSPECIFIED TYPE: ICD-10-CM

## 2024-07-22 PROCEDURE — 97166 OT EVAL MOD COMPLEX 45 MIN: CPT | Mod: GO

## 2024-07-22 PROCEDURE — 97535 SELF CARE MNGMENT TRAINING: CPT | Mod: GO

## 2024-07-22 ASSESSMENT — ACTIVITIES OF DAILY LIVING (ADL): HOME_MANAGEMENT_TIME_ENTRY: 15

## 2024-07-22 NOTE — PROGRESS NOTES
"Occupational Therapy    Occupational Therapy Evaluation    Name: Kevin Mendes \"Katherine\"  MRN: [id number]  : 1945  Date: 2024  Visit #1    Time Entry:  Time Calculation  Start Time: 930  Stop Time: 1030  Time Calculation (min): 60 min  OT Evaluation Time Entry  OT Evaluation (Moderate) Time Entry: 45     OT Therapeutic Procedures Time Entry  Self Care/Home Management (ADLs) Time Entry: 15                Assessment:  Pt presents to occupational therapy today with lymphedema neck and face, neck stiffness, decreased end range ROM, weakness impacting functional movement, activity.  Standardized testing and measures administered today, including LLIS, reveal that the patient has multiple impairments in body structures and functions, activity limitations, and participation restrictions.   The patient has personal factors and comorbidities that may serve as barriers affecting the plan of care, including h/o oropharyngeal cancer and effects of tx, PEG tube.  Skilled OT services are warranted in order to realize measurable change in the above outcome measures and achieve improvements in the patient's functional status and individual goals. The patient verbalized understanding and is in agreement with all goals and plan of care.    Clinical Presentation: evolving with changing characteristics   Level of Complexity: moderate   Problems To Be Addressed: decreased ADL performance, decreased IADL performance, decreased play/leisure performance, decreased knowledge of precautions, decreased knowledge of HEP, edema, pain, decreased ROM/joint mobility, decreased strength, impaired sensation/sensibility and lymphedema.       Plan:  Pt will:   -demonstrate decreased swelling, tightness and softened tissue texture of neck and face  -demonstrate decreased/stable neck and face measurements   -demonstrate increased knowledge of lymphedema skin care precautions, especially prior radiated skin, to reduce the risk of infection and " exacerbation  -demonstrate independence with self manual lymph drainage (MLD) to enhance the lymphatic flow and decongestion of face and neck lymphedema  -demonstrate understanding of lymphatic compression techniques and rationale  -tolerate compression garments, wraps as appropriate, follow established wear schedule  -decrease tightness, discomfort, pain  -increase ease of neck ROM, strength as needed for functional activities  -demonstrate understanding and compliance of lymphedema exercise/activity precautions  -be independent with HEPs    Intervention plan include: vasopneumatic device , ADLs, compression bandaging , edema control , education/instruction , garment measuring/fitting , home program , IADLs, manual lymphatic drainage , manual therapy , therapeutic activities and therapeutic exercises.   Frequency and duration: 1-2 time(s) a week, for 8-12 weeks.   Potential to achieve rehab goals is good.     Plan of care was developed with input and agreement by the patient.     Subjective   Current Problem:  1. Lymphedema of face        2. 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 Radiation injury, sequela  Referral to Occupational Therapy      5. Lymphedema, not elsewhere classified        6. Neck stiffness        7. Muscle weakness (generalized)          General:   Lymphedema History   History of present illness. Pt presents to OT with lymphedema neck and face, neck stiffness, decreased end range ROM, weakness impacting functional movement, activity.  History:   squamous cancer base of tongue, radiation 12/2023 - 2/2024, chemo infusions. Pt has dysphagia, sees ST; pt on PEG tube. Pt reports esophageal tightness, has stretching procedures x2.  Pt has not had lymphedema tx, not using compression.  Pt has h/o skin cancer with excisions, cheek, back.  Other history: HTN; HLD; DM II; OA; tendonitis left shoulder with  range limits; R RKR 2018; L THR 1983; ankle injury requiring hardware/repair 1987; SVT 1999 with two ablations; h/o right thumb [facility] surgery.    Living Environment:   Pt lives with his wife in 1 floor apartment, 3 steps into building, 6 steps with railing to apartment. Pt's wife has health difficulties per pt.  Pt walks without device, manages stairs, hand rail support, step to pattern.  Pt manages self cares, he manages his tube feeding.  Pt sleeps in recliner chair, does not lay flat.  Pt drives.     Precautions:   PEG tube     Pain Assessment:  Pt reports tightness, denies pain.     Outcome Measures:  LLIS= 9    Objective   Head and neck  ROM  Neck tight, restricted with head turns.  Tight with nexk extension.  L shoulder grossly 90 degrees flexion.   Strength  Pt fatigue, general weakness.  Sensation  Intact neck, face.    Head and neck edema:  Fullness face, jaw  Full, firm, fibrosis sub mental area  Full, firm, fibrosis neck.    Head and neck measurements cm  -Tragus to mental protuberance:  R= 14.7,    L= 14.3  -Tragus to mouth angle:   R= 9.5,   L= 10.0  -Mandibular angle to nasal wing:   R= 11.1.   L= 11.7  -Mandibular angle to ext eye corner:   R= 11.3,  L= 10.9  -Mandibular angle to int eye corner  ----  -Mental protuberance to int eye corner:  ----  -Mandibular angle to mental protuberance:  R= 10.8,  L= 12.4    Neck circumference cm:  5 cm below chin= 45.1  7 cm below chin= 43.9  Base of neck= 42.4        Treatment:   60 minutes    EVALUATION: 45  SELF CARE: 15  Pt instructed on function of lymphatic system, causes of lymphedema, lymphedema tx/CDT-complete decongestive therapy which includes skin care, manual lymph drainage (MLD), compression and exercise. NLN hand out issued.   OT demonstrate MLD techniques, instruct pt to begin light neck clearing. Will instruct full sequence.

## 2024-07-23 ENCOUNTER — PROCEDURE VISIT (OUTPATIENT)
Dept: SPEECH THERAPY | Facility: CLINIC | Age: 79
End: 2024-07-23
Payer: MEDICARE

## 2024-07-23 DIAGNOSIS — C76.0 HEAD AND NECK CANCER (MULTI): Primary | ICD-10-CM

## 2024-07-23 DIAGNOSIS — C01 MALIGNANT NEOPLASM OF BASE OF TONGUE (MULTI): ICD-10-CM

## 2024-07-23 PROCEDURE — 92612 ENDOSCOPY SWALLOW (FEES) VID: CPT | Mod: GN

## 2024-07-23 PROCEDURE — 92526 ORAL FUNCTION THERAPY: CPT | Mod: GN

## 2024-07-24 PROBLEM — I89.0 LYMPHEDEMA OF FACE: Status: ACTIVE | Noted: 2024-07-24

## 2024-07-24 PROBLEM — M43.6 NECK STIFFNESS: Status: ACTIVE | Noted: 2024-07-24

## 2024-07-24 PROBLEM — I89.0 LYMPHEDEMA, NOT ELSEWHERE CLASSIFIED: Status: ACTIVE | Noted: 2024-07-24

## 2024-07-24 PROBLEM — M62.81 MUSCLE WEAKNESS (GENERALIZED): Status: ACTIVE | Noted: 2024-07-24

## 2024-07-24 NOTE — PROCEDURES
"Speech-Language Pathology    SLP Adult Outpatient FEES Evaluation    Patient Name: Kevin Mendes \"Katherine\"  MRN: [id number]  Today's Date: 7/24/2024   Time Calculation  Start Time: 1500  Stop Time: 1600  Time Calculation (min): 60 min         Current Problem:   1. Head and neck cancer (Multi)        2. Malignant neoplasm of base of tongue (Multi)          Recommendations:  Began soft solids with thin liquids with the following swallow strategies:  Alternate liquids and solids  Hard multiple swallows per bite and sip  Oral care before and after meals.  Continue swallow exercise regiment      Impression:  Moderate pharyngeal Dysphagia secondary to multimodality treatment from head and neck cancer.  Swallowing function characterized by deficits in both swallow safety and efficiency.    Oral: Pt with intact volitional oral hold with thin liquids.   Pharyngeal: White out duration adequate with no evidence of penetration or aspiration during the swallow with all consistencies. Following the swallow min amounts of pharyngeal stasis with thin liquids, trace with purees and solids accumulating at the level of the pyriforms. Residual residues of thin liquids observed migrating into the subglottic space via the intra-arytenoid space; resulting in overt aspiration after the swallow. Pt with reflexive productive cough to clear laryngeal vestibule; however subglottic space difficulty to view due to edematous ventricular folds. Pt able to clear solids and liquids via double hard swallows.  Compensatory Strategies: Pt prefers not to utilize strategies and therefor; SLP deferred at this time.     Prognosis:  Good     Plan:   Continue SLP services. Plan for follow up after 8/8 after next dilation.   Consider MBSS after 8/8  Continue dysphagia management with [facility]- Dr. Isabel Beckman     Goals:  Pt will complete effortful swallows 10 reps, 3 times per day for 7 days a week; to strengthen pharyngeal muscles for improve bolus clearance through " the pharynx.    Pt will produce a series of lingual exercises/stretches independent of cues 10 reps, 3 times per day, 7 days a week; to maintain strength and motility of the lingual muscles and reduce the effects of radiation for safe and efficient PO intake.  Pt will complete a series of neck stretches independent of cues, 5 times a day for 7 days a week; to reduce the effects of radiation on the muscles necessary for optimal head position for safe and efficient PO intake.  Pt will complete isometric shaker for 1 min 3 reps at 3 sets a day, independent of cues; to improve hyolaryngeal elevation/excursion and UES opening during the swallow.   Pt will utilize and recall compensatory strategies independent of cues 100% of the time to tolerate the least restrictive diet without overt s/s of pharyngeal deficits.  Pt will complete EMST 5 reps, 5x a day for 5 days @ 30 cmH2o, independent of cues to strengthening movement of the hyolaryngeal complex necessary for airway protection during the swallow.     Subjective:  Reason for consult: Dysphagia s/p multimodality treatment for head and neck cancer.     Cognition:  Attention: WFL  Follows Commands: WFL  Orientation: Oriented x4      FEES:  FEES Verbal Consent: Fiberoptic Endoscopic Evaluation of Swallowing exam was completed once informed verbal consent was obtained- Yes  Scoped Passed: R naris   Following Application Of: Surgilube  Patient Tolerated Procedure: Yes   A flexible endoscopic examination of swallowing (FEES) was completed in clinic today to visualize the pharyngeal-laryngeal anatomy, secretion management, swallowing function, and effectiveness of compensatory strategies while consuming foods and liquids of various volumes and textures. Surgical lube was utilized to lubricate the endoscope prior to insertion.  All boluses were self-administered or a by clinician. Natural and single swallowing conditions were provided with the instructions. All liquid and solids  trials were dyed white and green with clear water given between trials to improve quality of images.      Anatomical Observations for Non-Swallow Tasks:  - Edematous but mildly improved when compared to prior exam.  - Secretion burden- Thin white frothy secretions scattered throughout the pharyngeal lumen. No active aspiration of secretions.     Edema:  Epiglottis: 0- Normal   Vallecula:  1- Mild edema   Pharyngoepiglottic folds:  2- Moderate edema  Aryepiglottic folds:  2- Moderate edema  Arytenoids:  2- Moderate edema  Pyriform sinuses:  2- Moderate edema  False Vocal folds:  2- Moderate edema  True Vocal folds:  1- Mild edema          Rosenbeck:  Consistencies/Score: Thin: 6 - Material enters airway, passes below cords, ejected via cough (After the swallow)  Consistencies/Score: Pudding/Puree: 1 - Material does not enter airway  Consistencies/Score: Solid: 1 - Material does not enter airway     Functional Oral Intake Scale   FIOS level Comment    Level 1  Nothing by Mouth    Level 2 Tube dependent with minimal attempts of food and liquid.    Level 3 Tube dependent with consistent oral intake of food and liquid.    Level 4 Total oral diet of a single consistency.   X Level 5 Total oral diet with multiple consistencies, but requires special preparations and compensations.    Level 6 Total oral diet with multiple consistencies without special preparations but specific food limitations.    Level 7 Total oral diet with no restrictions.         Education/Treatment:   Instructed patient this date in: Swallow guidelines, swallow exercises and aspiration pna prevention.  - Results and recommendations from FEES discussed with patient in detail.     - Prognostic indicators for risk in development of aspiration PNA discussed and provided in handout form given identified aspiration on instrumental exam.     - EMST 150 reviewed and calibration and daily use. All parts were thoroughly explained and clinician demonstrated how to  determine starting point and weekly exercise program. Discussed importance of pausing in between each to allow the muscles to reset for optimal benefit. Written instructions provided as well.    - Clinician modeled all techniques and accurate patient follow through confirmed.  Handouts emailed/provided to facilitate optimal home carryover

## 2024-07-31 ENCOUNTER — TREATMENT (OUTPATIENT)
Dept: OCCUPATIONAL THERAPY | Facility: CLINIC | Age: 79
End: 2024-07-31
Payer: MEDICARE

## 2024-07-31 DIAGNOSIS — M62.81 MUSCLE WEAKNESS (GENERALIZED): ICD-10-CM

## 2024-07-31 DIAGNOSIS — M43.6 NECK STIFFNESS: Primary | ICD-10-CM

## 2024-07-31 DIAGNOSIS — I89.0 LYMPHEDEMA OF FACE: ICD-10-CM

## 2024-07-31 DIAGNOSIS — I89.0 LYMPHEDEMA, NOT ELSEWHERE CLASSIFIED: ICD-10-CM

## 2024-07-31 PROCEDURE — 97535 SELF CARE MNGMENT TRAINING: CPT | Mod: GO | Performed by: OCCUPATIONAL THERAPIST

## 2024-07-31 ASSESSMENT — ACTIVITIES OF DAILY LIVING (ADL): HOME_MANAGEMENT_TIME_ENTRY: 57

## 2024-07-31 NOTE — PROGRESS NOTES
"Occupational Therapy    Occupational Therapy Treatment     Name: Kevin Mendes \"Katherine\"  MRN: [id number]  : 1945  Date: 2024    Visit: 2     Assessment:  OT initiated Manual Lymph Drainage home program for head and neck swelling. Pt expressed carryover of education provided.  Will further assess.     Plan:  Continue POC as established in evaluation.     Subjective   Current Problem:  1. Neck stiffness        2. Muscle weakness (generalized)        3. Lymphedema, not elsewhere classified        4. Lymphedema of face          Pt reports he has been completing self massage to his neck daily.      Precautions:   PEG tube     Pain Assessment:  Pt reports tightness, denies pain.     Objective   Head and neck  ROM  Neck tight, restricted with head turns.  Tight with nexk extension.  L shoulder grossly 90 degrees flexion.     Strength  Pt fatigue, general weakness.    Sensation  Intact neck, face.    Head and neck edema:  Fullness face, jaw  Full, firm, fibrosis sub mental area  Full, firm, fibrosis neck.    Head and neck measurements cm    No measurements on today's date     Treatment:   57 minutes    Self Care/ADL:     OT initiated manual lymph drainage (MLD) home program. OT provided rationale for MLD; OT utilized visual aids of the lymphatic system to reinforce education. OT provided patient with step by step personalized MLD sequence. OT educated pt on correct hand techniques and sequencing for self MLD.  Pt with good teach back of education provided.     "

## 2024-08-12 ENCOUNTER — TELEPHONE (OUTPATIENT)
Dept: OTOLARYNGOLOGY | Facility: HOSPITAL | Age: 79
End: 2024-08-12
Payer: MEDICARE

## 2024-08-12 NOTE — TELEPHONE ENCOUNTER
Called pt to reschedule his procedure with Dr. Beckman at [facility] for 9/12/24. Pt is aware of 7AM arrival for an 8AM start time. Pt verbalized understanding.

## 2024-08-14 ENCOUNTER — TREATMENT (OUTPATIENT)
Dept: OCCUPATIONAL THERAPY | Facility: CLINIC | Age: 79
End: 2024-08-14
Payer: MEDICARE

## 2024-08-14 DIAGNOSIS — I89.0 LYMPHEDEMA OF FACE: ICD-10-CM

## 2024-08-14 DIAGNOSIS — I89.0 LYMPHEDEMA, NOT ELSEWHERE CLASSIFIED: Primary | ICD-10-CM

## 2024-08-14 DIAGNOSIS — M62.81 MUSCLE WEAKNESS (GENERALIZED): ICD-10-CM

## 2024-08-14 DIAGNOSIS — M43.6 NECK STIFFNESS: ICD-10-CM

## 2024-08-14 PROCEDURE — 97140 MANUAL THERAPY 1/> REGIONS: CPT | Mod: GO,CO

## 2024-08-14 PROCEDURE — 97535 SELF CARE MNGMENT TRAINING: CPT | Mod: GO,CO

## 2024-08-14 ASSESSMENT — ACTIVITIES OF DAILY LIVING (ADL): HOME_MANAGEMENT_TIME_ENTRY: 30

## 2024-08-14 NOTE — PROGRESS NOTES
"Occupational Therapy    Occupational Therapy Treatment     Name: Kevin Mendes \"Katherine\"  MRN: [id number]  : 1945  Date: 2024    Time In 1505p  Time Out 1550p  Total Time 45 mins    Visit: 3     Assessment:  Pt appears to tolerate treatment well with no complaints of pain. Good tissue texture softening post MLD. Good fit achieved of swell spot, will continue to assess.     Plan:  Continue POC as established in evaluation.     Subjective   Current Problem:  1. Lymphedema, not elsewhere classified        2. Lymphedema of face        3. Muscle weakness (generalized)        4. Neck stiffness            Pt states he is getting ready to have procedure done to his esophagus, was supposed to have it done last Thursday but the facility did not have power.      Precautions:   PEG tube     Pain Assessment:  Pt reports tightness, denies pain.     Objective   Head and neck  ROM  Neck tight, restricted with head turns.  Tight with nexk extension.  L shoulder grossly 90 degrees flexion.     Strength  Pt fatigue, general weakness.    Sensation  Intact neck, face.    Head and neck edema:  Fullness face, jaw  Full, firm, fibrosis sub mental area  Full, firm, fibrosis neck.    Head and neck measurements cm    No measurements on today's date     Treatment:   45 minutes    Self Care/ADL: 30    TOWNSEND assesses skin  TOWNSEND reviews MLD sequence with pt.   TOWNSEND fabricates swell spot for neck using 1/2\" grey foam, covered with stockinette. TOWNSEND places swell spot into stockinette to be placed around head, TOWNSEND educates pt on wear and care, begin with 10-15 minute increments, pt agreeable.     Man Ther 15  TOWNSEND provides MLD to neck, jaw, cheeks. Good tissue texture softening post MLD.    "

## 2024-08-19 ENCOUNTER — TREATMENT (OUTPATIENT)
Dept: OCCUPATIONAL THERAPY | Facility: CLINIC | Age: 79
End: 2024-08-19
Payer: MEDICARE

## 2024-08-19 DIAGNOSIS — M43.6 NECK STIFFNESS: ICD-10-CM

## 2024-08-19 DIAGNOSIS — M62.81 MUSCLE WEAKNESS (GENERALIZED): ICD-10-CM

## 2024-08-19 DIAGNOSIS — I89.0 LYMPHEDEMA OF FACE: Primary | ICD-10-CM

## 2024-08-19 DIAGNOSIS — I89.0 LYMPHEDEMA, NOT ELSEWHERE CLASSIFIED: ICD-10-CM

## 2024-08-19 PROCEDURE — 97140 MANUAL THERAPY 1/> REGIONS: CPT | Mod: GO | Performed by: OCCUPATIONAL THERAPIST

## 2024-08-19 NOTE — PROGRESS NOTES
"Occupational Therapy    Occupational Therapy Treatment     Name: Kevin Mendes \"Katherine\"  MRN: [id number]  : 1945  Date: 2024    Time In 1505p  Time Out 1550p  Total Time 45 mins    Visit: 5     Assessment:  Pt demonstrated decreased fullness in head and neck post manual techniques.     Plan:  Continue POC as established in evaluation.     Subjective   Current Problem:  1. Lymphedema of face        2. Lymphedema, not elsewhere classified        3. Muscle weakness (generalized)        4. Neck stiffness          Pt reports that he has been wearing his swell spot issued by TOWNSEND in last treatment and he likes it.      Precautions:   PEG tube     Pain Assessment:  Pt reports tightness, denies pain.     Objective   Head and neck  ROM  Neck tight, restricted with head turns.  Tight with nexk extension.  L shoulder grossly 90 degrees flexion.     Strength  Pt fatigue, general weakness.    Sensation  Intact neck, face.    Head and neck edema:  Fullness face, jaw  Full, firm, fibrosis sub mental area  Full, firm, fibrosis neck.    Head and neck measurements cm    No measurements on today's date     Treatment:   41 minutes    Man Ther   OT provided Manual Lymph Drainage (MLD) to neck (anterior and posterior), jaw and cheeks.  Pt with noted decreased fullness post treatment.       "

## 2024-08-27 ENCOUNTER — TREATMENT (OUTPATIENT)
Dept: OCCUPATIONAL THERAPY | Facility: CLINIC | Age: 79
End: 2024-08-27
Payer: MEDICARE

## 2024-08-27 ENCOUNTER — EVALUATION (OUTPATIENT)
Dept: SPEECH THERAPY | Facility: CLINIC | Age: 79
End: 2024-08-27
Payer: MEDICARE

## 2024-08-27 DIAGNOSIS — R13.12 OROPHARYNGEAL DYSPHAGIA: Primary | ICD-10-CM

## 2024-08-27 DIAGNOSIS — M43.6 NECK STIFFNESS: ICD-10-CM

## 2024-08-27 DIAGNOSIS — I89.0 LYMPHEDEMA, NOT ELSEWHERE CLASSIFIED: Primary | ICD-10-CM

## 2024-08-27 DIAGNOSIS — I89.0 LYMPHEDEMA OF FACE: ICD-10-CM

## 2024-08-27 DIAGNOSIS — M62.81 MUSCLE WEAKNESS (GENERALIZED): ICD-10-CM

## 2024-08-27 PROCEDURE — 92526 ORAL FUNCTION THERAPY: CPT | Mod: GN

## 2024-08-27 PROCEDURE — 97535 SELF CARE MNGMENT TRAINING: CPT | Mod: GO,CO

## 2024-08-27 PROCEDURE — 97140 MANUAL THERAPY 1/> REGIONS: CPT | Mod: GO,CO

## 2024-08-27 ASSESSMENT — ACTIVITIES OF DAILY LIVING (ADL): HOME_MANAGEMENT_TIME_ENTRY: 15

## 2024-08-27 NOTE — PROGRESS NOTES
"Occupational Therapy    Occupational Therapy Treatment     Name: Kevin Mendes \"Katheirne\"  MRN: [id number]  : 1945  Date: 2024    Time In 1058p  Time Out 1153p  Total Time 55 mins    Visit: 6     Assessment:  Pt appears to tolerate treatment well with no complaints of pain. Good tissue texture softening post MLD. TOWNSEND educates pt on cervical ROM exercises while completing MLD at home, pt demonstrates good understanding.    Plan:  Continue occupational therapy lymphedema CDT for decreased swelling and increased ease of functional feeding tasks.    Subjective   Current Problem:  1. Lymphedema, not elsewhere classified        2. Neck stiffness        3. Muscle weakness (generalized)        4. Lymphedema of face              Pt states he has been wearing swell spot everyday.      Precautions:   PEG tube     Pain Assessment:  0/10     Objective   Head and neck  ROM  Neck tight, restricted with head turns.  Tight with nexk extension.  L shoulder grossly 90 degrees flexion.     Strength  Pt fatigue, general weakness.    Sensation  Intact neck, face.    Head and neck edema:  Fullness face, jaw  Full, firm, fibrosis sub mental area  Full, firm, fibrosis neck.    Head and neck measurements cm    No measurements on today's date     Treatment:   55 minutes    Man Ther 40  TOWNSEND provides MLD and soft tissue techniques to head and neck.   TOWNSEND instructs pt in cervical ROM exercises simultaneously to promote further decreased swelling and increased ROM to ease functional ADL and IADL tasks    Self Care 15  TOWNSEND and pt discuss pt upcoming move to [place], TOWNSEND educates pt on ways to find lymphedema CLT once moved and setlled, pt verbalizes understanding. Pt states he sees his ENT this Wednesday and she is setting him up with new ENT at [place].       "

## 2024-08-28 NOTE — PROGRESS NOTES
"Speech-Language Pathology    [facility] Adult Outpatient Speech-Language Pathology Treatment     Patient Name: Kevin Mendes \"Katherine\"  MRN: [id number]  Today's Date: 8/28/2024  Time Calculation  Start Time: 1500  Stop Time: 1545  Time Calculation (min): 45 min       SUBJECTIVE  Patient alert and oriented and ready to participate in office visit this date. Pt currently peg dependent but slowly increasing PO intake with modified textures.     The dysphagia history includes:      Dysphagia for solids               Yes    Dysphagia for liquids              No    Dysphagia for pills                  n/a  Associated weight loss            No    Recent pneumonia/bronchitis  No  GERD/Acid reflux   No      OBJECTIVE  LONG TERM GOAL  Improve overall swallow safety and efficiency to tolerate the least restrictive diet.    SHORT TERM GOALS  Pt will complete effortful swallows 10 reps, 3 times per day for 7 days a week; to strengthen pharyngeal muscles for improve bolus clearance through the pharynx.    Pt will produce a series of lingual exercises/stretches independent of cues 10 reps, 3 times per day, 7 days a week; to maintain strength and motility of the lingual muscles and reduce the effects of radiation for safe and efficient PO intake.  Pt will complete a series of neck stretches independent of cues, 5 times a day for 7 days a week; to reduce the effects of radiation on the muscles necessary for optimal head position for safe and efficient PO intake.  Pt will complete isometric shaker for 1 min 3 reps at 3 sets a day, independent of cues; to improve hyolaryngeal elevation/excursion and UES opening during the swallow.   Pt will utilize and recall compensatory strategies independent of cues 100% of the time to tolerate the least restrictive diet without overt s/s of pharyngeal deficits.  Pt will complete EMST 5 reps, 5x a day for 5 days @ 30 cmH2o, independent of cues to strengthening movement of the hyolaryngeal complex necessary for " airway protection during the swallow.     TREATMENT  Instructed patient this date in:  - Prognostic indicators for risk in development of aspiration PNA discussed and provided in handout form given identified aspiration on instrumental exam.   - Review of diet modifications and swallow guidelines to max PO intake. Pt currently consuming soft solids and thin liquids with improved tolerance without evidence of pulmonary sequale. PEG tube feeds have been reduced from 6 to 3 feedings a day. Pt is attempting different diet textures but requires extended time to eat small meals. Diet texture modifications   becomes a challenge due to current spouse limitations to prepare foods. This should improve once pt transitions to assisted living.     - Review of swallow exercise regimen and pt able to demonstrate adequate skill with exercises.   Clinician modeled all techniques and accurate patient follow through confirmed.  Handouts emailed/provided to facilitate optimal home carryover.    RECOMMENDATIONS:  Began soft solids with thin liquids with the following swallow strategies:  Alternate liquids and solids  Hard multiple swallows per bite and sip  Oral care before and after meals.  Continue swallow exercise regiment    PLAN  Continue current plan of care  Monitor home program  Patient instructed to call if there are problems    Progress with POC, as tolerated  Discussed POC with   Patient/caregiver agreeable with POC

## 2024-08-29 ENCOUNTER — TELEMEDICINE CLINICAL SUPPORT (OUTPATIENT)
Dept: HEMATOLOGY/ONCOLOGY | Facility: CLINIC | Age: 79
End: 2024-08-29
Payer: MEDICARE

## 2024-08-29 DIAGNOSIS — Z71.89 ENCOUNTER FOR TUBE FEEDING INSTRUCTION: ICD-10-CM

## 2024-08-29 NOTE — PROGRESS NOTES
"NUTRITION COMMUNICATION NOTE    Kevin Mendes \"Pat\" wife, Mariana called today and left a message as she is trying to get [employer] TF order changed.      REASON FOR COMMUNICATION:   Called Mariana back and she stated Dr. Beckman had suggested to patient to decrease his TF from 4 cartons of WebTeb standard 1.4 to 3 cartons per day.  He has been seeing Dr. Beckman for difficulty swallowing.  Has been dependent on TF formula since his chemo/RT treatments for head and neck cancer.  Patient know to me from that treatment course.      She reported that Dr. Beckman would like patient to try to eat more solid foods, and he reports that he is too full from the TF, and  Suggested to patient to decrease TF to 3 cartons per day.  Agree with this.      3 cartons of WebTeb Standard 1.4 would provide 1365 calories, 60g protein per day.    Recommend pt try to eat orally 500-700 calories and get an estimated 20-40g protein per day to meet estimated nutritional needs.    Discussed change with Carlos.  Will obtain order from MD and send back to Carlos.           "

## 2024-09-04 ENCOUNTER — TREATMENT (OUTPATIENT)
Dept: OCCUPATIONAL THERAPY | Facility: CLINIC | Age: 79
End: 2024-09-04
Payer: MEDICARE

## 2024-09-04 DIAGNOSIS — M43.6 NECK STIFFNESS: ICD-10-CM

## 2024-09-04 DIAGNOSIS — M62.81 MUSCLE WEAKNESS (GENERALIZED): ICD-10-CM

## 2024-09-04 DIAGNOSIS — I89.0 LYMPHEDEMA OF FACE: Primary | ICD-10-CM

## 2024-09-04 DIAGNOSIS — I89.0 LYMPHEDEMA, NOT ELSEWHERE CLASSIFIED: ICD-10-CM

## 2024-09-04 PROCEDURE — 97140 MANUAL THERAPY 1/> REGIONS: CPT | Mod: GO

## 2024-09-04 PROCEDURE — 97535 SELF CARE MNGMENT TRAINING: CPT | Mod: GO

## 2024-09-04 PROCEDURE — 97110 THERAPEUTIC EXERCISES: CPT | Mod: GO

## 2024-09-04 ASSESSMENT — ACTIVITIES OF DAILY LIVING (ADL): HOME_MANAGEMENT_TIME_ENTRY: 15

## 2024-09-04 NOTE — PROGRESS NOTES
"Occupational Therapy    Occupational Therapy Treatment    Name: Kevin Mendes \"Katherine\"  MRN: [id number]  : 1945  Date:     Time Entry:  Time Calculation  Start Time: 1100  Stop Time: 1155  Time Calculation (min): 55 min        OT Therapeutic Procedures Time Entry  Manual Therapy Time Entry: 25  Self Care/Home Management (ADLs) Time Entry: 15  Therapeutic Exercise Time Entry: 15              Visit: 6     Assessment:  Tissue texture softening neck, face post tx.  Decreases in measurements today.  OT educates pt on neck ROM/decongestive exercises, home program developed.  Pt demonstrated good understanding.    Plan:  Continue occupational therapy for head and neck lymphedema CDT (complete decongestive therapy) for decreased swelling, improved mobility and increased ease of functional feeding tasks.    Subjective   Current Problem:  1. Lymphedema of face        2. Lymphedema, not elsewhere classified        3. Neck stiffness        4. Muscle weakness (generalized)              Pt states he has been wearing swell spot everyday.   Pt doing MLD every day, throughout the day.  Pt moving to [place] in a month.  Pt fell on steps carrying garbage, needed help to get up. Pt scraped knee. Pt did not need to go to hospital.     Precautions:   PEG tube     Pain Assessment:  0/10     Objective   Head and neck  ROM  Neck tight, restricted with head turns.  Tight with nexk extension.  L shoulder grossly 90 degrees flexion.     Strength  Pt fatigue, general weakness.  Sensation  Intact neck, face.  Head and neck edema:  Fullness face, jaw, L>R  Full, fibrosis sub mental area, softening.  Full, firm, fibrosis neck.    Head and neck measurements cm  (Initial measurements 24)  -Tragus to mental protuberance:  R= 14.1  (14.7),    L= 14.4  (14.3)  -Tragus to mouth angle:   R= 9.4  (9.5),   L= 9.7  (10.0)  -Mandibular angle to nasal wing:   R= 11.3  (11.1)   L= 11.1  (11.7)  -Mandibular angle to ext eye corner:   R= " "11.3  (11.3),  L= 11.0  (10.9)  -Mandibular angle to int eye corner  ----  -Mental protuberance to int eye corner:  ----  -Mandibular angle to mental protuberance:  R= 10.4  (10.8),  L= 11.8  (12.4)    Neck circumference cm:  5 cm below chin= 44.5  (45.1)  7 cm below chin= 43.1  (43.9)  Base of neck= 42.2  (42.4)    Treatment:   55 minutes    Man Ther 25  OT provides MLD (manual lymph drainage) and gentle soft tissue techniques to head and neck.   Softened tissue texture noted with tx.  Pt performs his self ROM daily, \"throughout the day\".    Therapeutic Exercise 15  OT instructs pt in neck and shoulder ROM/decongestive exercises:  -abdominal breathing x5  -head turns x5  -chin tucks x5  -head side bends x5  -shoulder rolls x5 each direction  -BUE horizontal ab-adduction (arm swings) x5  Rest breaks between exercises, per lymphedema exercise precautions.  Good pt technique with cues.  Written HEP developed, issued.  cervical ROM exercises simultaneously to promote further decreased swelling and increased ROM to ease functional ADL and IADL tasks    Self Care 15  OT assess skin, tissue texture, take and assess measurements.  Pt wears his neck foam swell pad daily, does his MLD.  OT and pt discuss pt upcoming move to [place], OT show pt resources for therapist directories for lymphedema therapists in area. Pt reports he is going to begin with PT for balance and strengthening.           "

## 2024-09-09 ENCOUNTER — ANESTHESIA EVENT (OUTPATIENT)
Dept: OPERATING ROOM | Facility: CLINIC | Age: 79
End: 2024-09-09
Payer: MEDICARE

## 2024-09-09 ENCOUNTER — TREATMENT (OUTPATIENT)
Dept: OCCUPATIONAL THERAPY | Facility: CLINIC | Age: 79
End: 2024-09-09
Payer: MEDICARE

## 2024-09-09 DIAGNOSIS — I89.0 LYMPHEDEMA, NOT ELSEWHERE CLASSIFIED: ICD-10-CM

## 2024-09-09 DIAGNOSIS — I89.0 LYMPHEDEMA OF FACE: Primary | ICD-10-CM

## 2024-09-09 DIAGNOSIS — M62.81 MUSCLE WEAKNESS (GENERALIZED): ICD-10-CM

## 2024-09-09 DIAGNOSIS — M43.6 NECK STIFFNESS: ICD-10-CM

## 2024-09-09 PROCEDURE — 97140 MANUAL THERAPY 1/> REGIONS: CPT | Mod: GO

## 2024-09-09 PROCEDURE — 97535 SELF CARE MNGMENT TRAINING: CPT | Mod: GO

## 2024-09-09 ASSESSMENT — ACTIVITIES OF DAILY LIVING (ADL): HOME_MANAGEMENT_TIME_ENTRY: 15

## 2024-09-09 NOTE — PROGRESS NOTES
"Occupational Therapy    Occupational Therapy Treatment    Name: Kevin Mendes \"Katherine\"  MRN: [id number]  : 1945  Date: 24    Time Entry:  Time Calculation  Start Time: 1000  Stop Time: 1040  Time Calculation (min): 40 min        OT Therapeutic Procedures Time Entry  Manual Therapy Time Entry: 25  Self Care/Home Management (ADLs) Time Entry: 15                Visit: 7     Assessment:  Tissue texture softening neck, face post tx.  Decreases in measurements today.  OT educates pt on neck ROM/decongestive exercises, home program developed.  Pt demonstrated good understanding.    Plan:  Continue occupational therapy for head and neck lymphedema CDT (complete decongestive therapy) for decreased swelling, improved mobility and increased ease of functional feeding tasks.    Subjective   Current Problem:  1. Lymphedema of face        2. Lymphedema, not elsewhere classified        3. Neck stiffness        4. Muscle weakness (generalized)              Pt states he has been wearing swell spot everyday.   Pt doing MLD every day, throughout the day.  Pt moving to [place] in a month.  Pt scraped left knee, he covered it with dressing. OT gently washed lower leg.  Pt had a Mohs procedure right inner lower leg, above ankle, covered with dressing.       Precautions:   PEG tube     Pain Assessment:  0/10     Objective   Head and neck  ROM  Neck tight, restricted with head turns.  Tight with nexk extension.  L shoulder grossly 90 degrees flexion.     Strength  Pt fatigue, general weakness.  Sensation  Intact neck, face.  Head and neck edema:  Fullness face, jaw, L>R  Full, fibrosis sub mental area, softening.  Full, firm, fibrosis neck.    Head and neck measurements cm  Measurements not taken today.    Treatment:   40 minutes    Man Ther 25  OT provides MLD (manual lymph drainage) and gentle soft tissue techniques to bilateral head and neck, first clear trunk. Full ant neck, shows softening.  Softened tissue texture noted " with tx.    Therapeutic Exercise 15  OT perform neck and shoulder ROM/decongestive exercises:  -abdominal breathing x5 (with MLD)  -head turns x10  -chin tucks x10  -head side bends x10  -shoulder rolls x10 each direction  -BUE horizontal ab-adduction (arm swings) x10  OT refine techniques.    Seated Sci[employer] arm bike:  3 minutes    Walk track in clinic 2x    Rest breaks between exercises, per lymphedema exercise precautions.

## 2024-09-12 ENCOUNTER — HOSPITAL ENCOUNTER (OUTPATIENT)
Dept: OPERATING ROOM | Facility: CLINIC | Age: 79
Discharge: HOME | End: 2024-09-12
Payer: MEDICARE

## 2024-09-12 ENCOUNTER — ANESTHESIA (OUTPATIENT)
Dept: OPERATING ROOM | Facility: CLINIC | Age: 79
End: 2024-09-12
Payer: MEDICARE

## 2024-09-12 VITALS
SYSTOLIC BLOOD PRESSURE: 138 MMHG | HEART RATE: 69 BPM | RESPIRATION RATE: 16 BRPM | DIASTOLIC BLOOD PRESSURE: 65 MMHG | WEIGHT: 183.42 LBS | BODY MASS INDEX: 26.26 KG/M2 | HEIGHT: 70 IN | TEMPERATURE: 97.9 F | OXYGEN SATURATION: 97 %

## 2024-09-12 DIAGNOSIS — Q39.4 CRICOPHARYNGEAL WEB (HHS-HCC): ICD-10-CM

## 2024-09-12 DIAGNOSIS — C76.0 HEAD AND NECK CANCER (MULTI): ICD-10-CM

## 2024-09-12 DIAGNOSIS — T66.XXXS TOXICITY OF RADIATION, LATE EFFECT: ICD-10-CM

## 2024-09-12 DIAGNOSIS — R13.10 DYSPHAGIA, UNSPECIFIED TYPE: Primary | ICD-10-CM

## 2024-09-12 LAB
GLUCOSE BLD MANUAL STRIP-MCNC: 92 MG/DL (ref 74–99)
GLUCOSE BLD MANUAL STRIP-MCNC: 96 MG/DL (ref 74–99)
POC FINGERSTICK BLOOD GLUCOSE: 92 MG/DL (ref 70–100)

## 2024-09-12 PROCEDURE — 2500000004 HC RX 250 GENERAL PHARMACY W/ HCPCS (ALT 636 FOR OP/ED): Performed by: NURSE ANESTHETIST, CERTIFIED REGISTERED

## 2024-09-12 PROCEDURE — 7100000010 HC PHASE TWO TIME - EACH INCREMENTAL 1 MINUTE: Performed by: ANESTHESIOLOGY

## 2024-09-12 PROCEDURE — 3700000001 HC GENERAL ANESTHESIA TIME - INITIAL BASE CHARGE: Performed by: ANESTHESIOLOGY

## 2024-09-12 PROCEDURE — C1725 CATH, TRANSLUMIN NON-LASER: HCPCS | Performed by: ANESTHESIOLOGY

## 2024-09-12 PROCEDURE — 7100000009 HC PHASE TWO TIME - INITIAL BASE CHARGE: Performed by: ANESTHESIOLOGY

## 2024-09-12 PROCEDURE — 3600000007 HC OR TIME - EACH INCREMENTAL 1 MINUTE - PROCEDURE LEVEL TWO: Performed by: ANESTHESIOLOGY

## 2024-09-12 PROCEDURE — 2500000004 HC RX 250 GENERAL PHARMACY W/ HCPCS (ALT 636 FOR OP/ED): Performed by: OTOLARYNGOLOGY

## 2024-09-12 PROCEDURE — 2720000007 HC OR 272 NO HCPCS: Performed by: ANESTHESIOLOGY

## 2024-09-12 PROCEDURE — 3700000002 HC GENERAL ANESTHESIA TIME - EACH INCREMENTAL 1 MINUTE: Performed by: ANESTHESIOLOGY

## 2024-09-12 PROCEDURE — 82947 ASSAY GLUCOSE BLOOD QUANT: CPT

## 2024-09-12 PROCEDURE — 3600000002 HC OR TIME - INITIAL BASE CHARGE - PROCEDURE LEVEL TWO: Performed by: ANESTHESIOLOGY

## 2024-09-12 RX ORDER — NALOXONE HYDROCHLORIDE 0.4 MG/ML
0.2 INJECTION, SOLUTION INTRAMUSCULAR; INTRAVENOUS; SUBCUTANEOUS EVERY 5 MIN PRN
Status: DISCONTINUED | OUTPATIENT
Start: 2024-09-12 | End: 2024-09-13 | Stop reason: HOSPADM

## 2024-09-12 RX ORDER — ONDANSETRON HYDROCHLORIDE 2 MG/ML
4 INJECTION, SOLUTION INTRAVENOUS ONCE AS NEEDED
Status: DISCONTINUED | OUTPATIENT
Start: 2024-09-12 | End: 2024-09-13 | Stop reason: HOSPADM

## 2024-09-12 RX ORDER — OXYCODONE HYDROCHLORIDE 5 MG/1
5 TABLET ORAL EVERY 4 HOURS PRN
Status: DISCONTINUED | OUTPATIENT
Start: 2024-09-12 | End: 2024-09-13 | Stop reason: HOSPADM

## 2024-09-12 RX ORDER — LIDOCAINE IN NACL,ISO-OSMOT/PF 30 MG/3 ML
0.1 SYRINGE (ML) INJECTION ONCE
Status: DISCONTINUED | OUTPATIENT
Start: 2024-09-12 | End: 2024-09-13 | Stop reason: HOSPADM

## 2024-09-12 RX ORDER — ACETAMINOPHEN 325 MG/1
650 TABLET ORAL EVERY 4 HOURS PRN
Status: DISCONTINUED | OUTPATIENT
Start: 2024-09-12 | End: 2024-09-13 | Stop reason: HOSPADM

## 2024-09-12 RX ORDER — TRIAMCINOLONE ACETONIDE 40 MG/ML
INJECTION, SUSPENSION INTRA-ARTICULAR; INTRAMUSCULAR AS NEEDED
Status: COMPLETED | OUTPATIENT
Start: 2024-09-12 | End: 2024-09-12

## 2024-09-12 RX ORDER — SODIUM CHLORIDE, SODIUM LACTATE, POTASSIUM CHLORIDE, CALCIUM CHLORIDE 600; 310; 30; 20 MG/100ML; MG/100ML; MG/100ML; MG/100ML
20 INJECTION, SOLUTION INTRAVENOUS CONTINUOUS
Status: CANCELLED | OUTPATIENT
Start: 2024-09-12

## 2024-09-12 RX ORDER — SODIUM CHLORIDE, SODIUM LACTATE, POTASSIUM CHLORIDE, CALCIUM CHLORIDE 600; 310; 30; 20 MG/100ML; MG/100ML; MG/100ML; MG/100ML
100 INJECTION, SOLUTION INTRAVENOUS CONTINUOUS
Status: DISCONTINUED | OUTPATIENT
Start: 2024-09-12 | End: 2024-09-13 | Stop reason: HOSPADM

## 2024-09-12 RX ORDER — GLYCOPYRROLATE 0.2 MG/ML
INJECTION INTRAMUSCULAR; INTRAVENOUS AS NEEDED
Status: DISCONTINUED | OUTPATIENT
Start: 2024-09-12 | End: 2024-09-12

## 2024-09-12 RX ORDER — FLUMAZENIL 0.1 MG/ML
0.2 INJECTION INTRAVENOUS ONCE AS NEEDED
Status: DISCONTINUED | OUTPATIENT
Start: 2024-09-12 | End: 2024-09-13 | Stop reason: HOSPADM

## 2024-09-12 RX ORDER — PROPOFOL 10 MG/ML
INJECTION, EMULSION INTRAVENOUS AS NEEDED
Status: DISCONTINUED | OUTPATIENT
Start: 2024-09-12 | End: 2024-09-12

## 2024-09-12 RX ORDER — MIDAZOLAM HYDROCHLORIDE 1 MG/ML
INJECTION, SOLUTION INTRAMUSCULAR; INTRAVENOUS AS NEEDED
Status: DISCONTINUED | OUTPATIENT
Start: 2024-09-12 | End: 2024-09-12

## 2024-09-12 RX ORDER — SODIUM CHLORIDE, SODIUM LACTATE, POTASSIUM CHLORIDE, CALCIUM CHLORIDE 600; 310; 30; 20 MG/100ML; MG/100ML; MG/100ML; MG/100ML
INJECTION, SOLUTION INTRAVENOUS CONTINUOUS PRN
Status: DISCONTINUED | OUTPATIENT
Start: 2024-09-12 | End: 2024-09-12

## 2024-09-12 RX ORDER — HYDROMORPHONE HYDROCHLORIDE 1 MG/ML
0.5 INJECTION, SOLUTION INTRAMUSCULAR; INTRAVENOUS; SUBCUTANEOUS EVERY 5 MIN PRN
Status: DISCONTINUED | OUTPATIENT
Start: 2024-09-12 | End: 2024-09-13 | Stop reason: HOSPADM

## 2024-09-12 ASSESSMENT — COLUMBIA-SUICIDE SEVERITY RATING SCALE - C-SSRS
1. IN THE PAST MONTH, HAVE YOU WISHED YOU WERE DEAD OR WISHED YOU COULD GO TO SLEEP AND NOT WAKE UP?: NO
6. HAVE YOU EVER DONE ANYTHING, STARTED TO DO ANYTHING, OR PREPARED TO DO ANYTHING TO END YOUR LIFE?: NO
2. HAVE YOU ACTUALLY HAD ANY THOUGHTS OF KILLING YOURSELF?: NO

## 2024-09-12 ASSESSMENT — PAIN - FUNCTIONAL ASSESSMENT
PAIN_FUNCTIONAL_ASSESSMENT: 0-10

## 2024-09-12 ASSESSMENT — PAIN SCALES - GENERAL
PAINLEVEL_OUTOF10: 0 - NO PAIN

## 2024-09-12 NOTE — DISCHARGE INSTRUCTIONS
Postoperative Instructions    General: Pain of the nose and throat can be normal after these procedures. A small amount of blood from the nose or mouth can be expected.  Diet: Start with liquids after anesthesia. Soft foods may feel best for the first 2-3 days following your procedure. Soft foods include soup, noodles, scrambled eggs, oatmeal, yogurt, smoothies, applesauce, mashed potatoes, pasta or ice cream. Avoid toast, chips, hard crusted breads, and steak or similar meats. After your throat begins to feel less sore, you can continue your normal diet.   Pain Control: You may experience a mild to moderate sore throat or tongue for several days following the procedure. The throat pain may also seem to cause earaches from referred pain. We encourage use of acetaminophen (Tylenol) and /or ibuprofen (Motrin/Advil) for most pain control. These two medications can be taken together or can be alternated. We will sometimes prescribe you something stronger for pain for “break through.”  Use it only as needed. Do not drive while taking any narcotic pain medications.  Follow-up: Your follow-up with your doctor should be scheduled 2-3 weeks following surgery. If a biopsy was preformed, results will usually be available in the system 7 days following the surgery. You will be informed of the results.   Please call the office or go to the emergency room if you experience any of the following: difficulty breathing, inability to swallow, severe chest pain, fever great than 101 degrees, significant bleeding, or any new/concerning symptoms.  Contact:  During office hours between 8 AM and 5 PM, please call Dr. Beckman's office at [phone].  If you have an emergency over night or on the weekend, please call [phone] and ask the  to connect you to the ENT resident on call.

## 2024-09-12 NOTE — ANESTHESIA PREPROCEDURE EVALUATION
"Patient: Kevin Mendes \"Pat\"    Procedure Information       Date/Time: 09/12/24 0800    Scheduled providers: Isabel Beckman MD    Procedure: EGD    Location: [facility] OR            Relevant Problems   Cardiac   (+) Hyperlipidemia   (+) Hypertension   (+) SVT (supraventricular tachycardia) (CMS-HCC)      GI   (+) Dysphagia   (+) GERD (gastroesophageal reflux disease)      /Renal   (+) Benign prostatic hyperplasia without lower urinary tract symptoms   (+) Nephrolithiasis      Liver   (+) Malignant neoplasm of base of tongue (Multi)      Endocrine   (+) Type 2 diabetes mellitus with renal manifestations, controlled (Multi)      Hematology   (+) Anemia      Musculoskeletal   (+) DJD of shoulder   (+) Degenerative joint disease of knee   (+) Disc degeneration, lumbar       Clinical information reviewed:    Allergies  Meds               NPO Detail:  NPO/Void Status  Date of Last Liquid: 09/11/24  Time of Last Liquid: 2100  Date of Last Solid: 09/11/24  Time of Last Solid: 2100         Physical Exam    Airway  Mallampati: III  TM distance: >3 FB     Cardiovascular   Rhythm: regular  Rate: normal     Dental   Comments: Bridge removed by pt. Poor dentition; multiple missing teeth, chipped and discolored.   Pulmonary   Breath sounds clear to auscultation     Abdominal   Abdomen: soft         Anesthesia Plan    History of general anesthesia?: yes  History of complications of general anesthesia?: no    ASA 3     MAC     intravenous induction   Anesthetic plan and risks discussed with patient.    Plan discussed with CRNA.  "

## 2024-09-12 NOTE — ANESTHESIA POSTPROCEDURE EVALUATION
"Patient: Kevin Mendes \"Pat\"    Procedure Summary       Date: 09/12/24 Room / Location: [facility] OR    Anesthesia Start: 0758 Anesthesia Stop: 0825    Procedure: EGD Diagnosis: Dysphagia, unspecified type    Scheduled Providers: Isabel Beckman MD Responsible Provider: Mikie Seth MD    Anesthesia Type: MAC ASA Status: 3            Anesthesia Type: MAC    Vitals Value Taken Time   BP  09/12/24 0826   Temp  09/12/24 0826   Pulse  09/12/24 0826   Resp  09/12/24 0826   SpO2  09/12/24 0826   See PACU VS    Anesthesia Post Evaluation    Patient location during evaluation: PACU  Patient participation: complete - patient participated  Level of consciousness: responsive to light touch  Pain management: adequate  Airway patency: patent  Cardiovascular status: acceptable  Respiratory status: acceptable and room air  Hydration status: acceptable  Postoperative Nausea and Vomiting: none      No notable events documented.    "

## 2024-09-12 NOTE — H&P
History Of Present Illness  Katherine Mendes is a 78 y.o. male presenting with PEG dependent dysphagia in setting of multimodality treatment for p16+ BOT SCC. MBS with CP impression w/ web and PES narrowing, significant residue. Last dilation 7/11/24 to 32mm. .     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), Insomnia (02/19/2024), Leukopenia (03/05/2024), Localized, primary osteoarthritis (09/06/2018), Low back pain, unspecified (09/25/2017), Malignant melanoma of skin of trunk, except scrotum (Multi) (07/23/2009), Malignant neoplasm of base of tongue (Multi), Mass of neck (03/05/2024), Open wound (03/05/2024), Oropharyngeal dysphagia (03/05/2024), Personal history of other diseases of the circulatory system, Personal history of other diseases of urinary system (12/31/2022), Personal history of other endocrine, nutritional and metabolic disease, Polyneuropathy (04/05/2010), Presence of right artificial knee joint (01/12/2018), Sensorineural hearing loss, bilateral (07/23/2018), Tongue mass (10/05/2023), Type 2 diabetes mellitus (Multi), and Urinary tract infection (03/05/2024).    Surgical History  He has a past surgical history that includes Total hip arthroplasty (04/10/2014); Total knee arthroplasty (04/10/2014); Other surgical history (04/10/2014); Hand surgery (04/07/2017); Cataract extraction (04/07/2017); and Shoulder surgery (04/07/2017).     Social History  He reports that he has quit smoking. His smoking use included cigarettes. He has been exposed to tobacco smoke. He  "has never used smokeless tobacco. He reports that he does not currently use alcohol. He reports that he does not use drugs.    Family History  Family History   Problem Relation Name Age of Onset    Other (heart failure following cardiac surgery) Mother          Allergies  Cat dander    Review of Systems     Physical Exam  GENERAL: Well-nourished and developed, alert and appropriate, no distress, voice [id number]  RESPIRATORY: Breathing quietly, no stridor  HEAD: Normocephalic atraumatic  FACE: Symmetric, no masses or lesions  EYES:  Pupils reactive, sclera clear, external ocular muscles intact, no nystagmus.    EARS:  Pinnae normal.   NOSE:  No anterior lesions, masses or polyps.  ORAL CAVITY/OROPHARYNX:  Buccal mucosa is moist without lesions or masses, tongue midline and palate elevates symmetrically. Tongue mobility intact.  NECK:  There is no lymphadenopathy or thyromegaly.  Moderate submental lymphedema  NEUROLOGIC:  Cranial nerves II-XII grossly intact.      Last Recorded Vitals  Blood pressure 139/63, pulse 82, temperature 36.4 °C (97.5 °F), temperature source Temporal, resp. rate 16, height 1.778 m (5' 10\"), weight 83.2 kg (183 lb 6.8 oz), SpO2 99%.         Assessment/Plan   Assessment & Plan  Dysphagia, unspecified type      1. PEG dependent dysphagia in setting of multimodality treatment for p16+ BOT SCC   - Scheduled for repeat esophagoscopy and dilation, steroid injection to posterior pharyngeal wall          Isabel Beckman MD    "

## 2024-09-16 ENCOUNTER — LAB (OUTPATIENT)
Dept: LAB | Facility: LAB | Age: 79
End: 2024-09-16
Payer: MEDICARE

## 2024-09-16 ENCOUNTER — TREATMENT (OUTPATIENT)
Dept: OCCUPATIONAL THERAPY | Facility: CLINIC | Age: 79
End: 2024-09-16
Payer: MEDICARE

## 2024-09-16 DIAGNOSIS — R53.83 OTHER FATIGUE: ICD-10-CM

## 2024-09-16 DIAGNOSIS — M43.6 NECK STIFFNESS: ICD-10-CM

## 2024-09-16 DIAGNOSIS — I89.0 LYMPHEDEMA, NOT ELSEWHERE CLASSIFIED: ICD-10-CM

## 2024-09-16 DIAGNOSIS — M62.81 MUSCLE WEAKNESS (GENERALIZED): ICD-10-CM

## 2024-09-16 DIAGNOSIS — I89.0 LYMPHEDEMA OF FACE: Primary | ICD-10-CM

## 2024-09-16 DIAGNOSIS — E11.9 TYPE 2 DIABETES MELLITUS WITHOUT COMPLICATION, UNSPECIFIED WHETHER LONG TERM INSULIN USE (MULTI): ICD-10-CM

## 2024-09-16 DIAGNOSIS — Z92.3 HISTORY OF RADIATION THERAPY: ICD-10-CM

## 2024-09-16 LAB
ALBUMIN SERPL BCP-MCNC: 4 G/DL (ref 3.4–5)
ALP SERPL-CCNC: 80 U/L (ref 33–136)
ALT SERPL W P-5'-P-CCNC: 10 U/L (ref 10–52)
ANION GAP SERPL CALC-SCNC: 13 MMOL/L (ref 10–20)
AST SERPL W P-5'-P-CCNC: 14 U/L (ref 9–39)
BILIRUB SERPL-MCNC: 0.3 MG/DL (ref 0–1.2)
BUN SERPL-MCNC: 23 MG/DL (ref 6–23)
CALCIUM SERPL-MCNC: 9.3 MG/DL (ref 8.6–10.3)
CHLORIDE SERPL-SCNC: 99 MMOL/L (ref 98–107)
CO2 SERPL-SCNC: 31 MMOL/L (ref 21–32)
CREAT SERPL-MCNC: 0.81 MG/DL (ref 0.5–1.3)
EGFRCR SERPLBLD CKD-EPI 2021: 90 ML/MIN/1.73M*2
EST. AVERAGE GLUCOSE BLD GHB EST-MCNC: 97 MG/DL
GLUCOSE SERPL-MCNC: 139 MG/DL (ref 74–99)
HBA1C MFR BLD: 5 %
POTASSIUM SERPL-SCNC: 4.1 MMOL/L (ref 3.5–5.3)
PROT SERPL-MCNC: 6.8 G/DL (ref 6.4–8.2)
SODIUM SERPL-SCNC: 139 MMOL/L (ref 136–145)
TSH SERPL-ACNC: 3.4 MIU/L (ref 0.44–3.98)

## 2024-09-16 PROCEDURE — 83036 HEMOGLOBIN GLYCOSYLATED A1C: CPT

## 2024-09-16 PROCEDURE — 36415 COLL VENOUS BLD VENIPUNCTURE: CPT

## 2024-09-16 PROCEDURE — 80053 COMPREHEN METABOLIC PANEL: CPT

## 2024-09-16 PROCEDURE — 97110 THERAPEUTIC EXERCISES: CPT | Mod: GO

## 2024-09-16 PROCEDURE — 97140 MANUAL THERAPY 1/> REGIONS: CPT | Mod: GO

## 2024-09-16 PROCEDURE — 84443 ASSAY THYROID STIM HORMONE: CPT

## 2024-09-16 NOTE — PROGRESS NOTES
"Occupational Therapy    Occupational Therapy Treatment    Name: Kevin Mendes \"Katherine\"  MRN: [id number]  : 1945  Date: 24    Time Entry:  Time Calculation  Start Time: 1000  Stop Time: 1045  Time Calculation (min): 45 min        OT Therapeutic Procedures Time Entry  Manual Therapy Time Entry: 35  Therapeutic Exercise Time Entry: 10                Visit: 8     Assessment:  Tissue texture softening noted neck, chin and face post tx.    Plan:  Continue occupational therapy for head and neck lymphedema CDT (complete decongestive therapy) for decreased swelling, improved mobility and increased ease of functional feeding tasks.    Subjective   Current Problem:  1. Lymphedema of face        2. Lymphedema, not elsewhere classified        3. Neck stiffness        4. Muscle weakness (generalized)              Pt states he has been wearing swell spot everyday.   Pt doing MLD every day, throughout the day.  Pt moving to [place] in a month.  Pt scraped left knee, is now scabbed.  Pt had a Mohs procedure right inner lower leg, above ankle, covered with dressing. Area is red and warm, pt doing to doctor today ([facility])       Precautions:   PEG tube     Pain Assessment:  0/10     Objective   Head and neck  ROM  Neck tight, restricted with head turns, improvement in mobility noted, still tight. Pt reports it's not  as restricting  Tight with neck extension. Improvement noted.  L shoulder grossly 90 degrees flexion.   Strength  Pt fatigue, general weakness.  Sensation  Intact neck, face.  Head and neck edema:  Fullness face, jaw, L>R  Full, fibrosis sub mental area, softening.  Full, firm, fibrosis neck.    Head and neck measurements cm  Measurements not taken today.    Treatment:   45 minutes    Man Ther 35  OT provides MLD (manual lymph drainage) and gentle soft tissue techniques to bilateral head and neck, first clear trunk. Full ant neck, chin, shows softening.   Softened tissue texture noted with tx. " Skin looser after tx.    Therapeutic Exercise 10  OT perform neck and shoulder ROM/decongestive exercises:  -abdominal breathing x5 (with MLD)  -head turns x10  -chin tucks x10  -shoulder rolls x10 each direction  Rest breaks between exercises, per lymphedema exercise precautions.    Pt reports doing ROM exercises (and MLD) daily.  Seated [employer] arm bike:  3 minutes

## 2024-09-17 RX ORDER — DOXYCYCLINE 100 MG/1
1 CAPSULE ORAL
COMMUNITY
Start: 2024-09-11

## 2024-09-18 ENCOUNTER — APPOINTMENT (OUTPATIENT)
Dept: PRIMARY CARE | Facility: CLINIC | Age: 79
End: 2024-09-18
Payer: MEDICARE

## 2024-09-18 VITALS
SYSTOLIC BLOOD PRESSURE: 129 MMHG | HEIGHT: 71 IN | HEART RATE: 83 BPM | OXYGEN SATURATION: 97 % | WEIGHT: 182 LBS | BODY MASS INDEX: 25.48 KG/M2 | DIASTOLIC BLOOD PRESSURE: 76 MMHG

## 2024-09-18 DIAGNOSIS — E11.22 CONTROLLED TYPE 2 DIABETES MELLITUS WITH STAGE 3 CHRONIC KIDNEY DISEASE, WITHOUT LONG-TERM CURRENT USE OF INSULIN (MULTI): ICD-10-CM

## 2024-09-18 DIAGNOSIS — E78.5 HYPERLIPIDEMIA, UNSPECIFIED HYPERLIPIDEMIA TYPE: ICD-10-CM

## 2024-09-18 DIAGNOSIS — R35.0 BENIGN PROSTATIC HYPERPLASIA WITH URINARY FREQUENCY: ICD-10-CM

## 2024-09-18 DIAGNOSIS — N40.1 BENIGN PROSTATIC HYPERPLASIA WITH URINARY FREQUENCY: ICD-10-CM

## 2024-09-18 DIAGNOSIS — C76.0 HEAD AND NECK CANCER (MULTI): Primary | ICD-10-CM

## 2024-09-18 DIAGNOSIS — K21.00 GASTROESOPHAGEAL REFLUX DISEASE WITH ESOPHAGITIS WITHOUT HEMORRHAGE: ICD-10-CM

## 2024-09-18 DIAGNOSIS — M15.9 OSTEOARTHRITIS OF MULTIPLE JOINTS, UNSPECIFIED OSTEOARTHRITIS TYPE: ICD-10-CM

## 2024-09-18 DIAGNOSIS — N18.30 CONTROLLED TYPE 2 DIABETES MELLITUS WITH STAGE 3 CHRONIC KIDNEY DISEASE, WITHOUT LONG-TERM CURRENT USE OF INSULIN (MULTI): ICD-10-CM

## 2024-09-18 DIAGNOSIS — Z11.1 SCREENING-PULMONARY TB: ICD-10-CM

## 2024-09-18 DIAGNOSIS — I10 HYPERTENSION, UNSPECIFIED TYPE: ICD-10-CM

## 2024-09-18 DIAGNOSIS — C01 MALIGNANT NEOPLASM OF BASE OF TONGUE (MULTI): ICD-10-CM

## 2024-09-18 DIAGNOSIS — R25.1 TREMOR: ICD-10-CM

## 2024-09-18 DIAGNOSIS — E11.9 TYPE 2 DIABETES MELLITUS WITHOUT COMPLICATION, UNSPECIFIED WHETHER LONG TERM INSULIN USE (MULTI): ICD-10-CM

## 2024-09-18 PROCEDURE — 99215 OFFICE O/P EST HI 40 MIN: CPT | Performed by: FAMILY MEDICINE

## 2024-09-18 PROCEDURE — 1036F TOBACCO NON-USER: CPT | Performed by: FAMILY MEDICINE

## 2024-09-18 PROCEDURE — 86481 TB AG RESPONSE T-CELL SUSP: CPT

## 2024-09-18 PROCEDURE — 1157F ADVNC CARE PLAN IN RCRD: CPT | Performed by: FAMILY MEDICINE

## 2024-09-18 PROCEDURE — 3078F DIAST BP <80 MM HG: CPT | Performed by: FAMILY MEDICINE

## 2024-09-18 PROCEDURE — 3074F SYST BP LT 130 MM HG: CPT | Performed by: FAMILY MEDICINE

## 2024-09-18 PROCEDURE — 1123F ACP DISCUSS/DSCN MKR DOCD: CPT | Performed by: FAMILY MEDICINE

## 2024-09-18 PROCEDURE — 1160F RVW MEDS BY RX/DR IN RCRD: CPT | Performed by: FAMILY MEDICINE

## 2024-09-18 PROCEDURE — 1159F MED LIST DOCD IN RCRD: CPT | Performed by: FAMILY MEDICINE

## 2024-09-18 RX ORDER — GLYBURIDE 5 MG/1
10 TABLET ORAL
Qty: 360 TABLET | Refills: 2 | Status: SHIPPED | OUTPATIENT
Start: 2024-09-18

## 2024-09-18 RX ORDER — METFORMIN HYDROCHLORIDE 500 MG/5ML
500 SOLUTION ORAL
Qty: 900 ML | Refills: 2 | Status: SHIPPED | OUTPATIENT
Start: 2024-09-18

## 2024-09-18 RX ORDER — PIOGLITAZONEHYDROCHLORIDE 45 MG/1
45 TABLET ORAL DAILY
Qty: 90 TABLET | Refills: 2 | Status: SHIPPED | OUTPATIENT
Start: 2024-09-18

## 2024-09-18 RX ORDER — PRIMIDONE 50 MG/1
50 TABLET ORAL 3 TIMES DAILY
Qty: 90 TABLET | Refills: 5 | Status: SHIPPED | OUTPATIENT
Start: 2024-09-18 | End: 2025-03-17

## 2024-09-18 RX ORDER — ROSUVASTATIN CALCIUM 20 MG/1
20 TABLET, COATED ORAL DAILY
Qty: 90 TABLET | Refills: 2 | Status: SHIPPED | OUTPATIENT
Start: 2024-09-18 | End: 2025-06-15

## 2024-09-18 RX ORDER — ALFUZOSIN HYDROCHLORIDE 10 MG/1
10 TABLET, EXTENDED RELEASE ORAL DAILY
Qty: 90 TABLET | Refills: 2 | Status: SHIPPED | OUTPATIENT
Start: 2024-09-18 | End: 2025-06-15

## 2024-09-18 RX ORDER — LOSARTAN POTASSIUM 100 MG/1
100 TABLET ORAL DAILY
Qty: 90 TABLET | Refills: 2 | Status: SHIPPED | OUTPATIENT
Start: 2024-09-18

## 2024-09-18 RX ORDER — FAMOTIDINE 20 MG/1
TABLET, FILM COATED ORAL
Qty: 90 TABLET | Refills: 2 | Status: SHIPPED | OUTPATIENT
Start: 2024-09-18

## 2024-09-18 NOTE — PROGRESS NOTES
General Medical Management Note    78 y.o. male presents for Medical Management    HPI    Pt present with wife. Pt states that they are moving to [place] to an Assisted Living (AL) to be closer to their 2 daughters. Pt requesting AL paperwork to be completed and proof of a negative TB screening. Pt requesting a paper script for Metformin liquid form. Pt requesting paperwork be faxed to the AL facility. Denies any acute health concerns at this time.     9/11 mole removed from rt ankle. Pt currently on doxycycline to prevent infection.     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  Diagnosed in March 2024.  Being followed closely by oncology.  Pt is currently getting nutrition through a [employer]. Per pt [employer] is the company that makes his feeding.     GERD: Pt is compliant with taking Nexium. Indigestion controlled.     DM2: Pt is compliant with taking Diabeta, Metformin, and Actos. This condition is controlled. A1c has improved with lower calorie diet.  He has also lost 15 pounds since January 2024.    BPH: Pt is compliant with taking Uroxatral. Pt denies LUTS.    HTN: Pt is compliant with taking Cozaar. Denies any dizziness, blurry vision, or headaches. This condition is controlled.     Hyperlipidemia: Pt is compliant with taking Crestor. Triglycerides elevated last draw.       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    Insomnia 02/19/2024    Leukopenia 03/05/2024    Localized, primary osteoarthritis 09/06/2018    Low back pain, unspecified 09/25/2017    Acute low  back pain    Malignant melanoma of skin of trunk, except scrotum (Multi) 07/23/2009    Malignant neoplasm of base of tongue (Multi)     Mass of neck 03/05/2024    Open wound 03/05/2024    Oropharyngeal dysphagia 03/05/2024    Personal history of other diseases of the circulatory system     Personal history of supraventricular tachycardia    Personal history of other diseases of urinary system 12/31/2022    History of hematuria    Personal history of other endocrine, nutritional and metabolic disease     History of diabetes mellitus    Polyneuropathy 04/05/2010    Presence of right artificial knee joint 01/12/2018    Sensorineural hearing loss, bilateral 07/23/2018    Tongue mass 10/05/2023    Type 2 diabetes mellitus (Multi)     Urinary tract infection 03/05/2024      Past Surgical History:   Procedure Laterality Date    CATARACT EXTRACTION  04/07/2017    Cataract Surgery    HAND SURGERY  04/07/2017    Hand Surgery                                                                                                                                                          OTHER SURGICAL HISTORY  04/10/2014    Catheter Ablation Atrial Supraventricular Tachycardia    SHOULDER SURGERY  04/07/2017    Shoulder Surgery    TOTAL HIP ARTHROPLASTY  04/10/2014    Hip Replacement    TOTAL KNEE ARTHROPLASTY  04/10/2014    Knee Replacement     Family History   Problem Relation Name Age of Onset    Other (heart failure following cardiac surgery) Mother        Social History     Socioeconomic History    Marital status:      Spouse name: Not on file    Number of children: Not on file    Years of education: Not on file    Highest education level: Not on file   Occupational History    Not on file   Tobacco Use    Smoking status: Former     Types: Cigarettes     Passive exposure: Past    Smokeless tobacco: Never   Vaping Use    Vaping status: Never Used   Substance and Sexual Activity    Alcohol use: Not Currently    Drug use: Never     Sexual activity: Defer   Other Topics Concern    Not on file   Social History Narrative    Not on file     Social Determinants of Health     Financial Resource Strain: Not on file   Food Insecurity: Not on file   Transportation Needs: No Transportation Needs (5/31/2024)    OASIS : Transportation     Lack of Transportation (Medical): No     Lack of Transportation (Non-Medical): No     Patient Unable or Declines to Respond: No   Physical Activity: Not on file   Stress: Not on file   Social Connections: Feeling Socially Integrated (5/31/2024)    OASIS : Social Isolation     Frequency of experiencing loneliness or isolation: Never   Intimate Partner Violence: Not on file   Housing Stability: Not on file       Current Outpatient Medications on File Prior to Visit   Medication Sig Dispense Refill    alfuzosin (UroxatraL) 10 mg 24 hr tablet Take 1 tablet (10 mg) by mouth once daily. Do not crush, chew, or split. 90 tablet 2    aspirin 81 mg EC tablet Take 1 tablet (81 mg) by mouth once daily.      doxycycline (Vibramycin) 100 mg capsule Take 1 capsule (100 mg) by mouth every 12 hours.      esomeprazole (NexIUM Packet) 40 mg packet Take 40 mg by mouth once daily in the morning. Take before meals. 30 packet 11    glyBURIDE (Diabeta) 5 mg tablet Take 2 tablets (10 mg) by mouth 2 times a day with meals. 360 tablet 2    guaiFENesin (Robitussin) 100 mg/5 mL syrup 20 mL by g-tube route 4 times a day as needed for cough.      metFORMIN 500 mg/5 mL suspension,extended rel recon Take 1,000 mg by mouth 2 times a day. 600 mL 2    neomycin-polymyxin B-dexameth (Polydex) 3.5 mg/g-10,000 unit/g-0.1 % ointment ophthalmic ointment 2 times a day.      neomycin-polymyxin-dexAMETHasone (Maxitrol) 3.5mg/mL-10,000 unit/mL-0.1 % ophthalmic suspension 2 times a day.      pioglitazone (Actos) 45 mg tablet Take 1 tablet (45 mg) by mouth once daily. 90 tablet 2    PreviDent 5000 Booster Plus 1.1 % dental paste Brush at bedtime and  "expectorate do not rinse      primidone (Mysoline) 50 mg tablet Take 1 tablet (50 mg) by mouth 3 times a day. 90 tablet 5    rosuvastatin (Crestor) 20 mg tablet Take 1 tablet (20 mg) by mouth once daily. 90 tablet 2    famotidine (Pepcid) 20 mg tablet 1 po in AM for acid reflux (Patient not taking: Reported on 7/11/2024) 90 tablet 2    losartan (Cozaar) 100 mg tablet Take 1 tablet (100 mg) by mouth once daily. (Patient not taking: Reported on 7/11/2024) 90 tablet 2    magic mouthwash (lidocaine, diphenhydrAMINE, Maalox 1:1:1) Swish and spit 10 mL every 6 hours if needed for mucositis for up to 60 doses. (Patient not taking: Reported on 7/11/2024) 4000 mL 0    prochlorperazine (Compazine) 10 mg tablet Take 1 tablet (10 mg) by mouth every 6 hours if needed for nausea or vomiting. (Patient not taking: Reported on 7/11/2024) 30 tablet 5     No current facility-administered medications on file prior to visit.       Allergies   Allergen Reactions    Cat Dander Unknown         ROS: Denies chest pain, SOB, Headache, GI problems     Visit Vitals  /76   Pulse 83   Ht 1.803 m (5' 11\")   Wt 82.6 kg (182 lb)   SpO2 97%   BMI 25.38 kg/m²   Smoking Status Former   BSA 2.03 m²        PHYSICAL EXAM:  Alert and oriented x3.  Eyes: EOM grossly intact  Neck supple without lymph adenopathy or carotid bruit.  No masses or thyromegaly  Heart regular rate and rhythm without murmur.  Lungs clear to auscultation.  Legs without edema.  Gait is non-antalgic  Speech clear.  Hearing adequate.      Page by page completion of assisted living unit paperwork.  This will be faxed to the assisted living unit as will this office note.    DIAGNOSIS/PLAN:    ALU paperwork complete. Paperwork to be faxed to AL facility in [place] and [facility]-Spot results to follow when results are complete. Paper script given for liquid metformin.     1. Type 2 diabetes mellitus without complication, unspecified whether long term insulin use (Multi)  -Metformin " prescription for liquid form was printed for patient.  - Comprehensive Metabolic Panel; Future  - Hemoglobin A1C; Future    2. Head and neck cancer (Multi)  Continue close contact with oncology  Continue PEG tube feedings.  Patient's wife will send photo of product they are requesting.  A prescription will be printed.    3. Malignant neoplasm of base of tongue (Multi)  Will need close follow-up with oncology in [facility]    4. Gastroesophageal reflux disease with esophagitis without hemorrhage  - famotidine (Pepcid) 20 mg tablet; 1 po in AM for acid reflux  Dispense: 90 tablet; Refill: 2    5. Controlled type 2 diabetes mellitus with stage 3 chronic kidney disease, without long-term current use of insulin (Multi)  - glyBURIDE (Diabeta) 5 mg tablet; Take 2 tablets (10 mg) by mouth 2 times daily (morning and late afternoon).  Dispense: 360 tablet; Refill: 2  - pioglitazone (Actos) 45 mg tablet; Take 1 tablet (45 mg) by mouth once daily.  Dispense: 90 tablet; Refill: 2  - metFORMIN (Glucophage) 500 mg/5 mL solution oral solution; Take 5 mL (500 mg) by mouth 2 times daily (morning and late afternoon).  Dispense: 900 mL; Refill: 2    6. Benign prostatic hyperplasia with urinary frequency  - alfuzosin (UroxatraL) 10 mg 24 hr tablet; Take 1 tablet (10 mg) by mouth once daily. Do not crush, chew, or split.  Dispense: 90 tablet; Refill: 2    7. Hyperlipidemia, unspecified hyperlipidemia type  - rosuvastatin (Crestor) 20 mg tablet; Take 1 tablet (20 mg) by mouth once daily.  Dispense: 90 tablet; Refill: 2    8. Tremor  - primidone (Mysoline) 50 mg tablet; Take 1 tablet (50 mg) by mouth 3 times a day.  Dispense: 90 tablet; Refill: 5    9. Hypertension, unspecified type  - losartan (Cozaar) 100 mg tablet; Take 1 tablet (100 mg) by mouth once daily.  Dispense: 90 tablet; Refill: 2    10. Screening-pulmonary TB  - T-Spot TB    11. Osteoarthritis of multiple joints, unspecified osteoarthritis type  - Disability  Bo            Follow up as needed for Medical Management.    I will continue to monitor, evaluate, assess and treat all problems/diagnoses as appropriate and continue to collaborate with specialists.    Contact office or send a  [employer] message with any questions or concerns    Encouraged to sign up with  [employer]  Patient will only be notified of labs that require medical intervention.    Prescriptions will not be filled unless you are compliant with your follow up appointments or have a follow up appointment scheduled as per instruction of your physician. Refills should be requested at the time of your visit.    **Charting was completed using voice recognition technology and may include unintended errors**    Documentation in part by Julia Calvin RN, NP student, [facility]    I saw and evaluated the patient. I personally obtained the key and critical portions of the history and physical exam or was physically present for key and critical portions performed by the nurse practitioner student. I reviewed the documentation and discussed the patient with the nurse practitioner student.  I was directly involved with the history, exam and medical decision making and agree with the medical decision making as documented in the note.      Kd Lange DO, FACOFP  Senior Attending Physician  [facility]  [address]  [place] [address] [phone]

## 2024-09-21 LAB
NIL(NEG) CONTROL SPOT COUNT: NORMAL
PANEL A SPOT COUNT: 3
PANEL B SPOT COUNT: 1
POS CONTROL SPOT COUNT: NORMAL
T-SPOT. TB INTERPRETATION: NEGATIVE

## 2024-09-23 ENCOUNTER — TREATMENT (OUTPATIENT)
Dept: OCCUPATIONAL THERAPY | Facility: CLINIC | Age: 79
End: 2024-09-23
Payer: MEDICARE

## 2024-09-23 DIAGNOSIS — M43.6 NECK STIFFNESS: ICD-10-CM

## 2024-09-23 DIAGNOSIS — M62.81 MUSCLE WEAKNESS (GENERALIZED): ICD-10-CM

## 2024-09-23 DIAGNOSIS — I89.0 LYMPHEDEMA, NOT ELSEWHERE CLASSIFIED: ICD-10-CM

## 2024-09-23 DIAGNOSIS — I89.0 LYMPHEDEMA OF FACE: Primary | ICD-10-CM

## 2024-09-23 PROCEDURE — 97535 SELF CARE MNGMENT TRAINING: CPT | Mod: GO

## 2024-09-23 PROCEDURE — 97140 MANUAL THERAPY 1/> REGIONS: CPT | Mod: GO

## 2024-09-23 ASSESSMENT — ACTIVITIES OF DAILY LIVING (ADL): HOME_MANAGEMENT_TIME_ENTRY: 15

## 2024-09-23 NOTE — PROGRESS NOTES
"Occupational Therapy    Occupational Therapy Treatment    Name: Kevin Mendes \"Katherine\"  MRN: [id number]  : 1945  Date: 24    Time Entry:  Time Calculation  Start Time: 1000  Stop Time: 1045  Time Calculation (min): 45 min        OT Therapeutic Procedures Time Entry  Manual Therapy Time Entry: 30  Self Care/Home Management (ADLs) Time Entry: 15                Visit: #9  Discharge OT     Assessment:  Tissue texture softening noted neck, chin and face post tx.  Good pt teach back with refined techniques.  Good pt follow through with home programs for lymphedema: MLD, exercises, compression.  Pt voiced being comfortable with his ability to manage at home.  [place]  Discharge OT, lymphedema tx.    Plan:  Discharge OT/lymphedema tx.    Subjective   Current Problem:  1. Lymphedema of face        2. Lymphedema, not elsewhere classified        3. Neck stiffness        4. Muscle weakness (generalized)            Pt states he has been wearing swell spot everyday.   Pt doing MLD every day, throughout the day.  Pt moving to [place] in Oct, however AL apartment is not available now. Pt looking for a new sit.  Move is planned for Oct 9th.  Pt scraped left knee, is scabbed, improving.  Pt had a Mohs procedure right inner lower leg, above ankle, covered with dressing.  Saw doctor last week. Pt now wrapping area, is healing. Pt was placed on antibiotics, has finished them.  Pt sees doctor Wed. Stable.     Precautions:   PEG tube     Pain Assessment:  0/10     Outcome Measures:  LLIS= 3 (initially = 9)    Objective   Head and neck  ROM  Neck tight, restricted with head turns, improvement in mobility noted, still tight. Pt reports it's not  as restricting  Tight with neck extension. Improvement noted.  L shoulder grossly 90 degrees flexion.   Strength  Pt fatigue, general weakness.  Sensation  Intact neck, face.  Head and neck edema:  Fullness face, jaw, L>R  Full, fibrosis sub mental area, " "softening.  Full, firm, fibrosis neck.  Skin looser, tightness and swelling improved.      Head and neck measurements cm    (Initial measurements 7/22/24)  -Tragus to mental protuberance:  R= 13.7  (14.7),    L= 13.8  (14.3)  -Tragus to mouth angle:   R= 9.0  (9.5),   L= 9.2  (10.0)  -Mandibular angle to nasal wing:   R= 10.8  (11.1)   L= 10.4  (11.7)  -Mandibular angle to ext eye corner:   R= 10.8  (11.3),  L= 11.4  (10.9)  -Mandibular angle to int eye corner  ----  -Mental protuberance to int eye corner:  ----  -Mandibular angle to mental protuberance:  R= 11.4  (10.8),  L= 11.4  (12.4)    Neck circumference cm:  5 cm below chin= 44.0  (45.1)  7 cm below chin= 41.9  (43.9)  Base of neck= 41.2  (42.4)    Treatment:   45 minutes    Man Ther 30  OT provides MLD (manual lymph drainage) and gentle soft tissue techniques to bilateral head and neck, first clear trunk. Full ant neck, chin, shows softening.   OT add  gentle soft tissue technique, MFR to mid/base of neck with softening noted.  Pt instructed with techniques, showed good teach back.  Softened tissue texture noted with tx. Skin looser after tx.    Self Care 15  OT assess skin, tissue texture, take and assess measurements.  Improvements noted with swelling.  OT review pt HEPs:  Pt follows MLD, compression daily.  Pt uses compression wrap and swell pad during the day \"for a few hours\".  Pt has written instructions.  Pt moving to [place] in a few weeks, reports he feels comfortable with his programs, will follow up with doctors there.      "

## 2024-09-30 ENCOUNTER — APPOINTMENT (OUTPATIENT)
Dept: OCCUPATIONAL THERAPY | Facility: CLINIC | Age: 79
End: 2024-09-30
Payer: MEDICARE

## 2024-10-01 ENCOUNTER — OFFICE VISIT (OUTPATIENT)
Dept: OTOLARYNGOLOGY | Facility: CLINIC | Age: 79
End: 2024-10-01
Payer: MEDICARE

## 2024-10-01 ENCOUNTER — APPOINTMENT (OUTPATIENT)
Dept: SPEECH THERAPY | Facility: CLINIC | Age: 79
End: 2024-10-01
Payer: MEDICARE

## 2024-10-01 VITALS
SYSTOLIC BLOOD PRESSURE: 119 MMHG | BODY MASS INDEX: 26.07 KG/M2 | TEMPERATURE: 97.3 F | WEIGHT: 182.1 LBS | DIASTOLIC BLOOD PRESSURE: 68 MMHG | HEIGHT: 70 IN

## 2024-10-01 DIAGNOSIS — Q39.4 CRICOPHARYNGEAL WEB (HHS-HCC): ICD-10-CM

## 2024-10-01 DIAGNOSIS — R13.10 DYSPHAGIA, UNSPECIFIED TYPE: Primary | ICD-10-CM

## 2024-10-01 DIAGNOSIS — C01 MALIGNANT NEOPLASM OF BASE OF TONGUE (MULTI): ICD-10-CM

## 2024-10-01 PROCEDURE — 99214 OFFICE O/P EST MOD 30 MIN: CPT | Performed by: OTOLARYNGOLOGY

## 2024-10-01 PROCEDURE — 99215 OFFICE O/P EST HI 40 MIN: CPT | Performed by: OTOLARYNGOLOGY

## 2024-10-01 PROCEDURE — 1123F ACP DISCUSS/DSCN MKR DOCD: CPT | Performed by: OTOLARYNGOLOGY

## 2024-10-01 PROCEDURE — 1157F ADVNC CARE PLAN IN RCRD: CPT | Performed by: OTOLARYNGOLOGY

## 2024-10-01 PROCEDURE — 1126F AMNT PAIN NOTED NONE PRSNT: CPT | Performed by: OTOLARYNGOLOGY

## 2024-10-01 PROCEDURE — 1036F TOBACCO NON-USER: CPT | Performed by: OTOLARYNGOLOGY

## 2024-10-01 PROCEDURE — 1159F MED LIST DOCD IN RCRD: CPT | Performed by: OTOLARYNGOLOGY

## 2024-10-01 PROCEDURE — 1160F RVW MEDS BY RX/DR IN RCRD: CPT | Performed by: OTOLARYNGOLOGY

## 2024-10-01 PROCEDURE — 3074F SYST BP LT 130 MM HG: CPT | Performed by: OTOLARYNGOLOGY

## 2024-10-01 PROCEDURE — 3078F DIAST BP <80 MM HG: CPT | Performed by: OTOLARYNGOLOGY

## 2024-10-01 ASSESSMENT — PATIENT HEALTH QUESTIONNAIRE - PHQ9
2. FEELING DOWN, DEPRESSED OR HOPELESS: NOT AT ALL
SUM OF ALL RESPONSES TO PHQ9 QUESTIONS 1 AND 2: 0
1. LITTLE INTEREST OR PLEASURE IN DOING THINGS: NOT AT ALL

## 2024-10-01 ASSESSMENT — PAIN SCALES - GENERAL: PAINLEVEL: 0-NO PAIN

## 2024-10-01 NOTE — PROGRESS NOTES
"Patient: Kevin Mendes   MRN: [id number] YOB: 1945   Sex: male Age: 78 y.o.  Date of Service: 10/1/2024       ASSESSMENT AND PLAN  I discussed the findings with Kevin Mendes \"Pat\" and have recommended the followin. PEG dependent dysphagia in setting of multimodality treatment for p16+ BOT SCC. MBS with CP impression w/ web and PES narrowing, significant residue. FEES in office today demonstrated some improvement in residue with right head turn (though did not eliminate). He is s/p esophagoscopy and dilation, steroid injection x 3 last 24 to 38mm.  - Continue swallow therapy, primary nutrition via PEG  - Meticulous oral care  - Recommend seeing Dr. Fariba Ruiz, laryngology at [place] for management     2. Lymphedema  - s/p OT lymphedema therapy with improvement      CHIEF COMPLAINT  Chief Complaint   Patient presents with    Dysphagia       HISTORY OF PRESENT ILLNESS  Kevin Mendes \"Katherine\" is a 78 y.o. male who we have been following for PEG dependent dysphagia in the setting of [id number] p16+ BOT SCC s/p chemoXRT completed 24. He is s/p series of 3 esophagoscopy and dilation, last 24 to 38mm    He reports swallowing is improved but still has issues swallowing dry foods.     PMH: diabetes  SH: quit 25 years ago  Meds: ASA    ADDITIONAL HISTORY  Past Medical History  He has a past medical history of Abnormal CT of the chest (2009), Acute postoperative pain (2023), Arthritis, BPH (benign prostatic hyperplasia), Calcific tendonitis of left shoulder (2018), Cardiac dysrhythmia (2009), Cataract, Diarrhea (2024), GERD (gastroesophageal reflux disease), Hyperlipidemia, Hypertension, Inflammatory and toxic neuropathy (Multi) (2010), Insomnia (2024), Leukopenia (2024), Localized, primary osteoarthritis (2018), Low back pain, unspecified (2017), Malignant melanoma of skin of trunk, except scrotum (Multi) (2009), " Malignant neoplasm of base of tongue (Multi) (03/11/2024), Mass of neck (03/05/2024), Open wound (03/05/2024), Oropharyngeal dysphagia (03/05/2024), Polyneuropathy (04/05/2010), Presence of right artificial knee joint (01/12/2018), Sensorineural hearing loss, bilateral (07/23/2018), Tongue mass (10/05/2023), Type 2 diabetes mellitus, and Urinary tract infection (03/05/2024). Surgical History  He has a past surgical history that includes Total hip arthroplasty (04/10/2014); Total knee arthroplasty (04/10/2014); Cardiac electrophysiology mapping and ablation (04/10/2014); Hand surgery (04/07/2017); Cataract extraction (04/07/2017); and Shoulder surgery (04/07/2017).   Social History  He reports that he has quit smoking. His smoking use included cigarettes. He has been exposed to tobacco smoke. He has never used smokeless tobacco. He reports that he does not currently use alcohol. He reports that he does not currently use drugs. Allergies  Cat dander     Family History  Family History   Problem Relation Name Age of Onset    Other (heart failure following cardiac surgery) Mother          REVIEW OF SYSTEMS  All 10 systems were reviewed and negative except for above.      PHYSICAL EXAM  ENT Physical Exam   GENERAL: Well-nourished and developed, alert and appropriate, no distress, voice [id number]  RESPIRATORY: Breathing quietly, no stridor  HEAD: Normocephalic atraumatic  FACE: Symmetric, no masses or lesions  EYES:  Pupils reactive, sclera clear, external ocular muscles intact, no nystagmus.    EARS:  Pinnae normal.   NOSE:  No anterior lesions, masses or polyps.  ORAL CAVITY/OROPHARYNX:  Buccal mucosa is moist without lesions or masses, tongue midline and palate elevates symmetrically. Tongue mobility intact.  NECK:  There is no lymphadenopathy or thyromegaly.  Moderate submental lymphedema, improved  NEUROLOGIC:  Cranial nerves II-XII grossly intact.       Last Recorded Vitals  Blood pressure 119/68, temperature 36.3  "°C (97.3 °F), height 1.778 m (5' 10\"), weight 82.6 kg (182 lb 1.6 oz).    RESULTS    Patient Reported Outcome Measures  N/A    Laboratory, Radiology, and Pathology  I personally reviewed the following results, with the following interpretation:   MBS 5/15/24 - mild cervical osteophytes, CP impression/web, mild PPW thickening. Esophageal FT without obvious abnormalities        PROCEDURES  None    Flexible Fiberoptic Laryngoscopy (prior)    Patient failed a mirror exam due to limitations of equipment and the need for laryngoscopy to assess laryngeal anatomy and function    PREOPERATIVE DIAGNOSIS: dysphagia, head and neck cancer    POSTOPERATIVE DIAGNOSIS: Same    PROCEDURE: Transnasal videolaryngoscopy    ANESTHESIA:  Topical    COMPLICATIONS:  None    SPECIMENS:  None    PROCEDURE IN DETAIL:  The patient was brought into the endoscopy suite, placed in the upright position.  The nasal cavity was topically decongested anesthetized.  The distal chip video laryngoscope was passed through the nasal cavity.  The nasal cavity and nasopharynx were within normal limits except noted below. The following findings on laryngoscopy were noted:         Tongue Base: no masses or lesions, mild pooled secretions         Left vocal fold mobility: mobile         Right vocal fold mobility: mobile         Glottal closure: complete         Laryngeal muscle tension: severe         Symmetry: symmetric         Vocal fold free edge: no masses or lesions          Other: post radiation changes throughout, moderate supraglottic edema, pooled secretions postcricoid and bilateral pyriforms    The patient tolerated the procedure well.            Flexible Fiberoptic Endoscopic Evaluation of Swallowing (FEES)    A flexible endoscopic evaluation of swallowing was indicated to assess pharyngeal function. The patient was given trials of thin liquids    Findings: penetration during and after the swallow, moderate residue most significant in postcricoid " region, suspected aspiration on residue. Minimal improvement with chin tuck. Some improvement with right head turn.     Please see SLP report for complete details.      ----------------------------------------------------------------------  Isabel Beckman MD, MAEd    [facility]  Department of Otolaryngology - Head and Neck Surgery  [facility]    The total time I spent in care of this patient today (excluding time spent on other billable services) is as follows:    Time Spent  Prep time on day of patient encounter: 5 minutes  Time spent directly with patient, family or caregiver: 15 minutes  Additional Time Spent on Patient Care Activities: 5 minutes  Documentation Time: 5 minutes  Other Time Spent: 0 minutes  Total: 30 minutes

## 2024-10-01 NOTE — PATIENT INSTRUCTIONS
Dr. Fariba Ruiz  [facility]  [address]  [place], [place] [address] [phone]

## 2024-10-08 ENCOUNTER — APPOINTMENT (OUTPATIENT)
Dept: OTOLARYNGOLOGY | Facility: CLINIC | Age: 79
End: 2024-10-08
Payer: MEDICARE

## 2024-10-25 ENCOUNTER — APPOINTMENT (OUTPATIENT)
Dept: OTOLARYNGOLOGY | Facility: CLINIC | Age: 79
End: 2024-10-25
Payer: MEDICARE

## 2024-11-21 ENCOUNTER — APPOINTMENT (OUTPATIENT)
Dept: URBAN - METROPOLITAN AREA CLINIC 306 | Age: 79
Setting detail: DERMATOLOGY
End: 2024-11-21

## 2024-11-21 DIAGNOSIS — L57.8 OTHER SKIN CHANGES DUE TO CHRONIC EXPOSURE TO NONIONIZING RADIATION: ICD-10-CM

## 2024-11-21 DIAGNOSIS — D49.2 NEOPLASM OF UNSPECIFIED BEHAVIOR OF BONE, SOFT TISSUE, AND SKIN: ICD-10-CM

## 2024-11-21 DIAGNOSIS — D18.0 HEMANGIOMA: ICD-10-CM

## 2024-11-21 DIAGNOSIS — L82.1 OTHER SEBORRHEIC KERATOSIS: ICD-10-CM

## 2024-11-21 DIAGNOSIS — I87.2 VENOUS INSUFFICIENCY (CHRONIC) (PERIPHERAL): ICD-10-CM

## 2024-11-21 PROBLEM — L30.9 DERMATITIS, UNSPECIFIED: Status: ACTIVE | Noted: 2024-11-21

## 2024-11-21 PROBLEM — D18.01 HEMANGIOMA OF SKIN AND SUBCUTANEOUS TISSUE: Status: ACTIVE | Noted: 2024-11-21

## 2024-11-21 PROCEDURE — OTHER MIPS QUALITY: OTHER

## 2024-11-21 PROCEDURE — OTHER BIOPSY BY SHAVE METHOD: OTHER

## 2024-11-21 PROCEDURE — OTHER PRESCRIPTION MEDICATION MANAGEMENT: OTHER

## 2024-11-21 PROCEDURE — 99203 OFFICE O/P NEW LOW 30 MIN: CPT | Mod: 25

## 2024-11-21 PROCEDURE — OTHER PRESCRIPTION: OTHER

## 2024-11-21 PROCEDURE — OTHER REASSURANCE: OTHER

## 2024-11-21 PROCEDURE — OTHER COUNSELING: OTHER

## 2024-11-21 PROCEDURE — 11103 TANGNTL BX SKIN EA SEP/ADDL: CPT

## 2024-11-21 PROCEDURE — OTHER SUNSCREEN RECOMMENDATIONS: OTHER

## 2024-11-21 PROCEDURE — 11102 TANGNTL BX SKIN SINGLE LES: CPT

## 2024-11-21 RX ORDER — TRIAMCINOLONE ACETONIDE 1 MG/G
CREAM TOPICAL BID
Qty: 80 | Refills: 3 | Status: ERX | COMMUNITY
Start: 2024-11-21

## 2024-11-21 ASSESSMENT — LOCATION DETAILED DESCRIPTION DERM
LOCATION DETAILED: RIGHT LATERAL SUBMANDIBULAR CHEEK
LOCATION DETAILED: LEFT INFERIOR CENTRAL MALAR CHEEK
LOCATION DETAILED: LEFT SUPERIOR UPPER BACK
LOCATION DETAILED: RIGHT MEDIAL DISTAL PRETIBIAL REGION
LOCATION DETAILED: RIGHT LATERAL ABDOMEN
LOCATION DETAILED: RIGHT INFERIOR CENTRAL MALAR CHEEK
LOCATION DETAILED: LEFT MEDIAL DISTAL PRETIBIAL REGION
LOCATION DETAILED: RIGHT DISTAL RADIAL DORSAL FOREARM
LOCATION DETAILED: LEFT DISTAL DORSAL FOREARM
LOCATION DETAILED: RIGHT DISTAL DORSAL FOREARM
LOCATION DETAILED: LEFT SUPERIOR MEDIAL MIDBACK
LOCATION DETAILED: RIGHT MID-UPPER BACK

## 2024-11-21 ASSESSMENT — LOCATION SIMPLE DESCRIPTION DERM
LOCATION SIMPLE: RIGHT CHEEK
LOCATION SIMPLE: LEFT FOREARM
LOCATION SIMPLE: LEFT PRETIBIAL REGION
LOCATION SIMPLE: LEFT UPPER BACK
LOCATION SIMPLE: RIGHT FOREARM
LOCATION SIMPLE: RIGHT PRETIBIAL REGION
LOCATION SIMPLE: LEFT LOWER BACK
LOCATION SIMPLE: RIGHT UPPER BACK
LOCATION SIMPLE: LEFT CHEEK
LOCATION SIMPLE: ABDOMEN

## 2024-11-21 ASSESSMENT — LOCATION ZONE DERM
LOCATION ZONE: TRUNK
LOCATION ZONE: LEG
LOCATION ZONE: ARM
LOCATION ZONE: FACE

## 2024-11-21 NOTE — PROCEDURE: BIOPSY BY SHAVE METHOD
Detail Level: Detailed
Depth Of Biopsy: dermis
Was A Bandage Applied: Yes
Size Of Lesion In Cm: 0
Biopsy Type: H and E
Biopsy Method: Dermablade
Anesthesia Type: 1% lidocaine with epinephrine
Anesthesia Volume In Cc: 0.5
Hemostasis: Drysol
Wound Care: Petrolatum
Dressing: bandage
Destruction After The Procedure: No
Type Of Destruction Used: Curettage
Curettage Text: The wound bed was treated with curettage after the biopsy was performed.
Cryotherapy Text: The wound bed was treated with cryotherapy after the biopsy was performed.
Electrodesiccation Text: The wound bed was treated with electrodesiccation after the biopsy was performed.
Electrodesiccation And Curettage Text: The wound bed was treated with electrodesiccation and curettage after the biopsy was performed.
Silver Nitrate Text: The wound bed was treated with silver nitrate after the biopsy was performed.
Lab: [id number]
Lab Facility: [id number]
Consent: Written consent was obtained and risks were reviewed including but not limited to scarring, infection, bleeding, scabbing, incomplete removal, nerve damage and allergy to anesthesia.
Post-Care Instructions: I reviewed with the patient in detail post-care instructions. Patient is to keep the biopsy site dry overnight, and then apply bacitracin twice daily until healed. Patient may apply hydrogen peroxide soaks to remove any crusting.
Notification Instructions: Patient will be notified of biopsy results. However, patient instructed to call the office if not contacted within 2 weeks.
Billing Type: Third-Party Bill
Information: Selecting Yes will display possible errors in your note based on the variables you have selected. This validation is only offered as a suggestion for you. PLEASE NOTE THAT THE VALIDATION TEXT WILL BE REMOVED WHEN YOU FINALIZE YOUR NOTE. IF YOU WANT TO FAX A PRELIMINARY NOTE YOU WILL NEED TO TOGGLE THIS TO 'NO' IF YOU DO NOT WANT IT IN YOUR FAXED NOTE.

## 2024-12-17 ENCOUNTER — APPOINTMENT (OUTPATIENT)
Dept: URBAN - METROPOLITAN AREA CLINIC 306 | Age: 79
Setting detail: DERMATOLOGY
End: 2024-12-17

## 2024-12-17 PROBLEM — C44.42 SQUAMOUS CELL CARCINOMA OF SKIN OF SCALP AND NECK: Status: ACTIVE | Noted: 2024-12-17

## 2024-12-17 PROCEDURE — 17311 MOHS 1 STAGE H/N/HF/G: CPT

## 2024-12-17 PROCEDURE — 17312 MOHS ADDL STAGE: CPT

## 2024-12-17 PROCEDURE — OTHER MOHS SURGERY: OTHER

## 2024-12-17 PROCEDURE — 12042 INTMD RPR N-HF/GENIT2.6-7.5: CPT

## 2024-12-17 NOTE — PROCEDURE: MOHS SURGERY
Biopsy Photograph Reviewed: Yes
Consent Type: Consent 1 (Standard)
Eye Shield Used: No
Initial Size Of Lesion: 3
X Size Of Lesion In Cm (Optional): 0
Number Of Stages: 2
Primary Defect Length In Cm (Final Defect Size - Required For Flaps/Grafts): 4.2
Repair Type: Intermediate Layered Repair
Which Instrument Did You Use For Dermabrasion?: Wire Brush
Which Eyelid Repair Cpt Are You Using?: 19911
Oculoplastic Surgeon Procedure Text (A): After obtaining clear surgical margins the patient was sent to oculoplastics for surgical repair.  The patient understands they will receive post-surgical care and follow-up from the referring physician's office.
Otolaryngologist Procedure Text (A): After obtaining clear surgical margins the patient was sent to otolaryngology for surgical repair.  The patient understands they will receive post-surgical care and follow-up from the referring physician's office.
Plastic Surgeon Procedure Text (A): After obtaining clear surgical margins the patient was sent to plastics for surgical repair.  The patient understands they will receive post-surgical care and follow-up from the referring physician's office.
Mid-Level Procedure Text (A): After obtaining clear surgical margins the patient was sent to a mid-level provider for surgical repair.  The patient understands they will receive post-surgical care and follow-up from the mid-level provider.
Provider Procedure Text (A): After obtaining clear surgical margins the defect was repaired by another provider.
Asc Procedure Text (A): After obtaining clear surgical margins the patient was sent to an ASC for surgical repair.  The patient understands they will receive post-surgical care and follow-up from the ASC physician.
Simple / Intermediate / Complex Repair - Final Wound Length In Cm: 5.2
Suturegard Retention Suture: 2-0 Nylon
Retention Suture Bite Size: 3 mm
Length To Time In Minutes Device Was In Place: 10
Number Of Hemigard Strips Per Side: 1
Undermining Type: Entire Wound
Debridement Text: The wound edges were debrided prior to proceeding with the closure to facilitate wound healing.
Helical Rim Text: The closure involved the helical rim.
Vermilion Border Text: The closure involved the vermilion border.
Nostril Rim Text: The closure involved the nostril rim.
Retention Suture Text: Retention sutures were placed to support the closure and prevent dehiscence.
Area H Indication Text: Tumors in this location are included in Area H (eyelids, eyebrows, nose, lips, chin, ear, pre-auricular, post-auricular, temple, genitalia, hands, feet, ankles and areola).  Tissue conservation is critical in these anatomic locations.
Area M Indication Text: Tumors in this location are included in Area M (cheek, forehead, scalp, neck, jawline and pretibial skin).  Mohs surgery is indicated for tumors in these anatomic locations.
Area L Indication Text: Tumors in this location are included in Area L (trunk and extremities).  Mohs surgery is indicated for larger tumors, or tumors with aggressive histologic features, in these anatomic locations.
Depth Of Tumor Invasion (For Histology): epidermis
Perineural Invasion (For Histology - Be Specific If Possible): absent
Special Stains Stage 1 - Results: Base On Clearance Noted Above
Histology Selection Override (Optional- Will Default To Parent Diagnosis If N/A): Squamous Cell Carcinoma in situ
Stage 2: Additional Anesthesia Type: 1% lidocaine with epinephrine
Include Tumor Staging In Mohs Note?: Please Select the Appropriate Response
Staging Info: By selecting yes to the question above you will include information on AJCC 8 tumor staging in your Mohs note. Information on tumor staging will be automatically added for SCCs on the head and neck. AJCC 8 includes tumor size, tumor depth, perineural involvement and bone invasion.
Tumor Depth: Less than 6mm from granular layer and no invasion beyond the subcutaneous fat
Medical Necessity Statement: Based on my medical judgement, Mohs surgery is the most appropriate treatment for this cancer compared to other treatments.
Alternatives Discussed Intro (Do Not Add Period): I discussed alternative treatments to Mohs surgery and specifically discussed the risks and benefits of
Consent 1/Introductory Paragraph: The rationale for Mohs was explained to the patient and consent was obtained. The risks, benefits and alternatives to therapy were discussed in detail. Specifically, the risks of infection, scarring, bleeding, prolonged wound healing, incomplete removal, allergy to anesthesia, nerve injury and recurrence were addressed. Prior to the procedure, the treatment site was clearly identified and confirmed by the patient. All components of Universal Protocol/PAUSE Rule completed.
Consent 2/Introductory Paragraph: Mohs surgery was explained to the patient and consent was obtained. The risks, benefits and alternatives to therapy were discussed in detail. Specifically, the risks of infection, scarring, bleeding, prolonged wound healing, incomplete removal, allergy to anesthesia, nerve injury and recurrence were addressed. Prior to the procedure, the treatment site was clearly identified and confirmed by the patient. All components of Universal Protocol/PAUSE Rule completed.
Consent 3/Introductory Paragraph: I gave the patient a chance to ask questions they had about the procedure.  Following this I explained the Mohs procedure and consent was obtained. The risks, benefits and alternatives to therapy were discussed in detail. Specifically, the risks of infection, scarring, bleeding, prolonged wound healing, incomplete removal, allergy to anesthesia, nerve injury and recurrence were addressed. Prior to the procedure, the treatment site was clearly identified and confirmed by the patient. All components of Universal Protocol/PAUSE Rule completed.
Consent (Temporal Branch)/Introductory Paragraph: The rationale for Mohs was explained to the patient and consent was obtained. The risks, benefits and alternatives to therapy were discussed in detail. Specifically, the risks of damage to the temporal branch of the facial nerve, infection, scarring, bleeding, prolonged wound healing, incomplete removal, allergy to anesthesia, and recurrence were addressed. Prior to the procedure, the treatment site was clearly identified and confirmed by the patient. All components of Universal Protocol/PAUSE Rule completed.
Consent (Marginal Mandibular)/Introductory Paragraph: The rationale for Mohs was explained to the patient and consent was obtained. The risks, benefits and alternatives to therapy were discussed in detail. Specifically, the risks of damage to the marginal mandibular branch of the facial nerve, infection, scarring, bleeding, prolonged wound healing, incomplete removal, allergy to anesthesia, and recurrence were addressed. Prior to the procedure, the treatment site was clearly identified and confirmed by the patient. All components of Universal Protocol/PAUSE Rule completed.
Consent (Spinal Accessory)/Introductory Paragraph: The rationale for Mohs was explained to the patient and consent was obtained. The risks, benefits and alternatives to therapy were discussed in detail. Specifically, the risks of damage to the spinal accessory nerve, infection, scarring, bleeding, prolonged wound healing, incomplete removal, allergy to anesthesia, and recurrence were addressed. Prior to the procedure, the treatment site was clearly identified and confirmed by the patient. All components of Universal Protocol/PAUSE Rule completed.
Consent (Near Eyelid Margin)/Introductory Paragraph: The rationale for Mohs was explained to the patient and consent was obtained. The risks, benefits and alternatives to therapy were discussed in detail. Specifically, the risks of ectropion or eyelid deformity, infection, scarring, bleeding, prolonged wound healing, incomplete removal, allergy to anesthesia, nerve injury and recurrence were addressed. Prior to the procedure, the treatment site was clearly identified and confirmed by the patient. All components of Universal Protocol/PAUSE Rule completed.
Consent (Ear)/Introductory Paragraph: The rationale for Mohs was explained to the patient and consent was obtained. The risks, benefits and alternatives to therapy were discussed in detail. Specifically, the risks of ear deformity, infection, scarring, bleeding, prolonged wound healing, incomplete removal, allergy to anesthesia, nerve injury and recurrence were addressed. Prior to the procedure, the treatment site was clearly identified and confirmed by the patient. All components of Universal Protocol/PAUSE Rule completed.
Consent (Nose)/Introductory Paragraph: The rationale for Mohs was explained to the patient and consent was obtained. The risks, benefits and alternatives to therapy were discussed in detail. Specifically, the risks of nasal deformity, changes in the flow of air through the nose, infection, scarring, bleeding, prolonged wound healing, incomplete removal, allergy to anesthesia, nerve injury and recurrence were addressed. Prior to the procedure, the treatment site was clearly identified and confirmed by the patient. All components of Universal Protocol/PAUSE Rule completed.
Consent (Lip)/Introductory Paragraph: The rationale for Mohs was explained to the patient and consent was obtained. The risks, benefits and alternatives to therapy were discussed in detail. Specifically, the risks of lip deformity, changes in the oral aperture, infection, scarring, bleeding, prolonged wound healing, incomplete removal, allergy to anesthesia, nerve injury and recurrence were addressed. Prior to the procedure, the treatment site was clearly identified and confirmed by the patient. All components of Universal Protocol/PAUSE Rule completed.
Consent (Scalp)/Introductory Paragraph: The rationale for Mohs was explained to the patient and consent was obtained. The risks, benefits and alternatives to therapy were discussed in detail. Specifically, the risks of changes in hair growth pattern secondary to repair, infection, scarring, bleeding, prolonged wound healing, incomplete removal, allergy to anesthesia, nerve injury and recurrence were addressed. Prior to the procedure, the treatment site was clearly identified and confirmed by the patient. All components of Universal Protocol/PAUSE Rule completed.
Detail Level: Detailed
Postop Diagnosis: same
Anesthesia Volume In Cc: 6
Hemostasis: Electrocautery
Estimated Blood Loss (Cc): minimal
Anesthesia Volume In Cc: 8
Brow Lift Text: A midfrontal incision was made medially to the defect to allow access to the tissues just superior to the left eyebrow. Following careful dissection inferiorly in a supraperiosteal plane to the level of the left eyebrow, several 3-0 monocryl sutures were used to resuspend the eyebrow orbicularis oculi muscular unit to the superior frontal bone periosteum. This resulted in an appropriate reapproximation of static eyebrow symmetry and correction of the left brow ptosis.
Deep Sutures: 5-0 PGCL
Epidermal Sutures: 3-0 Nylon
Epidermal Closure: running
Suturegard Intro: Intraoperative tissue expansion was performed, utilizing the SUTUREGARD device, in order to reduce wound tension.
Suturegard Body: The suture ends were repeatedly re-tightened and re-clamped to achieve the desired tissue expansion.
Hemigard Intro: Due to skin fragility and wound tension, it was decided to use HEMIGARD adhesive retention suture devices to permit a linear closure. The skin was cleaned and dried for a 6cm distance away from the wound. Excessive hair, if present, was removed to allow for adhesion.
Hemigard Postcare Instructions: The HEMIGARD strips are to remain completely dry for at least 5-7 days.
Donor Site Anesthesia Type: same as repair anesthesia
Epidermal Closure Graft Donor Site (Optional): simple interrupted
Graft Donor Site Bandage (Optional-Leave Blank If You Don't Want In Note): Steri-strips and a pressure bandage were applied to the donor site.
Closure 2 Information: This tab is for additional flaps and grafts, including complex repair and grafts and complex repair and flaps. You can also specify a different location for the additional defect, if the location is the same you do not need to select a new one. We will insert the automated text for the repair you select below just as we do for solitary flaps and grafts. Please note that at this time if you select a location with a different insurance zone you will need to override the ICD10 and CPT if appropriate.
Closure 3 Information: This tab is for additional flaps and grafts above and beyond our usual structured repairs.  Please note if you enter information here it will not currently bill and you will need to add the billing information manually.
Wound Care: Petrolatum
Dressing: dry sterile dressing
Suture Removal: 21 days
Unna Boot Text: An Unna boot was placed to help immobilize the limb and facilitate more rapid healing.
Home Suture Removal Text: Patient was provided instructions on removing sutures and will remove their sutures at home.  If they have any questions or difficulties they will call the office.
Post-Care Instructions: I reviewed with the patient in detail post-care instructions. Patient is not to engage in any heavy lifting, exercise, or swimming for the next 14 days. Should the patient develop any fevers, chills, bleeding, severe pain patient will contact the office immediately.
Pain Refusal Text: I offered to prescribe pain medication but the patient refused to take this medication.
Mauc Instructions: By selecting yes to the question below the MAUC number will be added into the note.  This will be calculated automatically based on the diagnosis chosen, the size entered, the body zone selected (H,M,L) and the specific indications you chose. You will also have the option to override the Mohs AUC if you disagree with the automatically calculated number and this option is found in the Case Summary tab.
Where Do You Want The Question To Include Opioid Counseling Located?: Case Summary Tab
Eye Protection Verbiage: Before proceeding with the stage, a plastic scleral shield was inserted. The globe was anesthetized with a few drops of 1% lidocaine with 1:100,000 epinephrine. Then, an appropriate sized scleral shield was chosen and coated with lacrilube ointment. The shield was gently inserted and left in place for the duration of each stage. After the stage was completed, the shield was gently removed.
Mohs Method Verbiage: An incision at a 45 degree angle following the standard Mohs approach was done and the specimen was harvested as a microscopic controlled layer.
Surgeon/Pathologist Verbiage (Will Incorporate Name Of Surgeon From Intro If Not Blank): operated in two distinct and integrated capacities as the surgeon and pathologist.
Mohs Histo Method Verbiage: Each section was then chromacoded and processed in the Mohs lab using the Mohs protocol and submitted for frozen section, utilizing hematoxylin and eosin histochemical stains.
Subsequent Stages Histo Method Verbiage: Using a similar technique to that described above, a thin layer of tissue was removed from all areas where tumor was visible on the previous stage.  The tissue was again oriented, mapped, dyed, and processed as above.
Mohs Rapid Report Verbiage: The area of clinically evident tumor was marked with skin marking ink and appropriately hatched.  The initial incision was made following the Mohs approach through the skin.  The specimen was taken to the lab, divided into the necessary number of pieces, chromacoded and processed according to the Mohs protocol.  This was repeated in successive stages until a tumor free defect was achieved.
Complex Repair Preamble Text (Leave Blank If You Do Not Want): Extensive wide undermining was performed.
Intermediate Repair Preamble Text (Leave Blank If You Do Not Want): Undermining was performed with blunt dissection.
Graft Cartilage Fenestration Text: The cartilage was fenestrated with a 2mm punch biopsy to help facilitate graft survival and healing.
Non-Graft Cartilage Fenestration Text: The cartilage was fenestrated with a 2mm punch biopsy to help facilitate healing.
Secondary Intention Text (Leave Blank If You Do Not Want): The defect will heal with secondary intention.
No Repair - Repaired With Adjacent Surgical Defect Text (Leave Blank If You Do Not Want): After obtaining clear surgical margins the defect was repaired concurrently with another surgical defect which was in close approximation.
Adjacent Tissue Transfer Text: The defect edges were debeveled with a #15 scalpel blade. Given the location of the defect and the proximity to free margins an adjacent tissue transfer was deemed most appropriate. Using a sterile surgical marker, an appropriate flap was drawn incorporating the defect and placing the expected incisions within the relaxed skin tension lines where possible. The area thus outlined was incised deep to adipose tissue with a #15 scalpel blade. The skin margins were undermined to an appropriate distance in all directions utilizing iris scissors and carried over to close the primary defect.
Advancement Flap (Single) Text: The defect edges were debeveled with a #15 scalpel blade. Given the location of the defect and the proximity to free margins a single advancement flap was deemed most appropriate. Using a sterile surgical marker, an appropriate advancement flap was drawn incorporating the defect and placing the expected incisions within the relaxed skin tension lines where possible. The area thus outlined was incised deep to adipose tissue with a #15 scalpel blade. The skin margins were undermined to an appropriate distance in all directions utilizing iris scissors. Following this, the designed flap was advanced and carried over into the primary defect and sutured into place.
Advancement Flap (Double) Text: The defect edges were debeveled with a #15 scalpel blade. Given the location of the defect and the proximity to free margins a double advancement flap was deemed most appropriate. Using a sterile surgical marker, the appropriate advancement flaps were drawn incorporating the defect and placing the expected incisions within the relaxed skin tension lines where possible. The area thus outlined was incised deep to adipose tissue with a #15 scalpel blade. The skin margins were undermined to an appropriate distance in all directions utilizing iris scissors. Following this, the designed flaps were advanced and carried over into the primary defect and sutured into place.
Advancement-Rotation Flap Text: The defect edges were debeveled with a #15 scalpel blade. Given the location of the defect, shape of the defect and the proximity to free margins an advancement-rotation flap was deemed most appropriate. Using a sterile surgical marker, an appropriate flap was drawn incorporating the defect and placing the expected incisions within the relaxed skin tension lines where possible. The area thus outlined was incised deep to adipose tissue with a #15 scalpel blade. The skin margins were undermined to an appropriate distance in all directions utilizing iris scissors. Following this, the designed flap was carried over into the primary defect and sutured into place.
Alar Island Pedicle Flap Text: The defect edges were debeveled with a #15 scalpel blade. Given the location of the defect, shape of the defect and the proximity to the alar rim an island pedicle advancement flap was deemed most appropriate. Using a sterile surgical marker, an appropriate advancement flap was drawn incorporating the defect, outlining the appropriate donor tissue and placing the expected incisions within the nasal ala running parallel to the alar rim. The area thus outlined was incised with a #15 scalpel blade. The skin margins were undermined minimally to an appropriate distance in all directions around the primary defect and laterally outward around the island pedicle utilizing iris scissors.  There was minimal undermining beneath the pedicle flap. Following this, the designed flap was carried over into the primary defect and sutured into place.
A-T Advancement Flap Text: The defect edges were debeveled with a #15 scalpel blade. Given the location of the defect, shape of the defect and the proximity to free margins an A-T advancement flap was deemed most appropriate. Using a sterile surgical marker, an appropriate advancement flap was drawn incorporating the defect and placing the expected incisions within the relaxed skin tension lines where possible. The area thus outlined was incised deep to adipose tissue with a #15 scalpel blade. The skin margins were undermined to an appropriate distance in all directions utilizing iris scissors. Following this, the designed flap was advanced and carried over into the primary defect and sutured into place.
Banner Transposition Flap Text: The defect edges were debeveled with a #15 scalpel blade. Given the location of the defect and the proximity to free margins a Banner transposition flap was deemed most appropriate. Using a sterile surgical marker, an appropriate flap was drawn around the defect. The area thus outlined was incised deep to adipose tissue with a #15 scalpel blade. The skin margins were undermined to an appropriate distance in all directions utilizing iris scissors. Following this, the designed flap was carried into the primary defect and sutured into place.
Bilateral Helical Rim Advancement Flap Text: The defect edges were debeveled with a #15 blade scalpel.  Given the location of the defect and the proximity to free margins (helical rim) a bilateral helical rim advancement flap was deemed most appropriate. Using a sterile surgical marker, the appropriate advancement flaps were drawn incorporating the defect and placing the expected incisions between the helical rim and antihelix where possible.  The area thus outlined was incised through and through with a #15 scalpel blade.  With a skin hook and iris scissors, the flaps were gently and sharply undermined and freed up. Following this, the designed flaps were placed into the primary defect and sutured into place.
Bilateral Rotation Flap Text: The defect edges were debeveled with a #15 scalpel blade. Given the location of the defect, shape of the defect and the proximity to free margins a bilateral rotation flap was deemed most appropriate. Using a sterile surgical marker, an appropriate rotation flap was drawn incorporating the defect and placing the expected incisions within the relaxed skin tension lines where possible. The area thus outlined was incised deep to adipose tissue with a #15 scalpel blade. The skin margins were undermined to an appropriate distance in all directions utilizing iris scissors. Following this, the designed flap was carried over into the primary defect and sutured into place.
Bilobed Flap Text: The defect edges were debeveled with a #15 scalpel blade. Given the location of the defect and the proximity to free margins a bilobe flap was deemed most appropriate. Using a sterile surgical marker, an appropriate bilobe flap drawn around the defect. The area thus outlined was incised deep to adipose tissue with a #15 scalpel blade. The skin margins were undermined to an appropriate distance in all directions utilizing iris scissors. Following this, the designed flap was carried over into the primary defect and sutured into place.
Bilobed Transposition Flap Text: The defect edges were debeveled with a #15 scalpel blade. Given the location of the defect and the proximity to free margins a bilobed transposition flap was deemed most appropriate. Using a sterile surgical marker, an appropriate bilobe flap drawn around the defect. The area thus outlined was incised deep to adipose tissue with a #15 scalpel blade. The skin margins were undermined to an appropriate distance in all directions utilizing iris scissors. Following this, the designed flap was carried over into the primary defect and sutured into place.
Bi-Rhombic Flap Text: The defect edges were debeveled with a #15 scalpel blade. Given the location of the defect and the proximity to free margins a bi-rhombic flap was deemed most appropriate. Using a sterile surgical marker, an appropriate rhombic flap was drawn incorporating the defect. The area thus outlined was incised deep to adipose tissue with a #15 scalpel blade. The skin margins were undermined to an appropriate distance in all directions utilizing iris scissors. Following this, the designed flap was carried over into the primary defect and sutured into place.
Burow's Advancement Flap Text: The defect edges were debeveled with a #15 scalpel blade. Given the location of the defect and the proximity to free margins a Burow's advancement flap was deemed most appropriate. Using a sterile surgical marker, the appropriate advancement flap was drawn incorporating the defect and placing the expected incisions within the relaxed skin tension lines where possible. The area thus outlined was incised deep to adipose tissue with a #15 scalpel blade. The skin margins were undermined to an appropriate distance in all directions utilizing iris scissors. Following this, the designed flap was advanced and carried over into the primary defect and sutured into place.
Chonodrocutaneous Helical Advancement Flap Text: The defect edges were debeveled with a #15 scalpel blade. Given the location of the defect and the proximity to free margins a chondrocutaneous helical advancement flap was deemed most appropriate. Using a sterile surgical marker, the appropriate advancement flap was drawn incorporating the defect and placing the expected incisions within the relaxed skin tension lines where possible. The area thus outlined was incised deep to adipose tissue with a #15 scalpel blade. The skin margins were undermined to an appropriate distance in all directions utilizing iris scissors. Following this, the designed flap was advanced and carried over into the primary defect and sutured into place.
Crescentic Advancement Flap Text: The defect edges were debeveled with a #15 scalpel blade. Given the location of the defect and the proximity to free margins a crescentic advancement flap was deemed most appropriate. Using a sterile surgical marker, the appropriate advancement flap was drawn incorporating the defect and placing the expected incisions within the relaxed skin tension lines where possible. The area thus outlined was incised deep to adipose tissue with a #15 scalpel blade. The skin margins were undermined to an appropriate distance in all directions utilizing iris scissors. Following this, the designed flap was advanced and carried over into the primary defect and sutured into place.
Dorsal Nasal Flap Text: The defect edges were debeveled with a #15 scalpel blade. Given the location of the defect and the proximity to free margins a dorsal nasal flap was deemed most appropriate. Using a sterile surgical marker, an appropriate dorsal nasal flap was drawn around the defect. The area thus outlined was incised deep to adipose tissue with a #15 scalpel blade. The skin margins were undermined to an appropriate distance in all directions utilizing iris scissors. Following this, the designed flap was carried into the primary defect and sutured into place.
Double Island Pedicle Flap Text: The defect edges were debeveled with a #15 scalpel blade. Given the location of the defect, shape of the defect and the proximity to free margins a double island pedicle advancement flap was deemed most appropriate. Using a sterile surgical marker, an appropriate advancement flap was drawn incorporating the defect, outlining the appropriate donor tissue and placing the expected incisions within the relaxed skin tension lines where possible. The area thus outlined was incised deep to adipose tissue with a #15 scalpel blade. The skin margins were undermined to an appropriate distance in all directions around the primary defect and laterally outward around the island pedicle utilizing iris scissors.  There was minimal undermining beneath the pedicle flap. Following this, the flap was carried over into the primary defect and sutured into place.
Double O-Z Flap Text: The defect edges were debeveled with a #15 scalpel blade. Given the location of the defect, shape of the defect and the proximity to free margins a Double O-Z flap was deemed most appropriate. Using a sterile surgical marker, an appropriate transposition flap was drawn incorporating the defect and placing the expected incisions within the relaxed skin tension lines where possible. The area thus outlined was incised deep to adipose tissue with a #15 scalpel blade. The skin margins were undermined to an appropriate distance in all directions utilizing iris scissors. Following this, the designed flap was carried over into the primary defect and sutured into place.
Double O-Z Plasty Text: The defect edges were debeveled with a #15 scalpel blade. Given the location of the defect, shape of the defect and the proximity to free margins a Double O-Z plasty (double transposition flap) was deemed most appropriate. Using a sterile surgical marker, the appropriate transposition flaps were drawn incorporating the defect and placing the expected incisions within the relaxed skin tension lines where possible. The area thus outlined was incised deep to adipose tissue with a #15 scalpel blade. The skin margins were undermined to an appropriate distance in all directions utilizing iris scissors. Hemostasis was achieved with electrocautery. The flaps were then transposed and carried over into place, one clockwise and the other counterclockwise, and anchored with interrupted buried subcutaneous sutures.
Double Z Plasty Text: The lesion was extirpated to the level of the fat with a #15 scalpel blade. Given the location of the defect, shape of the defect and the proximity to free margins a double Z-plasty was deemed most appropriate for repair. Using a sterile surgical marker, the appropriate transposition arms of the double Z-plasty were drawn incorporating the defect and placing the expected incisions within the relaxed skin tension lines where possible. The area thus outlined was incised deep to adipose tissue with a #15 scalpel blade. The skin margins were undermined to an appropriate distance in all directions utilizing iris scissors. The opposing transposition arms were then transposed and carried over into place in opposite direction and anchored with interrupted buried subcutaneous sutures.
Ear Star Wedge Flap Text: The defect edges were debeveled with a #15 blade scalpel.  Given the location of the defect and the proximity to free margins (helical rim) an ear star wedge flap was deemed most appropriate. Using a sterile surgical marker, the appropriate flap was drawn incorporating the defect and placing the expected incisions between the helical rim and antihelix where possible.  The area thus outlined was incised through and through with a #15 scalpel blade. Following this, the designed flap was carried over into the primary defect and sutured into place.
Flip-Flop Flap Text: The defect edges were debeveled with a #15 blade scalpel.  Given the location of the defect and the proximity to free margins a flip-flop flap was deemed most appropriate. Using a sterile surgical marker, the appropriate flap was drawn incorporating the defect and placing the expected incisions between the helical rim and antihelix where possible.  The area thus outlined was incised through and through with a #15 scalpel blade. Following this, the designed flap was carried over into the primary defect and sutured into place.
Hatchet Flap Text: The defect edges were debeveled with a #15 scalpel blade. Given the location of the defect, shape of the defect and the proximity to free margins a hatchet flap was deemed most appropriate. Using a sterile surgical marker, an appropriate hatchet flap was drawn incorporating the defect and placing the expected incisions within the relaxed skin tension lines where possible. The area thus outlined was incised deep to adipose tissue with a #15 scalpel blade. The skin margins were undermined to an appropriate distance in all directions utilizing iris scissors. Following this, the designed flap was carried over into the primary defect and sutured into place.
Helical Rim Advancement Flap Text: The defect edges were debeveled with a #15 blade scalpel.  Given the location of the defect and the proximity to free margins (helical rim) a double helical rim advancement flap was deemed most appropriate. Using a sterile surgical marker, the appropriate advancement flaps were drawn incorporating the defect and placing the expected incisions between the helical rim and antihelix where possible.  The area thus outlined was incised through and through with a #15 scalpel blade.  With a skin hook and iris scissors, the flaps were gently and sharply undermined and freed up. Folllowing this, the designed flaps were carried over into the primary defect and sutured into place.
H Plasty Text: Given the location of the defect, shape of the defect and the proximity to free margins a H-plasty was deemed most appropriate for repair. Using a sterile surgical marker, the appropriate advancement arms of the H-plasty were drawn incorporating the defect and placing the expected incisions within the relaxed skin tension lines where possible. The area thus outlined was incised deep to adipose tissue with a #15 scalpel blade. The skin margins were undermined to an appropriate distance in all directions utilizing iris scissors.  The opposing advancement arms were then advanced and carried over into place in opposite direction and anchored with interrupted buried subcutaneous sutures.
Island Pedicle Flap Text: The defect edges were debeveled with a #15 scalpel blade. Given the location of the defect, shape of the defect and the proximity to free margins an island pedicle advancement flap was deemed most appropriate. Using a sterile surgical marker, an appropriate advancement flap was drawn incorporating the defect, outlining the appropriate donor tissue and placing the expected incisions within the relaxed skin tension lines where possible. The area thus outlined was incised deep to adipose tissue with a #15 scalpel blade. The skin margins were undermined to an appropriate distance in all directions around the primary defect and laterally outward around the island pedicle utilizing iris scissors.  There was minimal undermining beneath the pedicle flap. Following this, the flap was carried over into the primary defect and sutured into place.
Island Pedicle Flap With Canthal Suspension Text: The defect edges were debeveled with a #15 scalpel blade. Given the location of the defect, shape of the defect and the proximity to free margins an island pedicle advancement flap was deemed most appropriate. Using a sterile surgical marker, an appropriate advancement flap was drawn incorporating the defect, outlining the appropriate donor tissue and placing the expected incisions within the relaxed skin tension lines where possible. The area thus outlined was incised deep to adipose tissue with a #15 scalpel blade. The skin margins were undermined to an appropriate distance in all directions around the primary defect and laterally outward around the island pedicle utilizing iris scissors.  There was minimal undermining beneath the pedicle flap. A suspension suture was placed in the canthal tendon to prevent tension and prevent ectropion. Following this, the designed flap was placed into the primary defect and sutured into place.
Island Pedicle Flap-Requiring Vessel Identification Text: The defect edges were debeveled with a #15 scalpel blade. Given the location of the defect, shape of the defect and the proximity to free margins an island pedicle advancement flap was deemed most appropriate. Using a sterile surgical marker, an appropriate advancement flap was drawn, based on the axial vessel mentioned above, incorporating the defect, outlining the appropriate donor tissue and placing the expected incisions within the relaxed skin tension lines where possible. The area thus outlined was incised deep to adipose tissue with a #15 scalpel blade. The skin margins were undermined to an appropriate distance in all directions around the primary defect and laterally outward around the island pedicle utilizing iris scissors.  There was minimal undermining beneath the pedicle flap. Following this, the designed flap was carried over into the primary defect and sutured into place.
Keystone Flap Text: The defect edges were debeveled with a #15 scalpel blade. Given the location of the defect, shape of the defect a keystone flap was deemed most appropriate. Using a sterile surgical marker, an appropriate keystone flap was drawn incorporating the defect, outlining the appropriate donor tissue and placing the expected incisions within the relaxed skin tension lines where possible. The area thus outlined was incised deep to adipose tissue with a #15 scalpel blade. The skin margins were undermined to an appropriate distance in all directions around the primary defect and laterally outward around the flap utilizing iris scissors. Following this, the designed flap was carried into the primary defect and sutured into place.
Melolabial Transposition Flap Text: The defect edges were debeveled with a #15 scalpel blade. Given the location of the defect and the proximity to free margins a melolabial flap was deemed most appropriate. Using a sterile surgical marker, an appropriate melolabial transposition flap was drawn incorporating the defect. The area thus outlined was incised deep to adipose tissue with a #15 scalpel blade. The skin margins were undermined to an appropriate distance in all directions utilizing iris scissors. Following this, the designed flap was carried over into the primary defect and sutured into place.
Mercedes Flap Text: The defect edges were debeveled with a #15 scalpel blade. Given the location of the defect, shape of the defect and the proximity to free margins a Mercedes flap was deemed most appropriate. Using a sterile surgical marker, an appropriate advancement flap was drawn incorporating the defect and placing the expected incisions within the relaxed skin tension lines where possible. The area thus outlined was incised deep to adipose tissue with a #15 scalpel blade. The skin margins were undermined to an appropriate distance in all directions utilizing iris scissors. Following this, the designed flap was advanced and carried over into the primary defect and sutured into place.
Modified Advancement Flap Text: The defect edges were debeveled with a #15 scalpel blade. Given the location of the defect, shape of the defect and the proximity to free margins a modified advancement flap was deemed most appropriate. Using a sterile surgical marker, an appropriate advancement flap was drawn incorporating the defect and placing the expected incisions within the relaxed skin tension lines where possible. The area thus outlined was incised deep to adipose tissue with a #15 scalpel blade. The skin margins were undermined to an appropriate distance in all directions utilizing iris scissors. Following this, the designed flap was advanced and carried over into the primary defect and sutured into place.
Mucosal Advancement Flap Text: Given the location of the defect, shape of the defect and the proximity to free margins a mucosal advancement flap was deemed most appropriate. Incisions were made with a 15 blade scalpel in the appropriate fashion along the cutaneous vermilion border and the mucosal lip. The remaining actinically damaged mucosal tissue was excised.  The mucosal advancement flap was then elevated to the gingival sulcus with care taken to preserve the neurovascular structures and advanced into the primary defect. Care was taken to ensure that precise realignment of the vermilion border was achieved.
Muscle Hinge Flap Text: The defect edges were debeveled with a #15 scalpel blade.  Given the size, depth and location of the defect and the proximity to free margins a muscle hinge flap was deemed most appropriate. Using a sterile surgical marker, an appropriate hinge flap was drawn incorporating the defect. The area thus outlined was incised with a #15 scalpel blade. The skin margins were undermined to an appropriate distance in all directions utilizing iris scissors. Following this, the designed flap was carried into the primary defect and sutured into place.
Mustarde Flap Text: The defect edges were debeveled with a #15 scalpel blade.  Given the size, depth and location of the defect and the proximity to free margins a Mustarde flap was deemed most appropriate. Using a sterile surgical marker, an appropriate flap was drawn incorporating the defect. The area thus outlined was incised with a #15 scalpel blade. The skin margins were undermined to an appropriate distance in all directions utilizing iris scissors. Following this, the designed flap was carried into the primary defect and sutured into place.
Nasal Turnover Hinge Flap Text: The defect edges were debeveled with a #15 scalpel blade.  Given the size, depth, location of the defect and the defect being full thickness a nasal turnover hinge flap was deemed most appropriate. Using a sterile surgical marker, an appropriate hinge flap was drawn incorporating the defect. The area thus outlined was incised with a #15 scalpel blade. The flap was designed to recreate the nasal mucosal lining and the alar rim. The skin margins were undermined to an appropriate distance in all directions utilizing iris scissors. Following this, the designed flap was carried over into the primary defect and sutured into place
Nasalis-Muscle-Based Myocutaneous Island Pedicle Flap Text: Using a #15 blade, an incision was made around the donor flap to the level of the nasalis muscle. Wide lateral undermining was then performed in both the subcutaneous plane above the nasalis muscle, and in a submuscular plane just above periosteum. This allowed the formation of a free nasalis muscle axial pedicle (based on the angular artery) which was still attached to the actual cutaneous flap, increasing its mobility and vascular viability. Hemostasis was obtained with pinpoint electrocoagulation. The flap was mobilized into position and the pivotal anchor points positioned and stabilized with buried interrupted sutures. Subcutaneous and dermal tissues were closed in a multilayered fashion with sutures. Tissue redundancies were excised, and the epidermal edges were apposed without significant tension and sutured with sutures.
Nasalis Myocutaneous Flap Text: Using a #15 blade, an incision was made around the donor flap to the level of the nasalis muscle. Wide lateral undermining was then performed in both the subcutaneous plane above the nasalis muscle, and in a submuscular plane just above periosteum. This allowed the formation of a free nasalis muscle axial pedicle which was still attached to the actual cutaneous flap, increasing its mobility and vascular viability. Hemostasis was obtained with pinpoint electrocoagulation. The flap was mobilized into position and the pivotal anchor points positioned and stabilized with buried interrupted sutures. Subcutaneous and dermal tissues were closed in a multilayered fashion with sutures. Tissue redundancies were excised, and the epidermal edges were apposed without significant tension and sutured with sutures.
Nasolabial Transposition Flap Text: The defect edges were debeveled with a #15 scalpel blade.  Given the size, depth and location of the defect and the proximity to free margins a nasolabial transposition flap was deemed most appropriate. Using a sterile surgical marker, an appropriate flap was drawn incorporating the defect. The area thus outlined was incised with a #15 scalpel blade. The skin margins were undermined to an appropriate distance in all directions utilizing iris scissors. Following this, the designed flap was carried into the primary defect and sutured into place.
Orbicularis Oris Muscle Flap Text: The defect edges were debeveled with a #15 scalpel blade.  Given that the defect affected the competency of the oral sphincter an orbicularis oris muscle flap was deemed most appropriate to restore this competency and normal muscle function.  Using a sterile surgical marker, an appropriate flap was drawn incorporating the defect. The area thus outlined was incised with a #15 scalpel blade. Following this, the designed flap was carried over into the primary defect and sutured into place.
O-T Advancement Flap Text: The defect edges were debeveled with a #15 scalpel blade. Given the location of the defect, shape of the defect and the proximity to free margins an O-T advancement flap was deemed most appropriate. Using a sterile surgical marker, an appropriate advancement flap was drawn incorporating the defect and placing the expected incisions within the relaxed skin tension lines where possible. The area thus outlined was incised deep to adipose tissue with a #15 scalpel blade. The skin margins were undermined to an appropriate distance in all directions utilizing iris scissors. Following this, the designed flap was advanced and carried over into the primary defect and sutured into place.
O-T Plasty Text: The defect edges were debeveled with a #15 scalpel blade. Given the location of the defect, shape of the defect and the proximity to free margins an O-T plasty was deemed most appropriate. Using a sterile surgical marker, an appropriate O-T plasty was drawn incorporating the defect and placing the expected incisions within the relaxed skin tension lines where possible. The area thus outlined was incised deep to adipose tissue with a #15 scalpel blade. The skin margins were undermined to an appropriate distance in all directions utilizing iris scissors. Following this, the designed flap was carried over into the primary defect and sutured into place.
O-L Flap Text: The defect edges were debeveled with a #15 scalpel blade. Given the location of the defect, shape of the defect and the proximity to free margins an O-L flap was deemed most appropriate. Using a sterile surgical marker, an appropriate advancement flap was drawn incorporating the defect and placing the expected incisions within the relaxed skin tension lines where possible. The area thus outlined was incised deep to adipose tissue with a #15 scalpel blade. The skin margins were undermined to an appropriate distance in all directions utilizing iris scissors. Following this, the designed flap was advanced and carried over into the primary defect and sutured into place.
O-Z Flap Text: The defect edges were debeveled with a #15 scalpel blade. Given the location of the defect, shape of the defect and the proximity to free margins an O-Z flap was deemed most appropriate. Using a sterile surgical marker, an appropriate transposition flap was drawn incorporating the defect and placing the expected incisions within the relaxed skin tension lines where possible. The area thus outlined was incised deep to adipose tissue with a #15 scalpel blade. The skin margins were undermined to an appropriate distance in all directions utilizing iris scissors. Following this, the designed flap was carried over into the primary defect and sutured into place.
O-Z Plasty Text: The defect edges were debeveled with a #15 scalpel blade. Given the location of the defect, shape of the defect and the proximity to free margins an O-Z plasty (double transposition flap) was deemed most appropriate. Using a sterile surgical marker, the appropriate transposition flaps were drawn incorporating the defect and placing the expected incisions within the relaxed skin tension lines where possible. The area thus outlined was incised deep to adipose tissue with a #15 scalpel blade. The skin margins were undermined to an appropriate distance in all directions utilizing iris scissors. Hemostasis was achieved with electrocautery. The flaps were then transposed and carried over into place, one clockwise and the other counterclockwise, and anchored with interrupted buried subcutaneous sutures.
Peng Advancement Flap Text: The defect edges were debeveled with a #15 scalpel blade. Given the location of the defect, shape of the defect and the proximity to free margins a Peng advancement flap was deemed most appropriate. Using a sterile surgical marker, an appropriate advancement flap was drawn incorporating the defect and placing the expected incisions within the relaxed skin tension lines where possible. The area thus outlined was incised deep to adipose tissue with a #15 scalpel blade. The skin margins were undermined to an appropriate distance in all directions utilizing iris scissors. Following this, the designed flap was advanced and carried over into the primary defect and sutured into place.
Rectangular Flap Text: The defect edges were debeveled with a #15 scalpel blade. Given the location of the defect and the proximity to free margins a rectangular flap was deemed most appropriate. Using a sterile surgical marker, an appropriate rectangular flap was drawn incorporating the defect. The area thus outlined was incised deep to adipose tissue with a #15 scalpel blade. The skin margins were undermined to an appropriate distance in all directions utilizing iris scissors. Following this, the designed flap was carried over into the primary defect and sutured into place.
Rhombic Flap Text: The defect edges were debeveled with a #15 scalpel blade. Given the location of the defect and the proximity to free margins a rhombic flap was deemed most appropriate. Using a sterile surgical marker, an appropriate rhombic flap was drawn incorporating the defect. The area thus outlined was incised deep to adipose tissue with a #15 scalpel blade. The skin margins were undermined to an appropriate distance in all directions utilizing iris scissors. Following this, the designed flap was carried over into the primary defect and sutured into place.
Rhomboid Transposition Flap Text: The defect edges were debeveled with a #15 scalpel blade. Given the location of the defect and the proximity to free margins a rhomboid transposition flap was deemed most appropriate. Using a sterile surgical marker, an appropriate rhomboid flap was drawn incorporating the defect. The area thus outlined was incised deep to adipose tissue with a #15 scalpel blade. The skin margins were undermined to an appropriate distance in all directions utilizing iris scissors. Following this, the designed flap was carried over into the primary defect and sutured into place.
Rotation Flap Text: The defect edges were debeveled with a #15 scalpel blade. Given the location of the defect, shape of the defect and the proximity to free margins a rotation flap was deemed most appropriate. Using a sterile surgical marker, an appropriate rotation flap was drawn incorporating the defect and placing the expected incisions within the relaxed skin tension lines where possible. The area thus outlined was incised deep to adipose tissue with a #15 scalpel blade. The skin margins were undermined to an appropriate distance in all directions utilizing iris scissors. Following this, the designed flap was carried over into the primary defect and sutured into place.
Spiral Flap Text: The defect edges were debeveled with a #15 scalpel blade. Given the location of the defect, shape of the defect and the proximity to free margins a spiral flap was deemed most appropriate. Using a sterile surgical marker, an appropriate rotation flap was drawn incorporating the defect and placing the expected incisions within the relaxed skin tension lines where possible. The area thus outlined was incised deep to adipose tissue with a #15 scalpel blade. The skin margins were undermined to an appropriate distance in all directions utilizing iris scissors. Following this, the designed flap was carried over into the primary defect and sutured into place.
Staged Advancement Flap Text: The defect edges were debeveled with a #15 scalpel blade. Given the location of the defect, shape of the defect and the proximity to free margins a staged advancement flap was deemed most appropriate. Using a sterile surgical marker, an appropriate advancement flap was drawn incorporating the defect and placing the expected incisions within the relaxed skin tension lines where possible. The area thus outlined was incised deep to adipose tissue with a #15 scalpel blade. The skin margins were undermined to an appropriate distance in all directions utilizing iris scissors. Following this, the designed flap was carried over into the primary defect and sutured into place.
Star Wedge Flap Text: The defect edges were debeveled with a #15 scalpel blade. Given the location of the defect, shape of the defect and the proximity to free margins a star wedge flap was deemed most appropriate. Using a sterile surgical marker, an appropriate rotation flap was drawn incorporating the defect and placing the expected incisions within the relaxed skin tension lines where possible. The area thus outlined was incised deep to adipose tissue with a #15 scalpel blade. The skin margins were undermined to an appropriate distance in all directions utilizing iris scissors. Following this, the designed flap was carried over into the primary defect and sutured into place.
Transposition Flap Text: The defect edges were debeveled with a #15 scalpel blade. Given the location of the defect and the proximity to free margins a transposition flap was deemed most appropriate. Using a sterile surgical marker, an appropriate transposition flap was drawn incorporating the defect. The area thus outlined was incised deep to adipose tissue with a #15 scalpel blade. The skin margins were undermined to an appropriate distance in all directions utilizing iris scissors. Following this, the designed flap was carried over into the primary defect and sutured into place.
Trilobed Flap Text: The defect edges were debeveled with a #15 scalpel blade. Given the location of the defect and the proximity to free margins a trilobed flap was deemed most appropriate. Using a sterile surgical marker, an appropriate trilobed flap was drawn around the defect. The area thus outlined was incised deep to adipose tissue with a #15 scalpel blade. The skin margins were undermined to an appropriate distance in all directions utilizing iris scissors. Following this, the designed flap was carried into the primary defect and sutured into place.
V-Y Flap Text: The defect edges were debeveled with a #15 scalpel blade. Given the location of the defect, shape of the defect and the proximity to free margins a V-Y flap was deemed most appropriate. Using a sterile surgical marker, an appropriate advancement flap was drawn incorporating the defect and placing the expected incisions within the relaxed skin tension lines where possible. The area thus outlined was incised deep to adipose tissue with a #15 scalpel blade. The skin margins were undermined to an appropriate distance in all directions utilizing iris scissors. Following this, the designed flap was advanced and carried over into the primary defect and sutured into place.
V-Y Plasty Text: The defect edges were debeveled with a #15 scalpel blade. Given the location of the defect, shape of the defect and the proximity to free margins an V-Y advancement flap was deemed most appropriate. Using a sterile surgical marker, an appropriate advancement flap was drawn incorporating the defect and placing the expected incisions within the relaxed skin tension lines where possible. The area thus outlined was incised deep to adipose tissue with a #15 scalpel blade. The skin margins were undermined to an appropriate distance in all directions utilizing iris scissors. Following this, the designed flap was advanced and carried over into the primary defect and sutured into place.
W Plasty Text: The lesion was extirpated to the level of the fat with a #15 scalpel blade. Given the location of the defect, shape of the defect and the proximity to free margins a W-plasty was deemed most appropriate for repair. Using a sterile surgical marker, the appropriate transposition arms of the W-plasty were drawn incorporating the defect and placing the expected incisions within the relaxed skin tension lines where possible. The area thus outlined was incised deep to adipose tissue with a #15 scalpel blade. The skin margins were undermined to an appropriate distance in all directions utilizing iris scissors. The opposing transposition arms were then transposed and carried over into place in opposite direction and anchored with interrupted buried subcutaneous sutures.
Z Plasty Text: The lesion was extirpated to the level of the fat with a #15 scalpel blade. Given the location of the defect, shape of the defect and the proximity to free margins a Z-plasty was deemed most appropriate for repair. Using a sterile surgical marker, the appropriate transposition arms of the Z-plasty were drawn incorporating the defect and placing the expected incisions within the relaxed skin tension lines where possible. The area thus outlined was incised deep to adipose tissue with a #15 scalpel blade. The skin margins were undermined to an appropriate distance in all directions utilizing iris scissors. The opposing transposition arms were then transposed and carried over into place in opposite direction and anchored with interrupted buried subcutaneous sutures.
Zygomaticofacial Flap Text: Given the location of the defect, shape of the defect and the proximity to free margins a zygomaticofacial flap was deemed most appropriate for repair. Using a sterile surgical marker, the appropriate flap was drawn incorporating the defect and placing the expected incisions within the relaxed skin tension lines where possible. The area thus outlined was incised deep to adipose tissue with a #15 scalpel blade with preservation of a vascular pedicle.  The skin margins were undermined to an appropriate distance in all directions utilizing iris scissors. The flap was then carried over into the defect and anchored with interrupted buried subcutaneous sutures.
Abbe Flap (Lower To Upper Lip) Text: The defect of the upper lip was assessed and measured.  Given the location and size of the defect, an Abbe flap was deemed most appropriate. Using a sterile surgical marker, an appropriate Abbe flap was measured and drawn on the lower lip. Local anesthesia was then infiltrated. A scalpel was then used to incise the upper lip through and through the skin, vermilion, muscle and mucosa, leaving the flap pedicled on the opposite side.  The flap was then rotated and transferred to the lower lip defect.  The flap was then sutured into place with a three layer technique, closing the orbicularis oris muscle layer with subcutaneous buried sutures, followed by a mucosal layer and an epidermal layer.
Abbe Flap (Upper To Lower Lip) Text: The defect of the lower lip was assessed and measured.  Given the location and size of the defect, an Abbe flap was deemed most appropriate. Using a sterile surgical marker, an appropriate Abbe flap was measured and drawn on the upper lip. Local anesthesia was then infiltrated.  A scalpel was then used to incise the upper lip through and through the skin, vermilion, muscle and mucosa, leaving the flap pedicled on the opposite side.  The flap was then rotated and transferred to the lower lip defect.  The flap was then sutured into place with a three layer technique, closing the orbicularis oris muscle layer with subcutaneous buried sutures, followed by a mucosal layer and an epidermal layer.
Cheek Interpolation Flap Text: A decision was made to reconstruct the defect utilizing an interpolation axial flap and a staged reconstruction.  A telfa template was made of the defect.  This telfa template was then used to outline the Cheek Interpolation flap.  The donor area for the pedicle flap was then injected with anesthesia.  The flap was excised through the skin and subcutaneous tissue down to the layer of the underlying musculature.  The interpolation flap was carefully excised within this deep plane to maintain its blood supply.  The edges of the donor site were undermined.   The donor site was closed in a primary fashion.  The pedicle was then rotated into position and sutured.  Once the tube was sutured into place, adequate blood supply was confirmed with blanching and refill.  The pedicle was then wrapped with xeroform gauze and dressed appropriately with a telfa and gauze bandage to ensure continued blood supply and protect the attached pedicle.
Cheek-To-Nose Interpolation Flap Text: A decision was made to reconstruct the defect utilizing an interpolation axial flap and a staged reconstruction.  A telfa template was made of the defect.  This telfa template was then used to outline the Cheek-To-Nose Interpolation flap.  The donor area for the pedicle flap was then injected with anesthesia.  The flap was excised through the skin and subcutaneous tissue down to the layer of the underlying musculature.  The interpolation flap was carefully excised within this deep plane to maintain its blood supply.  The edges of the donor site were undermined.   The donor site was closed in a primary fashion.  The pedicle was then rotated into position and sutured.  Once the tube was sutured into place, adequate blood supply was confirmed with blanching and refill.  The pedicle was then wrapped with xeroform gauze and dressed appropriately with a telfa and gauze bandage to ensure continued blood supply and protect the attached pedicle.
Estlander Flap (Lower To Upper Lip) Text: The defect of the lower lip was assessed and measured.  Given the location and size of the defect, an Estlander flap was deemed most appropriate. Using a sterile surgical marker, an appropriate Estlander flap was measured and drawn on the upper lip. Local anesthesia was then infiltrated. A scalpel was then used to incise the lateral aspect of the flap, through skin, muscle and mucosa, leaving the flap pedicled medially.  The flap was then rotated and positioned to fill the lower lip defect.  The flap was then sutured into place with a three layer technique, closing the orbicularis oris muscle layer with subcutaneous buried sutures, followed by a mucosal layer and an epidermal layer.
Interpolation Flap Text: A decision was made to reconstruct the defect utilizing an interpolation axial flap and a staged reconstruction.  A telfa template was made of the defect.  This telfa template was then used to outline the interpolation flap.  The donor area for the pedicle flap was then injected with anesthesia.  The flap was excised through the skin and subcutaneous tissue down to the layer of the underlying musculature.  The interpolation flap was carefully excised within this deep plane to maintain its blood supply.  The edges of the donor site were undermined.   The donor site was closed in a primary fashion.  The pedicle was then rotated into position and sutured.  Once the tube was sutured into place, adequate blood supply was confirmed with blanching and refill.  The pedicle was then wrapped with xeroform gauze and dressed appropriately with a telfa and gauze bandage to ensure continued blood supply and protect the attached pedicle.
Melolabial Interpolation Flap Text: A decision was made to reconstruct the defect utilizing an interpolation axial flap and a staged reconstruction.  A telfa template was made of the defect.  This telfa template was then used to outline the melolabial interpolation flap.  The donor area for the pedicle flap was then injected with anesthesia.  The flap was excised through the skin and subcutaneous tissue down to the layer of the underlying musculature.  The pedicle flap was carefully excised within this deep plane to maintain its blood supply.  The edges of the donor site were undermined.   The donor site was closed in a primary fashion.  The pedicle was then rotated into position and sutured.  Once the tube was sutured into place, adequate blood supply was confirmed with blanching and refill.  The pedicle was then wrapped with xeroform gauze and dressed appropriately with a telfa and gauze bandage to ensure continued blood supply and protect the attached pedicle.
Mastoid Interpolation Flap Text: A decision was made to reconstruct the defect utilizing an interpolation axial flap and a staged reconstruction.  A telfa template was made of the defect.  This telfa template was then used to outline the mastoid interpolation flap.  The donor area for the pedicle flap was then injected with anesthesia.  The flap was excised through the skin and subcutaneous tissue down to the layer of the underlying musculature.  The pedicle flap was carefully excised within this deep plane to maintain its blood supply.  The edges of the donor site were undermined.   The donor site was closed in a primary fashion.  The pedicle was then rotated into position and sutured.  Once the tube was sutured into place, adequate blood supply was confirmed with blanching and refill.  The pedicle was then wrapped with xeroform gauze and dressed appropriately with a telfa and gauze bandage to ensure continued blood supply and protect the attached pedicle.
Paramedian Forehead Flap Text: A decision was made to reconstruct the defect utilizing an interpolation axial flap and a staged reconstruction.  A telfa template was made of the defect.  This telfa template was then used to outline the paramedian forehead pedicle flap.  The donor area for the pedicle flap was then injected with anesthesia.  The flap was excised through the skin and subcutaneous tissue down to the layer of the underlying musculature.  The pedicle flap was carefully excised within this deep plane to maintain its blood supply.  The edges of the donor site were undermined.   The donor site was closed in a primary fashion.  The pedicle was then rotated into position and sutured.  Once the tube was sutured into place, adequate blood supply was confirmed with blanching and refill.  The pedicle was then wrapped with xeroform gauze and dressed appropriately with a telfa and gauze bandage to ensure continued blood supply and protect the attached pedicle.
Posterior Auricular Interpolation Flap Text: A decision was made to reconstruct the defect utilizing an interpolation axial flap and a staged reconstruction.  A telfa template was made of the defect.  This telfa template was then used to outline the posterior auricular interpolation flap.  The donor area for the pedicle flap was then injected with anesthesia.  The flap was excised through the skin and subcutaneous tissue down to the layer of the underlying musculature.  The pedicle flap was carefully excised within this deep plane to maintain its blood supply.  The edges of the donor site were undermined.   The donor site was closed in a primary fashion.  The pedicle was then rotated into position and sutured.  Once the tube was sutured into place, adequate blood supply was confirmed with blanching and refill.  The pedicle was then wrapped with xeroform gauze and dressed appropriately with a telfa and gauze bandage to ensure continued blood supply and protect the attached pedicle.
Cheiloplasty (Complex) Text: A decision was made to reconstruct the defect with a  cheiloplasty.  The defect was undermined extensively.  Additional orbicularis oris muscle was excised with a 15 blade scalpel.  The defect was converted into a full thickness wedge to facilite a better cosmetic result.  Small vessels were then tied off with 5-0 monocyrl. The orbicularis oris, superficial fascia, adipose and dermis were then reapproximated.  After the deeper layers were approximated the epidermis was reapproximated with particular care given to realign the vermilion border.
Cheiloplasty (Less Than 50%) Text: A decision was made to reconstruct the defect with a  cheiloplasty.  The defect was undermined extensively.  Additional orbicularis oris muscle was excised with a 15 blade scalpel.  The defect was converted into a full thickness wedge, of less than 50% of the vertical height of the lip, to facilite a better cosmetic result.  Small vessels were then tied off with 5-0 monocyrl. The orbicularis oris, superficial fascia, adipose and dermis were then reapproximated.  After the deeper layers were approximated the epidermis was reapproximated with particular care given to realign the vermilion border.
Ear Wedge Repair Text: A wedge excision was completed by carrying down an excision through the full thickness of the ear and cartilage with an inward facing Burow's triangle. The wound was then closed in a layered fashion.
Full Thickness Lip Wedge Repair (Flap) Text: Given the location of the defect and the proximity to free margins a full thickness wedge repair was deemed most appropriate. Using a sterile surgical marker, the appropriate repair was drawn incorporating the defect and placing the expected incisions perpendicular to the vermilion border.  The vermilion border was also meticulously outlined to ensure appropriate reapproximation during the repair.  The area thus outlined was incised through and through with a #15 scalpel blade.  The muscularis and dermis were reaproximated with deep sutures following hemostasis. Care was taken to realign the vermilion border before proceeding with the superficial closure.  Once the vermilion was realigned the superfical and mucosal closure was finished.
Burow's Graft Text: The defect edges were debeveled with a #15 scalpel blade. Given the location of the defect, shape of the defect, the proximity to free margins and the presence of a standing cone deformity a Burow's skin graft was deemed most appropriate. The standing cone was removed and this tissue was then trimmed to the shape of the primary defect. The adipose tissue was also removed until only dermis and epidermis were left.  The skin margins of the secondary defect were undermined to an appropriate distance in all directions utilizing iris scissors.  The secondary defect was closed with interrupted buried subcutaneous sutures.  The skin edges were then re-apposed with running  sutures.  The skin graft was then placed in the primary defect and oriented appropriately.
Cartilage Graft Text: The defect edges were debeveled with a #15 scalpel blade. Given the location of the defect, shape of the defect, the fact the defect involved a full thickness cartilage defect a cartilage graft was deemed most appropriate.  An appropriate donor site was identified, cleansed, and anesthetized. The cartilage graft was then harvested and transferred to the recipient site, oriented appropriately and then sutured into place.  The secondary defect was then repaired using a primary closure.
Composite Graft Text: The defect edges were debeveled with a #15 scalpel blade. Given the location of the defect, shape of the defect, the proximity to free margins and the fact the defect was full thickness a composite graft was deemed most appropriate.  The defect was outline and then transferred to the donor site.  A full thickness graft was then excised from the donor site. The graft was then placed in the primary defect, oriented appropriately and then sutured into place.  The secondary defect was then repaired using a primary closure.
Epidermal Autograft Text: The defect edges were debeveled with a #15 scalpel blade. Given the location of the defect, shape of the defect and the proximity to free margins an epidermal autograft was deemed most appropriate. Using a sterile surgical marker, the primary defect shape was transferred to the donor site. The epidermal graft was then harvested.  The skin graft was then placed in the primary defect and oriented appropriately.
Dermal Autograft Text: The defect edges were debeveled with a #15 scalpel blade. Given the location of the defect, shape of the defect and the proximity to free margins a dermal autograft was deemed most appropriate. Using a sterile surgical marker, the primary defect shape was transferred to the donor site. The area thus outlined was incised deep to adipose tissue with a #15 scalpel blade.  The harvested graft was then trimmed of adipose and epidermal tissue until only dermis was left.  The skin graft was then placed in the primary defect and oriented appropriately.
Ftsg Text: The defect edges were debeveled with a #15 scalpel blade. Given the location of the defect, shape of the defect and the proximity to free margins a full thickness skin graft was deemed most appropriate. Using a sterile surgical marker, the primary defect shape was transferred to the donor site. The area thus outlined was incised deep to adipose tissue with a #15 scalpel blade.  The harvested graft was then trimmed of adipose tissue until only dermis and epidermis was left.  The skin margins of the secondary defect were undermined to an appropriate distance in all directions utilizing iris scissors.  The secondary defect was closed with interrupted buried subcutaneous sutures.  The skin edges were then re-apposed with running  sutures.  The skin graft was then placed in the primary defect and oriented appropriately.
Pinch Graft Text: The defect edges were debeveled with a #15 scalpel blade. Given the location of the defect, shape of the defect and the proximity to free margins a pinch graft was deemed most appropriate. Using a sterile surgical marker, the primary defect shape was transferred to the donor site. The area thus outlined was incised deep to adipose tissue with a #15 scalpel blade.  The harvested graft was then trimmed of adipose tissue until only dermis and epidermis was left. The skin margins of the secondary defect were undermined to an appropriate distance in all directions utilizing iris scissors.  The secondary defect was closed with interrupted buried subcutaneous sutures.  The skin edges were then re-apposed with running  sutures.  The skin graft was then placed in the primary defect and oriented appropriately.
Skin Substitute Text: The defect edges were debeveled with a #15 scalpel blade. Given the location of the defect, shape of the defect and the proximity to free margins a skin substitute graft was deemed most appropriate.  The graft material was trimmed to fit the size of the defect. The graft was then placed in the primary defect and oriented appropriately.
Split-Thickness Skin Graft Text: The defect edges were debeveled with a #15 scalpel blade. Given the location of the defect, shape of the defect and the proximity to free margins a split thickness skin graft was deemed most appropriate. Using a sterile surgical marker, the primary defect shape was transferred to the donor site. The split thickness graft was then harvested.  The skin graft was then placed in the primary defect and oriented appropriately.
Tissue Cultured Epidermal Autograft Text: The defect edges were debeveled with a #15 scalpel blade. Given the location of the defect, shape of the defect and the proximity to free margins a tissue cultured epidermal autograft was deemed most appropriate.  The graft was then trimmed to fit the size of the defect.  The graft was then placed in the primary defect and oriented appropriately.
Xenograft Text: The defect edges were debeveled with a #15 scalpel blade. Given the location of the defect, shape of the defect and the proximity to free margins a xenograft was deemed most appropriate.  The graft was then trimmed to fit the size of the defect.  The graft was then placed in the primary defect and oriented appropriately.
Complex Repair And Flap Additional Text (Will Appearing After The Standard Complex Repair Text): The complex repair was not sufficient to completely close the primary defect. The remaining additional defect was repaired with the flap mentioned below.
Complex Repair And Graft Additional Text (Will Appearing After The Standard Complex Repair Text): The complex repair was not sufficient to completely close the primary defect. The remaining additional defect was repaired with the graft mentioned below.
Eyelid Full Thickness Repair - 77343: The eyelid defect was full thickness which required a wedge repair of the eyelid. Special care was taken to ensure that the eyelid margin was realligned when placing sutures.
Eyelid Partial Thickness Repair - 50409: The eyelid defect was partial thickness which required a wedge repair of the eyelid. Special care was taken to ensure that the eyelid margin was realligned when placing sutures.
Intermediate Repair And Flap Additional Text (Will Appearing After The Standard Complex Repair Text): The intermediate repair was not sufficient to completely close the primary defect. The remaining additional defect was repaired with the flap mentioned below.
Intermediate Repair And Graft Additional Text (Will Appearing After The Standard Complex Repair Text): The intermediate repair was not sufficient to completely close the primary defect. The remaining additional defect was repaired with the graft mentioned below.
Localized Dermabrasion With 15 Blade Text: The patient was draped in routine manner.  Localized dermabrasion using a 15 blade was performed in routine manner to papillary dermis. This spot dermabrasion is being performed to complete skin cancer reconstruction. It also will eliminate the other sun damaged precancerous cells that are known to be part of the regional effect of a lifetime's worth of sun exposure. This localized dermabrasion is therapeutic and should not be considered cosmetic in any regard.
Localized Dermabrasion With Sand Papertext: The patient was draped in routine manner.  Localized dermabrasion using sterile sand paper was performed in routine manner to papillary dermis. This spot dermabrasion is being performed to complete skin cancer reconstruction. It also will eliminate the other sun damaged precancerous cells that are known to be part of the regional effect of a lifetime's worth of sun exposure. This localized dermabrasion is therapeutic and should not be considered cosmetic in any regard.
Localized Dermabrasion With Wire Brush Text: The patient was draped in routine manner.  Localized dermabrasion using 3 x 17 mm wire brush was performed in routine manner to papillary dermis. This spot dermabrasion is being performed to complete skin cancer reconstruction. It also will eliminate the other sun damaged precancerous cells that are known to be part of the regional effect of a lifetime's worth of sun exposure. This localized dermabrasion is therapeutic and should not be considered cosmetic in any regard.
Purse String (Simple) Text: Given the location of the defect and the characteristics of the surrounding skin a purse string closure was deemed most appropriate.  Undermining was performed circumferentially around the surgical defect.  A purse string suture was then placed and tightened.
Purse String (Intermediate) Text: Given the location of the defect and the characteristics of the surrounding skin a purse string intermediate closure was deemed most appropriate.  Undermining was performed circumferentially around the surgical defect.  A purse string suture was then placed and tightened.
Partial Purse String (Simple) Text: Given the location of the defect and the characteristics of the surrounding skin a simple purse string closure was deemed most appropriate.  Undermining was performed circumferentially around the surgical defect.  A purse string suture was then placed and tightened. Wound tension only allowed a partial closure of the circular defect.
Partial Purse String (Intermediate) Text: Given the location of the defect and the characteristics of the surrounding skin an intermediate purse string closure was deemed most appropriate.  Undermining was performed circumferentially around the surgical defect.  A purse string suture was then placed and tightened. Wound tension only allowed a partial closure of the circular defect.
Tarsorrhaphy Text: A tarsorrhaphy was performed using Frost sutures.
Manual Repair Warning Statement: We plan on removing the manually selected variable below in favor of our much easier automatic structured text blocks found in the previous tab. We decided to do this to help make the flow better and give you the full power of structured data. Manual selection is never going to be ideal in our platform and I would encourage you to avoid using manual selection from this point on, especially since I will be sunsetting this feature. It is important that you do one of two things with the customized text below. First, you can save all of the text in a word file so you can have it for future reference. Second, transfer the text to the appropriate area in the Library tab. Lastly, if there is a flap or graft type which we do not have you need to let us know right away so I can add it in before the variable is hidden. No need to panic, we plan to give you roughly 6 months to make the change.
Same Histology In Subsequent Stages Text: The pattern and morphology of the tumor is as described in the first stage.
No Residual Tumor Seen Histology Text: There were no malignant cells seen in the sections examined.
Inflammation Suggestive Of Cancer Camouflage Histology Text: There was a dense lymphocytic infiltrate which prevented adequate histologic evaluation of adjacent structures.
Bcc Histology Text: There were numerous aggregates of basaloid cells.
Bcc Infiltrative Histology Text: There were numerous aggregates of basaloid cells demonstrating an infiltrative pattern.
Mart-1 - Positive Histology Text: MART-1 staining demonstrates areas of higher density and clustering of melanocytes with Pagetoid spread upwards within the epidermis. The surgical margins are positive for tumor cells.
Mart-1 - Negative Histology Text: MART-1 staining demonstrates a normal density and pattern of melanocytes along the dermal-epidermal junction. The surgical margins are negative for tumor cells.
Information: Selecting Yes will display possible errors in your note based on the variables you have selected. This validation is only offered as a suggestion for you. PLEASE NOTE THAT THE VALIDATION TEXT WILL BE REMOVED WHEN YOU FINALIZE YOUR NOTE. IF YOU WANT TO FAX A PRELIMINARY NOTE YOU WILL NEED TO TOGGLE THIS TO 'NO' IF YOU DO NOT WANT IT IN YOUR FAXED NOTE.
Bill 59 Modifier?: No - Continue to Bill 79 Modifier

## 2025-01-08 ENCOUNTER — APPOINTMENT (OUTPATIENT)
Dept: URBAN - METROPOLITAN AREA CLINIC 306 | Age: 80
Setting detail: DERMATOLOGY
End: 2025-01-14

## 2025-01-08 DIAGNOSIS — L57.0 ACTINIC KERATOSIS: ICD-10-CM

## 2025-01-08 PROBLEM — C44.622 SQUAMOUS CELL CARCINOMA OF SKIN OF RIGHT UPPER LIMB, INCLUDING SHOULDER: Status: ACTIVE | Noted: 2025-01-08

## 2025-01-08 PROBLEM — C44.42 SQUAMOUS CELL CARCINOMA OF SKIN OF SCALP AND NECK: Status: ACTIVE | Noted: 2025-01-08

## 2025-01-08 PROCEDURE — OTHER SUTURE REMOVAL (GLOBAL PERIOD): OTHER

## 2025-01-08 PROCEDURE — OTHER LIQUID NITROGEN: OTHER

## 2025-01-08 PROCEDURE — 17003 DESTRUCT PREMALG LES 2-14: CPT | Mod: 79

## 2025-01-08 PROCEDURE — 17000 DESTRUCT PREMALG LESION: CPT | Mod: 59,79

## 2025-01-08 PROCEDURE — 17263 DSTRJ MAL LES T/A/L 2.1-3.0: CPT | Mod: 79

## 2025-01-08 PROCEDURE — 99024 POSTOP FOLLOW-UP VISIT: CPT

## 2025-01-08 PROCEDURE — OTHER CURETTAGE AND DESTRUCTION WITH PATHOLOGY: OTHER

## 2025-01-08 ASSESSMENT — LOCATION SIMPLE DESCRIPTION DERM
LOCATION SIMPLE: RIGHT WRIST
LOCATION SIMPLE: RIGHT CHEEK
LOCATION SIMPLE: LEFT CHEEK
LOCATION SIMPLE: RIGHT FOREHEAD

## 2025-01-08 ASSESSMENT — LOCATION DETAILED DESCRIPTION DERM
LOCATION DETAILED: LEFT INFERIOR CENTRAL MALAR CHEEK
LOCATION DETAILED: LEFT CENTRAL MALAR CHEEK
LOCATION DETAILED: RIGHT DORSAL WRIST
LOCATION DETAILED: RIGHT INFERIOR LATERAL MALAR CHEEK
LOCATION DETAILED: RIGHT SUPERIOR FOREHEAD

## 2025-01-08 ASSESSMENT — LOCATION ZONE DERM
LOCATION ZONE: ARM
LOCATION ZONE: FACE

## 2025-01-08 NOTE — PROCEDURE: CURETTAGE AND DESTRUCTION WITH PATHOLOGY
Body Location Override (Optional - Billing Will Still Be Based On Selected Body Map Location If Applicable): right distal radial dorsal forearm
Detail Level: Detailed
Size Of Lesion After Curettage: 3
Anesthesia Type: 1% lidocaine with epinephrine
Cautery Type: electrodesiccation
What Was Performed First?: Curettage
Additional Information: (Optional): The wound was cleaned, and a pressure dressing was applied.  The patient received detailed post-op instructions.
Lab: [id number]
Lab Facility: [id number]
Histology Text: Following the procedure a portion of the curetted material was sent for histologic evaluation.
Biopsy Type: H and E
Path Notes (To The Dermatopathologist): Not for margins
Render Path Notes In Note?: Yes
Consent was obtained from the patient. The risks, benefits and alternatives to therapy were discussed in detail. Specifically, the risks of infection, scarring, bleeding, prolonged wound healing, nerve injury, incomplete removal, allergy to anesthesia and recurrence were addressed. Alternatives to ED&C, such as: surgical removal and XRT were also discussed.  Prior to the procedure, the treatment site was clearly identified and confirmed by the patient. All components of Universal Protocol/PAUSE Rule completed.
Render Post-Care Instructions In Note?: no
Post-Care Instructions: I reviewed with the patient in detail post-care instructions. Patient is to keep the area dry for 48 hours, and not to engage in any swimming until the area is healed. Should the patient develop any fevers, chills, bleeding, severe pain patient will contact the office immediately.
Billing Type: Third-Party Bill
Bill As A Line Item Or As Units: Line Item

## 2025-01-08 NOTE — PROCEDURE: LIQUID NITROGEN
Application Tool (Optional): Cry-AC
Render Note In Bullet Format When Appropriate: No
Post-Care Instructions: I reviewed with the patient in detail post-care instructions. Patient is to wear sunprotection, and avoid picking at any of the treated lesions. Pt may apply Vaseline to crusted or scabbing areas.
Number Of Freeze-Thaw Cycles: 1 freeze-thaw cycle
Consent: The patient's consent was obtained including but not limited to risks of crusting, scabbing, blistering, scarring, darker or lighter pigmentary change, recurrence, incomplete removal and infection.
Show Aperture Variable?: Yes
Detail Level: Detailed
Aperture Size (Optional): A
Duration Of Freeze Thaw-Cycle (Seconds): 10

## 2025-01-08 NOTE — PROCEDURE: SUTURE REMOVAL (GLOBAL PERIOD)
Detail Level: Detailed
Add 28641 Cpt? (Important Note: In 2017 The Use Of 63776 Is Being Tracked By Cms To Determine Future Global Period Reimbursement For Global Periods): yes

## 2025-02-10 ENCOUNTER — APPOINTMENT (OUTPATIENT)
Dept: URBAN - METROPOLITAN AREA CLINIC 306 | Age: 80
Setting detail: DERMATOLOGY
End: 2025-02-10

## 2025-02-10 DIAGNOSIS — L57.0 ACTINIC KERATOSIS: ICD-10-CM

## 2025-02-10 PROCEDURE — OTHER LIQUID NITROGEN: OTHER

## 2025-02-10 PROCEDURE — 17003 DESTRUCT PREMALG LES 2-14: CPT

## 2025-02-10 PROCEDURE — 17000 DESTRUCT PREMALG LESION: CPT

## 2025-02-10 ASSESSMENT — LOCATION DETAILED DESCRIPTION DERM
LOCATION DETAILED: LEFT CENTRAL MALAR CHEEK
LOCATION DETAILED: LEFT FOREHEAD
LOCATION DETAILED: RIGHT SUPERIOR LATERAL BUCCAL CHEEK

## 2025-02-10 ASSESSMENT — LOCATION SIMPLE DESCRIPTION DERM
LOCATION SIMPLE: LEFT CHEEK
LOCATION SIMPLE: LEFT FOREHEAD
LOCATION SIMPLE: RIGHT CHEEK

## 2025-02-10 ASSESSMENT — LOCATION ZONE DERM: LOCATION ZONE: FACE

## 2025-02-10 NOTE — PROCEDURE: LIQUID NITROGEN
Duration Of Freeze Thaw-Cycle (Seconds): 10
Show Applicator Variable?: Yes
Aperture Size (Optional): A
Render Note In Bullet Format When Appropriate: No
Application Tool (Optional): Cry-AC
Consent: The patient's consent was obtained including but not limited to risks of crusting, scabbing, blistering, scarring, darker or lighter pigmentary change, recurrence, incomplete removal and infection.
Number Of Freeze-Thaw Cycles: 1 freeze-thaw cycle
Detail Level: Detailed
Post-Care Instructions: I reviewed with the patient in detail post-care instructions. Patient is to wear sunprotection, and avoid picking at any of the treated lesions. Pt may apply Vaseline to crusted or scabbing areas.

## 2025-03-10 ENCOUNTER — APPOINTMENT (OUTPATIENT)
Dept: URBAN - METROPOLITAN AREA CLINIC 306 | Age: 80
Setting detail: DERMATOLOGY
End: 2025-03-10

## 2025-03-10 DIAGNOSIS — Z85.828 PERSONAL HISTORY OF OTHER MALIGNANT NEOPLASM OF SKIN: ICD-10-CM

## 2025-03-10 DIAGNOSIS — L57.0 ACTINIC KERATOSIS: ICD-10-CM

## 2025-03-10 PROCEDURE — 17000 DESTRUCT PREMALG LESION: CPT

## 2025-03-10 PROCEDURE — OTHER LIQUID NITROGEN: OTHER

## 2025-03-10 PROCEDURE — 17003 DESTRUCT PREMALG LES 2-14: CPT

## 2025-03-10 PROCEDURE — 99212 OFFICE O/P EST SF 10 MIN: CPT | Mod: 25

## 2025-03-10 PROCEDURE — OTHER COUNSELING: OTHER

## 2025-03-10 ASSESSMENT — LOCATION SIMPLE DESCRIPTION DERM
LOCATION SIMPLE: LEFT CHEEK
LOCATION SIMPLE: LEFT TEMPLE
LOCATION SIMPLE: NECK
LOCATION SIMPLE: NOSE
LOCATION SIMPLE: RIGHT CHEEK
LOCATION SIMPLE: LEFT ANTERIOR NECK

## 2025-03-10 ASSESSMENT — LOCATION DETAILED DESCRIPTION DERM
LOCATION DETAILED: LEFT SUPERIOR LATERAL MALAR CHEEK
LOCATION DETAILED: RIGHT SUPERIOR LATERAL NECK
LOCATION DETAILED: RIGHT MEDIAL MALAR CHEEK
LOCATION DETAILED: RIGHT SUPERIOR CENTRAL MALAR CHEEK
LOCATION DETAILED: NASAL DORSUM
LOCATION DETAILED: RIGHT SUPERIOR LATERAL BUCCAL CHEEK
LOCATION DETAILED: LEFT INFERIOR CENTRAL MALAR CHEEK
LOCATION DETAILED: LEFT SUPERIOR LATERAL NECK
LOCATION DETAILED: LEFT LATERAL MALAR CHEEK
LOCATION DETAILED: RIGHT INFERIOR CENTRAL MALAR CHEEK
LOCATION DETAILED: LEFT INFERIOR TEMPLE
LOCATION DETAILED: LEFT CENTRAL MALAR CHEEK

## 2025-03-10 ASSESSMENT — LOCATION ZONE DERM
LOCATION ZONE: NECK
LOCATION ZONE: NOSE
LOCATION ZONE: NECK
LOCATION ZONE: FACE

## 2025-03-10 NOTE — PROCEDURE: LIQUID NITROGEN
Render Note In Bullet Format When Appropriate: No
Duration Of Freeze Thaw-Cycle (Seconds): 10
Show Aperture Variable?: Yes
Number Of Freeze-Thaw Cycles: 1 freeze-thaw cycle
Aperture Size (Optional): A
Application Tool (Optional): Cry-AC
Consent: The patient's consent was obtained including but not limited to risks of crusting, scabbing, blistering, scarring, darker or lighter pigmentary change, recurrence, incomplete removal and infection.
Post-Care Instructions: I reviewed with the patient in detail post-care instructions. Patient is to wear sunprotection, and avoid picking at any of the treated lesions. Pt may apply Vaseline to crusted or scabbing areas.
Detail Level: Detailed

## 2025-04-08 DIAGNOSIS — K21.00 GASTROESOPHAGEAL REFLUX DISEASE WITH ESOPHAGITIS WITHOUT HEMORRHAGE: ICD-10-CM

## 2025-04-09 RX ORDER — FAMOTIDINE 20 MG/1
TABLET, FILM COATED ORAL
Qty: 90 TABLET | Refills: 2 | OUTPATIENT
Start: 2025-04-09

## 2025-07-03 DIAGNOSIS — E11.22 CONTROLLED TYPE 2 DIABETES MELLITUS WITH STAGE 3 CHRONIC KIDNEY DISEASE, WITHOUT LONG-TERM CURRENT USE OF INSULIN (MULTI): ICD-10-CM

## 2025-07-03 DIAGNOSIS — N18.30 CONTROLLED TYPE 2 DIABETES MELLITUS WITH STAGE 3 CHRONIC KIDNEY DISEASE, WITHOUT LONG-TERM CURRENT USE OF INSULIN (MULTI): ICD-10-CM

## 2025-07-03 RX ORDER — PIOGLITAZONE 45 MG/1
45 TABLET ORAL DAILY
Qty: 90 TABLET | Refills: 2 | OUTPATIENT
Start: 2025-07-03

## 2025-08-26 DIAGNOSIS — N18.30 CONTROLLED TYPE 2 DIABETES MELLITUS WITH STAGE 3 CHRONIC KIDNEY DISEASE, WITHOUT LONG-TERM CURRENT USE OF INSULIN (MULTI): ICD-10-CM

## 2025-08-26 DIAGNOSIS — E11.22 CONTROLLED TYPE 2 DIABETES MELLITUS WITH STAGE 3 CHRONIC KIDNEY DISEASE, WITHOUT LONG-TERM CURRENT USE OF INSULIN (MULTI): ICD-10-CM

## 2025-08-27 RX ORDER — GLYBURIDE 5 MG/1
TABLET ORAL
Qty: 360 TABLET | Refills: 2 | OUTPATIENT
Start: 2025-08-27

## (undated) DEVICE — MANIFOLD, 4 PORT NEPTUNE STANDARD

## (undated) DEVICE — ADAPTER, WATER BOTTLE, SMART CAP, 140/160/180 SERIES

## (undated) DEVICE — DRESSING, GAUZE, 16 PLY, 4 X 4 IN, STERILE

## (undated) DEVICE — COVER, CART, 45 X 27 X 48 IN, CLEAR

## (undated) DEVICE — REST, HEAD, BAGEL, 9 IN

## (undated) DEVICE — VALVE, DEFENDO, 5 PCS BUTTON SET, SINGLE USE